# Patient Record
Sex: FEMALE | Race: WHITE | NOT HISPANIC OR LATINO | Employment: OTHER | URBAN - NONMETROPOLITAN AREA
[De-identification: names, ages, dates, MRNs, and addresses within clinical notes are randomized per-mention and may not be internally consistent; named-entity substitution may affect disease eponyms.]

---

## 2017-01-25 DIAGNOSIS — K63.5 COLON POLYPS: Primary | ICD-10-CM

## 2017-02-06 ENCOUNTER — ANESTHESIA EVENT (OUTPATIENT)
Dept: GASTROENTEROLOGY | Facility: HOSPITAL | Age: 72
End: 2017-02-06

## 2017-02-07 ENCOUNTER — ANESTHESIA (OUTPATIENT)
Dept: GASTROENTEROLOGY | Facility: HOSPITAL | Age: 72
End: 2017-02-07

## 2017-02-07 ENCOUNTER — HOSPITAL ENCOUNTER (OUTPATIENT)
Facility: HOSPITAL | Age: 72
Setting detail: HOSPITAL OUTPATIENT SURGERY
Discharge: HOME | End: 2017-02-07
Attending: INTERNAL MEDICINE | Admitting: INTERNAL MEDICINE

## 2017-02-07 VITALS
HEART RATE: 57 BPM | SYSTOLIC BLOOD PRESSURE: 135 MMHG | WEIGHT: 138 LBS | TEMPERATURE: 98.1 F | DIASTOLIC BLOOD PRESSURE: 66 MMHG | BODY MASS INDEX: 24.45 KG/M2 | OXYGEN SATURATION: 99 % | RESPIRATION RATE: 15 BRPM | HEIGHT: 63 IN

## 2017-02-07 DIAGNOSIS — K63.5 COLON POLYPS: ICD-10-CM

## 2017-02-07 PROCEDURE — 0DBK8ZX EXCISION OF ASCENDING COLON, VIA NATURAL OR ARTIFICIAL OPENING ENDOSCOPIC, DIAGNOSTIC: ICD-10-PCS | Performed by: INTERNAL MEDICINE

## 2017-02-07 RX ORDER — SODIUM CHLORIDE 9 MG/ML
100 INJECTION, SOLUTION INTRAVENOUS CONTINUOUS
Status: DISCONTINUED | OUTPATIENT
Start: 2017-02-07 | End: 2017-02-07 | Stop reason: HOSPADM

## 2017-02-07 RX ORDER — LIDOCAINE HYDROCHLORIDE 20 MG/ML
INJECTION, SOLUTION INFILTRATION; PERINEURAL AS NEEDED
Status: DISCONTINUED | OUTPATIENT
Start: 2017-02-07 | End: 2017-02-07 | Stop reason: SURG

## 2017-02-07 RX ORDER — PROPOFOL 10 MG/ML
VIAL (ML) INTRAVENOUS AS NEEDED
Status: DISCONTINUED | OUTPATIENT
Start: 2017-02-07 | End: 2017-02-07 | Stop reason: SURG

## 2017-02-07 RX ORDER — SODIUM CHLORIDE 0.9 % (FLUSH) 0.9 %
1-10 SYRINGE (ML) INJECTION AS NEEDED
Status: DISCONTINUED | OUTPATIENT
Start: 2017-02-07 | End: 2017-02-07 | Stop reason: HOSPADM

## 2017-02-07 RX ADMIN — LIDOCAINE HYDROCHLORIDE 50 MG: 20 INJECTION, SOLUTION INFILTRATION; PERINEURAL at 09:25

## 2017-02-07 RX ADMIN — PROPOFOL 50 MG: 10 INJECTION, EMULSION INTRAVENOUS at 09:32

## 2017-02-07 RX ADMIN — PROPOFOL 50 MG: 10 INJECTION, EMULSION INTRAVENOUS at 09:35

## 2017-02-07 RX ADMIN — PROPOFOL 50 MG: 10 INJECTION, EMULSION INTRAVENOUS at 09:40

## 2017-02-07 RX ADMIN — PROPOFOL 100 MG: 10 INJECTION, EMULSION INTRAVENOUS at 09:29

## 2017-02-07 RX ADMIN — SODIUM CHLORIDE 100 ML/HR: 9 INJECTION, SOLUTION INTRAVENOUS at 08:45

## 2017-02-07 RX ADMIN — PROPOFOL 50 MG: 10 INJECTION, EMULSION INTRAVENOUS at 09:25

## 2017-02-07 NOTE — DISCHARGE INSTRUCTIONS
Hold your Plavix for 5 days due to the removal of three polyps.  And then you may resume taking Plavix.

## 2017-02-07 NOTE — ANESTHESIA POSTPROCEDURE EVALUATION
Patient: Adela Goetz    Procedure Summary     Date Anesthesia Start Anesthesia Stop Room / Location    02/07/17 0924 0946  [facility] / [facility]       Procedure Diagnosis Surgeon Provider    COLONOSCOPY WITH ANESTHESIA (N/A ) Colon polyps  (Colon polyps [K63.5]) DO DEIRDRE Han CRNA          Anesthesia Type: No value filed.  Last vitals  BP      Temp      Pulse     Resp      SpO2        Post Anesthesia Care and Evaluation    Patient location during evaluation: PACU  Patient participation: complete - patient participated  Level of consciousness: awake and alert  Pain score: 0  Pain management: adequate  Airway patency: patent  Anesthetic complications: No anesthetic complications    Cardiovascular status: acceptable and stable  Respiratory status: acceptable and unassisted  Hydration status: acceptable

## 2017-02-07 NOTE — PLAN OF CARE
Problem: GI Endoscopy (Adult)  Goal: Signs and Symptoms of Listed Potential Problems Will be Absent or Manageable (GI Endoscopy)  Outcome: Outcome(s) achieved Date Met:  02/07/17 02/07/17 1015   GI Endoscopy   Problems Assessed (GI Endoscopy) all   Problems Present (GI Endoscopy) none

## 2017-02-07 NOTE — PLAN OF CARE
Problem: Patient Care Overview (Adult)  Goal: Discharge Needs Assessment  Outcome: Outcome(s) achieved Date Met:  02/07/17 02/07/17 1016   Discharge Needs Assessment   Concerns To Be Addressed no discharge needs identified   Equipment Needed After Discharge none   Current Health   Anticipated Changes Related to Illness none   Living Environment   Transportation Available car   Self-Care   Equipment Currently Used at Home none

## 2017-02-07 NOTE — PLAN OF CARE
Problem: Patient Care Overview (Adult)  Goal: Plan of Care Review  Outcome: Outcome(s) achieved Date Met:  02/07/17 02/07/17 1015   Coping/Psychosocial Response Interventions   Plan Of Care Reviewed With patient   Patient Care Overview   Progress improving   Outcome Evaluation   Outcome Summary/Follow up Plan discharge criteria met

## 2017-02-07 NOTE — PLAN OF CARE
Problem: Patient Care Overview (Adult)  Goal: Discharge Needs Assessment  Outcome: Outcome(s) achieved Date Met:  02/07/17 02/07/17 1016   Discharge Needs Assessment   Concerns To Be Addressed no discharge needs identified   Equipment Needed After Discharge none   Current Health   Anticipated Changes Related to Illness none   Living Environment   Transportation Available car

## 2017-02-07 NOTE — ANESTHESIA PREPROCEDURE EVALUATION
Anesthesia Evaluation     Patient summary reviewed   no history of anesthetic complications:     Airway   Mallampati: II  TM distance: >3 FB  Neck ROM: full  no difficulty expected  Dental          Pulmonary - normal exam   (+) shortness of breath,   (-) not a smoker  Cardiovascular - normal exam    Patient on routine beta blocker and Beta blocker given within 24 hours of surgery    (+) hypertension poorly controlled, CAD, dysrhythmias,   (-) past MI, angina, CHF      Neuro/Psych  (+) TIA, CVA residual symptoms, numbness,    GI/Hepatic/Renal/Endo    (+)  GERD well controlled,   (-) hiatal hernia, hepatitis, liver disease, renal disease, diabetes    Musculoskeletal     (+) arthralgias, back pain, joint swelling, neck stiffness,   Abdominal   (+) obese,    Substance History   (-) alcohol use, drug use     OB/GYN negative ob/gyn ROS         Other   (+) arthritis       Other Comment: Off plavix since 2/2                              Anesthesia Plan    ASA 3     intravenous induction   Anesthetic plan and risks discussed with patient.

## 2017-02-07 NOTE — PLAN OF CARE
Problem: GI Endoscopy (Adult)  Goal: Signs and Symptoms of Listed Potential Problems Will be Absent or Manageable (GI Endoscopy)  Outcome: Ongoing (interventions implemented as appropriate)    02/07/17 0965   GI Endoscopy   Problems Assessed (GI Endoscopy) all   Problems Present (GI Endoscopy) none

## 2017-02-07 NOTE — PLAN OF CARE
Problem: Patient Care Overview (Adult)  Goal: Adult Individualization and Mutuality  Outcome: Outcome(s) achieved Date Met:  02/07/17

## 2017-02-08 ENCOUNTER — TELEPHONE (OUTPATIENT)
Dept: GASTROENTEROLOGY | Facility: CLINIC | Age: 72
End: 2017-02-08

## 2017-02-08 ENCOUNTER — HOSPITAL ENCOUNTER (INPATIENT)
Facility: HOSPITAL | Age: 72
LOS: 2 days | Discharge: HOME | End: 2017-02-11
Attending: FAMILY MEDICINE | Admitting: INTERNAL MEDICINE

## 2017-02-08 ENCOUNTER — APPOINTMENT (OUTPATIENT)
Dept: CT IMAGING | Facility: HOSPITAL | Age: 72
End: 2017-02-08

## 2017-02-08 DIAGNOSIS — J18.9 PNEUMONIA OF LEFT UPPER LOBE DUE TO INFECTIOUS ORGANISM: Primary | ICD-10-CM

## 2017-02-08 DIAGNOSIS — R41.82 ALTERED MENTAL STATUS, UNSPECIFIED ALTERED MENTAL STATUS TYPE: ICD-10-CM

## 2017-02-08 LAB
ALBUMIN SERPL-MCNC: 4 G/DL (ref 3.5–5)
ALBUMIN/GLOB SERPL: 1.1 G/DL (ref 1.1–2.5)
ALP SERPL-CCNC: 62 U/L (ref 24–120)
ALT SERPL W P-5'-P-CCNC: 36 U/L (ref 0–54)
AMYLASE SERPL-CCNC: 104 U/L (ref 30–110)
ANION GAP SERPL CALCULATED.3IONS-SCNC: 11 MMOL/L (ref 4–13)
AST SERPL-CCNC: 42 U/L (ref 7–45)
BASOPHILS # BLD AUTO: 0.02 10*3/MM3 (ref 0–0.2)
BASOPHILS NFR BLD AUTO: 0.2 % (ref 0–2)
BILIRUB SERPL-MCNC: 1.4 MG/DL (ref 0.1–1)
BUN BLD-MCNC: 16 MG/DL (ref 5–21)
BUN/CREAT SERPL: 15.2 (ref 7–25)
CALCIUM SPEC-SCNC: 9.1 MG/DL (ref 8.4–10.4)
CHLORIDE SERPL-SCNC: 98 MMOL/L (ref 98–110)
CK MB SERPL-CCNC: <0.22 NG/ML (ref 0–5)
CK SERPL-CCNC: 27 U/L (ref 0–203)
CO2 SERPL-SCNC: 24 MMOL/L (ref 24–31)
CREAT BLD-MCNC: 1.05 MG/DL (ref 0.5–1.4)
CYTO UR: NORMAL
D-LACTATE SERPL-SCNC: 1.4 MMOL/L (ref 0.5–2)
DEPRECATED RDW RBC AUTO: 46.9 FL (ref 40–54)
EOSINOPHIL # BLD AUTO: 0 10*3/MM3 (ref 0–0.7)
EOSINOPHIL NFR BLD AUTO: 0 % (ref 0–4)
ERYTHROCYTE [DISTWIDTH] IN BLOOD BY AUTOMATED COUNT: 13.5 % (ref 12–15)
ETHANOL UR QL: <0.01 %
GFR SERPL CREATININE-BSD FRML MDRD: 52 ML/MIN/1.73
GLOBULIN UR ELPH-MCNC: 3.6 GM/DL
GLUCOSE BLD-MCNC: 153 MG/DL (ref 70–100)
HCT VFR BLD AUTO: 38.7 % (ref 37–47)
HGB BLD-MCNC: 12.9 G/DL (ref 12–16)
IMM GRANULOCYTES # BLD: 0.03 10*3/MM3 (ref 0–0.03)
IMM GRANULOCYTES NFR BLD: 0.2 % (ref 0–5)
LAB AP CASE REPORT: NORMAL
LAB AP CLINICAL INFORMATION: NORMAL
LIPASE SERPL-CCNC: 156 U/L (ref 23–203)
LYMPHOCYTES # BLD AUTO: 0.64 10*3/MM3 (ref 0.72–4.86)
LYMPHOCYTES NFR BLD AUTO: 5 % (ref 15–45)
Lab: NORMAL
MCH RBC QN AUTO: 32.2 PG (ref 28–32)
MCHC RBC AUTO-ENTMCNC: 33.3 G/DL (ref 33–36)
MCV RBC AUTO: 96.5 FL (ref 82–98)
MONOCYTES # BLD AUTO: 0.78 10*3/MM3 (ref 0.19–1.3)
MONOCYTES NFR BLD AUTO: 6 % (ref 4–12)
NEUTROPHILS # BLD AUTO: 11.45 10*3/MM3 (ref 1.87–8.4)
NEUTROPHILS NFR BLD AUTO: 88.6 % (ref 39–78)
PATH REPORT.FINAL DX SPEC: NORMAL
PATH REPORT.GROSS SPEC: NORMAL
PLATELET # BLD AUTO: 215 10*3/MM3 (ref 130–400)
PMV BLD AUTO: 11 FL (ref 6–12)
POTASSIUM BLD-SCNC: 3.4 MMOL/L (ref 3.5–5.3)
PROT SERPL-MCNC: 7.6 G/DL (ref 6.3–8.7)
RBC # BLD AUTO: 4.01 10*6/MM3 (ref 4.2–5.4)
SODIUM BLD-SCNC: 133 MMOL/L (ref 135–145)
TROPONIN I SERPL-MCNC: 0.02 NG/ML (ref 0–0.03)
WBC NRBC COR # BLD: 12.92 10*3/MM3 (ref 4.8–10.8)

## 2017-02-08 PROCEDURE — 80053 COMPREHEN METABOLIC PANEL: CPT | Performed by: FAMILY MEDICINE

## 2017-02-08 PROCEDURE — 85025 COMPLETE CBC W/AUTO DIFF WBC: CPT | Performed by: FAMILY MEDICINE

## 2017-02-08 PROCEDURE — 83605 ASSAY OF LACTIC ACID: CPT | Performed by: FAMILY MEDICINE

## 2017-02-08 PROCEDURE — 70450 CT HEAD/BRAIN W/O DYE: CPT

## 2017-02-08 PROCEDURE — 82553 CREATINE MB FRACTION: CPT | Performed by: FAMILY MEDICINE

## 2017-02-08 PROCEDURE — 84484 ASSAY OF TROPONIN QUANT: CPT | Performed by: FAMILY MEDICINE

## 2017-02-08 PROCEDURE — 80307 DRUG TEST PRSMV CHEM ANLYZR: CPT | Performed by: FAMILY MEDICINE

## 2017-02-08 PROCEDURE — 99285 EMERGENCY DEPT VISIT HI MDM: CPT

## 2017-02-08 PROCEDURE — 83690 ASSAY OF LIPASE: CPT | Performed by: FAMILY MEDICINE

## 2017-02-08 PROCEDURE — 87040 BLOOD CULTURE FOR BACTERIA: CPT | Performed by: FAMILY MEDICINE

## 2017-02-08 PROCEDURE — P9612 CATHETERIZE FOR URINE SPEC: HCPCS

## 2017-02-08 PROCEDURE — 82150 ASSAY OF AMYLASE: CPT | Performed by: FAMILY MEDICINE

## 2017-02-08 PROCEDURE — 82550 ASSAY OF CK (CPK): CPT | Performed by: FAMILY MEDICINE

## 2017-02-08 RX ORDER — SODIUM CHLORIDE 0.9 % (FLUSH) 0.9 %
10 SYRINGE (ML) INJECTION AS NEEDED
Status: DISCONTINUED | OUTPATIENT
Start: 2017-02-08 | End: 2017-02-11 | Stop reason: HOSPADM

## 2017-02-08 RX ADMIN — SODIUM CHLORIDE 1000 ML: 9 INJECTION, SOLUTION INTRAVENOUS at 23:45

## 2017-02-09 ENCOUNTER — APPOINTMENT (OUTPATIENT)
Dept: GENERAL RADIOLOGY | Facility: HOSPITAL | Age: 72
End: 2017-02-09

## 2017-02-09 PROBLEM — J18.9 PNEUMONIA OF LEFT UPPER LOBE DUE TO INFECTIOUS ORGANISM: Status: ACTIVE | Noted: 2017-02-09

## 2017-02-09 LAB
AMPHET+METHAMPHET UR QL: NEGATIVE
BACTERIA UR QL AUTO: ABNORMAL /HPF
BARBITURATES UR QL SCN: NEGATIVE
BENZODIAZ UR QL SCN: NEGATIVE
BILIRUB UR QL STRIP: NEGATIVE
CANNABINOIDS SERPL QL: NEGATIVE
CLARITY UR: CLEAR
COCAINE UR QL: NEGATIVE
COD CRY URNS QL: ABNORMAL /HPF
COLOR UR: YELLOW
FINE GRAN CASTS URNS QL MICRO: ABNORMAL /LPF
GLUCOSE UR STRIP-MCNC: NEGATIVE MG/DL
HGB UR QL STRIP.AUTO: ABNORMAL
HOLD SPECIMEN: NORMAL
HOLD SPECIMEN: NORMAL
HYALINE CASTS UR QL AUTO: ABNORMAL /LPF
KETONES UR QL STRIP: ABNORMAL
LEUKOCYTE ESTERASE UR QL STRIP.AUTO: NEGATIVE
METHADONE UR QL SCN: NEGATIVE
MUCOUS THREADS URNS QL MICRO: ABNORMAL /HPF
NITRITE UR QL STRIP: NEGATIVE
OPIATES UR QL: NEGATIVE
PCP UR QL SCN: NEGATIVE
PH UR STRIP.AUTO: 6 [PH] (ref 5–8)
PROT UR QL STRIP: ABNORMAL
RBC # UR: ABNORMAL /HPF
REF LAB TEST METHOD: ABNORMAL
RENAL EPI CELLS #/AREA URNS HPF: ABNORMAL /HPF
SP GR UR STRIP: >1.03 (ref 1–1.03)
SQUAMOUS #/AREA URNS HPF: ABNORMAL /HPF
UROBILINOGEN UR QL STRIP: ABNORMAL
WBC CASTS #/AREA URNS LPF: ABNORMAL /LPF
WBC UR QL AUTO: ABNORMAL /HPF
WHOLE BLOOD HOLD SPECIMEN: NORMAL
WHOLE BLOOD HOLD SPECIMEN: NORMAL

## 2017-02-09 PROCEDURE — 80307 DRUG TEST PRSMV CHEM ANLYZR: CPT | Performed by: FAMILY MEDICINE

## 2017-02-09 PROCEDURE — 94799 UNLISTED PULMONARY SVC/PX: CPT

## 2017-02-09 PROCEDURE — 25010000002 CEFTRIAXONE: Performed by: FAMILY MEDICINE

## 2017-02-09 PROCEDURE — 87899 AGENT NOS ASSAY W/OPTIC: CPT

## 2017-02-09 PROCEDURE — 86403 PARTICLE AGGLUT ANTBDY SCRN: CPT | Performed by: INTERNAL MEDICINE

## 2017-02-09 PROCEDURE — 86710 INFLUENZA VIRUS ANTIBODY: CPT | Performed by: INTERNAL MEDICINE

## 2017-02-09 PROCEDURE — 25010000002 ENOXAPARIN PER 10 MG: Performed by: INTERNAL MEDICINE

## 2017-02-09 PROCEDURE — 36415 COLL VENOUS BLD VENIPUNCTURE: CPT | Performed by: INTERNAL MEDICINE

## 2017-02-09 PROCEDURE — 63710000001 PREDNISONE PER 5 MG: Performed by: INTERNAL MEDICINE

## 2017-02-09 PROCEDURE — 71010 HC CHEST PA OR AP: CPT

## 2017-02-09 PROCEDURE — 81001 URINALYSIS AUTO W/SCOPE: CPT | Performed by: FAMILY MEDICINE

## 2017-02-09 PROCEDURE — 25010000002 LEVOFLOXACIN PER 250 MG: Performed by: INTERNAL MEDICINE

## 2017-02-09 RX ORDER — ACETAMINOPHEN 325 MG/1
650 TABLET ORAL EVERY 6 HOURS PRN
Status: DISCONTINUED | OUTPATIENT
Start: 2017-02-09 | End: 2017-02-11 | Stop reason: HOSPADM

## 2017-02-09 RX ORDER — PANTOPRAZOLE SODIUM 40 MG/1
40 TABLET, DELAYED RELEASE ORAL
Status: DISCONTINUED | OUTPATIENT
Start: 2017-02-09 | End: 2017-02-11 | Stop reason: HOSPADM

## 2017-02-09 RX ORDER — PREDNISONE 10 MG/1
10 TABLET ORAL
Status: DISCONTINUED | OUTPATIENT
Start: 2017-02-09 | End: 2017-02-11 | Stop reason: HOSPADM

## 2017-02-09 RX ORDER — LEVOTHYROXINE SODIUM 0.05 MG/1
50 TABLET ORAL
Status: DISCONTINUED | OUTPATIENT
Start: 2017-02-09 | End: 2017-02-11 | Stop reason: HOSPADM

## 2017-02-09 RX ORDER — POTASSIUM CHLORIDE 750 MG/1
40 CAPSULE, EXTENDED RELEASE ORAL ONCE
Status: COMPLETED | OUTPATIENT
Start: 2017-02-09 | End: 2017-02-09

## 2017-02-09 RX ORDER — ONDANSETRON 2 MG/ML
4 INJECTION INTRAMUSCULAR; INTRAVENOUS EVERY 6 HOURS PRN
Status: DISCONTINUED | OUTPATIENT
Start: 2017-02-09 | End: 2017-02-11 | Stop reason: HOSPADM

## 2017-02-09 RX ORDER — LEVOFLOXACIN 5 MG/ML
750 INJECTION, SOLUTION INTRAVENOUS EVERY 24 HOURS
Status: DISCONTINUED | OUTPATIENT
Start: 2017-02-09 | End: 2017-02-09

## 2017-02-09 RX ORDER — SODIUM CHLORIDE 0.9 % (FLUSH) 0.9 %
1-10 SYRINGE (ML) INJECTION AS NEEDED
Status: DISCONTINUED | OUTPATIENT
Start: 2017-02-09 | End: 2017-02-11 | Stop reason: HOSPADM

## 2017-02-09 RX ORDER — IPRATROPIUM BROMIDE AND ALBUTEROL SULFATE 2.5; .5 MG/3ML; MG/3ML
3 SOLUTION RESPIRATORY (INHALATION) EVERY 4 HOURS PRN
Status: DISCONTINUED | OUTPATIENT
Start: 2017-02-09 | End: 2017-02-11 | Stop reason: HOSPADM

## 2017-02-09 RX ORDER — OXYCODONE HYDROCHLORIDE AND ACETAMINOPHEN 5; 325 MG/1; MG/1
1 TABLET ORAL DAILY PRN
COMMUNITY
End: 2018-12-19

## 2017-02-09 RX ORDER — SODIUM CHLORIDE 9 MG/ML
50 INJECTION, SOLUTION INTRAVENOUS CONTINUOUS
Status: DISCONTINUED | OUTPATIENT
Start: 2017-02-09 | End: 2017-02-11 | Stop reason: HOSPADM

## 2017-02-09 RX ADMIN — POTASSIUM CHLORIDE 40 MEQ: 750 CAPSULE, EXTENDED RELEASE ORAL at 17:11

## 2017-02-09 RX ADMIN — LEVOTHYROXINE SODIUM 50 MCG: 50 TABLET ORAL at 06:22

## 2017-02-09 RX ADMIN — SODIUM CHLORIDE 100 ML/HR: 9 INJECTION, SOLUTION INTRAVENOUS at 17:44

## 2017-02-09 RX ADMIN — PANTOPRAZOLE SODIUM 40 MG: 40 TABLET, DELAYED RELEASE ORAL at 06:22

## 2017-02-09 RX ADMIN — METRONIDAZOLE 500 MG: 500 INJECTION, SOLUTION INTRAVENOUS at 04:55

## 2017-02-09 RX ADMIN — TUBERCULIN PURIFIED PROTEIN DERIVATIVE 5 UNITS: 5 INJECTION, SOLUTION INTRADERMAL at 04:55

## 2017-02-09 RX ADMIN — ENOXAPARIN SODIUM 40 MG: 40 INJECTION SUBCUTANEOUS at 11:11

## 2017-02-09 RX ADMIN — PREDNISONE 10 MG: 10 TABLET ORAL at 15:06

## 2017-02-09 RX ADMIN — DOXYCYCLINE 100 MG: 100 INJECTION, POWDER, LYOPHILIZED, FOR SOLUTION INTRAVENOUS at 17:44

## 2017-02-09 RX ADMIN — ACETAMINOPHEN 650 MG: 325 TABLET ORAL at 20:57

## 2017-02-09 RX ADMIN — LEVOFLOXACIN 750 MG: 5 INJECTION, SOLUTION INTRAVENOUS at 04:55

## 2017-02-09 RX ADMIN — METRONIDAZOLE 500 MG: 500 INJECTION, SOLUTION INTRAVENOUS at 15:06

## 2017-02-09 RX ADMIN — CEFTRIAXONE 1 G: 1 INJECTION, POWDER, FOR SOLUTION INTRAMUSCULAR; INTRAVENOUS at 03:26

## 2017-02-09 NOTE — H&P
DATE:  02/09/2017  TIME:  4:52 a.m.   ADMITTING PHYSICIAN:  Royce Lopez MD for the Hospitalist Service     HISTORY OF PRESENT ILLNESS:  Mrs. Goetz is a 71-year-old  female who presents to the emergency department at [facility] due to a multiplicity complaints including excessive somnolence, fevers, chills, loss of appetite and shortness of breath. Her symptoms started approximately 2 days ago, after undergoing colonoscopy. The colonoscopy was performed by Dr. Andrea Pizano. There were no obvious intra-procedure complications and the patient was discharged home after colonoscopy in stable condition. However, since then, she has developed the above noted symptoms. In the emergency department, a chest x-ray was obtained which demonstrates right upper lobe airspace disease. This finding may represent an aspiration type pneumonia as a consequence of her colonoscopy. Mrs. Goetz will be admitted to the hospital for further evaluation and treatment. Presently, she is resting comfortably and is in no distress. She seems significantly somnolent. However, she was able to answer most of my questions without much difficulty.     REVIEW OF SYSTEMS: Otherwise unremarkable from a cardiovascular, pulmonary, gastrointestinal, genitourinary, neurologic, psychiatric, metabolic and constitutional standpoint except as noted. She relates excessive fatigue, weakness and somnolence over the past 2 days.  She also relates subjective fevers and chills. She has had no sweats. She has had poor appetite. Her weight is stable. She has had no chest pain or chest palpitations. She relates a shortness of breath, but is not able to elaborate. She has had no lower extremity edema, orthopnea, cough, wheezing, or hemoptysis. Note that throughout the interview and exam I noticed that she was having a mild nonproductive cough. She has had no abdominal pain. She relates feeling nauseous but has had no vomiting. She has had no  diarrhea or constipation. She has had no dysphagia or odynophagia. She has had no hematemesis, hematochezia or melena. She has had no flank pain, pelvic pain, hematuria, or dysuria. She has had no skin rashes or arthralgias or myalgias. She has had no headache, confusion otherwise, memory deficits or loss of consciousness. She has had no changes in her vision or hearing. She has had no acute focal motor or sensory deficits.     PAST MEDICAL HISTORY:  1.   Hypertension.   2.   Dyslipidemia.   3.   Hypothyroidism.   4.   Coronary artery disease.   5.   Cerebrovascular disease with history of stroke on 2 occasions.   6.   Rheumatoid arthritis.   7.   Osteoarthritis.   8.   Osteoporosis.     PAST SURGICAL HISTORY:  1.   Status post appendectomy.   2.   Status post bilateral tubal ligation.   3.   Status post 3-vessel coronary artery bypass grafting.   4.   Status post lumbosacral surgery.   5.   Status post right breast biopsy.     ALLERGIES: No known drug allergies.     HOME MEDICATIONS:  1.   Norvasc 2.5 mg p.o. daily.   2.   Vitamin C 500 mg p.o. daily.   3.   B complex 1 p.o. daily.   4.   Coreg 12.5 mg p.o. b.i.d.   5.   Folic acid 1 mg p.o. daily.   6.   Synthroid 0.05 mg p.o. daily.   7.   Lisinopril 10 mg p.o. daily.   8.   Methotrexate 10 mg p.o. weekly.   9.   Nitroglycerin sublingual tablets 0.4 mg p.o. p.r.n. for chest pain.   10. Oxycodone 5 mg p.o. q.i.d. p.r.n. for pain.   11. Pravachol 40 mg p.o. daily.   12. Prednisone 10 mg p.o. daily.   13. Restoril 15 mg p.o. at bedtime p.r.n. for insomnia.   14. Zinc sulfate 1 p.o. daily.     SOCIAL HISTORY: Significant for being a resident of [place], [place]. She lives with her . She has 6 sons in good health. She works part-time as a . She holds an associate's degree. She has no history of tobacco, alcohol or drug use. She describes her [employer] affiliation is Yaz[employer]. She has no recent history of travel outside this region.     She  designates her , Devon Goetz, to serve as a surrogate for healthcare matters should such become necessary.     CODE STATUS: SHE IS A FULL CODE.     FAMILY HISTORY: Significant for having 3 brothers and 3 sisters. One brother is  due to heart attack. Her surviving siblings are in apparent good health. Her father is  due to heart attack. Mother is  due to COPD.     PHYSICAL EXAMINATION:  VITAL SIGNS: Temperature is 97.4, pulse is 68, respirations are 14 and unlabored, blood pressure is 117/58 and O2 saturation is 93% breathing ambient air. Weight is 139 pounds.   GENERAL: This is a 71-year-old  female appearing her documented age. She is resting comfortably in bed. She is in no apparent distress. She is articulate in her speech. She is interactive and cooperative. She is slow to answer questions. She appears mildly sedated.   HEENT:  Head and neck exam is essentially unremarkable except as noted. I see no signs of acute trauma. Eyes, nose, and throat appear grossly unremarkable. Sclerae are clear. There is no discharge from the nostrils. Mucous membranes are moist. Posterior pharynx is unremarkable. Dentition is in good shape. Neck is supple. She has no cervical or clavicular adenopathy. She has no carotid bruits. There are no masses of the head or neck. Neck veins are not pathologically distended.   CARDIAC: Reveals S1 and S2 with a regular rhythm. She has no definite murmurs, rubs, gallops.   LUNG: Reveals bilateral breath sounds are clear to auscultation throughout. She has no rales, wheezes, or rhonchi.   ABDOMEN: Reveals bowel sounds to be present. Her abdomen is nontender, nondistended, soft.   EXTREMITIES: No lower extremity edema, erythema or calf tenderness.   NEUROLOGIC: Reveals the patient to be resting quietly but easily awakened. Cranial nerves II-XII are intact. She exhibits no definite focal, motor or sensory deficits. She seems able to move all extremities without  difficulty and at will. Her gait was not tested.   PSYCHIATRIC: Deferred due to the patient's status. As noted, she appears mildly sedated.     DIAGNOSTIC DATA: Metabolic panel demonstrates sodium of 133, potassium 3.4, chloride 98, CO2 of 24, BUN 16, creatinine 1.05, glucose 153, total calcium 9.1.     CBC demonstrates a white blood cell count of 12.9, hemoglobin 12.9, hematocrit 38.7, platelet count of 215,000.     Urine drug screen is uniformly unremarkable.     Urinalysis demonstrates a specific gravity of greater than 1.030 with a pH of 6.0. Her urine contains a trace amount of ketones, a small amount of blood and small amount of protein.     Urine microscopic examination reveals 3-5 red blood cells per high-power field and 0-2 white blood cells per high-power field.     Lactic acid is 1.4.     Amylase and lipase are unremarkable.     CPK, CK-MB and troponin levels are all unremarkable.     Alcohol level is less than 0.01.     CT study of the head without contrast demonstrates no acute abnormalities, per report of the emergency department physician. There is evidence of lacunar infarcts within the bilateral basal ganglion. There is evidence of small vessel ischemic changes. Generalized atrophy is noted. There is a calcified meningioma measuring 1.8 x 1.4 cm present. Please refer to the formal interpretation for additional particulars.     Chest x-ray demonstrates apparent right upper lobe airspace disease.     IMPRESSION:  1.   Altered mental status.   2.   Right upper lobe pneumonia, aspiration?   3.   Hypertension.   4.   Dyslipidemia.   5.   Hypothyroidism.   6.   Rheumatoid arthritis   7.   Coronary artery disease.   8.   Cerebrovascular disease.      PLAN: At this time, Mrs. Goetz will be admitted to [facility] for further evaluation and treatment. Her admitting diagnoses are as noted. Her condition at this time is judged to be stable. She will be placed on telemetry. I have asked the nursing  staff to obtain vital signs per protocol. She will be confined to bedrest with bathroom privileges with assistance. As noted, she has no known drug allergies. I have asked the nursing staff to monitor input and output. Daily weights will be obtained. Neurologic checks will be obtained q.4h. Neurochecks will be obtained every 4 hours. She will be maintained on a regular diet. IV fluids will consist of normal saline at 75 mL/h.  Oxygen will be used as needed to maintain her O2 saturation greater than 92%. She is a FULL CODE. Fall precautions are to be instituted.     ADMITTING MEDICATIONS:    1.   Levaquin 750 mg IV daily.   2.   Flagyl 500 mg IV q.8h.   3.   Prednisone 10 mg p.o. daily.   4.   Tylenol 650 mg p.o. q.6h. p.r.n. for fever and/or discomfort.   5.   DuoNeb 1 unit q.4h. p.r.n. for shortness of breath.   6.   Zofran 4 mg IV q.6h. p.r.n. for nausea and vomiting.   7.   Protonix 40 mg p.o. daily.     I will obtain followup laboratory studies in the morning.     I will continue to follow Mrs. Goetz closely through the night pending return of the hospitalist team in the morning. The nursing staff may call should they have any questions or concerns. Please refer to the medical record for additional information, orders and/or comments.        cc:         YOBANI Acuna/[id number]  D:  02/09/2017 06:08:20(Eastern Time)  T:  02/09/2017 07:51:11(Eastern Time)  Voice ID:  [id number]/Document ID:  [id number]

## 2017-02-09 NOTE — ED PROVIDER NOTES
Subjective   Patient is a 71 y.o. female presenting with altered mental status.   Altered Mental Status   Presenting symptoms: behavior changes and confusion    Presenting symptoms comment:  Sleeping alot  Severity:  Severe  Most recent episode:  2 days ago  Episode history:  Unable to specify  Timing:  Constant  Progression:  Worsening  Chronicity:  New  Context: not alcohol use, not dementia, not drug use, not head injury, not homeless, taking medications as prescribed, not nursing home resident, not recent change in medication, not recent illness and not recent infection    Associated symptoms: nausea, vomiting and weakness    Associated symptoms: no abdominal pain, normal movement, no agitation, no bladder incontinence, no decreased appetite, no depression, no difficulty breathing, no eye deviation, no fever, no hallucinations, no headaches, no light-headedness, no palpitations, no rash, no seizures, no slurred speech, no suicidal behavior and no visual change        Review of Systems   Constitutional: Negative for activity change, appetite change, chills, decreased appetite and fever.   HENT: Negative for congestion, dental problem and drooling.    Eyes: Negative for discharge and itching.   Respiratory: Negative for apnea, cough, chest tightness and shortness of breath.    Cardiovascular: Negative for chest pain and palpitations.   Gastrointestinal: Positive for nausea and vomiting. Negative for abdominal pain and diarrhea.   Endocrine: Negative for polydipsia and polyuria.   Genitourinary: Negative for bladder incontinence, difficulty urinating and dysuria.   Skin: Negative for color change, pallor and rash.   Neurological: Positive for weakness. Negative for dizziness, seizures, facial asymmetry, light-headedness and headaches.   Hematological: Negative for adenopathy. Does not bruise/bleed easily.   Psychiatric/Behavioral: Positive for confusion. Negative for agitation, behavioral problems, hallucinations  and suicidal ideas.   All other systems reviewed and are negative.      Past Medical History   Diagnosis Date   • Arthritis    • CAD (coronary artery disease)    • Cancer      basal  cell ca   • Chest pain    • Difficulty walking    • Hyperlipidemia    • Hypertension    • Osteopenia    • SOB (shortness of breath)    • Stroke      2015 april durin surgery   • TIA (transient ischemic attack)        No Known Allergies    Past Surgical History   Procedure Laterality Date   • Spine surgery     • Tubal abdominal ligation     • Appendectomy     • Hernia repair     • Cardiac catheterization     • Breast biopsy     • Breast reconstruction     • Retinal laser procedure     • Cardiac surgery     • Colonoscopy N/A 1/18/2017     Procedure: COLONOSCOPY WITH ANESTHESIA;  Surgeon: Andrae Pizano DO;  Location: [facility] ENDOSCOPY;  Service:    • Colonoscopy N/A 2/7/2017     Procedure: COLONOSCOPY WITH ANESTHESIA;  Surgeon: Andrea Pizano DO;  Location: [facility] ENDOSCOPY;  Service:        Family History   Problem Relation Age of Onset   • Thyroid cancer Other    • Coronary artery disease Other    • Hypothyroidism Other    • Emphysema Mother    • Heart disease Father    • Cancer Sister    • Cancer Brother    • No Known Problems Sister    • No Known Problems Sister    • No Known Problems Brother    • Liver cancer Maternal Grandmother        Social History     Social History   • Marital status:      Spouse name: N/A   • Number of children: N/A   • Years of education: N/A     Social History Main Topics   • Smoking status: Never Smoker   • Smokeless tobacco: Never Used   • Alcohol use No   • Drug use: No   • Sexual activity: Defer     Other Topics Concern   • None     Social History Narrative       Lab Results (last 24 hours)     Procedure Component Value Units Date/Time    CBC & Differential [[id number]] Collected:  02/08/17 2201    Specimen:  Blood Updated:  02/08/17 2250    Narrative:       The following orders were created for  panel order CBC & Differential.  Procedure                               Abnormality         Status                     ---------                               -----------         ------                     CBC Auto Differential[[id number]]         Abnormal            Final result                 Please view results for these tests on the individual orders.    Comprehensive Metabolic Panel [[id number]]  (Abnormal) Collected:  02/08/17 2201    Specimen:  Blood Updated:  02/08/17 2303     Glucose 153 (H) mg/dL      BUN 16 mg/dL      Creatinine 1.05 mg/dL      Sodium 133 (L) mmol/L      Potassium 3.4 (L) mmol/L      Chloride 98 mmol/L      CO2 24.0 mmol/L      Calcium 9.1 mg/dL      Total Protein 7.6 g/dL      Albumin 4.00 g/dL      ALT (SGPT) 36 U/L      AST (SGOT) 42 U/L      Alkaline Phosphatase 62 U/L      Total Bilirubin 1.4 (H) mg/dL      eGFR Non African Amer 52 (L) mL/min/1.73      Globulin 3.6 gm/dL      A/G Ratio 1.1 g/dL      BUN/Creatinine Ratio 15.2      Anion Gap 11.0 mmol/L     Narrative:       The MDRD GFR formula is only valid for adults with stable renal function between ages 18 and 70.    Amylase [[id number]]  (Normal) Collected:  02/08/17 2201    Specimen:  Blood Updated:  02/08/17 2303     Amylase 104 U/L     Lipase [[id number]]  (Normal) Collected:  02/08/17 2201    Specimen:  Blood Updated:  02/08/17 2303     Lipase 156 U/L     CK [[id number]]  (Normal) Collected:  02/08/17 2201    Specimen:  Blood Updated:  02/08/17 2316     Creatine Kinase 27 U/L     Troponin [[id number]]  (Normal) Collected:  02/08/17 2201    Specimen:  Blood Updated:  02/08/17 2316     Troponin I 0.019 ng/mL     CK-MB [[id number]]  (Normal) Collected:  02/08/17 2201    Specimen:  Blood Updated:  02/08/17 2316     CKMB <0.22 ng/mL     Narrative:       CKMB Index not indicated    Ethanol [[id number]]  (Normal) Collected:  02/08/17 2201    Specimen:  Blood Updated:  02/08/17 2303     Ethanol % <0.010 %     Narrative:       Not for legal  purposes. Chain of Custody not followed.     CBC Auto Differential [[id number]]  (Abnormal) Collected:  02/08/17 2201    Specimen:  Blood Updated:  02/08/17 2250     WBC 12.92 (H) 10*3/mm3      RBC 4.01 (L) 10*6/mm3      Hemoglobin 12.9 g/dL      Hematocrit 38.7 %      MCV 96.5 fL      MCH 32.2 (H) pg      MCHC 33.3 g/dL      RDW 13.5 %      RDW-SD 46.9 fl      MPV 11.0 fL      Platelets 215 10*3/mm3      Neutrophil % 88.6 (H) %      Lymphocyte % 5.0 (L) %      Monocyte % 6.0 %      Eosinophil % 0.0 %      Basophil % 0.2 %      Immature Grans % 0.2 %      Neutrophils, Absolute 11.45 (H) 10*3/mm3      Lymphocytes, Absolute 0.64 (L) 10*3/mm3      Monocytes, Absolute 0.78 10*3/mm3      Eosinophils, Absolute 0.00 10*3/mm3      Basophils, Absolute 0.02 10*3/mm3      Immature Grans, Absolute 0.03 10*3/mm3     Lactic Acid, Plasma [[id number]]  (Normal) Collected:  02/08/17 2332    Specimen:  Blood Updated:  02/08/17 2350     Lactate 1.4 mmol/L     Blood Culture [[id number]] Collected:  02/08/17 2332    Specimen:  Blood from Blood, Venous Line Updated:  02/08/17 2339    Blood Culture [[id number]] Collected:  02/08/17 2332    Specimen:  Blood from Arm, Left Updated:  02/08/17 2339    Urinalysis With / Culture If Indicated [[id number]]  (Abnormal) Collected:  02/09/17 0157    Specimen:  Urine from Urine, Catheter Updated:  02/09/17 0236     Color, UA Yellow      Appearance, UA Clear      pH, UA 6.0      Specific Gravity, UA >1.030 (H)      Glucose, UA Negative      Ketones, UA Trace (A)      Bilirubin, UA Negative      Blood, UA Small (1+) (A)      Protein, UA 30 mg/dL (1+) (A)      Leuk Esterase, UA Negative      Nitrite, UA Negative      Urobilinogen, UA 0.2 E.U./dL     Urine Drug Screen [[id number]]  (Normal) Collected:  02/09/17 0157    Specimen:  Urine from Urine, Catheter Updated:  02/09/17 0241     Amphetamine Screen, Urine Negative      Barbiturates Screen, Urine Negative      Benzodiazepine Screen, Urine Negative      Cocaine  Screen, Urine Negative      Methadone Screen, Urine Negative      Opiate Screen Negative      Phencyclidine (PCP), Urine Negative      THC, Screen, Urine Negative     Narrative:       Negative Thresholds For Drugs Screened in Urine:    Amphetamines          500 ng/ml  Barbiturates          200 ng/ml  Benzodiazepines       200 ng/ml  Cocaine               150 ng/ml  Methadone             150 ng/ml  Opiates               300 ng/ml  Phencyclidine         25 ng/ml  THC                      50 ng/ml    The normal value for all drugs tested is negative. This report includes final unconfirmed screening results.  A positive result by this assay can be, at your request, sent to the Reference Lab for confirmation by gas chromatography. Unconfirmed results must not be used for non-medical purposes, such as employment or legal testing. Clinical consideration should be applied to any drug of abuse test result, particularly when unconfirmed results are used.    Urinalysis, Microscopic Only [[id number]]  (Abnormal) Collected:  02/09/17 0157    Specimen:  Urine from Urine, Catheter Updated:  02/09/17 0236     RBC, UA 3-5 (A) /HPF      WBC, UA 0-2 (A) /HPF      Bacteria, UA Trace (A) /HPF      Squamous Epithelial Cells, UA None Seen /HPF      Renal Epithelial Cells, UA 0-2 /HPF      Hyaline Casts, UA 7-12 /LPF      WBC Casts 0-2 /LPF      Fine Granular Casts, UA 0-2 /LPF      Calcium Oxalate Crystals, UA Small/1+ /HPF      Mucus, UA Moderate/2+ (A) /HPF      Methodology Automated Microscopy           Objective   Physical Exam   Constitutional: She is oriented to person, place, and time. She appears well-developed and well-nourished.   HENT:   Head: Normocephalic and atraumatic.   Eyes: Conjunctivae and EOM are normal. Pupils are equal, round, and reactive to light.   Neck: Normal range of motion. Neck supple.   Cardiovascular: Normal rate, regular rhythm, normal heart sounds and intact distal pulses.    Pulmonary/Chest: Effort normal  "and breath sounds normal.   Abdominal: Soft. Bowel sounds are normal.   Neurological: She is alert and oriented to person, place, and time.   Skin: Skin is warm and dry.   Psychiatric: She has a normal mood and affect. Her behavior is normal.   Nursing note and vitals reviewed.      Procedures         CT Head Without Contrast    (Results Pending)   XR Chest 1 View    (Results Pending)       Visit Vitals   • /59   • Pulse 64   • Temp 97.4 °F (36.3 °C) (Temporal Artery )   • Resp 14   • Ht 63\" (160 cm)   • Wt 139 lb (63 kg)   • LMP  (LMP Unknown)   • SpO2 94%   • BMI 24.62 kg/m2       ED Course    ED Course       Medications   sodium chloride 0.9 % flush 10 mL (not administered)   tuberculin injection 5 Units (not administered)   sodium chloride 0.9 % bolus 1,000 mL (0 mL Intravenous Stopped 2/9/17 0145)   cefTRIAXone (ROCEPHIN) 1 g/100 mL 0.9% NS (MBP) (1 g Intravenous New Bag 2/9/17 0326)            MDM  Number of Diagnoses or Management Options  Altered mental status, unspecified altered mental status type: new and requires workup  Pneumonia of left upper lobe due to infectious organism: new and requires workup     Amount and/or Complexity of Data Reviewed  Clinical lab tests: ordered and reviewed  Tests in the radiology section of CPT®: ordered and reviewed  Tests in the medicine section of CPT®: ordered and reviewed  Decide to obtain previous medical records or to obtain history from someone other than the patient: yes  Review and summarize past medical records: yes  Independent visualization of images, tracings, or specimens: yes    Risk of Complications, Morbidity, and/or Mortality  Presenting problems: high  Diagnostic procedures: moderate  Management options: moderate    Patient Progress  Patient progress: improved      Final diagnoses:   Pneumonia of left upper lobe due to infectious organism   Altered mental status, unspecified altered mental status type          Heidi Melendez DO  02/09/17 " 0406       Heidi Melendez DO  02/09/17 0422

## 2017-02-09 NOTE — ED NOTES
PT HAD SCHEDULED COLONOSCOPY ON Tuesday. SINCE THE PROCEDURE PT IS QUIET AND WITHDRAWN NOT ACTING LIKE NORMAL SELF PER FAMILY. PT PRESENTS WITH NAUSEA AND VOMITING. FAMILY STATES SHE HAS NOT EATEN OR HAD ANYTHING TO DRINK SINCE Monday AFTERNOON. PT HAS A HX OF DIFFICULTY AROUSING AFTER SURGERY.      Italia Espino RN  02/08/17 0108

## 2017-02-09 NOTE — PROGRESS NOTES
"Patient admitted earlier this morning by Dr. Lopez, and his history and physical is been reviewed.  Patient recently had a colonoscopy, and states that she was doing well after this procedure but was feeling a little bit weak and fatigued.  She attributed these feelings to the medications she received for the procedure, the colon prep the night before, etc.  She states that she was able to go visit family after the procedure, was able to climb multiple flights of stairs, and was even able to do some antique shopping.  She got progressively more tired later that evening, and states that the next day she felt \"wiped out\".  She has had some nausea and a couple of episodes of \"dry heaving\".  She does not recall any episodes of aspiration.  She has had a little bit of congestion, but denies any shortness of breath.  She reports some subjective chills, but denies any fevers or rigors.  She has been having some muscle/joint pains, but is attributing those symptoms to her normal symptoms of rheumatoid arthritis, of which she takes prednisone and methotrexate on an outpatient basis.    Chest x-ray reveals a new right upper lobe infiltrate.  Patient has a mild elevation of her white blood cell count at 12.92.  Her history does not seem consistent with aspiration, and she recalls no events where she could have aspirated even post procedure.  I will go ahead and discontinue her Flagyl for now.  Continue other antibiotic therapy with doxycycline in addition to Rocephin.  I will start her on a diet this evening.  Continue to hydrate with intravenous fluids as clinically she appears somewhat dry, and does have some mild hyponatremia and hypokalemia.  We will repeat her laboratory studies in the morning tomorrow.  Please see Dr. Lopez's dictation for additional details which was completed earlier this morning.  Workup continues.    Can Gr MD  02/09/17  4:40 PM        "

## 2017-02-09 NOTE — PLAN OF CARE
Problem: Patient Care Overview (Adult)  Goal: Plan of Care Review  Outcome: Ongoing (interventions implemented as appropriate)    02/09/17 1145   Coping/Psychosocial Response Interventions   Plan Of Care Reviewed With patient;spouse   Patient Care Overview   Progress no change   Outcome Evaluation   Outcome Summary/Follow up Plan VSS, no complaints of SOA or pain, pt reports she is hungry and eager to eat, currently NPO, awaiting MD assessment, initiating plan of care, will continue to monitor.        Goal: Adult Individualization and Mutuality  Outcome: Ongoing (interventions implemented as appropriate)  Goal: Discharge Needs Assessment  Outcome: Ongoing (interventions implemented as appropriate)    Problem: Pneumonia (Adult)  Goal: Signs and Symptoms of Listed Potential Problems Will be Absent or Manageable (Pneumonia)  Outcome: Ongoing (interventions implemented as appropriate)

## 2017-02-10 LAB
ALBUMIN SERPL-MCNC: 2.9 G/DL (ref 3.5–5)
ALBUMIN/GLOB SERPL: 1 G/DL (ref 1.1–2.5)
ALP SERPL-CCNC: 48 U/L (ref 24–120)
ALT SERPL W P-5'-P-CCNC: 35 U/L (ref 0–54)
ANION GAP SERPL CALCULATED.3IONS-SCNC: 7 MMOL/L (ref 4–13)
AST SERPL-CCNC: 27 U/L (ref 7–45)
BILIRUB SERPL-MCNC: 0.5 MG/DL (ref 0.1–1)
BUN BLD-MCNC: 12 MG/DL (ref 5–21)
BUN/CREAT SERPL: 16.2 (ref 7–25)
CALCIUM SPEC-SCNC: 9 MG/DL (ref 8.4–10.4)
CHLORIDE SERPL-SCNC: 109 MMOL/L (ref 98–110)
CO2 SERPL-SCNC: 23 MMOL/L (ref 24–31)
CREAT BLD-MCNC: 0.74 MG/DL (ref 0.5–1.4)
DEPRECATED RDW RBC AUTO: 46.4 FL (ref 40–54)
ERYTHROCYTE [DISTWIDTH] IN BLOOD BY AUTOMATED COUNT: 13.5 % (ref 12–15)
GFR SERPL CREATININE-BSD FRML MDRD: 77 ML/MIN/1.73
GLOBULIN UR ELPH-MCNC: 2.9 GM/DL
GLUCOSE BLD-MCNC: 99 MG/DL (ref 70–100)
HCT VFR BLD AUTO: 33.9 % (ref 37–47)
HGB BLD-MCNC: 11.3 G/DL (ref 12–16)
MAGNESIUM SERPL-MCNC: 2.1 MG/DL (ref 1.4–2.2)
MCH RBC QN AUTO: 31.6 PG (ref 28–32)
MCHC RBC AUTO-ENTMCNC: 33.3 G/DL (ref 33–36)
MCV RBC AUTO: 94.7 FL (ref 82–98)
PHOSPHATE SERPL-MCNC: 2.9 MG/DL (ref 2.5–4.5)
PLATELET # BLD AUTO: 173 10*3/MM3 (ref 130–400)
PMV BLD AUTO: 10.4 FL (ref 6–12)
POTASSIUM BLD-SCNC: 3.7 MMOL/L (ref 3.5–5.3)
PROT SERPL-MCNC: 5.8 G/DL (ref 6.3–8.7)
RBC # BLD AUTO: 3.58 10*6/MM3 (ref 4.2–5.4)
SODIUM BLD-SCNC: 139 MMOL/L (ref 135–145)
TSH SERPL DL<=0.05 MIU/L-ACNC: 0.54 MIU/ML (ref 0.47–4.68)
WBC NRBC COR # BLD: 6.09 10*3/MM3 (ref 4.8–10.8)

## 2017-02-10 PROCEDURE — 84443 ASSAY THYROID STIM HORMONE: CPT | Performed by: INTERNAL MEDICINE

## 2017-02-10 PROCEDURE — 63710000001 PREDNISONE PER 5 MG: Performed by: INTERNAL MEDICINE

## 2017-02-10 PROCEDURE — 25010000002 CEFTRIAXONE: Performed by: INTERNAL MEDICINE

## 2017-02-10 PROCEDURE — 80053 COMPREHEN METABOLIC PANEL: CPT | Performed by: INTERNAL MEDICINE

## 2017-02-10 PROCEDURE — 83735 ASSAY OF MAGNESIUM: CPT | Performed by: INTERNAL MEDICINE

## 2017-02-10 PROCEDURE — 85027 COMPLETE CBC AUTOMATED: CPT | Performed by: INTERNAL MEDICINE

## 2017-02-10 PROCEDURE — 84100 ASSAY OF PHOSPHORUS: CPT | Performed by: INTERNAL MEDICINE

## 2017-02-10 PROCEDURE — 25010000002 ENOXAPARIN PER 10 MG: Performed by: INTERNAL MEDICINE

## 2017-02-10 RX ORDER — OXYCODONE HYDROCHLORIDE AND ACETAMINOPHEN 5; 325 MG/1; MG/1
1 TABLET ORAL EVERY 6 HOURS PRN
Status: DISCONTINUED | OUTPATIENT
Start: 2017-02-10 | End: 2017-02-11 | Stop reason: HOSPADM

## 2017-02-10 RX ADMIN — SODIUM CHLORIDE 100 ML/HR: 9 INJECTION, SOLUTION INTRAVENOUS at 10:40

## 2017-02-10 RX ADMIN — PANTOPRAZOLE SODIUM 40 MG: 40 TABLET, DELAYED RELEASE ORAL at 05:45

## 2017-02-10 RX ADMIN — LEVOTHYROXINE SODIUM 50 MCG: 50 TABLET ORAL at 05:45

## 2017-02-10 RX ADMIN — DOXYCYCLINE 100 MG: 100 INJECTION, POWDER, LYOPHILIZED, FOR SOLUTION INTRAVENOUS at 05:45

## 2017-02-10 RX ADMIN — ENOXAPARIN SODIUM 40 MG: 40 INJECTION SUBCUTANEOUS at 09:05

## 2017-02-10 RX ADMIN — PREDNISONE 10 MG: 10 TABLET ORAL at 09:05

## 2017-02-10 RX ADMIN — CEFTRIAXONE 1 G: 1 INJECTION, POWDER, FOR SOLUTION INTRAMUSCULAR; INTRAVENOUS at 02:22

## 2017-02-10 RX ADMIN — DOXYCYCLINE 100 MG: 100 INJECTION, POWDER, LYOPHILIZED, FOR SOLUTION INTRAVENOUS at 16:51

## 2017-02-10 RX ADMIN — OXYCODONE HYDROCHLORIDE AND ACETAMINOPHEN 1 TABLET: 5; 325 TABLET ORAL at 20:52

## 2017-02-10 NOTE — PLAN OF CARE
Problem: Patient Care Overview (Adult)  Goal: Plan of Care Review  Outcome: Ongoing (interventions implemented as appropriate)    02/10/17 1600   Coping/Psychosocial Response Interventions   Plan Of Care Reviewed With patient   Patient Care Overview   Progress improving   Outcome Evaluation   Outcome Summary/Follow up Plan pt doing well today and states she is feeling better. she is in 0/10 and is up with one assist       Goal: Adult Individualization and Mutuality  Outcome: Ongoing (interventions implemented as appropriate)  Goal: Discharge Needs Assessment  Outcome: Ongoing (interventions implemented as appropriate)

## 2017-02-10 NOTE — PLAN OF CARE
Problem: Patient Care Overview (Adult)  Goal: Plan of Care Review  Outcome: Ongoing (interventions implemented as appropriate)    02/10/17 0500   Coping/Psychosocial Response Interventions   Plan Of Care Reviewed With patient;spouse   Patient Care Overview   Progress no change   Outcome Evaluation   Outcome Summary/Follow up Plan mild temp. encourage ISand tylenol. no c/o pain. denies SOA. denies N/V         Problem: Pneumonia (Adult)  Goal: Signs and Symptoms of Listed Potential Problems Will be Absent or Manageable (Pneumonia)    02/10/17 0500   Pneumonia   Problems Assessed (Pneumonia) all   Problems Present (Pneumonia) none

## 2017-02-10 NOTE — PROGRESS NOTES
"    [facility]  INPATIENT PROGRESS NOTE    Length of Stay: 1  Date of Admission: 2/8/2017  Primary Care Physician: Kareem Kinney MD    Subjective   Chief Complaint: \"My legs hurt\"  HPI   Patient states that she feels better today than yesterday.  She does admit to some pain in her legs.  She feels like the pain involves her joints, and wonders if it is related to her rheumatoid arthritis.  She is tolerating her diet.  She does feel weakness, but states this is better today as compared to the past 2 days.  Denies shortness of breath.    Review of Systems     All pertinent negatives and positives are as above. All other systems have been reviewed and are negative unless otherwise stated.     Objective    Temp:  [97.4 °F (36.3 °C)-100.8 °F (38.2 °C)] 99.7 °F (37.6 °C)  Heart Rate:  [57-94] 66  Resp:  [16-18] 16  BP: (122-155)/(68-84) 145/75  Physical Exam   Constitutional: She is oriented to person, place, and time. No distress.   HENT:   Head: Normocephalic.   Mouth/Throat: No oropharyngeal exudate.   Eyes: Pupils are equal, round, and reactive to light. No scleral icterus.   Neck: Normal range of motion. No tracheal deviation present.   Pulmonary/Chest: Effort normal. No respiratory distress.   Neurological: She is alert and oriented to person, place, and time.   Skin: Skin is warm and dry. She is not diaphoretic.   Psychiatric: She has a normal mood and affect. Her behavior is normal.   Vitals reviewed.    Results Review:  I have reviewed the labs, radiology results, and diagnostic studies.    Laboratory Data:     Results from last 7 days  Lab Units 02/10/17  0442 02/08/17  2201   WBC 10*3/mm3 6.09 12.92*   HEMOGLOBIN g/dL 11.3* 12.9   HEMATOCRIT % 33.9* 38.7   PLATELETS 10*3/mm3 173 215          Results from last 7 days  Lab Units 02/10/17  0442 02/08/17  2201   SODIUM mmol/L 139 133*   POTASSIUM mmol/L 3.7 3.4*   CHLORIDE mmol/L 109 98   TOTAL CO2 mmol/L 23.0* " 24.0   BUN mg/dL 12 16   CREATININE mg/dL 0.74 1.05   CALCIUM mg/dL 9.0 9.1   BILIRUBIN mg/dL 0.5 1.4*   ALK PHOS U/L 48 62   ALT (SGPT) U/L 35 36   AST (SGOT) U/L 27 42   GLUCOSE mg/dL 99 153*       Culture Data:   BLOOD CULTURE   Date Value Ref Range Status   02/08/2017 No growth at 24 hours  Preliminary   02/08/2017 No growth at 24 hours  Preliminary       Radiology Data:   Imaging Results (last 24 hours)     ** No results found for the last 24 hours. **          I have reviewed the patient current medications.     Assessment/Plan     Hospital Problem List     Pneumonia of left upper lobe due to infectious organism        Assessment:  1.  RUL Pneumonia  2.  Leukocytosis - improving  3.  HTN  4.  Rheumatoid Arthritis - on outpatient Methotrexate and Prednisone  5.  Hypothyroidism    Plan:  1.  IV Doxy/Rocephin  2.  Holding Methotrexate  3.  Continue outpt prednisone regimen  4.  Restart Percocet for joint pain (patient thinks her RA is causing joint pain in her legs)  5.  Decrease rate of IVFs  6.  Anticipate home this weekend    Can Gr MD   02/10/17   4:02 PM

## 2017-02-10 NOTE — PROGRESS NOTES
Discharge Planning Assessment  [facility]     Patient Name: Adela Goetz  MRN: [id number]  Today's Date: 2/10/2017    Admit Date: 2/8/2017          Discharge Needs Assessment       02/10/17 1114    Living Environment    Lives With (P)  spouse    Living Arrangements (P)  house    Quality Of Family Relationships (P)  supportive    Able to Return to Prior Living Arrangements (P)  yes    Discharge Needs Assessment    Concerns To Be Addressed (P)  no discharge needs identified;denies needs/concerns at this time    Readmission Within The Last 30 Days (P)  no previous admission in last 30 days    Anticipated Changes Related to Illness (P)  none    Equipment Currently Used at Home (P)  none    Equipment Needed After Discharge (P)  none    Transportation Available (P)  family or friend will provide            Discharge Plan       02/10/17 1115    Case Management/Social Work Plan    Plan (P)  PT plans to DC home with spouse. PT refuses HH services. No DC needs anticipated at this time. SW will follow.     Patient/Family In Agreement With Plan (P)  yes        Discharge Placement     No information found                Demographic Summary     None            Functional Status     None            Psychosocial     None            Abuse/Neglect     None            Legal     None            Substance Abuse     None            Patient Forms     None          Dina Mistry

## 2017-02-11 VITALS
HEART RATE: 70 BPM | RESPIRATION RATE: 14 BRPM | HEIGHT: 63 IN | WEIGHT: 139 LBS | TEMPERATURE: 99.1 F | BODY MASS INDEX: 24.63 KG/M2 | DIASTOLIC BLOOD PRESSURE: 83 MMHG | SYSTOLIC BLOOD PRESSURE: 158 MMHG | OXYGEN SATURATION: 96 %

## 2017-02-11 PROCEDURE — 25010000002 CEFTRIAXONE: Performed by: INTERNAL MEDICINE

## 2017-02-11 PROCEDURE — 25010000002 ENOXAPARIN PER 10 MG: Performed by: INTERNAL MEDICINE

## 2017-02-11 PROCEDURE — 63710000001 PREDNISONE PER 5 MG: Performed by: INTERNAL MEDICINE

## 2017-02-11 RX ORDER — CEFDINIR 300 MG/1
300 CAPSULE ORAL 2 TIMES DAILY
Qty: 10 CAPSULE | Refills: 0 | Status: SHIPPED | OUTPATIENT
Start: 2017-02-11 | End: 2017-06-21

## 2017-02-11 RX ORDER — TOBRAMYCIN AND DEXAMETHASONE 3; 1 MG/ML; MG/ML
1 SUSPENSION/ DROPS OPHTHALMIC
Status: DISCONTINUED | OUTPATIENT
Start: 2017-02-11 | End: 2017-02-11 | Stop reason: HOSPADM

## 2017-02-11 RX ORDER — TOBRAMYCIN AND DEXAMETHASONE 3; 1 MG/ML; MG/ML
1 SUSPENSION/ DROPS OPHTHALMIC
Refills: 0
Start: 2017-02-11 | End: 2017-06-21

## 2017-02-11 RX ORDER — DOXYCYCLINE 50 MG/1
100 CAPSULE ORAL 2 TIMES DAILY
Qty: 10 CAPSULE | Refills: 0 | Status: SHIPPED | OUTPATIENT
Start: 2017-02-11 | End: 2017-06-21

## 2017-02-11 RX ADMIN — PANTOPRAZOLE SODIUM 40 MG: 40 TABLET, DELAYED RELEASE ORAL at 05:41

## 2017-02-11 RX ADMIN — PREDNISONE 10 MG: 10 TABLET ORAL at 09:09

## 2017-02-11 RX ADMIN — CEFTRIAXONE 1 G: 1 INJECTION, POWDER, FOR SOLUTION INTRAMUSCULAR; INTRAVENOUS at 03:24

## 2017-02-11 RX ADMIN — LEVOTHYROXINE SODIUM 50 MCG: 50 TABLET ORAL at 05:41

## 2017-02-11 RX ADMIN — DOXYCYCLINE 100 MG: 100 INJECTION, POWDER, LYOPHILIZED, FOR SOLUTION INTRAVENOUS at 05:33

## 2017-02-11 RX ADMIN — ENOXAPARIN SODIUM 40 MG: 40 INJECTION SUBCUTANEOUS at 09:09

## 2017-02-11 RX ADMIN — SODIUM CHLORIDE 50 ML/HR: 9 INJECTION, SOLUTION INTRAVENOUS at 03:00

## 2017-02-11 NOTE — PLAN OF CARE
Problem: Patient Care Overview (Adult)  Goal: Plan of Care Review  Outcome: Ongoing (interventions implemented as appropriate)    02/11/17 0405   Coping/Psychosocial Response Interventions   Plan Of Care Reviewed With patient   Patient Care Overview   Progress progress toward functional goals as expected   Outcome Evaluation   Outcome Summary/Follow up Plan PT ASTATED SHE WAS GOING HOME TODAY ,CONT ON ANTIBODICS . B/P 179/ 81       Goal: Adult Individualization and Mutuality  Outcome: Ongoing (interventions implemented as appropriate)  Goal: Discharge Needs Assessment  Outcome: Ongoing (interventions implemented as appropriate)    Problem: Pneumonia (Adult)  Goal: Signs and Symptoms of Listed Potential Problems Will be Absent or Manageable (Pneumonia)  Outcome: Ongoing (interventions implemented as appropriate)

## 2017-02-11 NOTE — DISCHARGE SUMMARY
[facility] Medicine Services  DISCHARGE SUMMARY       Date of Admission: 2/8/2017  Date of Discharge:  2/11/2017  Primary Care Physician: Kareem Kinney MD    Discharge Diagnoses:  Hospital Problem List     Pneumonia of right upper lobe due to infectious organism   Leukocytosis - improving  HTN  Rheumatoid Arthritis - on outpatient Methotrexate and Prednisone  Hypothyroidism       Procedures Performed: None    Pertinent Test Results:   Lab Results (last 7 days)     CBC Auto Differential [[id number]]  (Abnormal) Collected:  02/08/17 2201    Specimen:  Blood Updated:  02/08/17 2250     WBC 12.92 (H) 10*3/mm3      RBC 4.01 (L) 10*6/mm3      Hemoglobin 12.9 g/dL      Hematocrit 38.7 %      MCV 96.5 fL      MCH 32.2 (H) pg      MCHC 33.3 g/dL      RDW 13.5 %      RDW-SD 46.9 fl      MPV 11.0 fL      Platelets 215 10*3/mm3      Neutrophil % 88.6 (H) %      Lymphocyte % 5.0 (L) %      Monocyte % 6.0 %      Eosinophil % 0.0 %      Basophil % 0.2 %      Immature Grans % 0.2 %      Neutrophils, Absolute 11.45 (H) 10*3/mm3      Lymphocytes, Absolute 0.64 (L) 10*3/mm3      Monocytes, Absolute 0.78 10*3/mm3      Eosinophils, Absolute 0.00 10*3/mm3      Basophils, Absolute 0.02 10*3/mm3      Immature Grans, Absolute 0.03 10*3/mm3     Comprehensive Metabolic Panel [[id number]]  (Abnormal) Collected:  02/08/17 2201    Specimen:  Blood Updated:  02/08/17 2303     Glucose 153 (H) mg/dL      BUN 16 mg/dL      Creatinine 1.05 mg/dL      Sodium 133 (L) mmol/L      Potassium 3.4 (L) mmol/L      Chloride 98 mmol/L      CO2 24.0 mmol/L      Calcium 9.1 mg/dL      Total Protein 7.6 g/dL      Albumin 4.00 g/dL      ALT (SGPT) 36 U/L      AST (SGOT) 42 U/L      Alkaline Phosphatase 62 U/L      Total Bilirubin 1.4 (H) mg/dL      eGFR Non African Amer 52 (L) mL/min/1.73      Globulin 3.6 gm/dL      A/G Ratio 1.1 g/dL      BUN/Creatinine Ratio 15.2      Anion Gap 11.0 mmol/L     Narrative:       The MDRD GFR  formula is only valid for adults with stable renal function between ages 18 and 70.    Amylase [[id number]]  (Normal) Collected:  02/08/17 2201    Specimen:  Blood Updated:  02/08/17 2303     Amylase 104 U/L     Lipase [[id number]]  (Normal) Collected:  02/08/17 2201    Specimen:  Blood Updated:  02/08/17 2303     Lipase 156 U/L     Ethanol [[id number]]  (Normal) Collected:  02/08/17 2201    Specimen:  Blood Updated:  02/08/17 2303     Ethanol % <0.010 %     Narrative:       Not for legal purposes. Chain of Custody not followed.     CK [[id number]]  (Normal) Collected:  02/08/17 2201    Specimen:  Blood Updated:  02/08/17 2316     Creatine Kinase 27 U/L     Troponin [[id number]]  (Normal) Collected:  02/08/17 2201    Specimen:  Blood Updated:  02/08/17 2316     Troponin I 0.019 ng/mL     CK-MB [[id number]]  (Normal) Collected:  02/08/17 2201    Specimen:  Blood Updated:  02/08/17 2316     CKMB <0.22 ng/mL     Narrative:       CKMB Index not indicated    Lactic Acid, Plasma [[id number]]  (Normal) Collected:  02/08/17 2332    Specimen:  Blood Updated:  02/08/17 2350     Lactate 1.4 mmol/L     Urinalysis With / Culture If Indicated [[id number]]  (Abnormal) Collected:  02/09/17 0157    Specimen:  Urine from Urine, Catheter Updated:  02/09/17 0236     Color, UA Yellow      Appearance, UA Clear      pH, UA 6.0      Specific Gravity, UA >1.030 (H)      Glucose, UA Negative      Ketones, UA Trace (A)      Bilirubin, UA Negative      Blood, UA Small (1+) (A)      Protein, UA 30 mg/dL (1+) (A)      Leuk Esterase, UA Negative      Nitrite, UA Negative      Urobilinogen, UA 0.2 E.U./dL     Urinalysis, Microscopic Only [[id number]]  (Abnormal) Collected:  02/09/17 0157    Specimen:  Urine from Urine, Catheter Updated:  02/09/17 0236     RBC, UA 3-5 (A) /HPF      WBC, UA 0-2 (A) /HPF      Bacteria, UA Trace (A) /HPF      Squamous Epithelial Cells, UA None Seen /HPF      Renal Epithelial Cells, UA 0-2 /HPF      Hyaline Casts, UA 7-12 /LPF      WBC  Casts 0-2 /LPF      Fine Granular Casts, UA 0-2 /LPF      Calcium Oxalate Crystals, UA Small/1+ /HPF      Mucus, UA Moderate/2+ (A) /HPF      Methodology Automated Microscopy     Urine Drug Screen [[id number]]  (Normal) Collected:  02/09/17 0157    Specimen:  Urine from Urine, Catheter Updated:  02/09/17 0241     Amphetamine Screen, Urine Negative      Barbiturates Screen, Urine Negative      Benzodiazepine Screen, Urine Negative      Cocaine Screen, Urine Negative      Methadone Screen, Urine Negative      Opiate Screen Negative      Phencyclidine (PCP), Urine Negative      THC, Screen, Urine Negative     Streptococcus Pneumoniae Ag [[id number]] Collected:  02/09/17 1812    Specimen:  Blood Updated:  02/09/17 1821    Influenza A / B Virus Antibodies, G / M [[id number]] Collected:  02/09/17 1812    Specimen:  Blood Updated:  02/09/17 1821    CBC (No Diff) [[id number]]  (Abnormal) Collected:  02/10/17 0442    Specimen:  Blood Updated:  02/10/17 0509     WBC 6.09 10*3/mm3      RBC 3.58 (L) 10*6/mm3      Hemoglobin 11.3 (L) g/dL      Hematocrit 33.9 (L) %      MCV 94.7 fL      MCH 31.6 pg      MCHC 33.3 g/dL      RDW 13.5 %      RDW-SD 46.4 fl      MPV 10.4 fL      Platelets 173 10*3/mm3     Magnesium [[id number]]  (Normal) Collected:  02/10/17 0442    Specimen:  Blood Updated:  02/10/17 0534     Magnesium 2.1 mg/dL     Phosphorus [[id number]]  (Normal) Collected:  02/10/17 0442    Specimen:  Blood Updated:  02/10/17 0534     Phosphorus 2.9 mg/dL     Comprehensive Metabolic Panel [[id number]]  (Abnormal) Collected:  02/10/17 0442    Specimen:  Blood Updated:  02/10/17 0541     Glucose 99 mg/dL      BUN 12 mg/dL      Creatinine 0.74 mg/dL      Sodium 139 mmol/L      Potassium 3.7 mmol/L      Chloride 109 mmol/L      CO2 23.0 (L) mmol/L      Calcium 9.0 mg/dL      Total Protein 5.8 (L) g/dL      Albumin 2.90 (L) g/dL      ALT (SGPT) 35 U/L      AST (SGOT) 27 U/L      Alkaline Phosphatase 48 U/L      Total Bilirubin 0.5 mg/dL      eGFR  Non  Amer 77 mL/min/1.73      Globulin 2.9 gm/dL      A/G Ratio 1.0 (L) g/dL      BUN/Creatinine Ratio 16.2      Anion Gap 7.0 mmol/L     Narrative:       The MDRD GFR formula is only valid for adults with stable renal function between ages 18 and 70.    TSH [[id number]]  (Normal) Collected:  02/10/17 0442    Specimen:  Blood Updated:  02/10/17 0603     TSH 0.535 mIU/mL     Blood Culture [[id number]]  (Normal) Collected:  02/08/17 2332    Specimen:  Blood from Blood, Venous Line Updated:  02/11/17 0001     Blood Culture No growth at 2 days     Blood Culture [[id number]]  (Normal) Collected:  02/08/17 2332    Specimen:  Blood from Arm, Left Updated:  02/11/17 0001     Blood Culture No growth at 2 days         Imaging Results (last 7 days)     Procedure Component Value Units Date/Time    CT Head Without Contrast [[id number]] Collected:  02/09/17 0804     Updated:  02/09/17 0809    Narrative:       EXAMINATION: CT HEAD WO CONTRAST-      2/8/2017 11:34 PM CST     HISTORY: Recent colonoscopy. Altered mental status and weakness.     In order to have a CT radiation dose as low as reasonably achievable  Automated Exposure Control was utilized for adjustment of the mA and/or  KV according to patient size.     DLP in mGycm= 1139.     Routine noncontrast head CT compared with 04/30/2015.     A preliminary NRS/vRAD report was faxed to the appropriate department  immediately after this study was performed.     Atrophy is not excessive.  Ventricles are normal.     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     No acute hemorrhage.     Unchanged dense anterior falx calcification or densely calcified  meningioma measuring 2.0 x 1.6 cm.     Summary:  1. No acute abnormality and no significant change.      This report was finalized on 02/09/2017 08:07 by Dr. Ayaan Cordero MD.    XR Chest 1 View [[id number]] Collected:  02/09/17 0708     Updated:  02/09/17 0904    Narrative:       FRONTAL UPRIGHT RADIOGRAPH OF THE CHEST  "2/9/2017 3:43 AM CST     HISTORY: Altered mental status     COMPARISON: 09/06/2016.     FINDINGS:   Ill-defined opacity in the RIGHT upper lung is most likely pneumonia.  Median sternotomy wires and atrial appendage clip are noted.      The osseous structures and surrounding soft tissues demonstrate no acute  abnormality.       Impression:       1. New opacity in the RIGHT upper lung is most likely pneumonia.  Follow-up to radiographic resolution is recommended to ensure no  underlying neoplasm.        This report was finalized on 02/09/2017 09:02 by Dr. Kareem Lin MD.          Consults: None    Chief Complaint on Day of Discharge:     Hospital Course  Patient is a 71 y.o. female presented with complaints of fevers, chills, loss of appetite and shortness of breathing.  Apparently symptoms had started 2 days prior to admission and post colonoscopy.  There was no obvious intraoral procedure complications and the patient was discharged home after colonoscopy in stable addition.  Evaluation revealed right upper lobe pneumonia, most likely aspiration as a consequence of her colonoscopy.  Patient was admitted for further treatment.  She has been receiving doxycycline and Rocephin IV.  Her stay has been uncomplicated.  She does have rheumatoid arthritis and methotrexate has been on hold, and will continue until duration of antibiotics is completed.  She has no complaints today other than weakness.  She is quite anxious to be discharged.  Family is at bedside and assures she will have help at home.  She declines home health.     Condition on Discharge:  Fatigue    Physical Exam on Discharge:  Visit Vitals   • /83 (BP Location: Left arm, Patient Position: Lying)   • Pulse 70   • Temp 99.1 °F (37.3 °C) (Oral)   • Resp 14   • Ht 62.99\" (160 cm)   • Wt 139 lb (63 kg)   • LMP  (LMP Unknown)   • SpO2 96%   • BMI 24.62 kg/m2     Physical Exam   Constitutional: She is oriented to person, place, and time. She appears " well-developed and well-nourished. No distress.   HENT:   Head: Normocephalic and atraumatic.   Eyes: Conjunctivae and EOM are normal. Pupils are equal, round, and reactive to light. No scleral icterus.   Neck: Normal range of motion. Neck supple. No JVD present. No tracheal deviation present.   Cardiovascular: Normal rate, regular rhythm, normal heart sounds and intact distal pulses.  Exam reveals no gallop.    No murmur heard.  Pulmonary/Chest: Effort normal. No respiratory distress. She has wheezes (mild scattered wheezing throughout). She has no rales.   Diminished right upper lobe   Abdominal: Soft. Bowel sounds are normal. She exhibits no distension. There is no tenderness. There is no guarding.   Musculoskeletal: Normal range of motion. She exhibits no edema.   Neurological: She is alert and oriented to person, place, and time.   No obvious deficits noted.   Skin: Skin is warm and dry. No rash noted. She is not diaphoretic. No erythema. No pallor.   Psychiatric: She has a normal mood and affect. Her behavior is normal.   Vitals reviewed.      Discharge Disposition:  Home or Self Care    Discharge Medications:   Adela Goetz   Home Medication Instructions MILES:[id number]    Printed on:02/11/17 9907   Medication Information                      amLODIPine (NORVASC) 2.5 MG tablet  Take 1 tablet by mouth daily.             Ascorbic Acid (VITAMIN C PO)  Take 1,000 mg by mouth Daily.             B Complex-C-E-Zn (BEC/ZINC PO)  Take  by mouth.             carvedilol (COREG) 12.5 MG tablet  Take 6.25 mg by mouth 2 (Two) Times a Day With Meals.             cefdinir (OMNICEF) 300 MG capsule  Take 1 capsule by mouth 2 (Two) Times a Day.             Cholecalciferol (VITAMIN D3) 5000 UNITS capsule capsule  Take 2,000 Units by mouth Daily.             doxycycline (MONODOX) 50 MG capsule  Take 2 capsules by mouth 2 (Two) Times a Day.             folic acid (FOLVITE) 1 MG tablet  Take 1 mg by mouth Daily.              levothyroxine (SYNTHROID, LEVOTHROID) 50 MCG tablet  Take 50 mcg by mouth Daily.             lisinopril (PRINIVIL,ZESTRIL) 20 MG tablet  Take 20 mg by mouth Daily.             methotrexate 2.5 MG tablet  Take 10 mg by mouth 1 (One) Time Per Week.             nitroglycerin (NITROSTAT) 0.4 MG SL tablet  Place 0.4 mg under the tongue every 5 (five) minutes as needed for chest pain. Take no more than 3 doses in 15 minutes.             oxyCODONE (OXY-IR) 5 MG capsule  Take 5 mg by mouth every 4 (four) hours as needed for moderate pain (4-6).             oxyCODONE-acetaminophen (PERCOCET) 5-325 MG per tablet  Take 1 tablet by mouth Daily As Needed for moderate pain (4-6).             pravastatin (PRAVACHOL) 40 MG tablet  Take 40 mg by mouth Daily.             predniSONE (DELTASONE) 10 MG tablet  Take 10 mg by mouth daily.             temazepam (RESTORIL) 15 MG capsule  Take 15 mg by mouth at night as needed for sleep.             Zinc Sulfate (ZINC 15 PO)  Take 50 mg by mouth Daily.                 Diet Instructions     Diet: Regular; Thin Liquids, No Restrictions       Discharge Diet:  Regular   Fluid Consistency:  Thin Liquids, No Restrictions          Discharge Care Plan / Instructions: Hold methotrexate until completion of antibiotics      Activity Instructions     Activity as Tolerated          Follow-up Appointments: Next week with primary care provider, Dr. Kareem Kinney    Test Results Pending at Discharge: None     Plan discussed with Dr. Jamie Bo.   Dr. Gil:  Patient seen and examined. Complained of right eye infection. Stated she was ready to go home. Mild irritation in right eye. Lungs clear. Patient up in chair. Plan for DC discussed with NP, agree with OP follow up. Will add Tobradex to DC medications.     Time spent in face-to-face evaluation, chart review, planning and education 40 minutes.    Angela Gr, APRN  02/11/17  9:26 AM

## 2017-02-11 NOTE — PLAN OF CARE
Problem: Patient Care Overview (Adult)  Goal: Plan of Care Review  Outcome: Ongoing (interventions implemented as appropriate)    02/11/17 1018   Coping/Psychosocial Response Interventions   Plan Of Care Reviewed With patient   Patient Care Overview   Progress improving   Outcome Evaluation   Outcome Summary/Follow up Plan pt being discharged home and has stated she feels much better today       Goal: Adult Individualization and Mutuality  Outcome: Ongoing (interventions implemented as appropriate)  Goal: Discharge Needs Assessment  Outcome: Ongoing (interventions implemented as appropriate)    Problem: Pneumonia (Adult)  Goal: Signs and Symptoms of Listed Potential Problems Will be Absent or Manageable (Pneumonia)  Outcome: Ongoing (interventions implemented as appropriate)

## 2017-02-13 LAB — S PNEUM AG SPEC QL LA: NEGATIVE

## 2017-02-14 LAB
BACTERIA SPEC AEROBE CULT: NORMAL
BACTERIA SPEC AEROBE CULT: NORMAL

## 2017-02-15 ENCOUNTER — HOSPITAL ENCOUNTER (OUTPATIENT)
Dept: GENERAL RADIOLOGY | Facility: HOSPITAL | Age: 72
Discharge: HOME | End: 2017-02-15
Admitting: INTERNAL MEDICINE

## 2017-02-15 ENCOUNTER — TRANSCRIBE ORDERS (OUTPATIENT)
Dept: GENERAL RADIOLOGY | Facility: HOSPITAL | Age: 72
End: 2017-02-15

## 2017-02-15 DIAGNOSIS — J18.9 UNRESOLVED PNEUMONIA: Primary | ICD-10-CM

## 2017-02-15 PROCEDURE — 71020 HC CHEST PA AND LATERAL: CPT

## 2017-02-16 LAB
FLUAV IGG SER-ACNC: 1.92 IV
FLUAV IGM SER QL: 0.5 IV
FLUBV IGG TITR SER: 1.79 IV
FLUBV IGM SER QL: 0.26 IV

## 2017-03-22 ENCOUNTER — HOSPITAL ENCOUNTER (OUTPATIENT)
Dept: GENERAL RADIOLOGY | Facility: HOSPITAL | Age: 72
Discharge: HOME | End: 2017-03-22
Admitting: INTERNAL MEDICINE

## 2017-03-22 ENCOUNTER — TRANSCRIBE ORDERS (OUTPATIENT)
Dept: GENERAL RADIOLOGY | Facility: HOSPITAL | Age: 72
End: 2017-03-22

## 2017-03-22 DIAGNOSIS — Z78.0 POSTMENOPAUSAL STATUS (AGE-RELATED) (NATURAL): Primary | ICD-10-CM

## 2017-03-22 PROCEDURE — 77080 DXA BONE DENSITY AXIAL: CPT

## 2017-05-03 ENCOUNTER — HOSPITAL ENCOUNTER (OUTPATIENT)
Dept: GENERAL RADIOLOGY | Age: 72
Discharge: HOME | End: 2017-05-03
Payer: MEDICARE

## 2017-05-03 DIAGNOSIS — M25.542 ARTHRALGIA OF BOTH HANDS: ICD-10-CM

## 2017-05-03 DIAGNOSIS — M25.541 ARTHRALGIA OF BOTH HANDS: ICD-10-CM

## 2017-05-03 PROCEDURE — 73130 X-RAY EXAM OF HAND: CPT

## 2017-06-15 ENCOUNTER — HOSPITAL ENCOUNTER (OUTPATIENT)
Dept: GENERAL RADIOLOGY | Facility: HOSPITAL | Age: 72
Discharge: HOME | End: 2017-06-15
Admitting: INTERNAL MEDICINE

## 2017-06-15 ENCOUNTER — TRANSCRIBE ORDERS (OUTPATIENT)
Dept: GENERAL RADIOLOGY | Facility: HOSPITAL | Age: 72
End: 2017-06-15

## 2017-06-15 DIAGNOSIS — M54.12 BRACHIAL NEURITIS OR RADICULITIS NOS: Primary | ICD-10-CM

## 2017-06-15 PROCEDURE — 72050 X-RAY EXAM NECK SPINE 4/5VWS: CPT

## 2017-06-21 ENCOUNTER — OFFICE VISIT (OUTPATIENT)
Dept: NEUROLOGY | Facility: CLINIC | Age: 72
End: 2017-06-21

## 2017-06-21 VITALS
HEART RATE: 74 BPM | WEIGHT: 139 LBS | BODY MASS INDEX: 24.63 KG/M2 | DIASTOLIC BLOOD PRESSURE: 88 MMHG | SYSTOLIC BLOOD PRESSURE: 138 MMHG | HEIGHT: 63 IN

## 2017-06-21 DIAGNOSIS — M54.12 CERVICAL RADICULOPATHY: ICD-10-CM

## 2017-06-21 DIAGNOSIS — E78.5 HYPERLIPIDEMIA, UNSPECIFIED HYPERLIPIDEMIA TYPE: ICD-10-CM

## 2017-06-21 DIAGNOSIS — I63.312 CEREBROVASCULAR ACCIDENT (CVA) DUE TO THROMBOSIS OF LEFT MIDDLE CEREBRAL ARTERY (HCC): Primary | ICD-10-CM

## 2017-06-21 DIAGNOSIS — R43.2 ALTERED TASTE: ICD-10-CM

## 2017-06-21 DIAGNOSIS — D32.9 MENINGIOMA (HCC): ICD-10-CM

## 2017-06-21 DIAGNOSIS — I10 ESSENTIAL HYPERTENSION: ICD-10-CM

## 2017-06-21 PROCEDURE — 99213 OFFICE O/P EST LOW 20 MIN: CPT | Performed by: CLINICAL NURSE SPECIALIST

## 2017-06-21 RX ORDER — CLOPIDOGREL BISULFATE 75 MG/1
75 TABLET ORAL DAILY
COMMUNITY
End: 2017-10-11 | Stop reason: SDUPTHER

## 2017-06-21 NOTE — PROGRESS NOTES
Subjective     Chief Complaint   Patient presents with   • Stroke     No new stroke symptoms       Adela Goetz is a 72 y.o. female right handed retiree.  She is here today for follow up for stroke and altered taste. She was last seen 11/2017. She has done well since then and denies new stroke symptoms. She has had no change/improvement in taste. She did see Dr. Evans to evaluation meningioma. Since december 2017 she has had progressive cervical pain radiating down left arm and is to see Dr. ANGEL Lang next week.  She is wearing a soft neck collar.     As you recall, SHe has a history of stroke in 12/14 and 4/15 perioperative during CABG.  She has had loss of taste since then.  She also has mild dis coordination of LUE.  She had seen Dr. Wynn in the past. I have reviewed test results.  She does have a history of a meningioma. en previously reviewed by Dr. Lerma.    Stroke   This is a chronic problem. The current episode started more than 1 year ago. The problem has been unchanged. Associated symptoms include arthralgias (arthritis) and weakness (mild LUE). Pertinent negatives include no chest pain, fatigue, fever, headaches, nausea, sore throat or vomiting. Associated symptoms comments: Altered taste. LUE discoordination. Treatments tried: currently taking ASA 81 mg/statin.   Brain Tumor   This is a chronic problem. The problem has been unchanged (unchanged on MRI). Associated symptoms include arthralgias (arthritis) and weakness (mild LUE). Pertinent negatives include no chest pain, fatigue, fever, headaches, nausea, sore throat or vomiting. Associated symptoms comments: None. Altered taste.        Current Outpatient Prescriptions   Medication Sig Dispense Refill   • amLODIPine (NORVASC) 2.5 MG tablet Take 1 tablet by mouth daily. 30 tablet 11   • Ascorbic Acid (VITAMIN C PO) Take 1,000 mg by mouth Daily.     • B Complex-C-E-Zn (BEC/ZINC PO) Take  by mouth.     • carvedilol (COREG) 12.5 MG tablet Take 6.25 mg by mouth  2 (Two) Times a Day With Meals.     • Cholecalciferol (VITAMIN D3) 5000 UNITS capsule capsule Take 2,000 Units by mouth Daily.     • clopidogrel (PLAVIX) 75 MG tablet Take 75 mg by mouth Daily.     • folic acid (FOLVITE) 1 MG tablet Take 1 mg by mouth Daily.     • levothyroxine (SYNTHROID, LEVOTHROID) 50 MCG tablet Take 50 mcg by mouth Daily.     • lisinopril (PRINIVIL,ZESTRIL) 20 MG tablet Take 20 mg by mouth Daily.  4   • methotrexate 2.5 MG tablet Take 4 tablets by mouth 1 (One) Time Per Week. Resume after completion of antibiotics  0   • nitroglycerin (NITROSTAT) 0.4 MG SL tablet Place 0.4 mg under the tongue every 5 (five) minutes as needed for chest pain. Take no more than 3 doses in 15 minutes.     • oxyCODONE (OXY-IR) 5 MG capsule Take 5 mg by mouth every 4 (four) hours as needed for moderate pain (4-6).     • oxyCODONE-acetaminophen (PERCOCET) 5-325 MG per tablet Take 1 tablet by mouth Daily As Needed for moderate pain (4-6).     • pravastatin (PRAVACHOL) 40 MG tablet Take 40 mg by mouth Daily.  0   • predniSONE (DELTASONE) 10 MG tablet Take 10 mg by mouth daily.     • temazepam (RESTORIL) 15 MG capsule Take 15 mg by mouth at night as needed for sleep.     • Zinc Sulfate (ZINC 15 PO) Take 50 mg by mouth Daily.       No current facility-administered medications for this visit.        Past Medical History:   Diagnosis Date   • Arthritis    • CAD (coronary artery disease)    • Cancer     basal  cell ca   • Chest pain    • Difficulty walking    • Hyperlipidemia    • Hypertension    • Osteopenia    • SOB (shortness of breath)    • Stroke     2015 april durin surgery   • TIA (transient ischemic attack)        Past Surgical History:   Procedure Laterality Date   • APPENDECTOMY     • BREAST BIOPSY     • BREAST RECONSTRUCTION     • CARDIAC CATHETERIZATION     • CARDIAC SURGERY     • COLONOSCOPY N/A 1/18/2017    Procedure: COLONOSCOPY WITH ANESTHESIA;  Surgeon: Andrea Pizano DO;  [facility];   "Service:    • COLONOSCOPY N/A 2/7/2017    Procedure: COLONOSCOPY WITH ANESTHESIA;  Surgeon: Andrea Pizano DO;  Location: [facility] ENDOSCOPY;  Service:    • HERNIA REPAIR     • RETINAL LASER PROCEDURE     • SPINE SURGERY     • TUBAL ABDOMINAL LIGATION         family history includes Cancer in her brother and sister; Coronary artery disease in her other; Emphysema in her mother; Heart disease in her father; Hypothyroidism in her other; Liver cancer in her maternal grandmother; No Known Problems in her brother, sister, and sister; Thyroid cancer in her other.    Social History   Substance Use Topics   • Smoking status: Never Smoker   • Smokeless tobacco: Never Used   • Alcohol use No       Review of Systems   Constitutional: Negative for fatigue and fever.   HENT: Negative.  Negative for sore throat.    Eyes: Negative.  Negative for visual disturbance.   Respiratory: Negative.  Negative for chest tightness and shortness of breath.    Cardiovascular: Negative.  Negative for chest pain.   Gastrointestinal: Negative.  Negative for constipation, diarrhea, nausea and vomiting.   Endocrine: Negative.    Genitourinary: Negative.    Musculoskeletal: Positive for arthralgias (arthritis).   Skin: Negative.    Allergic/Immunologic: Negative.    Neurological: Positive for weakness (mild LUE). Negative for tremors, speech difficulty and headaches.   Hematological: Negative.    Psychiatric/Behavioral: Negative.    All other systems reviewed and are negative.      Objective     /88  Pulse 74  Ht 63\" (160 cm)  Wt 139 lb (63 kg)  LMP  (LMP Unknown)  BMI 24.62 kg/m2, Body mass index is 24.62 kg/(m^2).    Physical Exam   Constitutional: She is oriented to person, place, and time. She appears well-developed and well-nourished.   HENT:   Head: Normocephalic and atraumatic.   Right Ear: External ear normal.   Left Ear: External ear normal.   Nose: Nose normal.   Mouth/Throat: Oropharynx is clear and moist.   Eyes: Conjunctivae, " EOM and lids are normal. Pupils are equal, round, and reactive to light.   Neck: Normal range of motion. Neck supple. Carotid bruit is not present.   Cardiovascular: Normal rate, regular rhythm, S1 normal, S2 normal and normal heart sounds.    Pulmonary/Chest: Effort normal and breath sounds normal.   Abdominal: Soft. Bowel sounds are normal.   Musculoskeletal: Normal range of motion.   Neurological: She is alert and oriented to person, place, and time. She has normal strength and normal reflexes. No cranial nerve deficit or sensory deficit. She displays a negative Romberg sign. Gait abnormal. Abnormal coordination: no ataxia/ tremor.    Reflex Scores:       Tricep reflexes are 2+ on the right side and 2+ on the left side.       Bicep reflexes are 2+ on the right side and 2+ on the left side.       Brachioradialis reflexes are 2+ on the right side and 2+ on the left side.       Patellar reflexes are 2+ on the right side and 2+ on the left side.       Achilles reflexes are 2+ on the right side and 2+ on the left side.  Decrease finger tapping on left compared to right  LUE strength no drift. Left  weaker than right.   Skin: Skin is warm and dry.   Psychiatric: She has a normal mood and affect. Her speech is normal and behavior is normal. Cognition and memory are normal.   Nursing note and vitals reviewed.      Results for orders placed or performed during the hospital encounter of 02/08/17   Blood Culture   Result Value Ref Range    Blood Culture No growth at 5 days    Blood Culture   Result Value Ref Range    Blood Culture No growth at 5 days    Comprehensive Metabolic Panel   Result Value Ref Range    Glucose 153 (H) 70 - 100 mg/dL    BUN 16 5 - 21 mg/dL    Creatinine 1.05 0.50 - 1.40 mg/dL    Sodium 133 (L) 135 - 145 mmol/L    Potassium 3.4 (L) 3.5 - 5.3 mmol/L    Chloride 98 98 - 110 mmol/L    CO2 24.0 24.0 - 31.0 mmol/L    Calcium 9.1 8.4 - 10.4 mg/dL    Total Protein 7.6 6.3 - 8.7 g/dL    Albumin 4.00 3.50  - 5.00 g/dL    ALT (SGPT) 36 0 - 54 U/L    AST (SGOT) 42 7 - 45 U/L    Alkaline Phosphatase 62 24 - 120 U/L    Total Bilirubin 1.4 (H) 0.1 - 1.0 mg/dL    eGFR Non African Amer 52 (L) >60 mL/min/1.73    Globulin 3.6 gm/dL    A/G Ratio 1.1 1.1 - 2.5 g/dL    BUN/Creatinine Ratio 15.2 7.0 - 25.0    Anion Gap 11.0 4.0 - 13.0 mmol/L   Urinalysis With / Culture If Indicated   Result Value Ref Range    Color, UA Yellow Yellow, Straw    Appearance, UA Clear Clear    pH, UA 6.0 5.0 - 8.0    Specific Gravity, UA >1.030 (H) 1.005 - 1.030    Glucose, UA Negative Negative    Ketones, UA Trace (A) Negative    Bilirubin, UA Negative Negative    Blood, UA Small (1+) (A) Negative    Protein, UA 30 mg/dL (1+) (A) Negative    Leuk Esterase, UA Negative Negative    Nitrite, UA Negative Negative    Urobilinogen, UA 0.2 E.U./dL 0.2 - 1.0 E.U./dL   Amylase   Result Value Ref Range    Amylase 104 30 - 110 U/L   Lipase   Result Value Ref Range    Lipase 156 23 - 203 U/L   CK   Result Value Ref Range    Creatine Kinase 27 0 - 203 U/L   Troponin   Result Value Ref Range    Troponin I 0.019 0.000 - 0.034 ng/mL   CK-MB   Result Value Ref Range    CKMB <0.22 0.00 - 5.00 ng/mL   Ethanol   Result Value Ref Range    Ethanol % <0.010 <0.010 %   Urine Drug Screen   Result Value Ref Range    Amphetamine Screen, Urine Negative Negative    Barbiturates Screen, Urine Negative Negative    Benzodiazepine Screen, Urine Negative Negative    Cocaine Screen, Urine Negative Negative    Methadone Screen, Urine Negative Negative    Opiate Screen Negative Negative    Phencyclidine (PCP), Urine Negative Negative    THC, Screen, Urine Negative Negative   Lactic Acid, Plasma   Result Value Ref Range    Lactate 1.4 0.5 - 2.0 mmol/L   CBC Auto Differential   Result Value Ref Range    WBC 12.92 (H) 4.80 - 10.80 10*3/mm3    RBC 4.01 (L) 4.20 - 5.40 10*6/mm3    Hemoglobin 12.9 12.0 - 16.0 g/dL    Hematocrit 38.7 37.0 - 47.0 %    MCV 96.5 82.0 - 98.0 fL    MCH 32.2 (H) 28.0  - 32.0 pg    MCHC 33.3 33.0 - 36.0 g/dL    RDW 13.5 12.0 - 15.0 %    RDW-SD 46.9 40.0 - 54.0 fl    MPV 11.0 6.0 - 12.0 fL    Platelets 215 130 - 400 10*3/mm3    Neutrophil % 88.6 (H) 39.0 - 78.0 %    Lymphocyte % 5.0 (L) 15.0 - 45.0 %    Monocyte % 6.0 4.0 - 12.0 %    Eosinophil % 0.0 0.0 - 4.0 %    Basophil % 0.2 0.0 - 2.0 %    Immature Grans % 0.2 0.0 - 5.0 %    Neutrophils, Absolute 11.45 (H) 1.87 - 8.40 10*3/mm3    Lymphocytes, Absolute 0.64 (L) 0.72 - 4.86 10*3/mm3    Monocytes, Absolute 0.78 0.19 - 1.30 10*3/mm3    Eosinophils, Absolute 0.00 0.00 - 0.70 10*3/mm3    Basophils, Absolute 0.02 0.00 - 0.20 10*3/mm3    Immature Grans, Absolute 0.03 0.00 - 0.03 10*3/mm3   Urinalysis, Microscopic Only   Result Value Ref Range    RBC, UA 3-5 (A) None Seen /HPF    WBC, UA 0-2 (A) None Seen /HPF    Bacteria, UA Trace (A) None Seen /HPF    Squamous Epithelial Cells, UA None Seen None Seen, 0-2 /HPF    Renal Epithelial Cells, UA 0-2 0 - 2 /HPF    Hyaline Casts, UA 7-12 None Seen /LPF    WBC Casts 0-2 None Seen /LPF    Fine Granular Casts, UA 0-2 None Seen /LPF    Calcium Oxalate Crystals, UA Small/1+ None Seen /HPF    Mucus, UA Moderate/2+ (A) None Seen, Trace /HPF    Methodology Automated Microscopy    Streptococcus Pneumoniae Ag   Result Value Ref Range    STREP PNEUMONIAE ANTIGEN Negative Negative   Influenza A / B Virus Antibodies, G / M   Result Value Ref Range    Influenza A Virus Ab, IgG 1.92 (H) <=0.89 IV    Influenza A Virus Ab, IgM 0.50 <=0.89 IV    Influenza B Virus Ab, IgG 1.79 (H) <=0.89 IV    Influenza B Virus Ab, IgM 0.26 <=0.89 IV   CBC (No Diff)   Result Value Ref Range    WBC 6.09 4.80 - 10.80 10*3/mm3    RBC 3.58 (L) 4.20 - 5.40 10*6/mm3    Hemoglobin 11.3 (L) 12.0 - 16.0 g/dL    Hematocrit 33.9 (L) 37.0 - 47.0 %    MCV 94.7 82.0 - 98.0 fL    MCH 31.6 28.0 - 32.0 pg    MCHC 33.3 33.0 - 36.0 g/dL    RDW 13.5 12.0 - 15.0 %    RDW-SD 46.4 40.0 - 54.0 fl    MPV 10.4 6.0 - 12.0 fL    Platelets 173 130 - 400  10*3/mm3   Comprehensive Metabolic Panel   Result Value Ref Range    Glucose 99 70 - 100 mg/dL    BUN 12 5 - 21 mg/dL    Creatinine 0.74 0.50 - 1.40 mg/dL    Sodium 139 135 - 145 mmol/L    Potassium 3.7 3.5 - 5.3 mmol/L    Chloride 109 98 - 110 mmol/L    CO2 23.0 (L) 24.0 - 31.0 mmol/L    Calcium 9.0 8.4 - 10.4 mg/dL    Total Protein 5.8 (L) 6.3 - 8.7 g/dL    Albumin 2.90 (L) 3.50 - 5.00 g/dL    ALT (SGPT) 35 0 - 54 U/L    AST (SGOT) 27 7 - 45 U/L    Alkaline Phosphatase 48 24 - 120 U/L    Total Bilirubin 0.5 0.1 - 1.0 mg/dL    eGFR Non African Amer 77 >60 mL/min/1.73    Globulin 2.9 gm/dL    A/G Ratio 1.0 (L) 1.1 - 2.5 g/dL    BUN/Creatinine Ratio 16.2 7.0 - 25.0    Anion Gap 7.0 4.0 - 13.0 mmol/L   Magnesium   Result Value Ref Range    Magnesium 2.1 1.4 - 2.2 mg/dL   Phosphorus   Result Value Ref Range    Phosphorus 2.9 2.5 - 4.5 mg/dL   TSH   Result Value Ref Range    TSH 0.535 0.470 - 4.680 mIU/mL   Light Blue Top   Result Value Ref Range    Extra Tube hold for add-on    Green Top (Gel)   Result Value Ref Range    Extra Tube Hold for add-ons.    Lavender Top   Result Value Ref Range    Extra Tube hold for add-on    Red Top   Result Value Ref Range    Extra Tube Hold for add-ons.         ASSESSMENT/PLAN    Diagnoses and all orders for this visit:    Cerebrovascular accident (CVA) due to thrombosis of left middle cerebral artery    Essential hypertension    Hyperlipidemia, unspecified hyperlipidemia type    Meningioma    Altered taste    Cervical radiculopathy    Other orders  -     clopidogrel (PLAVIX) 75 MG tablet; Take 75 mg by mouth Daily.      MEDICAL DECISION MAKIN. Continue with Plavix 75 mg daily for secondary stroke prevention  2. Continue with statin per PCP for LDL goal less than 70.  3. BP managed by PCP for systolic less than 130.  4. Meningioma monitored by Dr. MONIQUE Evans and patient denies LE weakness  5. Patient to see Dr. ANGEL Mcclellan next week for cervical radiculopathy  6. BMI healthy  7. Patient  does not use tobacco.  8. Patient is counseled on stroke signs and symptoms using FAST and Time Saved is Brain Saved.       allergies and all known medications/prescriptions have been reviewed using resources available on this encounter.    Return in about 6 months (around 12/21/2017).        JACLYN Santiago

## 2017-07-05 ENCOUNTER — TRANSCRIBE ORDERS (OUTPATIENT)
Dept: GENERAL RADIOLOGY | Facility: HOSPITAL | Age: 72
End: 2017-07-05

## 2017-07-05 ENCOUNTER — TRANSCRIBE ORDERS (OUTPATIENT)
Dept: ADMINISTRATIVE | Facility: HOSPITAL | Age: 72
End: 2017-07-05

## 2017-07-05 DIAGNOSIS — Z12.31 ENCOUNTER FOR SCREENING MAMMOGRAM FOR MALIGNANT NEOPLASM OF BREAST: Primary | ICD-10-CM

## 2017-07-13 RX ORDER — CLOPIDOGREL BISULFATE 75 MG/1
TABLET ORAL
Qty: 30 TABLET | Refills: 5 | Status: SHIPPED | OUTPATIENT
Start: 2017-07-13 | End: 2018-01-04 | Stop reason: SDUPTHER

## 2017-08-21 ENCOUNTER — HOSPITAL ENCOUNTER (OUTPATIENT)
Dept: MAMMOGRAPHY | Facility: HOSPITAL | Age: 72
Discharge: HOME | End: 2017-08-21

## 2017-08-21 DIAGNOSIS — Z12.31 ENCOUNTER FOR SCREENING MAMMOGRAM FOR MALIGNANT NEOPLASM OF BREAST: ICD-10-CM

## 2017-09-26 ENCOUNTER — OFFICE VISIT (OUTPATIENT)
Dept: OTOLARYNGOLOGY | Facility: CLINIC | Age: 72
End: 2017-09-26

## 2017-09-26 VITALS
BODY MASS INDEX: 22.32 KG/M2 | HEIGHT: 63 IN | TEMPERATURE: 97.7 F | DIASTOLIC BLOOD PRESSURE: 77 MMHG | HEART RATE: 58 BPM | SYSTOLIC BLOOD PRESSURE: 112 MMHG | WEIGHT: 126 LBS

## 2017-09-26 DIAGNOSIS — I63.312 CEREBROVASCULAR ACCIDENT (CVA) DUE TO THROMBOSIS OF LEFT MIDDLE CEREBRAL ARTERY (HCC): ICD-10-CM

## 2017-09-26 DIAGNOSIS — C44.91 BASAL CELL CARCINOMA: Primary | ICD-10-CM

## 2017-09-26 DIAGNOSIS — I25.810 CORONARY ARTERY DISEASE INVOLVING CORONARY BYPASS GRAFT OF NATIVE HEART WITHOUT ANGINA PECTORIS: ICD-10-CM

## 2017-09-26 PROCEDURE — 99203 OFFICE O/P NEW LOW 30 MIN: CPT | Performed by: OTOLARYNGOLOGY

## 2017-09-26 RX ORDER — CIPROFLOXACIN HYDROCHLORIDE 3.5 MG/ML
1 SOLUTION/ DROPS TOPICAL
Refills: 0 | COMMUNITY
Start: 2017-09-04 | End: 2017-10-11

## 2017-09-26 RX ORDER — PREDNISOLONE ACETATE 10 MG/ML
1 SUSPENSION/ DROPS OPHTHALMIC 4 TIMES DAILY
Refills: 0 | COMMUNITY
Start: 2017-09-04 | End: 2018-12-19

## 2017-09-26 NOTE — PROGRESS NOTES
Name:  Adela Goetz  YOB: 1945  Location: [facility]  Location Address: [address] [place] [address], [facility], [address] [place] [address]  Location Phone: [phone]    Chief Complaint  Chief Complaint   Patient presents with   • Skin Lesion     BCC right superior lateral neck       History of Present IllnessThe patient  is a 72 y.o. female who is referred by Mary Cardona MD for preoperative evaluation. She complains of a lesion of the right neck present for several months. It has been  biopsied.  Pathology demonstrated a basal cell carcinoma.                            Past Medical History:   Diagnosis Date   • Arthritis    • CAD (coronary artery disease)    • Cancer     basal  cell ca   • Chest pain    • Difficulty walking    • Hyperlipidemia    • Hypertension    • Osteopenia    • SOB (shortness of breath)    • Stroke     2015 durin surgery   • TIA (transient ischemic attack)        Past Surgical History:   Procedure Laterality Date   • APPENDECTOMY     • BREAST BIOPSY     • BREAST RECONSTRUCTION     • CARDIAC CATHETERIZATION     • CARDIAC SURGERY     • CATARACT EXTRACTION     • COLONOSCOPY N/A 2017    Procedure: COLONOSCOPY WITH ANESTHESIA;  Surgeon: Andrea Pizano DO;  Location: [facility] ENDOSCOPY;  Service:    • COLONOSCOPY N/A 2017    Procedure: COLONOSCOPY WITH ANESTHESIA;  Surgeon: Andrea Pizano DO;  Location: [facility] ENDOSCOPY;  Service:    • HERNIA REPAIR     • RETINAL LASER PROCEDURE     • SPINE SURGERY     • TUBAL ABDOMINAL LIGATION           Current Outpatient Prescriptions:   •  amLODIPine (NORVASC) 2.5 MG tablet, Take 1 tablet by mouth daily., Disp: 30 tablet, Rfl: 11  •  Ascorbic Acid (VITAMIN C PO), Take 1,000 mg by mouth Daily., Disp: , Rfl:   •  B Complex-C-E-Zn (BEC/ZINC PO), Take  by mouth., Disp: , Rfl:   •  carvedilol (COREG) 12.5 MG tablet, Take 6.25 mg by mouth 2 (Two) Times a Day With Meals., Disp: , Rfl:   •  Cholecalciferol (VITAMIN D3) 5000  UNITS capsule capsule, Take 2,000 Units by mouth Daily., Disp: , Rfl:   •  ciprofloxacin (CILOXAN) 0.3 % ophthalmic solution, , Disp: , Rfl: 0  •  clopidogrel (PLAVIX) 75 MG tablet, Take 75 mg by mouth Daily., Disp: , Rfl:   •  clopidogrel (PLAVIX) 75 MG tablet, TAKE 1 TABLET BY MOUTH DAILY, Disp: 30 tablet, Rfl: 5  •  folic acid (FOLVITE) 1 MG tablet, Take 1 mg by mouth Daily., Disp: , Rfl:   •  levothyroxine (SYNTHROID, LEVOTHROID) 50 MCG tablet, Take 50 mcg by mouth Daily., Disp: , Rfl:   •  lisinopril (PRINIVIL,ZESTRIL) 20 MG tablet, Take 20 mg by mouth Daily., Disp: , Rfl: 4  •  methotrexate 2.5 MG tablet, Take 4 tablets by mouth 1 (One) Time Per Week. Resume after completion of antibiotics, Disp: , Rfl: 0  •  nitroglycerin (NITROSTAT) 0.4 MG SL tablet, Place 0.4 mg under the tongue every 5 (five) minutes as needed for chest pain. Take no more than 3 doses in 15 minutes., Disp: , Rfl:   •  oxyCODONE (OXY-IR) 5 MG capsule, Take 5 mg by mouth every 4 (four) hours as needed for moderate pain (4-6)., Disp: , Rfl:   •  oxyCODONE-acetaminophen (PERCOCET) 5-325 MG per tablet, Take 1 tablet by mouth Daily As Needed for moderate pain (4-6)., Disp: , Rfl:   •  pravastatin (PRAVACHOL) 40 MG tablet, Take 40 mg by mouth Daily., Disp: , Rfl: 0  •  prednisoLONE acetate (PRED FORTE) 1 % ophthalmic suspension, , Disp: , Rfl: 0  •  predniSONE (DELTASONE) 10 MG tablet, Take 10 mg by mouth daily., Disp: , Rfl:   •  temazepam (RESTORIL) 15 MG capsule, Take 15 mg by mouth at night as needed for sleep., Disp: , Rfl:   •  Zinc Sulfate (ZINC 15 PO), Take 50 mg by mouth Daily., Disp: , Rfl:     Review of patient's allergies indicates no known allergies.    Family History   Problem Relation Age of Onset   • Thyroid cancer Other    • Coronary artery disease Other    • Hypothyroidism Other    • Emphysema Mother    • Heart disease Father    • Cancer Sister    • Cancer Brother    • No Known Problems Sister    • No Known Problems Sister    •  No Known Problems Brother    • Liver cancer Maternal Grandmother        Social History     Social History   • Marital status:      Spouse name: N/A   • Number of children: N/A   • Years of education: N/A     Occupational History   • Not on file.     Social History Main Topics   • Smoking status: Never Smoker   • Smokeless tobacco: Never Used   • Alcohol use No   • Drug use: No   • Sexual activity: Defer     Other Topics Concern   • Not on file     Social History Narrative       Review of Systems   Constitutional: Negative.    HENT: Negative.    Eyes: Negative.    Respiratory: Negative.    Cardiovascular: Negative.    Gastrointestinal: Negative.    Endocrine: Negative.    Genitourinary: Negative.    Musculoskeletal: Negative.    Skin:        Right neck lesion   Allergic/Immunologic: Negative.    Neurological: Negative.    Hematological: Negative.    Psychiatric/Behavioral: Negative.        Vitals:    09/26/17 1311   BP: 112/77   Pulse: 58   Temp: 97.7 °F (36.5 °C)       Objective     Physical Exam    CONSTITUTIONAL: well nourished, well-developed, alert, oriented, in no acute distress     COMMUNICATION AND VOICE: able to communicate normally, normal voice quality    HEAD: normocephalic, no lesions, atraumatic, no tenderness, no masses     FACE: appearance normal, no lesions, no tenderness, no deformities, facial motion symmetric    EYES: ocular motility normal, eyelids normal, orbits normal, no proptosis, conjunctiva normal , pupils equal, round     EARS:  Hearing: hearing to conversational voice intact bilaterally   External Ears: normal bilaterally, no lesions    NOSE:  External Nose: external nasal structure normal, no tenderness on palpation, no nasal discharge, no lesions, no evidence of trauma, nostrils patent     ORAL:  Lips: upper and lower lips without lesion     NECK:  Inspection and Palpation: neck appearance normal, no masses or tenderness, 11 mm RPP right upper neck level II    CHEST/RESPIRATORY:  normal respiratory effort     CARDIOVASCULAR: no cyanosis or edema     NEUROLOGICAL/PSYCHIATRIC: oriented to time, place and person, mood normal, affect appropriate, CN II-XII intact grossly          Assessment/Plan   Adela was seen today for skin lesion.    Diagnoses and all orders for this visit:    Basal cell carcinoma  Comments:  Right upper neck level II  Orders:  -     Case Request; Standing  -     Comprehensive Metabolic Panel; Future  -     CBC and Differential; Future  -     Protime-INR; Future  -     APTT; Future  -     ECG 12 Lead; Future  -     XR Chest 2 View; Future  -     Case Request    Cerebrovascular accident (CVA) due to thrombosis of left middle cerebral artery  -     Case Request; Standing  -     Comprehensive Metabolic Panel; Future  -     CBC and Differential; Future  -     Protime-INR; Future  -     APTT; Future  -     ECG 12 Lead; Future  -     XR Chest 2 View; Future  -     Case Request    Coronary artery disease involving coronary bypass graft of native heart without angina pectoris  -     Case Request; Standing  -     Comprehensive Metabolic Panel; Future  -     CBC and Differential; Future  -     Protime-INR; Future  -     APTT; Future  -     ECG 12 Lead; Future  -     XR Chest 2 View; Future  -     Case Request    Other orders  -     Follow Anesthesia Guidelines / Standing Orders; Future  -     Obtain Informed Consent  -     Follow Anesthesia Guidelines / Standing Orders; Standing  -     Verify NPO Status; Standing  -     Obtain Informed Consent; Standing  -     NOLA Hose - To Be Placed on Patient in Pre-Op; Standing  -     SCD (Sequential Compression Device) - To Be Placed on Patient in Pre-Op; Standing  -     NPO Diet; Standing      EXCISION LESION right upper neck level II with frozen section and possible flap or graft (Right)  Orders Placed This Encounter   Procedures   • XR Chest 2 View     Standing Status:   Future     Standing Expiration Date:   9/26/2018     Order Specific  "Question:   Reason for Exam:     Answer:   EXCISION LESION right upper neck level II with frozen section and possible flap or graft   • Comprehensive Metabolic Panel     Standing Status:   Future     Standing Expiration Date:   9/26/2018   • Protime-INR     Standing Status:   Future     Standing Expiration Date:   9/26/2018   • APTT     Standing Status:   Future     Standing Expiration Date:   9/26/2018   • Follow Anesthesia Guidelines / Standing Orders     Standing Status:   Future   • Obtain Informed Consent     EXCISION LESION with possible flap or graft-     Order Specific Question:   Informed Consent Given For     Answer:   EXCISION LESION right upper neck level II with frozen section and possible flap or graft   • ECG 12 Lead     Standing Status:   Future     Standing Expiration Date:   9/26/2018     Order Specific Question:   Reason for Exam:     Answer:   EXCISION LESION   • CBC and Differential     Standing Status:   Future     Standing Expiration Date:   9/26/2018     Order Specific Question:   Manual Differential     Answer:   No     Return for postop.       Patient Instructions   The risks, benefits and options of the procedure were explained to the patient. The possiblity of a persistent cosmetic defect as well as a \"flap\" or a graft to close the defect was discussed. The patient was instructed to stop all aspirin, NSAIDs and VIT E etc.  If appropriate, clearance with primary MD or specialist will be obtained preoperatively.  The patient was scheduled for a LOCAL/MAC Anesthesia with a frozen section for margin control.    For instructions on the proper use, care and disposal of controled substances, ask you doctor or refer to the [place] Board of Medicine website: http://kbml.[place].gov/hb1/Pages/Considerations-For-Patient-Education.aspx            "

## 2017-09-28 ENCOUNTER — TELEPHONE (OUTPATIENT)
Dept: OTOLARYNGOLOGY | Facility: CLINIC | Age: 72
End: 2017-09-28

## 2017-09-28 PROBLEM — I25.810 CORONARY ARTERY DISEASE INVOLVING CORONARY BYPASS GRAFT OF NATIVE HEART WITHOUT ANGINA PECTORIS: Status: ACTIVE | Noted: 2017-09-28

## 2017-09-28 NOTE — TELEPHONE ENCOUNTER
----- Message from JACLYN Denton sent at 9/28/2017  3:07 PM CDT -----  Ok for the patient to be off plavix of 7 days. She is to take ASA 81 mg daily while off plavix.   Thanks  Zeina      ----- Message -----     From: Sherie Alonso LPN     Sent: 9/28/2017  12:54 PM       To: JACLYN Denton    I received this message.  I did see where you follow this patient. Do you want to follow up on this?  ----- Message -----     From: Nneka Cui RN     Sent: 9/28/2017  11:49 AM       To: Sherie Alonso LPN    Patient is having an excision of lesion. She is on plavix. I need a clearance to stop Plavix 7 days prior to surgery. I first contacted Dr. Kinney and he said to contact Dr. Lerma      9/28/17 Patient notified.

## 2017-09-28 NOTE — TELEPHONE ENCOUNTER
----- Message from Nneka Cui RN sent at 9/26/2017  1:53 PM CDT -----  Call dr kinney re plavix    9/28/17 I spoke with Dr. Kinney's nurse and she states we need to get clearance from Dr. Lerma to stop blood thinner. I will contact him

## 2017-10-11 ENCOUNTER — HOSPITAL ENCOUNTER (OUTPATIENT)
Dept: GENERAL RADIOLOGY | Facility: HOSPITAL | Age: 72
Discharge: HOME | End: 2017-10-11
Admitting: OTOLARYNGOLOGY

## 2017-10-11 ENCOUNTER — APPOINTMENT (OUTPATIENT)
Dept: PREADMISSION TESTING | Facility: HOSPITAL | Age: 72
End: 2017-10-11

## 2017-10-11 VITALS
DIASTOLIC BLOOD PRESSURE: 68 MMHG | HEIGHT: 64 IN | OXYGEN SATURATION: 99 % | RESPIRATION RATE: 18 BRPM | SYSTOLIC BLOOD PRESSURE: 147 MMHG | BODY MASS INDEX: 22.53 KG/M2 | WEIGHT: 132 LBS | HEART RATE: 53 BPM

## 2017-10-11 DIAGNOSIS — I63.312 CEREBROVASCULAR ACCIDENT (CVA) DUE TO THROMBOSIS OF LEFT MIDDLE CEREBRAL ARTERY (HCC): ICD-10-CM

## 2017-10-11 DIAGNOSIS — C44.91 BASAL CELL CARCINOMA: ICD-10-CM

## 2017-10-11 DIAGNOSIS — I25.810 CORONARY ARTERY DISEASE INVOLVING CORONARY BYPASS GRAFT OF NATIVE HEART WITHOUT ANGINA PECTORIS: ICD-10-CM

## 2017-10-11 DIAGNOSIS — C44.41 BASAL CELL CARCINOMA OF SKIN OF NECK: ICD-10-CM

## 2017-10-11 LAB
ALBUMIN SERPL-MCNC: 3.8 G/DL (ref 3.5–5)
ALBUMIN/GLOB SERPL: 1.4 G/DL (ref 1.1–2.5)
ALP SERPL-CCNC: 52 U/L (ref 24–120)
ALT SERPL W P-5'-P-CCNC: 29 U/L (ref 0–54)
ANION GAP SERPL CALCULATED.3IONS-SCNC: 8 MMOL/L (ref 4–13)
APTT PPP: 28.4 SECONDS (ref 24.1–34.8)
AST SERPL-CCNC: 21 U/L (ref 7–45)
BASOPHILS # BLD AUTO: 0.01 10*3/MM3 (ref 0–0.2)
BASOPHILS NFR BLD AUTO: 0.2 % (ref 0–2)
BILIRUB SERPL-MCNC: 0.3 MG/DL (ref 0.1–1)
BUN BLD-MCNC: 23 MG/DL (ref 5–21)
BUN/CREAT SERPL: 27.4 (ref 7–25)
CALCIUM SPEC-SCNC: 9.1 MG/DL (ref 8.4–10.4)
CHLORIDE SERPL-SCNC: 109 MMOL/L (ref 98–110)
CO2 SERPL-SCNC: 27 MMOL/L (ref 24–31)
CREAT BLD-MCNC: 0.84 MG/DL (ref 0.5–1.4)
DEPRECATED RDW RBC AUTO: 52.1 FL (ref 40–54)
EOSINOPHIL # BLD AUTO: 0.05 10*3/MM3 (ref 0–0.7)
EOSINOPHIL NFR BLD AUTO: 1 % (ref 0–4)
ERYTHROCYTE [DISTWIDTH] IN BLOOD BY AUTOMATED COUNT: 14.3 % (ref 12–15)
GFR SERPL CREATININE-BSD FRML MDRD: 66 ML/MIN/1.73
GLOBULIN UR ELPH-MCNC: 2.7 GM/DL
GLUCOSE BLD-MCNC: 84 MG/DL (ref 70–100)
HCT VFR BLD AUTO: 38.6 % (ref 37–47)
HGB BLD-MCNC: 12.7 G/DL (ref 12–16)
IMM GRANULOCYTES # BLD: 0.02 10*3/MM3 (ref 0–0.03)
IMM GRANULOCYTES NFR BLD: 0.4 % (ref 0–5)
INR PPP: 0.93 (ref 0.91–1.09)
LYMPHOCYTES # BLD AUTO: 0.61 10*3/MM3 (ref 0.72–4.86)
LYMPHOCYTES NFR BLD AUTO: 11.9 % (ref 15–45)
MCH RBC QN AUTO: 32.9 PG (ref 28–32)
MCHC RBC AUTO-ENTMCNC: 32.9 G/DL (ref 33–36)
MCV RBC AUTO: 100 FL (ref 82–98)
MONOCYTES # BLD AUTO: 0.35 10*3/MM3 (ref 0.19–1.3)
MONOCYTES NFR BLD AUTO: 6.8 % (ref 4–12)
NEUTROPHILS # BLD AUTO: 4.08 10*3/MM3 (ref 1.87–8.4)
NEUTROPHILS NFR BLD AUTO: 79.7 % (ref 39–78)
PLATELET # BLD AUTO: 206 10*3/MM3 (ref 130–400)
PMV BLD AUTO: 10.9 FL (ref 6–12)
POTASSIUM BLD-SCNC: 3.9 MMOL/L (ref 3.5–5.3)
PROT SERPL-MCNC: 6.5 G/DL (ref 6.3–8.7)
PROTHROMBIN TIME: 12.7 SECONDS (ref 11.9–14.6)
RBC # BLD AUTO: 3.86 10*6/MM3 (ref 4.2–5.4)
SODIUM BLD-SCNC: 144 MMOL/L (ref 135–145)
WBC NRBC COR # BLD: 5.12 10*3/MM3 (ref 4.8–10.8)

## 2017-10-11 PROCEDURE — 36415 COLL VENOUS BLD VENIPUNCTURE: CPT

## 2017-10-11 PROCEDURE — 85610 PROTHROMBIN TIME: CPT | Performed by: OTOLARYNGOLOGY

## 2017-10-11 PROCEDURE — 93005 ELECTROCARDIOGRAM TRACING: CPT

## 2017-10-11 PROCEDURE — 85730 THROMBOPLASTIN TIME PARTIAL: CPT | Performed by: OTOLARYNGOLOGY

## 2017-10-11 PROCEDURE — 80053 COMPREHEN METABOLIC PANEL: CPT | Performed by: OTOLARYNGOLOGY

## 2017-10-11 PROCEDURE — 85025 COMPLETE CBC W/AUTO DIFF WBC: CPT | Performed by: OTOLARYNGOLOGY

## 2017-10-11 PROCEDURE — 71020 HC CHEST PA AND LATERAL: CPT

## 2017-10-11 PROCEDURE — 93010 ELECTROCARDIOGRAM REPORT: CPT | Performed by: INTERNAL MEDICINE

## 2017-10-11 NOTE — DISCHARGE INSTRUCTIONS
DAY OF SURGERY INSTRUCTIONS        YOUR SURGEON: dr villaotro    PROCEDURE: ***    DATE OF SURGERY: ***    ARRIVAL TIME: AS DIRECTED BY OFFICE    DAY OF SURGERY TAKE ONLY THESE MEDICATIONS: ***amlodipine and carvediolol        BEFORE YOU COME TO THE HOSPITAL  (Pre-op instructions)  • Do not eat, drink, smoke or chew gum after midnight the night before surgery.  This also includes no mints.  • Morning of surgery take only the medicines you have been instructed with a sip of water unless otherwise instructed  by your physician.  • Do not shave, wear makeup or dark nail polish.  • Remove all jewelry including rings.  • Leave anything you consider valuable at home.  • Leave your suitcase in the car until after your surgery.  • Bring the following with you if applicable:  o Picture ID and insurance, Medicare or Medicaid cards  o Co-pay/deductible required by insurance (cash, check, credit card)  o Copy of advance directive, living will or power-of- documents if not brought to PAT  o CPAP or BIPAP mask and tubing  o Relaxation aids (MP3 player, book, magazine)  • On the day of surgery check in at registration located at the main entrance of the hospital.       Outpatient Surgery Guidelines, Adult  Outpatient procedures are those for which the person having the procedure is allowed to go home the same day as the procedure. Various procedures are done on an outpatient basis. You should follow some general guidelines if you will be having an outpatient procedure.  LET YOUR HEALTH CARE PROVIDER KNOW ABOUT:  · Any allergies you have.  · All medicines you are taking, including vitamins, herbs, eye drops, creams, and over-the-counter medicines.  · Previous problems you or members of your family have had with the use of anesthetics.  · Any blood disorders you have.  · Previous surgeries you have had.  · Medical conditions you have.  RISKS AND COMPLICATIONS  Your health care provider will discuss possible risks and  complications with you before surgery. Common risks and complications include:    · Problems due to the use of anesthetics.  · Blood loss and replacement (does not apply to minor surgical procedures).  · Temporary increase in pain due to surgery.  · Uncorrected pain or problems that the surgery was meant to correct.  · Infection.  · New damage.  BEFORE THE PROCEDURE  · Ask your health care provider about changing or stopping your regular medicines. You may need to stop taking certain medicines in the days or weeks before the procedure.  · Stop smoking at least 2 weeks before surgery. This lowers your risk for complications during and after surgery. Ask your health care provider for help with this if needed.  · Eat your usual meals and a light supper the day before surgery. Continue fluid intake. Do not drink alcohol.  · Do not eat or drink after midnight the night before your surgery.   · Arrange for someone to take you home and to stay with you for 24 hours after the procedure. Medicine given for your procedure may affect your ability to drive or to care for yourself.  · Call your health care provider's office if you develop an illness or problem that may prevent you from safely having your procedure.  AFTER THE PROCEDURE  After surgery, you will be taken to a recovery area, where your progress will be monitored. If there are no complications, you will be allowed to go home when you are awake, stable, and taking fluids well. You may have numbness around the surgical site. Healing will take some time. You will have tenderness at the surgical site and may have some swelling and bruising. You may also have some nausea.  HOME CARE INSTRUCTIONS  · Do not drive for 24 hours, or as directed by your health care provider. Do not drive while taking prescription pain medicines.  · Do not drink alcohol for 24 hours.  · Do not make important decisions or sign legal documents for 24 hours.  · You may resume a normal diet and  activities as directed.  · Do not lift anything heavier than 10 pounds (4.5 kg) or play contact sports until your health care provider says it is okay.  · Change your bandages (dressings) as directed.  · Only take over-the-counter or prescription medicines as directed by your health care provider.  · Follow up with your health care provider as directed.  SEEK MEDICAL CARE IF:  · You have increased bleeding (more than a small spot) from the surgical site.  · You have redness, swelling, or increasing pain in the wound.  · You see pus coming from the wound.  · You have a fever.  · You notice a bad smell coming from the wound or dressing.  · You feel lightheaded or faint.  · You develop a rash.  · You have trouble breathing.  · You develop allergies.  MAKE SURE YOU:  · Understand these instructions.  · Will watch your condition.  · Will get help right away if you are not doing well or get worse.     This information is not intended to replace advice given to you by your health care provider. Make sure you discuss any questions you have with your health care provider.     Document Released: 09/12/2002 Document Revised: 05/03/2016 Document Reviewed: 05/22/2014  [employer] Interactive Patient Education ©2016 [employer]       Fall Prevention in Hospitals, Adult  As a hospital patient, your condition and the treatments you receive can increase your risk for falls. Some additional risk factors for falls in a hospital include:  · Being in an unfamiliar environment.  · Being on bed rest.  · Your surgery.  · Taking certain medicines.  · Your tubing requirements, such as intravenous (IV) therapy or catheters.  It is important that you learn how to decrease fall risks while at the hospital. Below are important tips that can help prevent falls.  SAFETY TIPS FOR PREVENTING FALLS  Talk about your risk of falling.  · Ask your health care provider why you are at risk for falling. Is it your medicine, illness, tubing placement, or  something else?  · Make a plan with your health care provider to keep you safe from falls.  · Ask your health care provider or pharmacist about side effects of your medicines. Some medicines can make you dizzy or affect your coordination.  Ask for help.  · Ask for help before getting out of bed. You may need to press your call button.  · Ask for assistance in getting safely to the toilet.  · Ask for a walker or cane to be put at your bedside. Ask that most of the side rails on your bed be placed up before your health care provider leaves the room.  · Ask family or friends to sit with you.  · Ask for things that are out of your reach, such as your glasses, hearing aids, telephone, bedside table, or call button.  Follow these tips to avoid falling:  · Stay lying or seated, rather than standing, while waiting for help.  · Wear rubber-soled slippers or shoes whenever you walk in the hospital.  · Avoid quick, sudden movements.  ¨ Change positions slowly.  ¨ Sit on the side of your bed before standing.  ¨ Stand up slowly and wait before you start to walk.  · Let your health care provider know if there is a spill on the floor.  · Pay careful attention to the medical equipment, electrical cords, and tubes around you.  · When you need help, use your call button by your bed or in the bathroom. Wait for one of your health care providers to help you.  · If you feel dizzy or unsure of your footing, return to bed and wait for assistance.  · Avoid being distracted by the TV, telephone, or another person in your room.  · Do not lean or support yourself on rolling objects, such as IV poles or bedside tables.     This information is not intended to replace advice given to you by your health care provider. Make sure you discuss any questions you have with your health care provider.     Document Released: 12/15/2001 Document Revised: 01/08/2016 Document Reviewed: 08/25/2013  [employer] Interactive Patient Education ©2016 [employer]       Surgical Site Infections FAQs  What is a Surgical Site Infection (SSI)?  A surgical site infection is an infection that occurs after surgery in the part of the body where the surgery took place. Most patients who have surgery do not develop an infection. However, infections develop in about 1 to 3 out of every 100 patients who have surgery.  Some of the common symptoms of a surgical site infection are:  · Redness and pain around the area where you had surgery  · Drainage of cloudy fluid from your surgical wound  · Fever  Can SSIs be treated?  Yes. Most surgical site infections can be treated with antibiotics. The antibiotic given to you depends on the bacteria (germs) causing the infection. Sometimes patients with SSIs also need another surgery to treat the infection.  What are some of the things that hospitals are doing to prevent SSIs?  To prevent SSIs, doctors, nurses, and other healthcare providers:  · Clean their hands and arms up to their elbows with an antiseptic agent just before the surgery.  · Clean their hands with soap and water or an alcohol-based hand rub before and after caring for each patient.  · May remove some of your hair immediately before your surgery using electric clippers if the hair is in the same area where the procedure will occur. They should not shave you with a razor.  · Wear special hair covers, masks, gowns, and gloves during surgery to keep the surgery area clean.  · Give you antibiotics before your surgery starts. In most cases, you should get antibiotics within 60 minutes before the surgery starts and the antibiotics should be stopped within 24 hours after surgery.  · Clean the skin at the site of your surgery with a special soap that kills germs.  What can I do to help prevent SSIs?  Before your surgery:  · Tell your doctor about other medical problems you may have. Health problems such as allergies, diabetes, and obesity could affect your surgery and your  treatment.  · Quit smoking. Patients who smoke get more infections. Talk to your doctor about how you can quit before your surgery.  · Do not shave near where you will have surgery. Shaving with a razor can irritate your skin and make it easier to develop an infection.  At the time of your surgery:  · Speak up if someone tries to shave you with a razor before surgery. Ask why you need to be shaved and talk with your surgeon if you have any concerns.  · Ask if you will get antibiotics before surgery.  After your surgery:  · Make sure that your healthcare providers clean their hands before examining you, either with soap and water or an alcohol-based hand rub.  · If you do not see your providers clean their hands, please ask them to do so.  · Family and friends who visit you should not touch the surgical wound or dressings.  · Family and friends should clean their hands with soap and water or an alcohol-based hand rub before and after visiting you. If you do not see them clean their hands, ask them to clean their hands.  What do I need to do when I go home from the hospital?  · Before you go home, your doctor or nurse should explain everything you need to know about taking care of your wound. Make sure you understand how to care for your wound before you leave the hospital.  · Always clean your hands before and after caring for your wound.  · Before you go home, make sure you know who to contact if you have questions or problems after you get home.  · If you have any symptoms of an infection, such as redness and pain at the surgery site, drainage, or fever, call your doctor immediately.  If you have additional questions, please ask your doctor or nurse.  Developed and co-sponsored by The Society for Healthcare Epidemiology of [place] (SHEA); Infectious Diseases Society of [place] (IDSA); American Hospital Association; Association for Professionals in Infection Control and Epidemiology (APIC); Centers for Disease  Control and Prevention (CDC); and The Joint Commission.     This information is not intended to replace advice given to you by your health care provider. Make sure you discuss any questions you have with your health care provider.     Document Released: 12/23/2014 Document Revised: 01/08/2016 Document Reviewed: 03/02/2016  [employer] Interactive Patient Education ©2016 [employer]       [facility]  CHG 4% Patient Instruction Sheet    Preparing the Skin Before Surgery  Preparing or “prepping” skin before surgery can reduce the risk of infection at the surgical site. To make the process easier,[facility] has chosen 4% Chlorhexidine Gluconate (CHG) antiseptic solution.   The steps below outline the prepping process and should be carefully followed.                                                                                                                                                      Prep the skin at the following time(s):                                                      We recommend you shower the night before surgery, and again the morning of surgery with the 4% CHG antiseptic solution using half of the bottle and a cloth each time.  Dress in clean clothes/sleepwear after showering.  See instructions below for application.          Do not apply any lotions or moisturizers.       Do not shave the area to be prepped for at least 2 days prior to surgery.    Clipping the hair may be done immediately prior to your surgery at the hospital    if needed.    Directions:  Thoroughly rinse your body with water.  Apply 4% CHG to a cloth and wash skin gently, paying special attention to the operative site.  Rinse again thoroughly.  Once you have begun using this product do not apply anything else to your skin. If itching or redness persists, rinse affected areas and discontinue use.    When using this product:  • Keep out of eyes, ears, and mouth.  • If solution should contact these areas, rinse out promptly  and thoroughly with water.  • For external use only.  • Do not use in genital area, on your face or head.      PATIENT/FAMILY/RESPONSIBLE PARTY VERBALIZES UNDERSTANDING OF ABOVE EDUCATION.  COPY OF PAIN SCALE GIVEN AND REVIEWED WITH VERBALIZED UNDERSTANDING.

## 2017-10-17 NOTE — H&P (VIEW-ONLY)
Name:  Adela Goetz  YOB: 1945  Location: [facility]  Location Address: [address] [place] [address], [facility], [address] [place] [address]  Location Phone: [phone]    Chief Complaint  Chief Complaint   Patient presents with   • Skin Lesion     BCC right superior lateral neck       History of Present IllnessThe patient  is a 72 y.o. female who is referred by Mary Cardona MD for preoperative evaluation. She complains of a lesion of the right neck present for several months. It has been  biopsied.  Pathology demonstrated a basal cell carcinoma.                            Past Medical History:   Diagnosis Date   • Arthritis    • CAD (coronary artery disease)    • Cancer     basal  cell ca   • Chest pain    • Difficulty walking    • Hyperlipidemia    • Hypertension    • Osteopenia    • SOB (shortness of breath)    • Stroke     2015 durin surgery   • TIA (transient ischemic attack)        Past Surgical History:   Procedure Laterality Date   • APPENDECTOMY     • BREAST BIOPSY     • BREAST RECONSTRUCTION     • CARDIAC CATHETERIZATION     • CARDIAC SURGERY     • CATARACT EXTRACTION     • COLONOSCOPY N/A 2017    Procedure: COLONOSCOPY WITH ANESTHESIA;  Surgeon: Andrea Pizano DO;  Location: [facility] ENDOSCOPY;  Service:    • COLONOSCOPY N/A 2017    Procedure: COLONOSCOPY WITH ANESTHESIA;  Surgeon: Andrea Pizano DO;  Location: [facility] ENDOSCOPY;  Service:    • HERNIA REPAIR     • RETINAL LASER PROCEDURE     • SPINE SURGERY     • TUBAL ABDOMINAL LIGATION           Current Outpatient Prescriptions:   •  amLODIPine (NORVASC) 2.5 MG tablet, Take 1 tablet by mouth daily., Disp: 30 tablet, Rfl: 11  •  Ascorbic Acid (VITAMIN C PO), Take 1,000 mg by mouth Daily., Disp: , Rfl:   •  B Complex-C-E-Zn (BEC/ZINC PO), Take  by mouth., Disp: , Rfl:   •  carvedilol (COREG) 12.5 MG tablet, Take 6.25 mg by mouth 2 (Two) Times a Day With Meals., Disp: , Rfl:   •  Cholecalciferol (VITAMIN D3) 5000  UNITS capsule capsule, Take 2,000 Units by mouth Daily., Disp: , Rfl:   •  ciprofloxacin (CILOXAN) 0.3 % ophthalmic solution, , Disp: , Rfl: 0  •  clopidogrel (PLAVIX) 75 MG tablet, Take 75 mg by mouth Daily., Disp: , Rfl:   •  clopidogrel (PLAVIX) 75 MG tablet, TAKE 1 TABLET BY MOUTH DAILY, Disp: 30 tablet, Rfl: 5  •  folic acid (FOLVITE) 1 MG tablet, Take 1 mg by mouth Daily., Disp: , Rfl:   •  levothyroxine (SYNTHROID, LEVOTHROID) 50 MCG tablet, Take 50 mcg by mouth Daily., Disp: , Rfl:   •  lisinopril (PRINIVIL,ZESTRIL) 20 MG tablet, Take 20 mg by mouth Daily., Disp: , Rfl: 4  •  methotrexate 2.5 MG tablet, Take 4 tablets by mouth 1 (One) Time Per Week. Resume after completion of antibiotics, Disp: , Rfl: 0  •  nitroglycerin (NITROSTAT) 0.4 MG SL tablet, Place 0.4 mg under the tongue every 5 (five) minutes as needed for chest pain. Take no more than 3 doses in 15 minutes., Disp: , Rfl:   •  oxyCODONE (OXY-IR) 5 MG capsule, Take 5 mg by mouth every 4 (four) hours as needed for moderate pain (4-6)., Disp: , Rfl:   •  oxyCODONE-acetaminophen (PERCOCET) 5-325 MG per tablet, Take 1 tablet by mouth Daily As Needed for moderate pain (4-6)., Disp: , Rfl:   •  pravastatin (PRAVACHOL) 40 MG tablet, Take 40 mg by mouth Daily., Disp: , Rfl: 0  •  prednisoLONE acetate (PRED FORTE) 1 % ophthalmic suspension, , Disp: , Rfl: 0  •  predniSONE (DELTASONE) 10 MG tablet, Take 10 mg by mouth daily., Disp: , Rfl:   •  temazepam (RESTORIL) 15 MG capsule, Take 15 mg by mouth at night as needed for sleep., Disp: , Rfl:   •  Zinc Sulfate (ZINC 15 PO), Take 50 mg by mouth Daily., Disp: , Rfl:     Review of patient's allergies indicates no known allergies.    Family History   Problem Relation Age of Onset   • Thyroid cancer Other    • Coronary artery disease Other    • Hypothyroidism Other    • Emphysema Mother    • Heart disease Father    • Cancer Sister    • Cancer Brother    • No Known Problems Sister    • No Known Problems Sister    •  No Known Problems Brother    • Liver cancer Maternal Grandmother        Social History     Social History   • Marital status:      Spouse name: N/A   • Number of children: N/A   • Years of education: N/A     Occupational History   • Not on file.     Social History Main Topics   • Smoking status: Never Smoker   • Smokeless tobacco: Never Used   • Alcohol use No   • Drug use: No   • Sexual activity: Defer     Other Topics Concern   • Not on file     Social History Narrative       Review of Systems   Constitutional: Negative.    HENT: Negative.    Eyes: Negative.    Respiratory: Negative.    Cardiovascular: Negative.    Gastrointestinal: Negative.    Endocrine: Negative.    Genitourinary: Negative.    Musculoskeletal: Negative.    Skin:        Right neck lesion   Allergic/Immunologic: Negative.    Neurological: Negative.    Hematological: Negative.    Psychiatric/Behavioral: Negative.        Vitals:    09/26/17 1311   BP: 112/77   Pulse: 58   Temp: 97.7 °F (36.5 °C)       Objective     Physical Exam    CONSTITUTIONAL: well nourished, well-developed, alert, oriented, in no acute distress     COMMUNICATION AND VOICE: able to communicate normally, normal voice quality    HEAD: normocephalic, no lesions, atraumatic, no tenderness, no masses     FACE: appearance normal, no lesions, no tenderness, no deformities, facial motion symmetric    EYES: ocular motility normal, eyelids normal, orbits normal, no proptosis, conjunctiva normal , pupils equal, round     EARS:  Hearing: hearing to conversational voice intact bilaterally   External Ears: normal bilaterally, no lesions    NOSE:  External Nose: external nasal structure normal, no tenderness on palpation, no nasal discharge, no lesions, no evidence of trauma, nostrils patent     ORAL:  Lips: upper and lower lips without lesion     NECK:  Inspection and Palpation: neck appearance normal, no masses or tenderness, 11 mm RPP right upper neck level II    CHEST/RESPIRATORY:  normal respiratory effort     CARDIOVASCULAR: no cyanosis or edema     NEUROLOGICAL/PSYCHIATRIC: oriented to time, place and person, mood normal, affect appropriate, CN II-XII intact grossly          Assessment/Plan   Adela was seen today for skin lesion.    Diagnoses and all orders for this visit:    Basal cell carcinoma  Comments:  Right upper neck level II  Orders:  -     Case Request; Standing  -     Comprehensive Metabolic Panel; Future  -     CBC and Differential; Future  -     Protime-INR; Future  -     APTT; Future  -     ECG 12 Lead; Future  -     XR Chest 2 View; Future  -     Case Request    Cerebrovascular accident (CVA) due to thrombosis of left middle cerebral artery  -     Case Request; Standing  -     Comprehensive Metabolic Panel; Future  -     CBC and Differential; Future  -     Protime-INR; Future  -     APTT; Future  -     ECG 12 Lead; Future  -     XR Chest 2 View; Future  -     Case Request    Coronary artery disease involving coronary bypass graft of native heart without angina pectoris  -     Case Request; Standing  -     Comprehensive Metabolic Panel; Future  -     CBC and Differential; Future  -     Protime-INR; Future  -     APTT; Future  -     ECG 12 Lead; Future  -     XR Chest 2 View; Future  -     Case Request    Other orders  -     Follow Anesthesia Guidelines / Standing Orders; Future  -     Obtain Informed Consent  -     Follow Anesthesia Guidelines / Standing Orders; Standing  -     Verify NPO Status; Standing  -     Obtain Informed Consent; Standing  -     NOLA Hose - To Be Placed on Patient in Pre-Op; Standing  -     SCD (Sequential Compression Device) - To Be Placed on Patient in Pre-Op; Standing  -     NPO Diet; Standing      EXCISION LESION right upper neck level II with frozen section and possible flap or graft (Right)  Orders Placed This Encounter   Procedures   • XR Chest 2 View     Standing Status:   Future     Standing Expiration Date:   9/26/2018     Order Specific  "Question:   Reason for Exam:     Answer:   EXCISION LESION right upper neck level II with frozen section and possible flap or graft   • Comprehensive Metabolic Panel     Standing Status:   Future     Standing Expiration Date:   9/26/2018   • Protime-INR     Standing Status:   Future     Standing Expiration Date:   9/26/2018   • APTT     Standing Status:   Future     Standing Expiration Date:   9/26/2018   • Follow Anesthesia Guidelines / Standing Orders     Standing Status:   Future   • Obtain Informed Consent     EXCISION LESION with possible flap or graft-     Order Specific Question:   Informed Consent Given For     Answer:   EXCISION LESION right upper neck level II with frozen section and possible flap or graft   • ECG 12 Lead     Standing Status:   Future     Standing Expiration Date:   9/26/2018     Order Specific Question:   Reason for Exam:     Answer:   EXCISION LESION   • CBC and Differential     Standing Status:   Future     Standing Expiration Date:   9/26/2018     Order Specific Question:   Manual Differential     Answer:   No     Return for postop.       Patient Instructions   The risks, benefits and options of the procedure were explained to the patient. The possiblity of a persistent cosmetic defect as well as a \"flap\" or a graft to close the defect was discussed. The patient was instructed to stop all aspirin, NSAIDs and VIT E etc.  If appropriate, clearance with primary MD or specialist will be obtained preoperatively.  The patient was scheduled for a LOCAL/MAC Anesthesia with a frozen section for margin control.    For instructions on the proper use, care and disposal of controled substances, ask you doctor or refer to the [place] Board of Medicine website: http://kbml.[place].gov/hb1/Pages/Considerations-For-Patient-Education.aspx            "

## 2017-10-18 ENCOUNTER — ANESTHESIA EVENT (OUTPATIENT)
Dept: PERIOP | Facility: HOSPITAL | Age: 72
End: 2017-10-18

## 2017-10-18 ENCOUNTER — HOSPITAL ENCOUNTER (OUTPATIENT)
Facility: HOSPITAL | Age: 72
Setting detail: HOSPITAL OUTPATIENT SURGERY
Discharge: HOME | End: 2017-10-18
Attending: OTOLARYNGOLOGY | Admitting: OTOLARYNGOLOGY

## 2017-10-18 ENCOUNTER — ANESTHESIA (OUTPATIENT)
Dept: PERIOP | Facility: HOSPITAL | Age: 72
End: 2017-10-18

## 2017-10-18 VITALS
SYSTOLIC BLOOD PRESSURE: 148 MMHG | HEART RATE: 49 BPM | DIASTOLIC BLOOD PRESSURE: 76 MMHG | TEMPERATURE: 97.8 F | RESPIRATION RATE: 16 BRPM | OXYGEN SATURATION: 99 %

## 2017-10-18 DIAGNOSIS — I63.312 CEREBROVASCULAR ACCIDENT (CVA) DUE TO THROMBOSIS OF LEFT MIDDLE CEREBRAL ARTERY (HCC): ICD-10-CM

## 2017-10-18 DIAGNOSIS — C44.91 BASAL CELL CARCINOMA: ICD-10-CM

## 2017-10-18 DIAGNOSIS — I25.810 CORONARY ARTERY DISEASE INVOLVING CORONARY BYPASS GRAFT OF NATIVE HEART WITHOUT ANGINA PECTORIS: ICD-10-CM

## 2017-10-18 PROCEDURE — 25010000002 MIDAZOLAM PER 1 MG: Performed by: ANESTHESIOLOGY

## 2017-10-18 PROCEDURE — 88305 TISSUE EXAM BY PATHOLOGIST: CPT | Performed by: OTOLARYNGOLOGY

## 2017-10-18 PROCEDURE — 25010000002 PROPOFOL 10 MG/ML EMULSION: Performed by: NURSE ANESTHETIST, CERTIFIED REGISTERED

## 2017-10-18 PROCEDURE — 88331 PATH CONSLTJ SURG 1 BLK 1SPC: CPT | Performed by: OTOLARYNGOLOGY

## 2017-10-18 PROCEDURE — 25010000002 FENTANYL CITRATE (PF) 100 MCG/2ML SOLUTION: Performed by: NURSE ANESTHETIST, CERTIFIED REGISTERED

## 2017-10-18 PROCEDURE — 13132 CMPLX RPR F/C/C/M/N/AX/G/H/F: CPT | Performed by: OTOLARYNGOLOGY

## 2017-10-18 PROCEDURE — 11622 EXC S/N/H/F/G MAL+MRG 1.1-2: CPT | Performed by: OTOLARYNGOLOGY

## 2017-10-18 RX ORDER — MAGNESIUM HYDROXIDE 1200 MG/15ML
LIQUID ORAL AS NEEDED
Status: DISCONTINUED | OUTPATIENT
Start: 2017-10-18 | End: 2017-10-18 | Stop reason: HOSPADM

## 2017-10-18 RX ORDER — SODIUM CHLORIDE 0.9 % (FLUSH) 0.9 %
3 SYRINGE (ML) INJECTION AS NEEDED
Status: DISCONTINUED | OUTPATIENT
Start: 2017-10-18 | End: 2017-10-18 | Stop reason: HOSPADM

## 2017-10-18 RX ORDER — SODIUM CHLORIDE, SODIUM LACTATE, POTASSIUM CHLORIDE, CALCIUM CHLORIDE 600; 310; 30; 20 MG/100ML; MG/100ML; MG/100ML; MG/100ML
9 INJECTION, SOLUTION INTRAVENOUS CONTINUOUS
Status: DISCONTINUED | OUTPATIENT
Start: 2017-10-18 | End: 2017-10-18 | Stop reason: HOSPADM

## 2017-10-18 RX ORDER — IPRATROPIUM BROMIDE AND ALBUTEROL SULFATE 2.5; .5 MG/3ML; MG/3ML
3 SOLUTION RESPIRATORY (INHALATION) ONCE AS NEEDED
Status: CANCELLED | OUTPATIENT
Start: 2017-10-18

## 2017-10-18 RX ORDER — HYDRALAZINE HYDROCHLORIDE 20 MG/ML
5 INJECTION INTRAMUSCULAR; INTRAVENOUS
Status: CANCELLED | OUTPATIENT
Start: 2017-10-18

## 2017-10-18 RX ORDER — HYDROCODONE BITARTRATE AND ACETAMINOPHEN 5; 325 MG/1; MG/1
1 TABLET ORAL ONCE AS NEEDED
Status: DISCONTINUED | OUTPATIENT
Start: 2017-10-18 | End: 2017-10-18 | Stop reason: HOSPADM

## 2017-10-18 RX ORDER — MORPHINE SULFATE 2 MG/ML
2 INJECTION, SOLUTION INTRAMUSCULAR; INTRAVENOUS
Status: CANCELLED | OUTPATIENT
Start: 2017-10-18 | End: 2017-10-18

## 2017-10-18 RX ORDER — LABETALOL HYDROCHLORIDE 5 MG/ML
5 INJECTION, SOLUTION INTRAVENOUS
Status: CANCELLED | OUTPATIENT
Start: 2017-10-18

## 2017-10-18 RX ORDER — DIPHENHYDRAMINE HYDROCHLORIDE 50 MG/ML
12.5 INJECTION INTRAMUSCULAR; INTRAVENOUS
Status: CANCELLED | OUTPATIENT
Start: 2017-10-18

## 2017-10-18 RX ORDER — LIDOCAINE HYDROCHLORIDE AND EPINEPHRINE 10; 10 MG/ML; UG/ML
INJECTION, SOLUTION INFILTRATION; PERINEURAL AS NEEDED
Status: DISCONTINUED | OUTPATIENT
Start: 2017-10-18 | End: 2017-10-18 | Stop reason: HOSPADM

## 2017-10-18 RX ORDER — FENTANYL CITRATE 50 UG/ML
INJECTION, SOLUTION INTRAMUSCULAR; INTRAVENOUS AS NEEDED
Status: DISCONTINUED | OUTPATIENT
Start: 2017-10-18 | End: 2017-10-18 | Stop reason: SURG

## 2017-10-18 RX ORDER — PROPOFOL 10 MG/ML
VIAL (ML) INTRAVENOUS AS NEEDED
Status: DISCONTINUED | OUTPATIENT
Start: 2017-10-18 | End: 2017-10-18 | Stop reason: SURG

## 2017-10-18 RX ORDER — DEXTROSE MONOHYDRATE 25 G/50ML
12.5 INJECTION, SOLUTION INTRAVENOUS AS NEEDED
Status: DISCONTINUED | OUTPATIENT
Start: 2017-10-18 | End: 2017-10-18 | Stop reason: HOSPADM

## 2017-10-18 RX ORDER — SODIUM CHLORIDE, SODIUM LACTATE, POTASSIUM CHLORIDE, CALCIUM CHLORIDE 600; 310; 30; 20 MG/100ML; MG/100ML; MG/100ML; MG/100ML
1000 INJECTION, SOLUTION INTRAVENOUS CONTINUOUS
Status: DISCONTINUED | OUTPATIENT
Start: 2017-10-18 | End: 2017-10-18 | Stop reason: HOSPADM

## 2017-10-18 RX ORDER — MEPERIDINE HYDROCHLORIDE 25 MG/ML
12.5 INJECTION INTRAMUSCULAR; INTRAVENOUS; SUBCUTANEOUS
Status: CANCELLED | OUTPATIENT
Start: 2017-10-18 | End: 2017-10-19

## 2017-10-18 RX ORDER — HYDROCODONE BITARTRATE AND ACETAMINOPHEN 5; 325 MG/1; MG/1
1 TABLET ORAL EVERY 4 HOURS PRN
Qty: 4 TABLET | Refills: 0 | Status: SHIPPED | OUTPATIENT
Start: 2017-10-18 | End: 2018-12-19

## 2017-10-18 RX ORDER — FENTANYL CITRATE 50 UG/ML
25 INJECTION, SOLUTION INTRAMUSCULAR; INTRAVENOUS
Status: DISCONTINUED | OUTPATIENT
Start: 2017-10-18 | End: 2017-10-18 | Stop reason: HOSPADM

## 2017-10-18 RX ORDER — SODIUM CHLORIDE 0.9 % (FLUSH) 0.9 %
1-10 SYRINGE (ML) INJECTION AS NEEDED
Status: DISCONTINUED | OUTPATIENT
Start: 2017-10-18 | End: 2017-10-18 | Stop reason: HOSPADM

## 2017-10-18 RX ORDER — MIDAZOLAM HYDROCHLORIDE 1 MG/ML
2 INJECTION INTRAMUSCULAR; INTRAVENOUS
Status: DISCONTINUED | OUTPATIENT
Start: 2017-10-18 | End: 2017-10-18 | Stop reason: HOSPADM

## 2017-10-18 RX ORDER — NALOXONE HCL 0.4 MG/ML
0.4 VIAL (ML) INJECTION AS NEEDED
Status: CANCELLED | OUTPATIENT
Start: 2017-10-18

## 2017-10-18 RX ORDER — ONDANSETRON 2 MG/ML
4 INJECTION INTRAMUSCULAR; INTRAVENOUS ONCE AS NEEDED
Status: CANCELLED | OUTPATIENT
Start: 2017-10-18

## 2017-10-18 RX ORDER — MIDAZOLAM HYDROCHLORIDE 1 MG/ML
1 INJECTION INTRAMUSCULAR; INTRAVENOUS
Status: DISCONTINUED | OUTPATIENT
Start: 2017-10-18 | End: 2017-10-18 | Stop reason: HOSPADM

## 2017-10-18 RX ADMIN — SODIUM CHLORIDE, POTASSIUM CHLORIDE, SODIUM LACTATE AND CALCIUM CHLORIDE 1000 ML: 600; 310; 30; 20 INJECTION, SOLUTION INTRAVENOUS at 07:23

## 2017-10-18 RX ADMIN — FENTANYL CITRATE 100 MCG: 50 INJECTION, SOLUTION INTRAMUSCULAR; INTRAVENOUS at 09:25

## 2017-10-18 RX ADMIN — PROPOFOL 30 MG: 10 INJECTION, EMULSION INTRAVENOUS at 09:23

## 2017-10-18 RX ADMIN — MIDAZOLAM HYDROCHLORIDE 2 MG: 1 INJECTION, SOLUTION INTRAMUSCULAR; INTRAVENOUS at 08:51

## 2017-10-18 RX ADMIN — LIDOCAINE HYDROCHLORIDE 0.5 ML: 10 INJECTION, SOLUTION EPIDURAL; INFILTRATION; INTRACAUDAL; PERINEURAL at 07:22

## 2017-10-18 NOTE — PLAN OF CARE
Problem: Patient Care Overview (Adult)  Goal: Plan of Care Review  Outcome: Outcome(s) achieved Date Met:  10/18/17

## 2017-10-18 NOTE — PLAN OF CARE
Problem: Perioperative Period (Adult)  Goal: Signs and Symptoms of Listed Potential Problems Will be Absent or Manageable (Perioperative Period)  Outcome: Ongoing (interventions implemented as appropriate)    10/18/17 1480   Perioperative Period   Problems Assessed (Perioperative Period) pain;hypothermia;hypoxia/hypoxemia;perioperative injury;situational response   Problems Present (Perioperative Period) situational response

## 2017-10-18 NOTE — ANESTHESIA POSTPROCEDURE EVALUATION
Patient: Adela Goetz    Procedure Summary     Date Anesthesia Start Anesthesia Stop Room / Location    10/18/17 0921 1024  [facility] OR 03 / [facility]       Procedure Diagnosis Surgeon Provider    Excision of basal cell carcinoma the right neck with complex closure (Right Neck) Basal cell carcinoma; Coronary artery disease involving coronary bypass graft of native heart without angina pectoris; Cerebrovascular accident (CVA) due to thrombosis of left middle cerebral artery  (Basal cell carcinoma [C44.91]; Coronary artery disease involving coronary bypass graft of native heart without angina pectoris [I25.810]; Cerebrovascular accident (CVA) due to thrombosis of left middle cerebral artery [I63.312]) MD DEIRDRE Tsai CRNA          Anesthesia Type: MAC  Last vitals  BP   165/86 (10/18/17 1047)   Temp   97.8 °F (36.6 °C) (10/18/17 0648)   Pulse   57 (10/18/17 1047)   Resp   16 (10/18/17 1047)     SpO2   100 % (10/18/17 1047)     Post Anesthesia Care and Evaluation    Patient location during evaluation: PACU  Patient participation: complete - patient participated  Level of consciousness: awake and alert  Pain score: 0  Pain management: adequate  Airway patency: patent  Anesthetic complications: No anesthetic complications    Cardiovascular status: acceptable and stable  Respiratory status: acceptable and unassisted  Hydration status: acceptable

## 2017-10-18 NOTE — DISCHARGE INSTRUCTIONS
Moderate Conscious Sedation, Adult, Care After  Refer to this sheet in the next few weeks. These instructions provide you with information on caring for yourself after your procedure. Your health care provider may also give you more specific instructions. Your treatment has been planned according to current medical practices, but problems sometimes occur. Call your health care provider if you have any problems or questions after your procedure.  WHAT TO EXPECT AFTER THE PROCEDURE    After your procedure:  · You may feel sleepy, clumsy, and have poor balance for several hours.  · Vomiting may occur if you eat too soon after the procedure.  HOME CARE INSTRUCTIONS  · Do not participate in any activities where you could become injured for at least 24 hours. Do not:  ¨ Drive.  ¨ Swim.  ¨ Ride a bicycle.  ¨ Operate heavy machinery.  ¨ Cook.  ¨ Use power tools.  ¨ Climb ladders.  ¨ Work from a high place.  · Do not make important decisions or sign legal documents until you are improved.  · If you vomit, drink water, juice, or soup when you can drink without vomiting. Make sure you have little or no nausea before eating solid foods.  · Only take over-the-counter or prescription medicines for pain, discomfort, or fever as directed by your health care provider.  · Make sure you and your family fully understand everything about the medicines given to you, including what side effects may occur.  · You should not drink alcohol, take sleeping pills, or take medicines that cause drowsiness for at least 24 hours.  · If you smoke, do not smoke without supervision.  · If you are feeling better, you may resume normal activities 24 hours after you were sedated.  · Keep all appointments with your health care provider.  SEEK MEDICAL CARE IF:  · Your skin is pale or bluish in color.  · You continue to feel nauseous or vomit.  · Your pain is getting worse and is not helped by medicine.  · You have bleeding or swelling.  · You are still  sleepy or feeling clumsy after 24 hours.  SEEK IMMEDIATE MEDICAL CARE IF:  · You develop a rash.  · You have difficulty breathing.  · You develop any type of allergic problem.  · You have a fever.  MAKE SURE YOU:  · Understand these instructions.  · Will watch your condition.  · Will get help right away if you are not doing well or get worse.     This information is not intended to replace advice given to you by your health care provider. Make sure you discuss any questions you have with your health care provider.     Document Released: 10/08/2014 Document Revised: 01/08/2016 Document Reviewed: 10/08/2014  [employer] Interactive Patient Education ©2016 [employer]         CALL YOUR PHYSICIAN IF YOU EXPERIENCE  INCREASED PAIN NOT HELPED BY YOUR PAIN MEDICATION.        Fall Prevention in the Home      Falls can cause injuries. They can happen to people of all ages. There are many things you can do to make your home safe and to help prevent falls.    WHAT CAN I DO ON THE OUTSIDE OF MY HOME?  · Regularly fix the edges of walkways and driveways and fix any cracks.  · Remove anything that might make you trip as you walk through a door, such as a raised step or threshold.  · Trim any bushes or trees on the path to your home.  · Use bright outdoor lighting.  · Clear any walking paths of anything that might make someone trip, such as rocks or tools.  · Regularly check to see if handrails are loose or broken. Make sure that both sides of any steps have handrails.  · Any raised decks and porches should have guardrails on the edges.  · Have any leaves, snow, or ice cleared regularly.  · Use sand or salt on walking paths during winter.  · Clean up any spills in your garage right away. This includes oil or grease spills.  WHAT CAN I DO IN THE BATHROOM?    · Use night lights.  · Install grab bars by the toilet and in the tub and shower. Do not use towel bars as grab bars.  · Use non-skid mats or decals in the tub or shower.  · If  you need to sit down in the shower, use a plastic, non-slip stool.  · Keep the floor dry. Clean up any water that spills on the floor as soon as it happens.  · Remove soap buildup in the tub or shower regularly.  · Attach bath mats securely with double-sided non-slip rug tape.  · Do not have throw rugs and other things on the floor that can make you trip.  WHAT CAN I DO IN THE BEDROOM?  · Use night lights.  · Make sure that you have a light by your bed that is easy to reach.  · Do not use any sheets or blankets that are too big for your bed. They should not hang down onto the floor.  · Have a firm chair that has side arms. You can use this for support while you get dressed.  · Do not have throw rugs and other things on the floor that can make you trip.  WHAT CAN I DO IN THE KITCHEN?  · Clean up any spills right away.  · Avoid walking on wet floors.  · Keep items that you use a lot in easy-to-reach places.  · If you need to reach something above you, use a strong step stool that has a grab bar.  · Keep electrical cords out of the way.  · Do not use floor polish or wax that makes floors slippery. If you must use wax, use non-skid floor wax.  · Do not have throw rugs and other things on the floor that can make you trip.  WHAT CAN I DO WITH MY STAIRS?  · Do not leave any items on the stairs.  · Make sure that there are handrails on both sides of the stairs and use them. Fix handrails that are broken or loose. Make sure that handrails are as long as the stairways.  · Check any carpeting to make sure that it is firmly attached to the stairs. Fix any carpet that is loose or worn.  · Avoid having throw rugs at the top or bottom of the stairs. If you do have throw rugs, attach them to the floor with carpet tape.  · Make sure that you have a light switch at the top of the stairs and the bottom of the stairs. If you do not have them, ask someone to add them for you.  WHAT ELSE CAN I DO TO HELP PREVENT FALLS?  · Wear shoes  that:  ¨ Do not have high heels.  ¨ Have rubber bottoms.  ¨ Are comfortable and fit you well.  ¨ Are closed at the toe. Do not wear sandals.  · If you use a stepladder:  ¨ Make sure that it is fully opened. Do not climb a closed stepladder.  ¨ Make sure that both sides of the stepladder are locked into place.  ¨ Ask someone to hold it for you, if possible.  · Clearly denisa and make sure that you can see:  ¨ Any grab bars or handrails.  ¨ First and last steps.  ¨ Where the edge of each step is.  · Use tools that help you move around (mobility aids) if they are needed. These include:  ¨ Canes.  ¨ Walkers.  ¨ Scooters.  ¨ Crutches.  · Turn on the lights when you go into a dark area. Replace any light bulbs as soon as they burn out.  · Set up your furniture so you have a clear path. Avoid moving your furniture around.  · If any of your floors are uneven, fix them.  · If there are any pets around you, be aware of where they are.  · Review your medicines with your doctor. Some medicines can make you feel dizzy. This can increase your chance of falling.  Ask your doctor what other things that you can do to help prevent falls.     This information is not intended to replace advice given to you by your health care provider. Make sure you discuss any questions you have with your health care provider.     Document Released: 10/14/2010 Document Revised: 05/03/2016 Document Reviewed: 01/22/2016  [employer] Interactive Patient Education ©2016 [employer]     PATIENT/FAMILY/RESPONSIBLE PARTY VERBALIZES UNDERSTANDING OF ABOVE EDUCATION.  COPY OF PAIN SCALE GIVEN AND REVIEWED WITH VERBALIZED UNDERSTANDING.

## 2017-10-18 NOTE — PERIOPERATIVE NURSING NOTE
PT STATES WITH PREVIOUS COLONOSCOPY SHE BELIEVES SHE ASPIRATED. STATES SHE HAD PNEUMONIA THE NEXT DAY.

## 2017-10-18 NOTE — OP NOTE
OPERATIVE NOTE  10/18/2017    NAME: Adela Goetz    YOB: 1945  MRN: [id number]    PRE-OPERATIVE DIAGNOSIS:    Basal cell carcinoma [C44.91]  Coronary artery disease involving coronary bypass graft of native heart without angina pectoris [I25.810]  Cerebrovascular accident (CVA) due to thrombosis of left middle cerebral artery [I63.312]    POST-OPERATIVE DIAGNOSIS:   Post-Op Diagnosis Codes:     * Basal cell carcinoma [C44.91]     * Coronary artery disease involving coronary bypass graft of native heart without angina pectoris [I25.810]     * Cerebrovascular accident (CVA) due to thrombosis of left middle cerebral artery [I63.312]    PROCEDURE PERFORMED:   Excision of basal cell carcinoma the right neck with complex closure    SURGEON:   Edward Cardona MD    ASSISTANT(S):   None    ANESTHESIA:   MAC and Local Anesthesia with 1% Xylocaine with Epinephrine 1:100,000    INDICATIONS: The patient is a 72 y.o. female with Basal cell carcinoma [C44.91]  Coronary artery disease involving coronary bypass graft of native heart without angina pectoris [I25.810]  Cerebrovascular accident (CVA) due to thrombosis of left middle cerebral artery [I63.312]    PROCEDURE:  The patient was brought to the operating room, given MAC and Local Anesthesia with 1% Xylocaine with Epinephrine 1:100,000, and prepped and draped in the usual manner.    Approximately 8mL 1% Xylocaine with epinephrine was injected in the planned excision site in the right upper neck.  Excision was accomplished with a #15 blade in an elliptical fashion without difficulty.  The excision was approximately  3.5 cm x 1.2 cm.  The lesion was approximately 1.3 cm x 0.7 cm.  Upon excision the specimen was marked and submitted for frozen section examination which demonstrated findings consistent with a basal cell carcinoma with margins free of involvement with tumor.    Wide undermining was performed with curved iris scissors and double prong skin hooks.   Minimal bleeding was encountered which was controlled with electrocautery and low settings.  The incision was reapproximated utilizing interrupted 4-0 Vicryl subcutaneously and interrupted 5-0 nylon to reapproximate the epidermis.  Bactroban ointment was applied and the procedure terminated.     The patient tolerated the procedure well without complications and was transported to the postanesthesia care unit in stable condition.    SPECIMENS:  A: Right upper neck basal cell carcinoma    COMPLICATIONS: NONE    ESTIMATED BLOOD LOSS:  Minimal    Edward Cardona MD  10/18/2017

## 2017-10-18 NOTE — ANESTHESIA PREPROCEDURE EVALUATION
Anesthesia Evaluation     Patient summary reviewed   history of anesthetic complications (aspirated during colonoscopy):  NPO Solid Status: > 8 hours       Airway   Mallampati: II  TM distance: >3 FB  Neck ROM: full  Dental      Pulmonary    (-) asthma, sleep apnea, not a smoker  Cardiovascular   Exercise tolerance: good (4-7 METS)    ECG reviewed  Patient on routine beta blocker and Beta blocker given within 24 hours of surgery    (+) hypertension, CAD, CABG (2015), angina, hyperlipidemia  (-) cardiac stents      Neuro/Psych  (+) CVA,    (-) seizures  GI/Hepatic/Renal/Endo    (+)  hypothyroidism,   (-) GERD, liver disease, no renal disease, diabetes    Musculoskeletal     Abdominal    Substance History      OB/GYN          Other   (+) arthritis (RA on chronic steroids)                                     Anesthesia Plan    ASA 3     MAC     intravenous induction   Anesthetic plan and risks discussed with patient.

## 2017-10-18 NOTE — PLAN OF CARE
Problem: Patient Care Overview (Adult)  Goal: Plan of Care Review  Outcome: Ongoing (interventions implemented as appropriate)    10/18/17 0966   Coping/Psychosocial Response Interventions   Plan Of Care Reviewed With patient   Patient Care Overview   Progress improving   Outcome Evaluation   Outcome Summary/Follow up Plan less anxious

## 2017-10-18 NOTE — PLAN OF CARE
Problem: Perioperative Period (Adult)  Goal: Signs and Symptoms of Listed Potential Problems Will be Absent or Manageable (Perioperative Period)  Outcome: Outcome(s) achieved Date Met:  10/18/17    10/18/17 1122   Perioperative Period   Problems Assessed (Perioperative Period) all   Problems Present (Perioperative Period) none         Problem: Patient Care Overview (Adult)  Goal: Plan of Care Review  Outcome: Outcome(s) achieved Date Met:  10/18/17    10/18/17 1122   Coping/Psychosocial Response Interventions   Plan Of Care Reviewed With patient;spouse   Patient Care Overview   Progress improving   Outcome Evaluation   Outcome Summary/Follow up Plan PT READY FOR DC HOME

## 2017-10-19 LAB
CYTO UR: NORMAL
LAB AP CASE REPORT: NORMAL
LAB AP CLINICAL INFORMATION: NORMAL
Lab: NORMAL
Lab: NORMAL
PATH REPORT.FINAL DX SPEC: NORMAL
PATH REPORT.GROSS SPEC: NORMAL

## 2017-10-30 ENCOUNTER — OFFICE VISIT (OUTPATIENT)
Dept: OTOLARYNGOLOGY | Facility: CLINIC | Age: 72
End: 2017-10-30

## 2017-10-30 ENCOUNTER — HOSPITAL ENCOUNTER (OUTPATIENT)
Dept: MAMMOGRAPHY | Facility: HOSPITAL | Age: 72
Discharge: HOME | End: 2017-10-30
Admitting: INTERNAL MEDICINE

## 2017-10-30 DIAGNOSIS — C44.41 BASAL CELL CARCINOMA OF SKIN OF NECK: Primary | ICD-10-CM

## 2017-10-30 PROCEDURE — 99024 POSTOP FOLLOW-UP VISIT: CPT | Performed by: OTOLARYNGOLOGY

## 2017-10-30 PROCEDURE — 77063 BREAST TOMOSYNTHESIS BI: CPT

## 2017-10-30 PROCEDURE — G0202 SCR MAMMO BI INCL CAD: HCPCS

## 2017-10-30 NOTE — PROGRESS NOTES
Procedure   Suture Removal  Date/Time: 10/30/2017 10:12 AM  Performed by: ZENOBIA DIXON  Authorized by: VERA RENNER   Consent: Verbal consent obtained.  Consent given by: patient  Patient identity confirmed: verbally with patient  Body area: head/neck  Location details: neck  Comments: Patient presents for suture removal. The incision is well-approximated with no redness or edema noted. The patient was advised of path report.

## 2017-10-31 ENCOUNTER — TRANSCRIBE ORDERS (OUTPATIENT)
Dept: ADMINISTRATIVE | Facility: HOSPITAL | Age: 72
End: 2017-10-31

## 2017-10-31 DIAGNOSIS — R92.8 ABNORMAL MAMMOGRAM: Primary | ICD-10-CM

## 2017-11-03 ENCOUNTER — APPOINTMENT (OUTPATIENT)
Dept: ULTRASOUND IMAGING | Facility: HOSPITAL | Age: 72
End: 2017-11-03

## 2017-11-03 ENCOUNTER — APPOINTMENT (OUTPATIENT)
Dept: MAMMOGRAPHY | Facility: HOSPITAL | Age: 72
End: 2017-11-03

## 2017-11-08 ENCOUNTER — HOSPITAL ENCOUNTER (OUTPATIENT)
Dept: MAMMOGRAPHY | Facility: HOSPITAL | Age: 72
Discharge: HOME | End: 2017-11-08
Admitting: INTERNAL MEDICINE

## 2017-11-08 ENCOUNTER — HOSPITAL ENCOUNTER (OUTPATIENT)
Dept: ULTRASOUND IMAGING | Facility: HOSPITAL | Age: 72
Discharge: HOME | End: 2017-11-08

## 2017-11-08 DIAGNOSIS — R92.8 ABNORMAL MAMMOGRAM: ICD-10-CM

## 2017-11-08 PROCEDURE — G0206 DX MAMMO INCL CAD UNI: HCPCS

## 2017-11-08 PROCEDURE — G0279 TOMOSYNTHESIS, MAMMO: HCPCS

## 2017-11-10 ENCOUNTER — DOCUMENTATION (OUTPATIENT)
Dept: CARDIOLOGY | Facility: CLINIC | Age: 72
End: 2017-11-10

## 2017-11-10 NOTE — PROGRESS NOTES
GO A REFILL REQUEST FROM [facility] IN [place], [place] ,   FOR AMLODIPINE BESYLATE.   I READ DR MOSHER'S LAST OFFICE NOTE AND STATES SHE WILL FOLLING UP WITH HER PCP DR SMART,  SO I ASKED THE PHARMACY TO SEND THE REFILL REQUEST TO DR ASTORGA OFFICE.

## 2017-12-11 ENCOUNTER — HOSPITAL ENCOUNTER (OUTPATIENT)
Dept: MRI IMAGING | Facility: HOSPITAL | Age: 72
Discharge: HOME | End: 2017-12-11
Attending: NEUROLOGICAL SURGERY | Admitting: NEUROLOGICAL SURGERY

## 2017-12-11 ENCOUNTER — OFFICE VISIT (OUTPATIENT)
Dept: NEUROSURGERY | Facility: CLINIC | Age: 72
End: 2017-12-11

## 2017-12-11 VITALS
BODY MASS INDEX: 22.53 KG/M2 | HEIGHT: 64 IN | DIASTOLIC BLOOD PRESSURE: 70 MMHG | SYSTOLIC BLOOD PRESSURE: 140 MMHG | WEIGHT: 132 LBS

## 2017-12-11 DIAGNOSIS — M54.12 CERVICAL RADICULOPATHY: ICD-10-CM

## 2017-12-11 DIAGNOSIS — Z78.9 NON-SMOKER: ICD-10-CM

## 2017-12-11 DIAGNOSIS — D32.9 MENINGIOMA (HCC): ICD-10-CM

## 2017-12-11 DIAGNOSIS — D32.9 MENINGIOMA (HCC): Primary | ICD-10-CM

## 2017-12-11 PROCEDURE — 0 GADOBENATE DIMEGLUMINE 529 MG/ML SOLUTION: Performed by: NEUROLOGICAL SURGERY

## 2017-12-11 PROCEDURE — 99213 OFFICE O/P EST LOW 20 MIN: CPT | Performed by: NEUROLOGICAL SURGERY

## 2017-12-11 PROCEDURE — A9577 INJ MULTIHANCE: HCPCS | Performed by: NEUROLOGICAL SURGERY

## 2017-12-11 PROCEDURE — 70553 MRI BRAIN STEM W/O & W/DYE: CPT

## 2017-12-11 RX ORDER — PREDNISONE 1 MG/1
TABLET ORAL
Refills: 0 | COMMUNITY
Start: 2017-10-22 | End: 2018-12-19

## 2017-12-11 RX ORDER — FERROUS SULFATE 325(65) MG
TABLET ORAL
Refills: 1 | COMMUNITY
Start: 2017-11-09 | End: 2019-02-19

## 2017-12-11 RX ORDER — CARVEDILOL 6.25 MG/1
TABLET ORAL
Refills: 4 | COMMUNITY
Start: 2017-12-10 | End: 2018-12-19 | Stop reason: DRUGHIGH

## 2017-12-11 RX ADMIN — GADOBENATE DIMEGLUMINE 10 ML: 529 INJECTION, SOLUTION INTRAVENOUS at 09:15

## 2017-12-11 NOTE — PROGRESS NOTES
Chief complaint   Chief Complaint   Patient presents with   • Follow-up        Subjective     HPI:     This patient is a 72-year-old female who presents for a one-year follow-up regarding an incidental meningioma.  She is doing well and she states that she has not had any trouble over the past 1 year from this meningioma.  She does have some left shoulder stiffness, and she also had pain down her left arm but this resolved spontaneously.  She has a known cervical disc herniation and she in the past has not been interested in physical therapy, pain management or surgical intervention.    Review of Systems     A 12 point review of systems is obtained and is negative except for as described    Past Medical History:   Diagnosis Date   • Arthritis    • CAD (coronary artery disease)    • Cancer     basal  cell ca   • Chest pain    • Difficulty walking    • Disease of thyroid gland    • Hyperlipidemia    • Hypertension    • Osteopenia    • Pneumonia     feb 2017   • SOB (shortness of breath)    • Stroke     2015 april durin surgery   • TIA (transient ischemic attack)    • Weakness     left hand and left arm     Past Surgical History:   Procedure Laterality Date   • APPENDECTOMY     • BREAST BIOPSY     • BREAST RECONSTRUCTION      breast reduction , yrs ago   • CARDIAC CATHETERIZATION     • CARDIAC SURGERY      cabg x3  2015 ,done at [facility]   • CATARACT EXTRACTION     • COLONOSCOPY N/A 1/18/2017    Procedure: COLONOSCOPY WITH ANESTHESIA;  Surgeon: Andrea Pizano DO;  Location: [facility] ENDOSCOPY;  Service:    • COLONOSCOPY N/A 2/7/2017    Procedure: COLONOSCOPY WITH ANESTHESIA;  Surgeon: Andrea Pizano DO;  Location: [facility] ENDOSCOPY;  Service:    • HEAD/NECK LESION/CYST EXCISION Right 10/18/2017    Procedure: Excision of basal cell carcinoma the right neck with complex closure;  Surgeon: Edward Cardona MD;  Location: [facility] OR;  Service:    • HERNIA REPAIR     • REDUCTION MAMMAPLASTY  1995   • RETINAL  "LASER PROCEDURE     • SPINE SURGERY     • TUBAL ABDOMINAL LIGATION       Family History   Problem Relation Age of Onset   • Thyroid cancer Other    • Coronary artery disease Other    • Hypothyroidism Other    • Emphysema Mother    • Heart disease Father    • Cancer Sister    • Cancer Brother    • No Known Problems Sister    • No Known Problems Sister    • No Known Problems Brother    • Liver cancer Maternal Grandmother      Social History   Substance Use Topics   • Smoking status: Never Smoker   • Smokeless tobacco: Never Used   • Alcohol use No       (Not in a hospital admission)  Allergies:  Review of patient's allergies indicates no known allergies.    Objective      Vital Signs  /70  Ht 162.6 cm (64.02\")  Wt 59.9 kg (132 lb)  LMP  (LMP Unknown)  BMI 22.65 kg/m2    Physical Exam    No acute distress  Awake alert oriented ×3  HEENT atraumatic, normocephalic  Neurologic exam  Cranial nerves II through XII grossly intact  No pronator drift  Patient moves all extremities 5 out of 5 strength  Sensation is intact light touch in upper and lower extremities    Results Review: Mri Brain With & Without Contrast    Result Date: 12/11/2017  Narrative: EXAMINATION:  MRI BRAIN WITHOUT AND WITH CONTRAST-  12/11/2017 9:17 AM EST  HISTORY: Follow-up for meningioma. D32.9-Benign neoplasm of meninges, unspecified.  TECHNIQUE: Multiplanar imaging was performed in a high field magnet before and after gadolinium contrast administration.  COMPARISON: 11/11/2016.  FINDINGS: A mass in the falx superiorly measures 2.2 x 1.7 x 2.1 cm and previously measured 2.1 x 1.8 x 2.1 cm. This is not significantly changed. This enhances around the periphery. Centrally, there is low signal on all imaging sequences with blooming artifact on the gradient echo sequence. This is consistent with calcification. There are no new mass lesions. There is mild atrophy with associated ventricular prominence. There is chronic ischemia in the bean on the " right. There is chronic ischemic change in the right centrum semiovale region that extends into the basal ganglia region. There is chronic small vessel ischemic white matter disease in the periventricular white matter.      Impression: 1. A calcified meningioma along the falx is not significantly changed in appearance compared to the prior study. There are no new masses. 2. Chronic ischemic changes, as described. 3. Mild atrophy with associated ventricular prominence.    This report was finalized on 12/11/2017 08:56 by Dr. Ja Guzman MD.              Assessment/Plan:     72-year-old female with stable meningioma over the past 1 year.  I directly reviewed imaging findings with the patient.  Patient would like to proceed with continued observation over radiation or surgical resection.  We will follow-up with her in 1 year with a repeat MRI brain with and without contrast or sooner with questions or concerns.  We will also refer her to physical therapy for her left shoulder stiffness.    I discussed the patients findings and my recommendations with patient    Jae Evans MD  12/11/17  11:44 AM

## 2017-12-21 ENCOUNTER — OFFICE VISIT (OUTPATIENT)
Dept: NEUROLOGY | Facility: CLINIC | Age: 72
End: 2017-12-21

## 2017-12-21 VITALS
HEART RATE: 52 BPM | RESPIRATION RATE: 18 BRPM | OXYGEN SATURATION: 100 % | DIASTOLIC BLOOD PRESSURE: 105 MMHG | BODY MASS INDEX: 23.39 KG/M2 | WEIGHT: 132 LBS | SYSTOLIC BLOOD PRESSURE: 160 MMHG | HEIGHT: 63 IN

## 2017-12-21 DIAGNOSIS — D32.9 MENINGIOMA (HCC): ICD-10-CM

## 2017-12-21 DIAGNOSIS — I10 ESSENTIAL HYPERTENSION: Primary | ICD-10-CM

## 2017-12-21 DIAGNOSIS — M54.12 CERVICAL RADICULOPATHY: ICD-10-CM

## 2017-12-21 DIAGNOSIS — I63.312 CEREBROVASCULAR ACCIDENT (CVA) DUE TO THROMBOSIS OF LEFT MIDDLE CEREBRAL ARTERY (HCC): ICD-10-CM

## 2017-12-21 DIAGNOSIS — E78.5 HYPERLIPIDEMIA, UNSPECIFIED HYPERLIPIDEMIA TYPE: ICD-10-CM

## 2017-12-21 DIAGNOSIS — R43.2 ALTERED TASTE: ICD-10-CM

## 2017-12-21 PROCEDURE — 99213 OFFICE O/P EST LOW 20 MIN: CPT | Performed by: CLINICAL NURSE SPECIALIST

## 2017-12-21 NOTE — PROGRESS NOTES
Subjective     Chief Complaint   Patient presents with   • Stroke     patient states no isigns of another stroke since last visit          Adela Goetz is a 72 y.o. female right handed retiree.  She is here today for follow up for stroke and altered taste. She was last seen 6/2017. She has done well since then and denies new stroke symptoms. She has had no change/improvement in taste. She did see Dr. Eavns to evaluation meningiom and per patient has recent MRI brain and meningioma is stable. She did see Dr. Lang for cervical radiculopathy and did do PT and reports some improvement but continues to have LUE weakness.   She did have basal cell Ca removed by Dr. RUSS Cardona a few months ago.  As you recall, SHe has a history of stroke in 12/14 and 4/15 perioperative during CABG.  She has had loss of taste since then.  She also has mild dis coordination of LUE.  She had seen Dr. Wynn in the past. I have reviewed test results.  She does have a history of a meningioma. en previously reviewed by Dr. Lerma.       Stroke   This is a chronic (12/2014 and 4/2015 during [facility]) problem. The current episode started more than 1 year ago (right and left BG.). The problem has been unchanged. Associated symptoms include arthralgias (arthritis) and weakness (mild LUE). Pertinent negatives include no chest pain, fatigue, fever, headaches, nausea, sore throat or vomiting. Associated symptoms comments: Altered taste. LUE discoordination. Treatments tried: currently taking ASA 81 mg/statin.   Brain Tumor   This is a chronic problem. The problem has been unchanged (unchanged on MRI). Associated symptoms include arthralgias (arthritis) and weakness (mild LUE). Pertinent negatives include no chest pain, fatigue, fever, headaches, nausea, sore throat or vomiting. Associated symptoms comments: None. Altered taste.        Current Outpatient Prescriptions   Medication Sig Dispense Refill   • amLODIPine (NORVASC) 2.5 MG tablet Take 1 tablet by mouth  daily. 30 tablet 11   • Ascorbic Acid (VITAMIN C PO) Take 1,000 mg by mouth Daily.     • B Complex-C-E-Zn (BEC/ZINC PO) Take  by mouth.     • carvedilol (COREG) 12.5 MG tablet Take 6.25 mg by mouth 2 (Two) Times a Day With Meals.     • carvedilol (COREG) 6.25 MG tablet   4   • Cholecalciferol (VITAMIN D3) 5000 UNITS capsule capsule Take 2,000 Units by mouth Daily.     • clopidogrel (PLAVIX) 75 MG tablet TAKE 1 TABLET BY MOUTH DAILY 30 tablet 5   • ferrous sulfate 325 (65 FE) MG tablet TK 1 T PO QD  1   • folic acid (FOLVITE) 1 MG tablet Take 1 mg by mouth Daily.     • HYDROcodone-acetaminophen (NORCO) 5-325 MG per tablet Take 1 tablet by mouth Every 4 (Four) Hours As Needed for Moderate Pain  or Severe Pain  (Pain) for up to 4 doses. 4 tablet 0   • levothyroxine (SYNTHROID, LEVOTHROID) 50 MCG tablet Take 50 mcg by mouth Daily.     • lisinopril (PRINIVIL,ZESTRIL) 20 MG tablet Take 20 mg by mouth Daily.  4   • methotrexate 2.5 MG tablet Take 4 tablets by mouth 1 (One) Time Per Week. Resume after completion of antibiotics  0   • Nepafenac (ILEVRO) 0.3 % suspension Apply 1 drop to eye Daily. Right eye     • nitroglycerin (NITROSTAT) 0.4 MG SL tablet Place 0.4 mg under the tongue every 5 (five) minutes as needed for chest pain. Take no more than 3 doses in 15 minutes.     • oxyCODONE-acetaminophen (PERCOCET) 5-325 MG per tablet Take 1 tablet by mouth Daily As Needed for moderate pain (4-6).     • pravastatin (PRAVACHOL) 40 MG tablet Take 40 mg by mouth Daily.  0   • prednisoLONE acetate (PRED FORTE) 1 % ophthalmic suspension Administer 1 drop to the right eye 4 (Four) Times a Day.  0   • predniSONE (DELTASONE) 10 MG tablet Take 10 mg by mouth daily.     • predniSONE (DELTASONE) 5 MG tablet TK 1 T PO D  0   • temazepam (RESTORIL) 15 MG capsule Take 15 mg by mouth at night as needed for sleep.     • Zinc Sulfate (ZINC 15 PO) Take 50 mg by mouth Daily.       No current facility-administered medications for this visit.         Past Medical History:   Diagnosis Date   • Arthritis    • CAD (coronary artery disease)    • Cancer     basal  cell ca   • Chest pain    • Difficulty walking    • Disease of thyroid gland    • Hyperlipidemia    • Hypertension    • Osteopenia    • Pneumonia     feb 2017   • SOB (shortness of breath)    • Stroke     2015 april durin surgery   • TIA (transient ischemic attack)    • Weakness     left hand and left arm       Past Surgical History:   Procedure Laterality Date   • APPENDECTOMY     • BREAST BIOPSY     • BREAST RECONSTRUCTION      breast reduction , yrs ago   • CARDIAC CATHETERIZATION     • CARDIAC SURGERY      cabg x3  2015 ,done at [facility]   • CATARACT EXTRACTION     • COLONOSCOPY N/A 1/18/2017    Procedure: COLONOSCOPY WITH ANESTHESIA;  Surgeon: Andrea Pizano DO;  Location: [facility] ENDOSCOPY;  Service:    • COLONOSCOPY N/A 2/7/2017    Procedure: COLONOSCOPY WITH ANESTHESIA;  Surgeon: Andrea Pizano DO;  Location: [facility] ENDOSCOPY;  Service:    • HEAD/NECK LESION/CYST EXCISION Right 10/18/2017    Procedure: Excision of basal cell carcinoma the right neck with complex closure;  Surgeon: Edward Cardona MD;  Location: [facility] OR;  Service:    • HERNIA REPAIR     • REDUCTION MAMMAPLASTY  1995   • RETINAL LASER PROCEDURE     • SPINE SURGERY     • TUBAL ABDOMINAL LIGATION         family history includes Cancer in her brother and sister; Coronary artery disease in her other; Emphysema in her mother; Heart disease in her father; Hypothyroidism in her other; Liver cancer in her maternal grandmother; No Known Problems in her brother, sister, and sister; Thyroid cancer in her other.    Social History   Substance Use Topics   • Smoking status: Never Smoker   • Smokeless tobacco: Never Used   • Alcohol use No       Review of Systems   Constitutional: Negative for fatigue and fever.   HENT: Negative.  Negative for sore throat.    Eyes: Negative.  Negative for visual disturbance.   Respiratory:  "Negative.  Negative for chest tightness and shortness of breath.    Cardiovascular: Negative.  Negative for chest pain.   Gastrointestinal: Negative.  Negative for constipation, diarrhea, nausea and vomiting.   Endocrine: Negative.    Genitourinary: Negative.    Musculoskeletal: Positive for arthralgias (arthritis).   Skin: Negative.    Allergic/Immunologic: Negative.    Neurological: Positive for weakness (mild LUE). Negative for tremors, speech difficulty and headaches.   Hematological: Negative.    Psychiatric/Behavioral: Negative.    All other systems reviewed and are negative.      Objective     BP (!) 160/105 (BP Location: Left arm, Patient Position: Sitting, Cuff Size: Adult)  Pulse 52  Resp 18  Ht 160 cm (63\")  Wt 59.9 kg (132 lb)  LMP  (LMP Unknown)  SpO2 100%  BMI 23.38 kg/m2, Body mass index is 23.38 kg/(m^2).    Physical Exam   Constitutional: She is oriented to person, place, and time. She appears well-developed and well-nourished.   HENT:   Head: Normocephalic and atraumatic.   Right Ear: External ear normal.   Left Ear: External ear normal.   Nose: Nose normal.   Mouth/Throat: Oropharynx is clear and moist.   Eyes: Conjunctivae, EOM and lids are normal. Pupils are equal, round, and reactive to light.   Neck: Normal range of motion. Neck supple. Carotid bruit is not present.   Cardiovascular: Normal rate, regular rhythm, S1 normal, S2 normal and normal heart sounds.    Pulmonary/Chest: Effort normal and breath sounds normal.   Abdominal: Soft. Bowel sounds are normal.   Musculoskeletal: Normal range of motion.   Neurological: She is alert and oriented to person, place, and time. She has normal strength and normal reflexes. No cranial nerve deficit or sensory deficit. She displays a negative Romberg sign. Gait abnormal. Abnormal coordination: no ataxia/ tremor.    Reflex Scores:       Tricep reflexes are 2+ on the right side and 2+ on the left side.       Bicep reflexes are 2+ on the right side " and 2+ on the left side.       Brachioradialis reflexes are 2+ on the right side and 2+ on the left side.       Patellar reflexes are 2+ on the right side and 2+ on the left side.       Achilles reflexes are 2+ on the right side and 2+ on the left side.  Decrease finger tapping on left compared to right  LUE strength no drift. Left  weaker than right.   Skin: Skin is warm and dry.   Psychiatric: She has a normal mood and affect. Her speech is normal and behavior is normal. Cognition and memory are normal.   Nursing note and vitals reviewed.           ASSESSMENT/PLAN    Diagnoses and all orders for this visit:    Essential hypertension    Hyperlipidemia, unspecified hyperlipidemia type    Cerebrovascular accident (CVA) due to thrombosis of left middle cerebral artery    Altered taste    Meningioma    Cervical radiculopathy    MEDICAL DECISION MAKIN. Continue with Plavix 75 mg daily for secondary stroke prevention  2. Continue with statin per PCP for LDL goal less than 70.  3. BP managed by PCP for systolic less than 130.  4. Meningioma monitored by Dr. MONIQUE Evans and patient denies LE weakness  5. Patient to see Dr. ANGEL Mcclellan next week for cervical radiculopathy  6. BMI healthy. Patient's BMI is within normal parameters. No follow-up required.  7. Patient does not use tobacco.  8. Patient is counseled on stroke signs and symptoms using FAST and Time Saved is Brain Saved.   9. BP is elevated today. Patient states did not take her BP and will not be returning home immediately because has another appointment later today and too far to travel to go and come back. She will call her son to see if can bring her medication to her. She is to monitor her BP and f/u with PCP.     allergies and all known medications/prescriptions have been reviewed using resources available on this encounter.    Return in about 1 year (around 2018).        Sandra Omalley, JACLYN

## 2018-01-03 ENCOUNTER — APPOINTMENT (OUTPATIENT)
Dept: PHYSICAL THERAPY | Facility: HOSPITAL | Age: 73
End: 2018-01-03
Attending: NEUROLOGICAL SURGERY

## 2018-01-03 ENCOUNTER — HOSPITAL ENCOUNTER (OUTPATIENT)
Dept: PHYSICAL THERAPY | Facility: HOSPITAL | Age: 73
Setting detail: THERAPIES SERIES
Discharge: HOME | End: 2018-01-03
Attending: NEUROLOGICAL SURGERY

## 2018-01-03 DIAGNOSIS — M54.12 CERVICAL RADICULOPATHY: ICD-10-CM

## 2018-01-03 DIAGNOSIS — I63.312 CEREBROVASCULAR ACCIDENT (CVA) DUE TO THROMBOSIS OF LEFT MIDDLE CEREBRAL ARTERY (HCC): ICD-10-CM

## 2018-01-03 DIAGNOSIS — R29.898 WEAKNESS OF LEFT UPPER EXTREMITY: Primary | ICD-10-CM

## 2018-01-03 PROCEDURE — G8984 CARRY CURRENT STATUS: HCPCS

## 2018-01-03 PROCEDURE — G8985 CARRY GOAL STATUS: HCPCS

## 2018-01-03 PROCEDURE — 97162 PT EVAL MOD COMPLEX 30 MIN: CPT

## 2018-01-03 NOTE — THERAPY EVALUATION
Outpatient Physical Therapy Ortho Initial Evaluation  [facility]     Patient Name: Adela Goetz  : 1945  MRN: [id number]  Today's Date: 1/3/2018      Visit Date: 2018    Patient Active Problem List   Diagnosis   • CAD (coronary artery disease)   • Hypertension   • Hyperlipidemia   • Cerebrovascular accident (CVA) due to thrombosis of left middle cerebral artery   • Rheumatoid arthritis   • Osteopenia   • Meningioma   • Altered taste   • Cervical radiculopathy   • Pneumonia of left upper lobe due to infectious organism   • Basal cell carcinoma   • Coronary artery disease involving coronary bypass graft of native heart without angina pectoris   • Non-smoker        Past Medical History:   Diagnosis Date   • Arthritis    • CAD (coronary artery disease)    • Cancer     basal  cell ca   • Chest pain    • Difficulty walking    • Disease of thyroid gland    • Hyperlipidemia    • Hypertension    • Osteopenia    • Pneumonia     2017   • SOB (shortness of breath)    • Stroke     2015 durin surgery   • TIA (transient ischemic attack)    • Weakness     left hand and left arm        Past Surgical History:   Procedure Laterality Date   • APPENDECTOMY     • BREAST BIOPSY     • BREAST RECONSTRUCTION      breast reduction , yrs ago   • CARDIAC CATHETERIZATION     • CARDIAC SURGERY      cabg x3   ,done at [facility]   • CATARACT EXTRACTION     • COLONOSCOPY N/A 2017    Procedure: COLONOSCOPY WITH ANESTHESIA;  Surgeon: Andrea Pizano DO;  Location: [facility] ENDOSCOPY;  Service:    • COLONOSCOPY N/A 2017    Procedure: COLONOSCOPY WITH ANESTHESIA;  Surgeon: Andrea Pizano DO;  Location: [facility] ENDOSCOPY;  Service:    • HEAD/NECK LESION/CYST EXCISION Right 10/18/2017    Procedure: Excision of basal cell carcinoma the right neck with complex closure;  Surgeon: Edward Cardona MD;  Location: [facility] OR;  Service:    • HERNIA REPAIR     • REDUCTION MAMMAPLASTY     • RETINAL LASER  PROCEDURE     • SPINE SURGERY     • TUBAL ABDOMINAL LIGATION         Visit Dx:     ICD-10-CM ICD-9-CM   1. Weakness of left upper extremity R29.898 729.89   2. Cerebrovascular accident (CVA) due to thrombosis of left middle cerebral artery I63.312 434.01   3. Cervical radiculopathy M54.12 723.4             Patient History       01/03/18 0900          History    Chief Complaint Muscle weakness;Difficulty with daily activities  -RS      Date Current Problem(s) Began 01/03/17  -RS      Brief Description of Current Complaint Patient reports one year ago she had a significant flare in neck pain with left UE radicular symptoms.  This lasted 6 weeks with no therapy during/after.  Patient did suffer a CVA in 2015 after open hear surgery which affects her steadiness on her feet and coordination.  The left arm became weak after the neck flared and has been getting better slowly, but is still not where she wants it to be.  She is not falling, denies saddle anesthesia, reports increased constipation due to inability to eat/drink much due to post CVA loss of ability to taste, and notices more frequent bouts of mild urinary incontinence (cough/sneeze/feels like she needs to get to the restroom quickly).  Reaching overhead and lifting objects are the most difficult at this time.  -RS      Previous treatment for THIS PROBLEM Medication  -RS      Onset Date- PT 1/3/2018  -RS      Patient/Caregiver Goals Improve strength  -RS      Current Tobacco Use no  -RS      Smoking Status never smoker  -RS      Hand Dominance right-handed  -RS      Occupation/sports/leisure activities Sitter at [employer]  -RS      Patient seeing anyone else for problem(s)? Yes  -RS      What clinical tests have you had for this problem? MRI;X-ray  -RS      Results of Clinical Tests see imaging notes  -RS      Are you or can you be pregnant No  -RS      Pain     Pain Location Neck  -RS      Pain at Present 0  -RS      Fall Risk Assessment    Any  falls in the past year: No  -RS      Services    Prior Rehab/Home Health Experiences Yes  -RS      When was the prior experience with Rehab/Home Health 2009  -RS      Where was the prior experience with Rehab/Home Health [facility]  -RS      Are you currently receiving Home Health services No  -RS      Do you plan to receive Home Health services in the near future No  -RS      Daily Activities    Primary Language English  -RS      Are you able to read Yes  -RS      Are you able to write Yes  -RS      How does patient learn best? Pictures/Video;Demonstration  -RS      Does patient have problems with the following? Depression  -RS      Pt Participated in POC and Goals Yes  -RS      Safety    Are you being hurt, hit, or frightened by anyone at home or in your life? No  -RS      Are you being neglected by a caregiver No  -RS        User Key  (r) = Recorded By, (t) = Taken By, (c) = Cosigned By    Initials Name Provider Type    RS Enrique Del Cid, PT DPT Physical Therapist                PT Ortho       01/03/18 1000    Posture/Observations    Alignment Options Forward head;Thoracic kyphosis;Rounded shoulders  -RS    Forward Head Mild;Increased  -RS    Thoracic Kyphosis Mild;Moderate;Increased  -RS    Rounded Shoulders Bilateral:;Mild;Increased  -RS    Observations Muscle atrophy  -RS    Posture/Observations Comments left posterior cuff atrophy, left deltoid atrophy, left mid/low trap atrophy  -RS    Quarter Clearing    Quarter Clearing Upper Quarter Clearing  -RS    DTR- Upper Quarter Clearing    Biceps (C5/6) Bilateral:;3- Slightly hyperactive response  -RS    Brachioradialis (C6) Bilateral:;3- Slightly hyperactive response  -RS    Triceps (C7) Bilateral:;3- Slightly hyperactive response  -RS    Sensory Screen for Light Touch- Upper Quarter Clearing    C4 (posterior shoulder) Bilateral:;Intact  -RS    C5 (lateral upper arm) Bilateral:;Intact  -RS    C6 (tip of thumb) Bilateral:;Intact  -RS    C7 (tip  of 3rd finger) Bilateral:;Intact  -RS    C8 (tip of 5th finger) Bilateral:;Intact  -RS    T1 (medial lower arm) Bilateral:;Intact  -RS    Myotomal Screen- Upper Quarter Clearing    Shoulder flexion (C5) Right:;5 (Normal);Left:;4- (Good -)  -RS    Elbow flexion/wrist extension (C6) Right:;5 (Normal);Left:;4- (Good -)  -RS    Elbow extension/wrist flexion (C7) Right:;5 (Normal);Left:;4- (Good -)  -RS    Finger flexion/ (C8) Right:;5 (Normal);Left:;4- (Good -);4 (Good)  -RS    Finger abduction (T1) Right:;4+ (Good +);Left:;3+ (Fair +);4- (Good -)  -RS    Cervical/Shoulder ROM Screen    Cervical flexion Normal  -RS    Cervical extension Normal  -RS    Cervical rotation Impaired   55-57 deg bilaterally  -RS    Special Tests/Palpation    Special Tests/Palpation Shoulder  -RS    Shoulder Girdle Palpation    Deltoid Left:;Atrophied  -RS    Infraspinatus Left:;Atrophied  -RS    Teres Minor Left:;Atrophied  -RS    Upper Trap Left:;Tender;Guarded/taut  -RS    Levator Scapula Left:;Tender;Guarded/taut  -RS    Shoulder Girdle Palpation? Yes  -RS    Shoulder Girdle Accessory Motions    Shoulder Girdle Accessory Motions Tested? Yes  -RS    Posterior glide of humerus Left:;Hypomobile  -RS    Anterior glide of humerus Left:;Hypomobile  -RS    Retraction mobility of scapula Left:;Hypomobile  -RS    Upward rotation mobility of scapula Left:;Hypomobile  -RS    Shoulder Impingement/Rotator Cuff Special Tests    Full Can Test (RC Lesion) Left:;Negative  -RS    Drop Arm Test (Full Thickness RC Lesion) Left:;Positive   for weakness, cannot grade for cuff integrity  -RS    Belly Press (Subscapularis Lesion) Left:;Negative  -RS    Scapular Special Tests    Scapular Assistance Test (Scapular Dyskinesia) Left:;Positive  -RS    Scapular Retraction Test (Scapular Dyskinesia) Left:;Positive  -RS    ROM (Range of Motion)    General ROM upper extremity range of motion deficits identified  -RS    Left Shoulder    Flexion AROM Deficit 95  -RS     Flexion PROM Deficit 125  -RS    ABduction AROM Deficit 94  -RS    ABduction PROM Deficit 118  -RS    Left Scapula    Impairments Contrib to Scap ROM Deficit joint capsule tightness;muscle weakness;muscle shortening  -RS    General UE Assessment    ROM shoulder, left: UE ROM deficit;scapula, left: UE ROM deficit  -RS    MMT (Manual Muscle Testing)    General MMT Assessment upper extremity strength deficits identified  -RS    Left Shoulder    Flexion Gross Movement (3+/5) fair plus  -RS    ABduction Gross Movement (3+/5) fair plus  -RS    Int Rotation Gross Movement (4-/5) good minus  -RS    Ext Rotation Gross Movement (3+/5) fair plus  -RS    Left Scapula    Scapular ABd&Lat Rotation Serratus Ant (2+/5) poor plus;(3-/5) fair minus  -RS    Upper Extremity    Upper Ext Manual Muscle Testing right UE strength is WNL;left shoulder strength deficit;left scapular strength deficit  -RS    Pathomechanics    Upper Extremity Pathomechanics Excessive scapular elevation;Limited scapular upward rotation;Winging of scapula with return to neutral;Limited thoracic extension;Excessive abduction/internal rotation with reaching  -RS      User Key  (r) = Recorded By, (t) = Taken By, (c) = Cosigned By    Initials Name Provider Type    KENNETH Del Cid PT DPT Physical Therapist           Hand Therapy (last 24 hours)      Hand Eval       01/03/18 1000          Hand  Strength     Strength Affected Side Left  -RS       Strength Right    # Reps 3  -RS      Right Rung 2  -RS      Right  Test 1 40  -RS      Right  Test 2 40  -RS      Right  Test 3 42  -RS       Strength Average Right 40.67  -RS       Strength Left    # Reps 3  -RS      Left Rung 2  -RS      Left  Test 1 18  -RS      Left  Test 2 20  -RS      Left  Test 3 18  -RS       Strength Average Left 18.67  -RS        User Key  (r) = Recorded By, (t) = Taken By, (c) = Cosigned By    Initials Name Provider Type    KENNETH aSravia  Nehemias, PT DPT Physical Therapist                    Therapy Education  Education Details: standing wall CKC serratus slide; (L) UE CKC shoulder flexion at 90 deg  Given: HEP, Symptoms/condition management  Program: New  How Provided: Written, Demonstration, Verbal  Provided to: Patient  Level of Understanding: Verbalized, Demonstrated, Teach back education performed           PT OP Goals       01/03/18 1000       PT Short Term Goals    STG Date to Achieve 01/17/18  -RS     STG 1 Patient will demonstrate independence with the home exercise program  -RS     STG 1 Progress New  -RS     STG 2 Patient will display PROM into left shoulder flexion symmetrical to the right shoulder  -RS     STG 2 Progress New  -RS     Long Term Goals    LTG Date to Achieve 01/31/18  -RS     LTG 1 Patient will demonstrate left scapular upward rotation to at least 45 degrees with active shoulder flexion  -RS     LTG 1 Progress New  -RS     LTG 2 Patient will demonstrate left shoulder flexion/abduction AROM within 10 degrees of the right shoulder by discharge  -RS     LTG 2 Progress New  -RS     LTG 3 Patient will demonstrate ability to hold up to 3# sustained 90 deg shoulder flexion without compensations at the elbow, shoulder blade, or without shrugging by discharge.  -RS     LTG 3 Progress New  -RS     LTG 4 Patient will improve left hand  strength within 10# of the right UE by discharge  -RS     LTG 4 Progress New  -RS     LTG 4 Progress Comments Dynamometer position #2 for initial evaluation  -RS     LTG 5 Patient will demonstrate ability to reach overhead, perform housework, perform ADL's with the left UE without compensation of shoulder shrugging or trunk lean by discharge.  -RS     LTG 5 Progress New  -RS     Time Calculation    PT Goal Re-Cert Due Date 02/02/18  -RS       User Key  (r) = Recorded By, (t) = Taken By, (c) = Cosigned By    Initials Name Provider Type    KENNETH Del Cid, PT DPT Physical Therapist                 PT Assessment/Plan       01/03/18 1000       PT Assessment    Functional Limitations Performance in self-care ADL;Performance in leisure activities;Limitation in home management  -RS     Impairments Endurance;Joint mobility;Muscle strength;Pain;Posture;Range of motion  -RS     Assessment Comments Patient presents today with left UE weakness after a flare in neck pain one year ago.  The patient has suffered a CVA in the past and could not fully account the weakness as a residual deficit.  There were no UMN signs today including a negative Brantley bilaterally.  The patient's limited ROM in the shoulder is mainly due to lack of scapular active movement into upward rotation.  The shoulder has gotten stiff due to the lack of mobility overhead.  The left posterior rotator cuff is also noted to be very weak, which does not provide a good force couple to promote AROM greater than 90 degrees of flexion.  There is also significant  strength deficits on the left side that we will monitor with time.  The thoracic stiffness works against the upward rotation with reaching and will be addressed over the plan of care.  Noted upper trap and levator compensation is noted due to the aforementioned weaknesses.  The patient will be monitored for any changes in symptoms that would suggest persistent nerve irritation.  Given the patient has not pain or pain with ROM, especially of the neck, I believe that she is a good candidate for therapy.  Thank you for allowing us to work with this patient.  -RS     Please refer to paper survey for additional self-reported information Yes  -RS     Rehab Potential Good  -RS     Patient/caregiver participated in establishment of treatment plan and goals Yes  -RS     Patient would benefit from skilled therapy intervention Yes  -RS     PT Plan    PT Frequency 1x/week;2x/week  -RS     Predicted Duration of Therapy Intervention (days/wks) 4-6 weeks  -RS     Planned CPT's? PT EVAL MOD COMPLELITY:  29162;PT THER PROC EA 15 MIN: 21386;PT THER ACT EA 15 MIN: 96833;PT MANUAL THERAPY EA 15 MIN: 33030;PT NEUROMUSC RE-EDUCATION EA 15 MIN: 10817;PT HOT OR COLD PACK TREAT MCARE;PT ELECTRICAL STIM UNATTEND: ;PT ELECTRICAL STIM ATTD EA 15 MIN: 35750  -RS     Physical Therapy Interventions (Optional Details) home exercise program;joint mobilization;manual therapy techniques;modalities;neuromuscular re-education;motor coordination training;patient/family education;postural re-education;ROM (Range of Motion);strengthening;stretching;swiss ball techniques;taping  -RS     PT Plan Comments We will work on improving left shoulder joint mobility, scapulohumeral muscle flexibility, scapulothoracic flexibility (upward rotation) early on.  We will integrate gravity eliminated and CKC strengthening/recruitment progressing to resisted exercises as able.  Modalities may be used sparingly for muscle recruitment.    -RS       User Key  (r) = Recorded By, (t) = Taken By, (c) = Cosigned By    Initials Name Provider Type    RS Enrique Del Cid, PT DPT Physical Therapist                                        Time Calculation:   Start Time: 0936  Stop Time: 1036  Time Calculation (min): 60 min     Therapy Charges for Today     Code Description Service Date Service Provider Modifiers Qty    73844217134 HC PT CARRY MOV HAND OBJ CURRENT 1/3/2018 Enrique Del Cid, PT DPT GP, CJ 1    16329200574 HC PT CARRY MOV HAND OBJ PROJECTED 1/3/2018 Enrique Del Cid, PT DPT GP, CI 1    61092870626 HC PT EVAL MOD COMPLEXITY 4 1/3/2018 Enrique Del Cid, PT DPT GP 1          PT G-Codes  PT Professional Judgement Used?: Yes (per patient report of normal)  Functional Limitation: Carrying, moving and handling objects  Carrying, Moving and Handling Objects Current Status (): At least 20 percent but less than 40 percent impaired, limited or restricted  Carrying, Moving and Handling Objects Goal Status (): At least 1 percent but  less than 20 percent impaired, limited or restricted         YANDEL Del Cid, PT DPT  1/3/2018

## 2018-01-05 RX ORDER — CLOPIDOGREL BISULFATE 75 MG/1
TABLET ORAL
Qty: 30 TABLET | Refills: 11 | Status: SHIPPED | OUTPATIENT
Start: 2018-01-05 | End: 2019-02-19 | Stop reason: SDUPTHER

## 2018-01-30 ENCOUNTER — OFFICE VISIT (OUTPATIENT)
Dept: OTOLARYNGOLOGY | Facility: CLINIC | Age: 73
End: 2018-01-30

## 2018-01-30 VITALS
DIASTOLIC BLOOD PRESSURE: 81 MMHG | TEMPERATURE: 97.8 F | HEART RATE: 67 BPM | WEIGHT: 130.25 LBS | SYSTOLIC BLOOD PRESSURE: 135 MMHG | BODY MASS INDEX: 23.08 KG/M2 | HEIGHT: 63 IN

## 2018-01-30 DIAGNOSIS — C44.41 BASAL CELL CARCINOMA OF SKIN OF NECK: Primary | ICD-10-CM

## 2018-01-30 PROCEDURE — 99213 OFFICE O/P EST LOW 20 MIN: CPT | Performed by: NURSE PRACTITIONER

## 2018-01-30 NOTE — PATIENT INSTRUCTIONS
Call for problems or worsening symptoms    Sun protection.  Massage scar PRN.  Continue with derm followups Ananya Cardona

## 2018-01-30 NOTE — PROGRESS NOTES
YOB: 1945  Location: [facility]  Location Address: [address] [place] [address], [facility], [address] [place] [address]  Location Phone: [phone]    Chief Complaint   Patient presents with   • Basal Cell Carcinoma       History of Present Illness  Adela Goetz is a 72 y.o. female.  Adela Goetz is here for follow up of ENT complaints. The patient has had problems with hx of BCC.  The symptoms are not localized to a particular location. The patient has had a resolution of the symptoms. The symptoms have been present for the last several months The symptoms are aggravated by  no identifiable factors.         Past Medical History:   Diagnosis Date   • Arthritis    • Basal cell carcinoma 10/28/2017   • CAD (coronary artery disease)    • Cancer     basal  cell ca   • Chest pain    • CVA (cerebral vascular accident)    • Difficulty walking    • Disease of thyroid gland    • Hyperlipidemia    • Hypertension    • Osteopenia    • Pneumonia     2017   • SOB (shortness of breath)    • Stroke     2015 durin surgery   • TIA (transient ischemic attack)    • Weakness     left hand and left arm       Past Surgical History:   Procedure Laterality Date   • APPENDECTOMY     • BREAST BIOPSY     • BREAST RECONSTRUCTION      breast reduction , yrs ago   • CARDIAC CATHETERIZATION     • CARDIAC SURGERY      cabg x3   ,done at [facility]   • CATARACT EXTRACTION     • COLONOSCOPY N/A 2017    Procedure: COLONOSCOPY WITH ANESTHESIA;  Surgeon: Andrea Pizano DO;  Location: [facility] ENDOSCOPY;  Service:    • COLONOSCOPY N/A 2017    Procedure: COLONOSCOPY WITH ANESTHESIA;  Surgeon: Andrea Pizano DO;  Location: [facility] ENDOSCOPY;  Service:    • HEAD/NECK LESION/CYST EXCISION Right 10/18/2017    Procedure: Excision of basal cell carcinoma the right neck with complex closure;  Surgeon: Edward Cardona MD;  Location: [facility] OR;  Service:    • HERNIA REPAIR     • REDUCTION MAMMAPLASTY     • RETINAL LASER  PROCEDURE     • SPINE SURGERY     • TUBAL ABDOMINAL LIGATION           Current Outpatient Prescriptions:   •  amLODIPine (NORVASC) 2.5 MG tablet, Take 1 tablet by mouth daily., Disp: 30 tablet, Rfl: 11  •  Ascorbic Acid (VITAMIN C PO), Take 1,000 mg by mouth Daily., Disp: , Rfl:   •  B Complex-C-E-Zn (BEC/ZINC PO), Take  by mouth., Disp: , Rfl:   •  carvedilol (COREG) 12.5 MG tablet, Take 6.25 mg by mouth 2 (Two) Times a Day With Meals., Disp: , Rfl:   •  Cholecalciferol (VITAMIN D3) 5000 UNITS capsule capsule, Take 2,000 Units by mouth Daily., Disp: , Rfl:   •  clopidogrel (PLAVIX) 75 MG tablet, TAKE 1 TABLET BY MOUTH DAILY, Disp: 30 tablet, Rfl: 11  •  ferrous sulfate 325 (65 FE) MG tablet, TK 1 T PO QD, Disp: , Rfl: 1  •  folic acid (FOLVITE) 1 MG tablet, Take 1 mg by mouth Daily., Disp: , Rfl:   •  lisinopril (PRINIVIL,ZESTRIL) 20 MG tablet, Take 20 mg by mouth Daily., Disp: , Rfl: 4  •  nitroglycerin (NITROSTAT) 0.4 MG SL tablet, Place 0.4 mg under the tongue every 5 (five) minutes as needed for chest pain. Take no more than 3 doses in 15 minutes., Disp: , Rfl:   •  predniSONE (DELTASONE) 5 MG tablet, TK 1 T PO D, Disp: , Rfl: 0  •  Zinc Sulfate (ZINC 15 PO), Take 50 mg by mouth Daily., Disp: , Rfl:   •  carvedilol (COREG) 6.25 MG tablet, , Disp: , Rfl: 4  •  HYDROcodone-acetaminophen (NORCO) 5-325 MG per tablet, Take 1 tablet by mouth Every 4 (Four) Hours As Needed for Moderate Pain  or Severe Pain  (Pain) for up to 4 doses., Disp: 4 tablet, Rfl: 0  •  levothyroxine (SYNTHROID, LEVOTHROID) 50 MCG tablet, Take 50 mcg by mouth Daily., Disp: , Rfl:   •  methotrexate 2.5 MG tablet, Take 4 tablets by mouth 1 (One) Time Per Week. Resume after completion of antibiotics, Disp: , Rfl: 0  •  Nepafenac (ILEVRO) 0.3 % suspension, Apply 1 drop to eye Daily. Right eye, Disp: , Rfl:   •  oxyCODONE-acetaminophen (PERCOCET) 5-325 MG per tablet, Take 1 tablet by mouth Daily As Needed for moderate pain (4-6)., Disp: , Rfl:   •   pravastatin (PRAVACHOL) 40 MG tablet, Take 40 mg by mouth Daily., Disp: , Rfl: 0  •  prednisoLONE acetate (PRED FORTE) 1 % ophthalmic suspension, Administer 1 drop to the right eye 4 (Four) Times a Day., Disp: , Rfl: 0  •  predniSONE (DELTASONE) 10 MG tablet, Take 10 mg by mouth daily., Disp: , Rfl:   •  temazepam (RESTORIL) 15 MG capsule, Take 15 mg by mouth at night as needed for sleep., Disp: , Rfl:     Review of patient's allergies indicates no known allergies.    Family History   Problem Relation Age of Onset   • Thyroid cancer Other    • Coronary artery disease Other    • Hypothyroidism Other    • Emphysema Mother    • Heart disease Father    • Cancer Sister    • Cancer Brother    • No Known Problems Sister    • No Known Problems Sister    • No Known Problems Brother    • Liver cancer Maternal Grandmother        Social History     Social History   • Marital status:      Spouse name: N/A   • Number of children: N/A   • Years of education: N/A     Occupational History   • Not on file.     Social History Main Topics   • Smoking status: Never Smoker   • Smokeless tobacco: Never Used   • Alcohol use No   • Drug use: No   • Sexual activity: Defer     Other Topics Concern   • Not on file     Social History Narrative       Review of Systems   Constitutional: Negative.    HENT:        SEE HPI   Eyes: Negative.    Respiratory: Negative.    Cardiovascular: Negative.    Gastrointestinal: Negative.    Endocrine: Negative.    Genitourinary: Negative.    Musculoskeletal: Negative.    Skin: Negative.    Allergic/Immunologic: Negative.    Neurological: Negative.    Hematological: Negative.    Psychiatric/Behavioral: Negative.        Vitals:    01/30/18 0911   BP: 135/81   Pulse: 67   Temp: 97.8 °F (36.6 °C)       Objective     Physical Exam  CONSTITUTIONAL: well nourished, alert, oriented, in no acute distress     COMMUNICATION AND VOICE: able to communicate normally, normal voice quality    HEAD: normocephalic, no  lesions, atraumatic, no tenderness, no masses     FACE: appearance normal, no lesions, no tenderness, no deformities, facial motion symmetric    SALIVARY GLANDS: parotid glands with no tenderness, no swelling, no masses, submandibular glands with normal size, nontender    EYES: ocular motility normal, eyelids normal, orbits normal, no proptosis, conjunctiva normal , pupils equal, round     EARS:  Hearing: response to conversational voice normal bilaterally   External Ears: auricles without lesions  Otoscopic: tympanic membrane appearance normal, no lesions, no perforation, normal mobility, no fluid    NOSE:  External Nose: structure normal, no tenderness on palpation, no nasal discharge, no lesions, no evidence of trauma, nostrils patent   Intranasal Exam: nasal mucosa normal, vestibule within normal limits, inferior turbinate normal, nasal septum midline       ORAL:  Lips: upper and lower lips without lesion   Teeth: dentition within normal limits for age   Gums: gingivae healthy   Oral Mucosa: oral mucosa normal, no mucosal lesions   Floor of Mouth: Warthin’s duct patent, mucosa normal  Tongue: lingual mucosa normal without lesions, normal tongue mobility   Palate: soft and hard palates with normal mucosa and structure  Oropharynx: oropharyngeal mucosa normal    NECK: right neck incision well healed    THYROID: no overt thyromegaly, no tenderness, nodules or mass present on palpation, position midline     LYMPH NODES: no lymphadenopathy    CHEST/RESPIRATORY: respiratory effort normal,     CARDIOVASCULAR:  extremities without cyanosis or edema      NEUROLOGIC/PSYCHIATRIC: oriented to time, place and person, mood normal, affect appropriate, CN II-XII intact grossly    Assessment/Plan   Adela was seen today for basal cell carcinoma.    Diagnoses and all orders for this visit:    Basal cell carcinoma of skin of neck      * Surgery not found *  No orders of the defined types were placed in this encounter.    Return if  symptoms worsen or fail to improve.       Patient Instructions   Call for problems or worsening symptoms    Sun protection.  Massage scar PRN.  Continue with derm followups Ananya Cardona

## 2018-02-02 ENCOUNTER — DOCUMENTATION (OUTPATIENT)
Dept: PHYSICAL THERAPY | Facility: HOSPITAL | Age: 73
End: 2018-02-02

## 2018-02-02 NOTE — THERAPY DISCHARGE NOTE
Outpatient Physical Therapy Rehab Program Treatment/Discharge       Patient Name: Adela Goetz  : 1945  MRN: [id number]  Today's Date: 2018      Visit Date: 2018    Visit Dx:  No diagnosis found.    Patient Active Problem List   Diagnosis   • CAD (coronary artery disease)   • Hypertension   • Hyperlipidemia   • Cerebrovascular accident (CVA) due to thrombosis of left middle cerebral artery   • Rheumatoid arthritis   • Osteopenia   • Meningioma   • Altered taste   • Cervical radiculopathy   • Pneumonia of left upper lobe due to infectious organism   • Basal cell carcinoma   • Coronary artery disease involving coronary bypass graft of native heart without angina pectoris   • Non-smoker        Past Medical History:   Diagnosis Date   • Arthritis    • Basal cell carcinoma 10/28/2017   • CAD (coronary artery disease)    • Cancer     basal  cell ca   • Chest pain    • CVA (cerebral vascular accident)    • Difficulty walking    • Disease of thyroid gland    • Hyperlipidemia    • Hypertension    • Osteopenia    • Pneumonia     2017   • SOB (shortness of breath)    • Stroke     2015 durin surgery   • TIA (transient ischemic attack)    • Weakness     left hand and left arm        Past Surgical History:   Procedure Laterality Date   • APPENDECTOMY     • BREAST BIOPSY     • BREAST RECONSTRUCTION      breast reduction , yrs ago   • CARDIAC CATHETERIZATION     • CARDIAC SURGERY      cabg x3   ,done at [facility]   • CATARACT EXTRACTION     • COLONOSCOPY N/A 2017    Procedure: COLONOSCOPY WITH ANESTHESIA;  Surgeon: Andrea Pizano DO;  Location: [facility] ENDOSCOPY;  Service:    • COLONOSCOPY N/A 2017    Procedure: COLONOSCOPY WITH ANESTHESIA;  Surgeon: Andrea Pizano DO;  Location: [facility] ENDOSCOPY;  Service:    • HEAD/NECK LESION/CYST EXCISION Right 10/18/2017    Procedure: Excision of basal cell carcinoma the right neck with complex closure;  Surgeon: Edward Cardona MD;   Location: [facility] OR;  Service:    • HERNIA REPAIR     • REDUCTION MAMMAPLASTY  1995   • RETINAL LASER PROCEDURE     • SPINE SURGERY     • TUBAL ABDOMINAL LIGATION                                                            PT OP Goals       02/02/18 1000       PT Short Term Goals    STG Date to Achieve 01/17/18  -TR     STG 1 Patient will demonstrate independence with the home exercise program  -TR     STG 1 Progress Not Met  -TR     STG 2 Patient will display PROM into left shoulder flexion symmetrical to the right shoulder  -TR     STG 2 Progress Not Met  -TR     Long Term Goals    LTG Date to Achieve 01/31/18  -TR     LTG 1 Patient will demonstrate left scapular upward rotation to at least 45 degrees with active shoulder flexion  -TR     LTG 1 Progress Not Met  -TR     LTG 2 Patient will demonstrate left shoulder flexion/abduction AROM within 10 degrees of the right shoulder by discharge  -TR     LTG 2 Progress Not Met  -TR     LTG 3 Patient will demonstrate ability to hold up to 3# sustained 90 deg shoulder flexion without compensations at the elbow, shoulder blade, or without shrugging by discharge.  -TR     LTG 3 Progress Not Met  -TR     LTG 4 Patient will improve left hand  strength within 10# of the right UE by discharge  -TR     LTG 4 Progress Not Met  -TR     LTG 5 Patient will demonstrate ability to reach overhead, perform housework, perform ADL's with the left UE without compensation of shoulder shrugging or trunk lean by discharge.  -TR     LTG 5 Progress Not Met  -TR     Time Calculation    PT Goal Re-Cert Due Date 02/02/18  -TR       User Key  (r) = Recorded By, (t) = Taken By, (c) = Cosigned By    Initials Name Provider Type    MILKA Tejeda, PTA Physical Therapy Assistant                    Time Calculation:                   OP PT Discharge Summary  Reason for Discharge: Non-compliant  Outcomes Achieved: Unable to make functional progress toward goals at this time  Discharge  Destination: Home without follow-up  Discharge Instructions: Pt has not attended any sessions since inital eval. Pt called today and pt wishes to be discharged.       Sierra Tejeda, PTA  2/2/2018

## 2018-02-05 ENCOUNTER — APPOINTMENT (OUTPATIENT)
Dept: PHYSICAL THERAPY | Facility: HOSPITAL | Age: 73
End: 2018-02-05
Attending: NEUROLOGICAL SURGERY

## 2018-05-30 ENCOUNTER — HOSPITAL ENCOUNTER (OUTPATIENT)
Dept: GENERAL RADIOLOGY | Age: 73
Discharge: HOME | End: 2018-05-30
Payer: MEDICARE

## 2018-05-30 DIAGNOSIS — M25.561 PAIN IN BOTH KNEES, UNSPECIFIED CHRONICITY: ICD-10-CM

## 2018-05-30 DIAGNOSIS — M25.562 PAIN IN BOTH KNEES, UNSPECIFIED CHRONICITY: ICD-10-CM

## 2018-05-30 DIAGNOSIS — M25.551 PAIN OF BOTH HIP JOINTS: ICD-10-CM

## 2018-05-30 DIAGNOSIS — M25.552 PAIN OF BOTH HIP JOINTS: ICD-10-CM

## 2018-05-30 PROCEDURE — 73560 X-RAY EXAM OF KNEE 1 OR 2: CPT

## 2018-05-30 PROCEDURE — 73521 X-RAY EXAM HIPS BI 2 VIEWS: CPT

## 2018-09-18 LAB
ALBUMIN SERPL-MCNC: 4 G/DL (ref 3.5–5.2)
ALP BLD-CCNC: 65 U/L (ref 35–104)
ALT SERPL-CCNC: 14 U/L (ref 5–33)
ANION GAP SERPL CALCULATED.3IONS-SCNC: 12 MMOL/L (ref 7–19)
AST SERPL-CCNC: 18 U/L (ref 5–32)
BASOPHILS ABSOLUTE: 0 K/UL (ref 0–0.2)
BASOPHILS RELATIVE PERCENT: 0.6 % (ref 0–1)
BILIRUB SERPL-MCNC: 0.4 MG/DL (ref 0.2–1.2)
BUN BLDV-MCNC: 15 MG/DL (ref 8–23)
C-REACTIVE PROTEIN: 0.05 MG/DL (ref 0–0.5)
CALCIUM SERPL-MCNC: 9.6 MG/DL (ref 8.8–10.2)
CHLORIDE BLD-SCNC: 106 MMOL/L (ref 98–111)
CO2: 26 MMOL/L (ref 22–29)
CREAT SERPL-MCNC: 1 MG/DL (ref 0.5–0.9)
EOSINOPHILS ABSOLUTE: 0 K/UL (ref 0–0.6)
EOSINOPHILS RELATIVE PERCENT: 0 % (ref 0–5)
GFR NON-AFRICAN AMERICAN: 54
GLUCOSE BLD-MCNC: 90 MG/DL (ref 74–109)
HCT VFR BLD CALC: 38.9 % (ref 37–47)
HEMOGLOBIN: 12.8 G/DL (ref 12–16)
LYMPHOCYTES ABSOLUTE: 1 K/UL (ref 1.1–4.5)
LYMPHOCYTES RELATIVE PERCENT: 28.6 % (ref 20–40)
MCH RBC QN AUTO: 30.5 PG (ref 27–31)
MCHC RBC AUTO-ENTMCNC: 32.9 G/DL (ref 33–37)
MCV RBC AUTO: 92.6 FL (ref 81–99)
MONOCYTES ABSOLUTE: 0.4 K/UL (ref 0–0.9)
MONOCYTES RELATIVE PERCENT: 11.5 % (ref 0–10)
NEUTROPHILS ABSOLUTE: 2 K/UL (ref 1.5–7.5)
NEUTROPHILS RELATIVE PERCENT: 59 % (ref 50–65)
PDW BLD-RTO: 12.7 % (ref 11.5–14.5)
PLATELET # BLD: 195 K/UL (ref 130–400)
PMV BLD AUTO: 10.3 FL (ref 9.4–12.3)
POTASSIUM SERPL-SCNC: 3.5 MMOL/L (ref 3.5–5)
RBC # BLD: 4.2 M/UL (ref 4.2–5.4)
SEDIMENTATION RATE, ERYTHROCYTE: 10 MM/HR (ref 0–25)
SODIUM BLD-SCNC: 144 MMOL/L (ref 136–145)
TOTAL PROTEIN: 6.6 G/DL (ref 6.6–8.7)
WBC # BLD: 3.4 K/UL (ref 4.8–10.8)

## 2018-11-05 DIAGNOSIS — D32.0 BENIGN NEOPLASM OF CEREBRAL MENINGES (HCC): Primary | ICD-10-CM

## 2018-11-12 ENCOUNTER — HOSPITAL ENCOUNTER (OUTPATIENT)
Dept: MRI IMAGING | Facility: HOSPITAL | Age: 73
Discharge: HOME | End: 2018-11-12
Admitting: NURSE PRACTITIONER

## 2018-11-12 DIAGNOSIS — D32.0 BENIGN NEOPLASM OF CEREBRAL MENINGES (HCC): ICD-10-CM

## 2018-11-12 LAB — CREAT BLDA-MCNC: 0.9 MG/DL (ref 0.6–1.3)

## 2018-11-12 PROCEDURE — 82565 ASSAY OF CREATININE: CPT

## 2018-11-12 PROCEDURE — A9577 INJ MULTIHANCE: HCPCS | Performed by: NURSE PRACTITIONER

## 2018-11-12 PROCEDURE — 0 GADOBENATE DIMEGLUMINE 529 MG/ML SOLUTION: Performed by: NURSE PRACTITIONER

## 2018-11-12 PROCEDURE — 70553 MRI BRAIN STEM W/O & W/DYE: CPT

## 2018-11-12 RX ADMIN — GADOBENATE DIMEGLUMINE 10 ML: 529 INJECTION, SOLUTION INTRAVENOUS at 09:11

## 2018-12-19 ENCOUNTER — OFFICE VISIT (OUTPATIENT)
Dept: NEUROSURGERY | Facility: CLINIC | Age: 73
End: 2018-12-19

## 2018-12-19 VITALS — BODY MASS INDEX: 23.74 KG/M2 | WEIGHT: 134 LBS | HEIGHT: 63 IN

## 2018-12-19 DIAGNOSIS — Z78.9 NON-SMOKER: ICD-10-CM

## 2018-12-19 DIAGNOSIS — D32.9 MENINGIOMA (HCC): Primary | ICD-10-CM

## 2018-12-19 PROCEDURE — 99214 OFFICE O/P EST MOD 30 MIN: CPT | Performed by: NEUROLOGICAL SURGERY

## 2018-12-19 NOTE — PROGRESS NOTES
Chief complaint:   Chief Complaint   Patient presents with   • Follow-up     Here for yearly follow up for meningioma. Patient is doing well. She is under the care of Neurology.        Subjective     HPI:   From previous note:   72-year-old female with stable meningioma over the past 1 year.  I directly reviewed imaging findings with the patient.  Patient would like to proceed with continued observation over radiation or surgical resection.  We will follow-up with her in 1 year with a repeat MRI brain with and without contrast or sooner with questions or concerns.  We will also refer her to physical therapy for her left shoulder stiffness.    Interval History: Adela returns today for follow-up for meningioma.  She is doing well.  She has only mild worsening confusion.  She is attributing this to old age.  Her gait is wide-based and slow but she walks without a cane or walker.  She lives alone at home with her .  She has no weakness numbness or tingling in her hands or feet.  She has noticed a loss of smell and taste.  But no recent history of head trauma other than the fall off a ladder 2015.    Review of Systems   Constitutional: Negative.    HENT: Negative.    Eyes: Negative.    Respiratory: Negative.    Cardiovascular: Negative.    Gastrointestinal: Negative.    Endocrine: Negative.    Genitourinary: Negative.    Musculoskeletal: Negative.    Skin: Negative.    Allergic/Immunologic: Negative.    Neurological: Negative.    Hematological: Negative.        PFSH:  Past Medical History:   Diagnosis Date   • Basal cell carcinoma 10/28/2017   • CAD (coronary artery disease)    • CVA (cerebral vascular accident) (CMS/HCC) 2015   • Hyperlipidemia    • Hypertension    • Osteopenia    • Rheumatoid arthritis (CMS/[facility])    • TIA (transient ischemic attack)        Past Surgical History:   Procedure Laterality Date   • APPENDECTOMY     • BREAST BIOPSY     • BREAST RECONSTRUCTION      breast reduction , yrs ago   • CARDIAC  "CATHETERIZATION     • CARDIAC SURGERY      cabg x3  2015 ,done at [facility]   • CATARACT EXTRACTION     • COLONOSCOPY N/A 1/18/2017    Procedure: COLONOSCOPY WITH ANESTHESIA;  Surgeon: Andrea Pizano DO;  Location: [facility] ENDOSCOPY;  Service:    • COLONOSCOPY N/A 2/7/2017    Procedure: COLONOSCOPY WITH ANESTHESIA;  Surgeon: Andrea Pizano DO;  Location: [facility] ENDOSCOPY;  Service:    • HEAD/NECK LESION/CYST EXCISION Right 10/18/2017    Procedure: Excision of basal cell carcinoma the right neck with complex closure;  Surgeon: Edward Cardona MD;  Location: [facility] OR;  Service:    • HERNIA REPAIR     • REDUCTION MAMMAPLASTY  1995   • RETINAL LASER PROCEDURE     • SPINE SURGERY     • TUBAL ABDOMINAL LIGATION         Objective      Current Outpatient Medications   Medication Sig Dispense Refill   • amLODIPine (NORVASC) 2.5 MG tablet Take 1 tablet by mouth daily. 30 tablet 11   • Ascorbic Acid (VITAMIN C PO) Take 1,000 mg by mouth Daily.     • B Complex-C-E-Zn (BEC/ZINC PO) Take  by mouth.     • carvedilol (COREG) 12.5 MG tablet Take 6.25 mg by mouth 2 (Two) Times a Day With Meals.     • Cholecalciferol (VITAMIN D3) 5000 UNITS capsule capsule Take 2,000 Units by mouth Daily.     • clopidogrel (PLAVIX) 75 MG tablet TAKE 1 TABLET BY MOUTH DAILY 30 tablet 11   • ferrous sulfate 325 (65 FE) MG tablet TK 1 T PO QD  1   • folic acid (FOLVITE) 1 MG tablet Take 1 mg by mouth Daily.     • levothyroxine (SYNTHROID, LEVOTHROID) 50 MCG tablet Take 50 mcg by mouth Daily.     • lisinopril (PRINIVIL,ZESTRIL) 20 MG tablet Take 20 mg by mouth Daily.  4   • nitroglycerin (NITROSTAT) 0.4 MG SL tablet Place 0.4 mg under the tongue every 5 (five) minutes as needed for chest pain. Take no more than 3 doses in 15 minutes.     • Zinc Sulfate (ZINC 15 PO) Take 50 mg by mouth Daily.       No current facility-administered medications for this visit.        Vital Signs  Ht 160 cm (63\")   Wt 60.8 kg (134 lb)   LMP  (LMP Unknown)   " BMI 23.74 kg/m²   Physical Exam   Constitutional: She is oriented to person, place, and time.   Eyes: EOM are normal. Pupils are equal, round, and reactive to light.   Neurological: She is oriented to person, place, and time. She has normal strength. Gait normal.   Reflex Scores:       Patellar reflexes are 3+ on the right side and 3+ on the left side.  Psychiatric: Her speech is normal.     Neurologic Exam     Mental Status   Oriented to person, place, and time.   Speech: speech is normal     Cranial Nerves     CN II   Visual fields full to confrontation.     CN III, IV, VI   Pupils are equal, round, and reactive to light.  Extraocular motions are normal.     CN V   Right facial sensation deficit: none  Left facial sensation deficit: none    CN VII   Facial expression full, symmetric.     CN VIII   Hearing: intact    CN IX, X   Palate: symmetric    CN XI   Right sternocleidomastoid strength: normal  Left sternocleidomastoid strength: normal    CN XII   Tongue deviation: none    Motor Exam     Strength   Strength 5/5 throughout.     Sensory Exam   Right arm light touch: normal  Left arm light touch: normal    Gait, Coordination, and Reflexes     Gait  Gait: normal    Reflexes   Reflexes 2+ except as noted.   Right patellar: 3+  Left patellar: 3+  Right plantar: normal  Left plantar: normal    (12 bullet pts)    Results Review:   MRI with and without contrast of the brain reviewed.  There is some discrepancy in the \[facility]\"" neuroradiology report.  The body the takes is no significant change but the impression says significant change.  I believe this is a dictation air.  When I compare the 11/12/2018 to 02/02/2013 scans are assignificant change.  There does appear to be increased calcification within the center of the lesion which is a reassuring sign.          Assessment/Plan: Adela Goetz is a 73 y.o. female with a significant comorbidity TIA, rheumatoid arthritis, osteopenia, and coronary artery disease status post  heart surgery. She presents with a known problem of incidentally found meningioma after head trauma. Physical exam findings of a logical intact but with some altered gait.  Her imaging shows stable falcine meningioma.  We discussed conservative management versus stereotactic radiosurgery versus resection.  I agree with Dr. Evans.  We will continue conservative care with serial monitoring.  Since the lesion has been stable for 3 years we willout her interval scans to 3 years.  She does contact our office should she develop worsening neurological symptoms.    1. Meningioma (CMS/HCC)    2. Non-smoker    3. BMI 23.0-23.9, adult        Recommendations:  Adeal was seen today for follow-up.    Diagnoses and all orders for this visit:    Meningioma (CMS/HCC)  -     MRI Brain With & Without Contrast; Future    Non-smoker    BMI 23.0-23.9, adult        Return in about 3 years (around 12/19/2021) for follow up.    Level of Risk: Low due to:  one stable chronic illnesss  MDM: Moderate Complexity  (Mod = 01583, High = 98299)    Thank you, for allowing me to continue to participate in the care of this patient.    Sincerely,  Randal Mota MD

## 2019-02-19 ENCOUNTER — OFFICE VISIT (OUTPATIENT)
Dept: NEUROLOGY | Facility: CLINIC | Age: 74
End: 2019-02-19

## 2019-02-19 VITALS
SYSTOLIC BLOOD PRESSURE: 132 MMHG | BODY MASS INDEX: 23.74 KG/M2 | HEART RATE: 74 BPM | WEIGHT: 134 LBS | HEIGHT: 63 IN | DIASTOLIC BLOOD PRESSURE: 80 MMHG

## 2019-02-19 DIAGNOSIS — R41.3 IMPAIRED MEMORY: ICD-10-CM

## 2019-02-19 DIAGNOSIS — I63.312 CEREBROVASCULAR ACCIDENT (CVA) DUE TO THROMBOSIS OF LEFT MIDDLE CEREBRAL ARTERY (HCC): Primary | ICD-10-CM

## 2019-02-19 DIAGNOSIS — I65.23 BILATERAL CAROTID ARTERY STENOSIS: ICD-10-CM

## 2019-02-19 DIAGNOSIS — D32.9 MENINGIOMA (HCC): ICD-10-CM

## 2019-02-19 DIAGNOSIS — R43.2 ALTERED TASTE: ICD-10-CM

## 2019-02-19 PROCEDURE — 99214 OFFICE O/P EST MOD 30 MIN: CPT | Performed by: CLINICAL NURSE SPECIALIST

## 2019-02-19 RX ORDER — GABAPENTIN 100 MG/1
100 CAPSULE ORAL EVERY 8 HOURS PRN
COMMUNITY
End: 2019-11-12 | Stop reason: ALTCHOICE

## 2019-02-19 RX ORDER — TRAZODONE HYDROCHLORIDE 50 MG/1
50 TABLET ORAL NIGHTLY
COMMUNITY
End: 2020-09-03

## 2019-02-19 RX ORDER — PRAVASTATIN SODIUM 40 MG
40 TABLET ORAL NIGHTLY
Qty: 30 TABLET | Refills: 2 | Status: SHIPPED | OUTPATIENT
Start: 2019-02-19 | End: 2019-02-19

## 2019-02-19 RX ORDER — CLOPIDOGREL BISULFATE 75 MG/1
75 TABLET ORAL DAILY
Qty: 90 TABLET | Refills: 3 | Status: SHIPPED | OUTPATIENT
Start: 2019-02-19 | End: 2020-09-03

## 2019-02-19 RX ORDER — ROSUVASTATIN CALCIUM 20 MG/1
20 TABLET, COATED ORAL DAILY
COMMUNITY
End: 2019-10-25 | Stop reason: HOSPADM

## 2019-02-19 NOTE — PROGRESS NOTES
Subjective     Chief Complaint   Patient presents with   • Stroke         Adela Goetz is a 73 y.o. female right handed retiree.  She is here today for follow up for stroke and altered taste. She was last seen 12/2017. She is by herself. SHe is ambulating unassisted. She has done well since then and denies new stroke symptoms. She has had no change/improvement in taste and tells me that certain foods cause impaired taste. She did recently see Dr. Mota for evaluation of meningioma which is stable from follow up MRI brain 11/2018.   She does have remaining numbness in 4th,5th fingers left hand since the strokes.   She also has remaining LUE weakness since the stroke and cervical radiculopathy. Overall, this is about the same.     Patient has complaints of impaired memory. She seems uncertain why she is here for follow up and seems to be uncertain of her medications. She does appear to have more word finding during today exam but she denies this as a decline. MMSE today  28/30. She drives and does not get lost. States she manages finances well.      As you recall, SHe has a history of stroke in 12/14 and 4/15 perioperative during CABG.  She has had loss of taste since then.  She also has mild dis coordination of LUE.  She had seen Dr. Wynn in the past. .  She does have a history of a meningioma.        Stroke   This is a chronic (12/2014 and 4/2015 during CAGB) problem. The current episode started more than 1 year ago (right and left BG.). The problem has been unchanged. Associated symptoms include arthralgias (arthritis), neck pain and weakness (mild LUE). Pertinent negatives include no chest pain, fatigue, fever, headaches, myalgias, nausea, sore throat or vomiting. Associated symptoms comments: Altered taste. LUE discoordination. Treatments tried: currently taking ASA 81 mg/statin.   Brain Tumor   This is a chronic problem. The problem has been unchanged (unchanged on MRI). Associated symptoms include  "arthralgias (arthritis), neck pain and weakness (mild LUE). Pertinent negatives include no chest pain, fatigue, fever, headaches, myalgias, nausea, sore throat or vomiting. Associated symptoms comments: None. Altered taste.   Memory Loss   This is a new problem. Episode frequency: worse in the last year 2018. Associated symptoms include arthralgias (arthritis), neck pain and weakness (mild LUE). Pertinent negatives include no chest pain, fatigue, fever, headaches, myalgias, nausea, sore throat or vomiting. Associated symptoms comments: Does manage finances but occasionally will get late notices, \"too much month\" between income, drives does not get lost, seems uncertain with her medications, what she takes and why. Exacerbated by: stroke, meningioma, grandmother had dementia in her 70s.        Current Outpatient Medications   Medication Sig Dispense Refill   • amLODIPine (NORVASC) 2.5 MG tablet Take 1 tablet by mouth daily. 30 tablet 11   • Ascorbic Acid (VITAMIN C PO) Take 1,000 mg by mouth Daily.     • Cholecalciferol (VITAMIN D3) 5000 UNITS capsule capsule Take 2,000 Units by mouth Daily.     • clopidogrel (PLAVIX) 75 MG tablet Take 1 tablet by mouth Daily. 90 tablet 3   • folic acid (FOLVITE) 1 MG tablet Take 1 mg by mouth Daily.     • gabapentin (NEURONTIN) 100 MG capsule Take 100 mg by mouth Every 8 (Eight) Hours As Needed.     • levothyroxine (SYNTHROID, LEVOTHROID) 50 MCG tablet Take 50 mcg by mouth Daily.     • lisinopril (PRINIVIL,ZESTRIL) 20 MG tablet Take 20 mg by mouth Daily.  4   • methotrexate 2.5 MG tablet Take 2.5 mg by mouth 1 (One) Time Per Week.     • nitroglycerin (NITROSTAT) 0.4 MG SL tablet Place 0.4 mg under the tongue every 5 (five) minutes as needed for chest pain. Take no more than 3 doses in 15 minutes.     • traZODone (DESYREL) 50 MG tablet Take 50 mg by mouth Every Night.     • sertraline (ZOLOFT) 50 MG tablet Take 50 mg by mouth Daily.       No current facility-administered medications " for this visit.        Past Medical History:   Diagnosis Date   • Basal cell carcinoma 10/28/2017   • CAD (coronary artery disease)    • CVA (cerebral vascular accident) (CMS/HCC) 2015   • Hyperlipidemia    • Hypertension    • Osteopenia    • Rheumatoid arthritis (CMS/HCC)    • TIA (transient ischemic attack)        Past Surgical History:   Procedure Laterality Date   • APPENDECTOMY     • BREAST BIOPSY     • BREAST RECONSTRUCTION      breast reduction , yrs ago   • CARDIAC CATHETERIZATION     • CARDIAC SURGERY      cabg x3  2015 ,[facility] at [facility]   • CATARACT EXTRACTION     • COLONOSCOPY N/A 1/18/2017    Procedure: COLONOSCOPY WITH ANESTHESIA;  Surgeon: Andrea Pizano DO;  Location: [facility] ENDOSCOPY;  Service:    • COLONOSCOPY N/A 2/7/2017    Procedure: COLONOSCOPY WITH ANESTHESIA;  Surgeon: Andrea Pizano DO;  Location: [facility] ENDOSCOPY;  Service:    • HEAD/NECK LESION/CYST EXCISION Right 10/18/2017    Procedure: Excision of basal cell carcinoma the right neck with complex closure;  Surgeon: Edward Cardona MD;  Location: [facility] OR;  Service:    • HERNIA REPAIR     • REDUCTION MAMMAPLASTY  1995   • RETINAL LASER PROCEDURE     • SPINE SURGERY     • TUBAL ABDOMINAL LIGATION         family history includes Cancer in her brother and sister; Emphysema in her mother; Heart disease in her father; Liver cancer in her maternal grandmother.    Social History     Tobacco Use   • Smoking status: Never Smoker   • Smokeless tobacco: Never Used   Substance Use Topics   • Alcohol use: No   • Drug use: No       Review of Systems   Constitutional: Negative for fatigue and fever.   HENT: Negative.  Negative for rhinorrhea and sore throat.    Eyes: Negative.  Negative for visual disturbance.   Respiratory: Negative.  Negative for chest tightness and shortness of breath.    Cardiovascular: Negative.  Negative for chest pain.   Gastrointestinal: Negative.  Negative for blood in stool, constipation, diarrhea, nausea and  "vomiting.   Endocrine: Negative.  Negative for cold intolerance and heat intolerance.   Genitourinary: Negative.  Negative for dysuria and frequency.   Musculoskeletal: Positive for arthralgias (arthritis) and neck pain. Negative for gait problem and myalgias.   Skin: Negative.    Allergic/Immunologic: Negative.    Neurological: Positive for speech difficulty (mild word finding) and weakness (mild LUE). Negative for tremors and headaches.   Hematological: Negative.    Psychiatric/Behavioral: Positive for confusion (impaired memory). Negative for agitation. The patient is not nervous/anxious and is not hyperactive.    All other systems reviewed and are negative.      Objective     /80   Pulse 74   Ht 160 cm (63\")   Wt 60.8 kg (134 lb)   LMP  (LMP Unknown)   BMI 23.74 kg/m² , Body mass index is 23.74 kg/m².    Physical Exam   Constitutional: She is oriented to person, place, and time. Vital signs are normal. She appears well-developed and well-nourished. She is cooperative.   HENT:   Head: Normocephalic and atraumatic.   Right Ear: Hearing and external ear normal.   Left Ear: Hearing and external ear normal.   Nose: Nose normal.   Mouth/Throat: Oropharynx is clear and moist.   Eyes: Conjunctivae, EOM and lids are normal. Pupils are equal, round, and reactive to light.   Neck: Normal range of motion. Neck supple. Carotid bruit is not present.   Cardiovascular: Normal rate, regular rhythm and normal heart sounds.   No murmur heard.  Pulmonary/Chest: Effort normal and breath sounds normal. She has no decreased breath sounds. She has no rhonchi.   Abdominal: Soft. Bowel sounds are normal.   Musculoskeletal: Normal range of motion.   Neurological: She is alert and oriented to person, place, and time. She has normal strength and normal reflexes. She displays no tremor. No cranial nerve deficit or sensory deficit. She exhibits normal muscle tone. She displays a negative Romberg sign. Coordination (no ataxia/ " tremor. ) and gait normal.   Reflex Scores:       Tricep reflexes are 2+ on the right side and 2+ on the left side.       Bicep reflexes are 2+ on the right side and 2+ on the left side.       Brachioradialis reflexes are 2+ on the right side and 2+ on the left side.       Patellar reflexes are 2+ on the right side and 2+ on the left side.       Achilles reflexes are 2+ on the right side and 2+ on the left side.        Awake, alert. No aphasia, no dysarthria  Completes simple and complex commands  MMSE 28/30    CN II:  Visual fields full.  Pupils equally reactive to light  CN III, IV, VI:  Extraocular Muscles full with no signs of nystagmus  CN V:  Facial sensory is symmetric with no asymetries.  CN VII:  Facial motor symmetric  CN VIII:  Gross hearing intact bilaterally  CN IX:  Palate elevates symmetrically  CN X:  Palate elevates symmetrically  CN XI:  Shoulder shrug symmetric  CN XII:  Tongue is midline on protrusion    Decrease finger tapping on left compared to right  LUE strength no drift. Left  weaker than right.       Skin: Skin is warm and dry.   Psychiatric: She has a normal mood and affect. Her speech is normal and behavior is normal. Cognition and memory are normal.   Nursing note and vitals reviewed.      Results for orders placed or performed during the hospital encounter of 11/12/18   POC Creatinine   Result Value Ref Range    Creatinine 0.90 0.60 - 1.30 mg/dL      MRI BRAIN 11/2018: IMPRESSION:  1.  No acute abnormalities.  2.  Meningioma, parafalcine, change in signal characteristics in size.  3.  Additional chronic findings as described above.    ASSESSMENT/PLAN    Diagnoses and all orders for this visit:    Cerebrovascular accident (CVA) due to thrombosis of left middle cerebral artery (CMS/HCC)  -     US Carotid Bilateral; Future  -     Ambulatory Referral to Speech Therapy    Altered taste    Meningioma (CMS/HCC)  -     EEG (Hospital Performed); Future    Impaired memory  -     CBC &  Differential; Future  -     Comprehensive Metabolic Panel; Future  -     Magnesium; Future  -     Ammonia; Future  -     EEG (Hospital Performed); Future  -     Vitamin B12; Future  -     Folate; Future  -     Vitamin B6 (Pyridoxine); Future  -     Methylmalonic Acid, Serum; Future  -     Cancel: Ambulatory Referral to Speech Therapy  -     TSH; Future  -     T4; Future  -     Ambulatory Referral to Home Health  -     Urinalysis With Culture If Indicated - Urine, Clean Catch; Future  -     Ambulatory Referral to Speech Therapy    Bilateral carotid artery stenosis  -     US Carotid Bilateral; Future    Other orders  -     gabapentin (NEURONTIN) 100 MG capsule; Take 100 mg by mouth Every 8 (Eight) Hours As Needed.  -     traZODone (DESYREL) 50 MG tablet; Take 50 mg by mouth Every Night.  -     methotrexate 2.5 MG tablet; Take 2.5 mg by mouth 1 (One) Time Per Week.  -     sertraline (ZOLOFT) 50 MG tablet; Take 50 mg by mouth Daily.  -     clopidogrel (PLAVIX) 75 MG tablet; Take 1 tablet by mouth Daily.    MEDICAL DECISION MAKING:  Patient having complaints of impaired memory. May be late effect of prior strokes. MRI 11/2108 stable.   1. Continue with Plavix 75 mg daily for secondary stroke prevention  2. Continue with statin per PCP for LDL goal less than 70.  3. BP managed by PCP for systolic less than 130.  4. Meningioma monitored by Dr. Mota  5. Refer to  for impaired memory for memory evaluationh  6. Check labs looking for correctable cause of impaired memory.  7. Check EEG  8. Patient is counseled on stroke signs and symptoms using FAST and Time Saved is Brain Saved.   9. Check carotid duplex.  10. Refer to Home health for medication education  11. Patient's Body mass index is 23.74 kg/m². BMI is within normal parameters. No follow-up required..  12. Fall Risk Assessment  Fallen in past 6 months: 0--> No  Mental Status: 1--> confused  Mobility: 0--> No mobility issues  Medications: 1--> Sedatives  Total  Fall Risk Score: 4  13. Patient not taking a statin and unsure why. She was taking Pravachol 40 mg 1 year ago and uncertain when she stopped. Will resume Pravachol 40 mg daily. Check lipid panel.       HPI and ROS reviewed and updated.      allergies and all known medications/prescriptions have been reviewed using resources available on this encounter.    Return in about 6 weeks (around 4/2/2019).        Sandra Omalley, APRN

## 2019-02-22 ENCOUNTER — TELEPHONE (OUTPATIENT)
Dept: NEUROLOGY | Facility: CLINIC | Age: 74
End: 2019-02-22

## 2019-02-22 ENCOUNTER — LAB (OUTPATIENT)
Dept: LAB | Facility: HOSPITAL | Age: 74
End: 2019-02-22

## 2019-02-22 DIAGNOSIS — R41.3 IMPAIRED MEMORY: ICD-10-CM

## 2019-02-22 DIAGNOSIS — I63.312 CEREBROVASCULAR ACCIDENT (CVA) DUE TO THROMBOSIS OF LEFT MIDDLE CEREBRAL ARTERY (HCC): ICD-10-CM

## 2019-02-22 LAB
ALBUMIN SERPL-MCNC: 4.5 G/DL (ref 3.5–5)
ALBUMIN/GLOB SERPL: 1.7 G/DL (ref 1.1–2.5)
ALP SERPL-CCNC: 81 U/L (ref 24–120)
ALT SERPL W P-5'-P-CCNC: 24 U/L (ref 0–54)
AMMONIA BLD-SCNC: <9 UMOL/L (ref 9–33)
ANION GAP SERPL CALCULATED.3IONS-SCNC: 9 MMOL/L (ref 4–13)
ARTICHOKE IGE QN: 80 MG/DL (ref 0–99)
AST SERPL-CCNC: 26 U/L (ref 7–45)
BACTERIA UR QL AUTO: ABNORMAL /HPF
BASOPHILS # BLD AUTO: 0.02 10*3/MM3 (ref 0–0.2)
BASOPHILS NFR BLD AUTO: 0.4 % (ref 0–2)
BILIRUB SERPL-MCNC: 0.6 MG/DL (ref 0.1–1)
BILIRUB UR QL STRIP: NEGATIVE
BUN BLD-MCNC: 13 MG/DL (ref 5–21)
BUN/CREAT SERPL: 15.9 (ref 7–25)
CALCIUM SPEC-SCNC: 9.5 MG/DL (ref 8.4–10.4)
CHLORIDE SERPL-SCNC: 103 MMOL/L (ref 98–110)
CHOLEST SERPL-MCNC: 151 MG/DL (ref 130–200)
CLARITY UR: CLEAR
CO2 SERPL-SCNC: 30 MMOL/L (ref 24–31)
COLOR UR: YELLOW
CREAT BLD-MCNC: 0.82 MG/DL (ref 0.5–1.4)
DEPRECATED RDW RBC AUTO: 46.8 FL (ref 40–54)
EOSINOPHIL # BLD AUTO: 0.15 10*3/MM3 (ref 0–0.7)
EOSINOPHIL NFR BLD AUTO: 3 % (ref 0–4)
ERYTHROCYTE [DISTWIDTH] IN BLOOD BY AUTOMATED COUNT: 13.5 % (ref 12–15)
FOLATE SERPL-MCNC: >20 NG/ML (ref 4.78–24.2)
GFR SERPL CREATININE-BSD FRML MDRD: 68 ML/MIN/1.73
GLOBULIN UR ELPH-MCNC: 2.7 GM/DL
GLUCOSE BLD-MCNC: 90 MG/DL (ref 70–100)
GLUCOSE UR STRIP-MCNC: NEGATIVE MG/DL
HCT VFR BLD AUTO: 40.2 % (ref 37–47)
HDLC SERPL-MCNC: 50 MG/DL
HGB BLD-MCNC: 13.2 G/DL (ref 12–16)
HGB UR QL STRIP.AUTO: NEGATIVE
HYALINE CASTS UR QL AUTO: ABNORMAL /LPF
IMM GRANULOCYTES # BLD AUTO: 0.01 10*3/MM3 (ref 0–0.05)
IMM GRANULOCYTES NFR BLD AUTO: 0.2 % (ref 0–5)
KETONES UR QL STRIP: NEGATIVE
LDLC/HDLC SERPL: 1.46 {RATIO}
LEUKOCYTE ESTERASE UR QL STRIP.AUTO: ABNORMAL
LYMPHOCYTES # BLD AUTO: 1.2 10*3/MM3 (ref 0.72–4.86)
LYMPHOCYTES NFR BLD AUTO: 24.4 % (ref 15–45)
MAGNESIUM SERPL-MCNC: 2.3 MG/DL (ref 1.4–2.2)
MCH RBC QN AUTO: 31.6 PG (ref 28–32)
MCHC RBC AUTO-ENTMCNC: 32.8 G/DL (ref 33–36)
MCV RBC AUTO: 96.2 FL (ref 82–98)
MONOCYTES # BLD AUTO: 0.4 10*3/MM3 (ref 0.19–1.3)
MONOCYTES NFR BLD AUTO: 8.1 % (ref 4–12)
NEUTROPHILS # BLD AUTO: 3.14 10*3/MM3 (ref 1.87–8.4)
NEUTROPHILS NFR BLD AUTO: 63.9 % (ref 39–78)
NITRITE UR QL STRIP: NEGATIVE
NRBC BLD AUTO-RTO: 0 /100 WBC (ref 0–0)
PH UR STRIP.AUTO: 7 [PH] (ref 5–8)
PLATELET # BLD AUTO: 221 10*3/MM3 (ref 130–400)
PMV BLD AUTO: 10.4 FL (ref 6–12)
POTASSIUM BLD-SCNC: 3.5 MMOL/L (ref 3.5–5.3)
PROT SERPL-MCNC: 7.2 G/DL (ref 6.3–8.7)
PROT UR QL STRIP: NEGATIVE
RBC # BLD AUTO: 4.18 10*6/MM3 (ref 4.2–5.4)
RBC # UR: ABNORMAL /HPF
REF LAB TEST METHOD: ABNORMAL
SODIUM BLD-SCNC: 142 MMOL/L (ref 135–145)
SP GR UR STRIP: 1.01 (ref 1–1.03)
SQUAMOUS #/AREA URNS HPF: ABNORMAL /HPF
TRIGL SERPL-MCNC: 140 MG/DL (ref 0–149)
TSH SERPL DL<=0.05 MIU/L-ACNC: 1.25 MIU/ML (ref 0.47–4.68)
UROBILINOGEN UR QL STRIP: ABNORMAL
VIT B12 BLD-MCNC: 623 PG/ML (ref 239–931)
WBC NRBC COR # BLD: 4.92 10*3/MM3 (ref 4.8–10.8)
WBC UR QL AUTO: ABNORMAL /HPF

## 2019-02-22 PROCEDURE — 84443 ASSAY THYROID STIM HORMONE: CPT | Performed by: CLINICAL NURSE SPECIALIST

## 2019-02-22 PROCEDURE — 81001 URINALYSIS AUTO W/SCOPE: CPT | Performed by: CLINICAL NURSE SPECIALIST

## 2019-02-22 PROCEDURE — 85025 COMPLETE CBC W/AUTO DIFF WBC: CPT | Performed by: CLINICAL NURSE SPECIALIST

## 2019-02-22 PROCEDURE — 84436 ASSAY OF TOTAL THYROXINE: CPT | Performed by: CLINICAL NURSE SPECIALIST

## 2019-02-22 PROCEDURE — 83735 ASSAY OF MAGNESIUM: CPT | Performed by: CLINICAL NURSE SPECIALIST

## 2019-02-22 PROCEDURE — 80061 LIPID PANEL: CPT | Performed by: CLINICAL NURSE SPECIALIST

## 2019-02-22 PROCEDURE — 80053 COMPREHEN METABOLIC PANEL: CPT | Performed by: CLINICAL NURSE SPECIALIST

## 2019-02-22 PROCEDURE — 82607 VITAMIN B-12: CPT | Performed by: CLINICAL NURSE SPECIALIST

## 2019-02-22 PROCEDURE — 84207 ASSAY OF VITAMIN B-6: CPT | Performed by: CLINICAL NURSE SPECIALIST

## 2019-02-22 PROCEDURE — 82746 ASSAY OF FOLIC ACID SERUM: CPT | Performed by: CLINICAL NURSE SPECIALIST

## 2019-02-22 PROCEDURE — 82140 ASSAY OF AMMONIA: CPT | Performed by: CLINICAL NURSE SPECIALIST

## 2019-02-22 PROCEDURE — 36415 COLL VENOUS BLD VENIPUNCTURE: CPT

## 2019-02-22 PROCEDURE — 83921 ORGANIC ACID SINGLE QUANT: CPT | Performed by: CLINICAL NURSE SPECIALIST

## 2019-02-22 NOTE — TELEPHONE ENCOUNTER
[facility] said Adela told them she doesn't need home care and doesn't want home care, and she got her meds straightened out.

## 2019-02-23 LAB — T4 SERPL-MCNC: 10.6 UG/DL (ref 4.5–12)

## 2019-02-26 LAB
Lab: NORMAL
METHYLMALONATE SERPL-SCNC: 118 NMOL/L (ref 0–378)

## 2019-02-26 RX ORDER — AMOXICILLIN 500 MG/1
500 CAPSULE ORAL 2 TIMES DAILY
Qty: 8 CAPSULE | Refills: 0 | Status: SHIPPED | OUTPATIENT
Start: 2019-02-26 | End: 2019-03-02

## 2019-02-27 ENCOUNTER — HOSPITAL ENCOUNTER (OUTPATIENT)
Dept: SPEECH THERAPY | Facility: HOSPITAL | Age: 74
Setting detail: THERAPIES SERIES
Discharge: HOME | End: 2019-02-27

## 2019-02-27 DIAGNOSIS — R41.3 MEMORY CHANGES: Primary | ICD-10-CM

## 2019-02-27 PROCEDURE — 96125 COGNITIVE TEST BY HC PRO: CPT | Performed by: SPEECH-LANGUAGE PATHOLOGIST

## 2019-02-28 LAB — VIT B6 SERPL-MCNC: 19.3 UG/L (ref 2–32.8)

## 2019-03-14 ENCOUNTER — APPOINTMENT (OUTPATIENT)
Dept: ULTRASOUND IMAGING | Facility: HOSPITAL | Age: 74
End: 2019-03-14

## 2019-03-26 ENCOUNTER — HOSPITAL ENCOUNTER (OUTPATIENT)
Dept: ULTRASOUND IMAGING | Facility: HOSPITAL | Age: 74
Discharge: HOME | End: 2019-03-26

## 2019-03-26 ENCOUNTER — HOSPITAL ENCOUNTER (OUTPATIENT)
Dept: NEUROLOGY | Facility: HOSPITAL | Age: 74
Discharge: HOME | End: 2019-03-26
Admitting: CLINICAL NURSE SPECIALIST

## 2019-03-26 DIAGNOSIS — R41.3 IMPAIRED MEMORY: ICD-10-CM

## 2019-03-26 DIAGNOSIS — I63.312 CEREBROVASCULAR ACCIDENT (CVA) DUE TO THROMBOSIS OF LEFT MIDDLE CEREBRAL ARTERY (HCC): ICD-10-CM

## 2019-03-26 DIAGNOSIS — I65.23 BILATERAL CAROTID ARTERY STENOSIS: ICD-10-CM

## 2019-03-26 DIAGNOSIS — D32.9 MENINGIOMA (HCC): ICD-10-CM

## 2019-03-26 PROCEDURE — 95816 EEG AWAKE AND DROWSY: CPT

## 2019-03-26 PROCEDURE — 93880 EXTRACRANIAL BILAT STUDY: CPT

## 2019-03-26 PROCEDURE — 95816 EEG AWAKE AND DROWSY: CPT | Performed by: PSYCHIATRY & NEUROLOGY

## 2019-03-26 PROCEDURE — 93880 EXTRACRANIAL BILAT STUDY: CPT | Performed by: SURGERY

## 2019-03-27 ENCOUNTER — TELEPHONE (OUTPATIENT)
Dept: PODIATRY | Facility: CLINIC | Age: 74
End: 2019-03-27

## 2019-03-27 NOTE — TELEPHONE ENCOUNTER
Patient is now scheduled for May 17, 2019 at 3:45 pm. Patient gave verbal understanding of appointment date and time. Reminder letter will be mailed as well.

## 2019-04-17 ENCOUNTER — OFFICE VISIT (OUTPATIENT)
Dept: NEUROLOGY | Facility: CLINIC | Age: 74
End: 2019-04-17

## 2019-04-17 VITALS
HEIGHT: 63 IN | BODY MASS INDEX: 23.92 KG/M2 | HEART RATE: 72 BPM | WEIGHT: 135 LBS | SYSTOLIC BLOOD PRESSURE: 126 MMHG | DIASTOLIC BLOOD PRESSURE: 74 MMHG

## 2019-04-17 DIAGNOSIS — E78.5 HYPERLIPIDEMIA, UNSPECIFIED HYPERLIPIDEMIA TYPE: ICD-10-CM

## 2019-04-17 DIAGNOSIS — D32.9 MENINGIOMA (HCC): ICD-10-CM

## 2019-04-17 DIAGNOSIS — I10 ESSENTIAL HYPERTENSION: ICD-10-CM

## 2019-04-17 DIAGNOSIS — R41.3 IMPAIRED MEMORY: ICD-10-CM

## 2019-04-17 DIAGNOSIS — I63.312 CEREBROVASCULAR ACCIDENT (CVA) DUE TO THROMBOSIS OF LEFT MIDDLE CEREBRAL ARTERY (HCC): Primary | ICD-10-CM

## 2019-04-17 PROCEDURE — 99214 OFFICE O/P EST MOD 30 MIN: CPT | Performed by: CLINICAL NURSE SPECIALIST

## 2019-04-17 NOTE — PROGRESS NOTES
Subjective     Chief Complaint   Patient presents with   • Stroke        Adela Goetz is a 74y.o. female right handed retiree.  She is here today for follow up for stroke and altered taste, and impaired memory. She was last seen  2/2019. She is by herself. SHe is ambulating unassisted. She has done well since then and denies new stroke symptoms. She has had no change/improvement in taste and tells me that certain foods cause impaired taste. She has hx of meningioma followed by Dr. Mota. At last visit had complaints of impaired memory and seemed confused regarding her medications and why she was present for office visit. She did have evaluation with EEG, CUS, labs and ST memory evaluation. She had previous MRI brain 11/2018 that was stable. I have reviewed test results with patient. EEG negative, CUS stable compared to previous exam. ST memory eval was WFL. Labs unremarkable except extremely mild UTI. At any rate, patient reports today that she feels like she has improved and at the least no worse. I had ordered HH for medication management and patient refused when HH called to arrange.     She does have remaining numbness in 4th,5th fingers left hand since the strokes.   She also has remaining LUE weakness since the stroke and cervical radiculopathy. Overall, this is about the same.            As you recall, SHe has a history of stroke in 12/14 and 4/15 perioperative during CABG.  She has had loss of taste since then.  She also has mild dis coordination of LUE.  She had seen Dr. Wynn in the past. .  She does have a history of a meningioma.         Stroke    This is a chronic (12/2014 and 4/2015 during [facility]B) problem. The current episode started more than 1 year ago (right and left BG.). The problem has been unchanged. Associated symptoms include arthralgias (arthritis), neck pain and weakness (mild LUE). Pertinent negatives include no chest pain, fatigue, fever, headaches, myalgias, nausea, sore throat or  "vomiting. Associated symptoms comments: Altered taste. LUE discoordination. Treatments tried: currently taking ASA 81 mg/statin.   Brain Tumor    This is a chronic problem. The problem has been unchanged (unchanged on MRI). Associated symptoms include arthralgias (arthritis), neck pain and weakness (mild LUE). Pertinent negatives include no chest pain, fatigue, fever, headaches, myalgias, nausea, sore throat or vomiting. Associated symptoms comments: None. Altered taste.    Memory Loss    This is a new problem. Episode frequency: worse in the last year 2018.  Associated symptoms include arthralgias (arthritis), neck pain and weakness (mild LUE). Pertinent negatives include no chest pain, fatigue, fever, headaches, myalgias, nausea, sore throat or vomiting. Associated symptoms comments: Does manage finances but occasionally will get late notices, \"too much month\" between income, drives does not get lost, seems uncertain with her medications, what she takes and why. Exacerbated by: stroke, meningioma, grandmother had dementia in her 70s.        Current Outpatient Medications   Medication Sig Dispense Refill   • amLODIPine (NORVASC) 2.5 MG tablet Take 1 tablet by mouth daily. 30 tablet 11   • Ascorbic Acid (VITAMIN C PO) Take 1,000 mg by mouth Daily.     • Cholecalciferol (VITAMIN D3) 5000 UNITS capsule capsule Take 2,000 Units by mouth Daily.     • clopidogrel (PLAVIX) 75 MG tablet Take 1 tablet by mouth Daily. 90 tablet 3   • folic acid (FOLVITE) 1 MG tablet Take 1 mg by mouth Daily.     • gabapentin (NEURONTIN) 100 MG capsule Take 100 mg by mouth Every 8 (Eight) Hours As Needed.     • levothyroxine (SYNTHROID, LEVOTHROID) 50 MCG tablet Take 50 mcg by mouth Daily.     • lisinopril (PRINIVIL,ZESTRIL) 20 MG tablet Take 20 mg by mouth Daily.  4   • methotrexate 2.5 MG tablet Take 2.5 mg by mouth 1 (One) Time Per Week.     • nitroglycerin (NITROSTAT) 0.4 MG SL tablet Place 0.4 mg under the tongue every 5 (five) minutes " as needed for chest pain. Take no more than 3 doses in 15 minutes.     • rosuvastatin (CRESTOR) 20 MG tablet Take 20 mg by mouth Daily.     • sertraline (ZOLOFT) 50 MG tablet Take 50 mg by mouth Daily.     • traZODone (DESYREL) 50 MG tablet Take 50 mg by mouth Every Night.       No current facility-administered medications for this visit.        Past Medical History:   Diagnosis Date   • Basal cell carcinoma 10/28/2017   • CAD (coronary artery disease)    • CVA (cerebral vascular accident) (CMS/HCC) 2015   • Hyperlipidemia    • Hypertension    • Osteopenia    • Rheumatoid arthritis (CMS/HCC)    • TIA (transient ischemic attack)        Past Surgical History:   Procedure Laterality Date   • APPENDECTOMY     • BREAST BIOPSY     • BREAST RECONSTRUCTION      breast reduction , yrs ago   • CARDIAC CATHETERIZATION     • CARDIAC SURGERY      cabg x3  2015 ,done at [facility]   • CATARACT EXTRACTION     • COLONOSCOPY N/A 1/18/2017    Procedure: COLONOSCOPY WITH ANESTHESIA;  Surgeon: Andrea Pizano DO;  Location: [facility] ENDOSCOPY;  Service:    • COLONOSCOPY N/A 2/7/2017    Procedure: COLONOSCOPY WITH ANESTHESIA;  Surgeon: Andrea Pizano DO;  Location: [facility] ENDOSCOPY;  Service:    • HEAD/NECK LESION/CYST EXCISION Right 10/18/2017    Procedure: Excision of basal cell carcinoma the right neck with complex closure;  Surgeon: Edward Cardona MD;  Location: [facility] OR;  Service:    • HERNIA REPAIR     • REDUCTION MAMMAPLASTY  1995   • RETINAL LASER PROCEDURE     • SPINE SURGERY     • TUBAL ABDOMINAL LIGATION         family history includes Cancer in her brother and sister; Emphysema in her mother; Heart disease in her father; Liver cancer in her maternal grandmother.    Social History     Tobacco Use   • Smoking status: Never Smoker   • Smokeless tobacco: Never Used   Substance Use Topics   • Alcohol use: No   • Drug use: No       Review of Systems   Constitutional: Negative for fatigue and fever.   HENT: Negative.   "Negative for rhinorrhea and sore throat.    Eyes: Negative.  Negative for visual disturbance.   Respiratory: Negative.  Negative for chest tightness and shortness of breath.    Cardiovascular: Negative.  Negative for chest pain.   Gastrointestinal: Negative.  Negative for blood in stool, constipation, diarrhea, nausea and vomiting.   Endocrine: Negative.  Negative for cold intolerance and heat intolerance.   Genitourinary: Negative.  Negative for dysuria and frequency.   Musculoskeletal: Positive for arthralgias (arthritis) and neck pain. Negative for gait problem and myalgias.   Skin: Negative.    Allergic/Immunologic: Negative.    Neurological: Positive for speech difficulty (mild word finding) and weakness (mild LUE). Negative for tremors and headaches.   Hematological: Negative.    Psychiatric/Behavioral: Positive for confusion (impaired memory). Negative for agitation. The patient is not nervous/anxious and is not hyperactive.    All other systems reviewed and are negative.      Objective     /74   Pulse 72   Ht 160 cm (63\")   Wt 61.2 kg (135 lb)   LMP  (LMP Unknown)   Breastfeeding? No   BMI 23.91 kg/m²  , Body mass index is 23.91 kg/m².    Physical Exam   Constitutional: She is oriented to person, place, and time. Vital signs are normal. She appears well-developed and well-nourished. She is cooperative.   HENT:   Head: Normocephalic and atraumatic.   Right Ear: Hearing and external ear normal.   Left Ear: Hearing and external ear normal.   Nose: Nose normal.   Mouth/Throat: Oropharynx is clear and moist.   Eyes: Conjunctivae, EOM and lids are normal. Pupils are equal, round, and reactive to light.   Neck: Normal range of motion. Neck supple. Carotid bruit is not present.   Cardiovascular: Normal rate, regular rhythm and normal heart sounds.   No murmur heard.  Pulmonary/Chest: Effort normal and breath sounds normal. She has no decreased breath sounds. She has no rhonchi.   Abdominal: Soft. Bowel " sounds are normal.   Musculoskeletal: Normal range of motion.   Neurological: She is alert and oriented to person, place, and time. She has normal strength and normal reflexes. She displays no tremor. No cranial nerve deficit or sensory deficit. She exhibits normal muscle tone. She displays a negative Romberg sign. Coordination (no ataxia/ tremor. ) and gait normal.   Reflex Scores:       Tricep reflexes are 2+ on the right side and 2+ on the left side.       Bicep reflexes are 2+ on the right side and 2+ on the left side.       Brachioradialis reflexes are 2+ on the right side and 2+ on the left side.       Patellar reflexes are 2+ on the right side and 2+ on the left side.       Achilles reflexes are 2+ on the right side and 2+ on the left side.  awake, alert. No aphasia, no dysarthria  Completes simple and complex commands  MMSE 28/30    CN II:  Visual fields full.  Pupils equally reactive to light  CN III, IV, VI:  Extraocular Muscles full with no signs of nystagmus  CN V:  Facial sensory is symmetric with no asymetries.  CN VII:  Facial motor symmetric  CN VIII:  Gross hearing intact bilaterally  CN IX:  Palate elevates symmetrically  CN X:  Palate elevates symmetrically  CN XI:  Shoulder shrug symmetric  CN XII:  Tongue is midline on protrusion    Decrease finger tapping on left compared to right  LUE strength no drift. Left  weaker than right.       Skin: Skin is warm and dry.   Psychiatric: She has a normal mood and affect. Her speech is normal and behavior is normal. Cognition and memory are normal.   Nursing note and vitals reviewed.      Results for orders placed or performed in visit on 02/22/19   T4   Result Value Ref Range    T4, Total 10.6 4.5 - 12.0 ug/dL   Comprehensive Metabolic Panel   Result Value Ref Range    Glucose 90 70 - 100 mg/dL    BUN 13 5 - 21 mg/dL    Creatinine 0.82 0.50 - 1.40 mg/dL    Sodium 142 135 - 145 mmol/L    Potassium 3.5 3.5 - 5.3 mmol/L    Chloride 103 98 - 110 mmol/L     CO2 30.0 24.0 - 31.0 mmol/L    Calcium 9.5 8.4 - 10.4 mg/dL    Total Protein 7.2 6.3 - 8.7 g/dL    Albumin 4.50 3.50 - 5.00 g/dL    ALT (SGPT) 24 0 - 54 U/L    AST (SGOT) 26 7 - 45 U/L    Alkaline Phosphatase 81 24 - 120 U/L    Total Bilirubin 0.6 0.1 - 1.0 mg/dL    eGFR Non African Amer 68 >60 mL/min/1.73    Globulin 2.7 gm/dL    A/G Ratio 1.7 1.1 - 2.5 g/dL    BUN/Creatinine Ratio 15.9 7.0 - 25.0    Anion Gap 9.0 4.0 - 13.0 mmol/L   Magnesium   Result Value Ref Range    Magnesium 2.3 (H) 1.4 - 2.2 mg/dL   Ammonia   Result Value Ref Range    Ammonia <9 (L) 9 - 33 umol/L   Vitamin B12   Result Value Ref Range    Vitamin B-12 623 239 - 931 pg/mL   Folate   Result Value Ref Range    Folate >20.00 4.78 - 24.20 ng/mL   Vitamin B6 (Pyridoxine)   Result Value Ref Range    Vitamin B6 19.3 2.0 - 32.8 ug/L   Methylmalonic Acid, Serum   Result Value Ref Range    Methylmalonic Acid 118 0 - 378 nmol/L    Disclaimer: Comment    TSH   Result Value Ref Range    TSH 1.250 0.470 - 4.680 mIU/mL   Urinalysis With Culture If Indicated - Urine, Clean Catch   Result Value Ref Range    Color, UA Yellow Yellow, Straw    Appearance, UA Clear Clear    pH, UA 7.0 5.0 - 8.0    Specific Gravity, UA 1.009 1.005 - 1.030    Glucose, UA Negative Negative    Ketones, UA Negative Negative    Bilirubin, UA Negative Negative    Blood, UA Negative Negative    Protein, UA Negative Negative    Leuk Esterase, UA Trace (A) Negative    Nitrite, UA Negative Negative    Urobilinogen, UA 0.2 E.U./dL 0.2 - 1.0 E.U./dL   Lipid Panel   Result Value Ref Range    Total Cholesterol 151 130 - 200 mg/dL    Triglycerides 140 0 - 149 mg/dL    HDL Cholesterol 50 >=50 mg/dL    LDL Cholesterol  80 0 - 99 mg/dL    LDL/HDL Ratio 1.46    CBC Auto Differential   Result Value Ref Range    WBC 4.92 4.80 - 10.80 10*3/mm3    RBC 4.18 (L) 4.20 - 5.40 10*6/mm3    Hemoglobin 13.2 12.0 - 16.0 g/dL    Hematocrit 40.2 37.0 - 47.0 %    MCV 96.2 82.0 - 98.0 fL    MCH 31.6 28.0 - 32.0 pg     MCHC 32.8 (L) 33.0 - 36.0 g/dL    RDW 13.5 12.0 - 15.0 %    RDW-SD 46.8 40.0 - 54.0 fl    MPV 10.4 6.0 - 12.0 fL    Platelets 221 130 - 400 10*3/mm3    Neutrophil % 63.9 39.0 - 78.0 %    Lymphocyte % 24.4 15.0 - 45.0 %    Monocyte % 8.1 4.0 - 12.0 %    Eosinophil % 3.0 0.0 - 4.0 %    Basophil % 0.4 0.0 - 2.0 %    Immature Grans % 0.2 0.0 - 5.0 %    Neutrophils, Absolute 3.14 1.87 - 8.40 10*3/mm3    Lymphocytes, Absolute 1.20 0.72 - 4.86 10*3/mm3    Monocytes, Absolute 0.40 0.19 - 1.30 10*3/mm3    Eosinophils, Absolute 0.15 0.00 - 0.70 10*3/mm3    Basophils, Absolute 0.02 0.00 - 0.20 10*3/mm3    Immature Grans, Absolute 0.01 0.00 - 0.05 10*3/mm3    nRBC 0.0 0.0 - 0.0 /100 WBC   Urinalysis, Microscopic Only - Urine, Clean Catch   Result Value Ref Range    RBC, UA 0-2 (A) None Seen /HPF    WBC, UA 0-2 (A) None Seen /HPF    Bacteria, UA None Seen None Seen /HPF    Squamous Epithelial Cells, UA 0-2 None Seen, 0-2 /HPF    Hyaline Casts, UA 3-6 None Seen /LPF    Methodology Automated Microscopy       CAROTID DUPLEX:  IMPRESSION:  Impression:  1. There is less than 50% stenosis of the right internal carotid artery.  2. There is less than 50% stenosis of the left internal carotid artery.  3. Antegrade flow is demonstrated in bilateral vertebral arteries.    [facility] EVAL:  Visit Date: 02/27/2019      RBANS: The Repeatable Battery for the Assessment of Neuropsychological Status (RBANS) assesses patient function in the areas of Immediate and Delayed memory, visuospatial/constructional skills, language and attention. It is used to detect and track neurocognitive deficits.    Index score Percentile Qualitative Description   Immediate Memory 94   Average   Visuospatial 87   Low Average   Language 96   Low Average   Attention 88   Low Average   Delayed Memory 101   Average   Total Scale 90   Average   Comments:  RBANS given due to concerns for Memory.  Patient scored WFL in all areas of memory.  Gave patient handout on tips to  help at home.  Instructed her to contact MD if symptoms worsen or other concerns arise.  SLP services not warranted at this time.   Lucille Mirza, MS CCC-SLP 2/27/2019 1:31 PM    EEG: RESULTS: Background activity the EEG is 8-9 Hz and posterior head region bilaterally.  Hyperventilation not done.  Patient has bradycardia noted at times.  Photic stimulation no change.  Patient may have some focal slowing left frontal temporal versus right     IMPRESSION: Questionable EEG with some question of left frontal temporal slowing versus right and I cannot rule out focal cortical injury or ischemia as contributory.  Artifact could be contributory.  This must be correlated with patient's presentation.         ASSESSMENT/PLAN    Diagnoses and all orders for this visit:    Cerebrovascular accident (CVA) due to thrombosis of left middle cerebral artery (CMS/HCC)    Impaired memory    Hyperlipidemia, unspecified hyperlipidemia type    Essential hypertension    Meningioma (CMS/HCC)      MEDICAL DECISION MAKING:  Patient with hx of CVA and impaired memory stable. Impaired memory likely late effect of prior stroke.  1. Continue with Plavix 75 mg daily for secondary stroke prevention  2. Continue with statin per PCP for LDL goal less than 70.  3. BP managed by PCP for systolic less than 130.  4. Meningioma monitored by Dr. Mota  5.  patient to continue with tips provided by ST for impaired memory  6.  labs did show mild UTI at last visit and resolved  7.  I had recommended HH for medication education and patient refused.  8. Patient is counseled on stroke signs and symptoms using FAST and Time Saved is Brain Saved.   9.  Patient's Body mass index is 23.91 kg/m². BMI is within normal parameters. No follow-up required..    HPI and ROS reviewed and updated.      allergies and all known medications/prescriptions have been reviewed using resources available on this encounter.    Return in about 6 months (around  10/17/2019).        Sandra Omalley, APRN

## 2019-04-17 NOTE — PATIENT INSTRUCTIONS
Stroke Prevention  Some medical conditions and behaviors are associated with a higher chance of having a stroke. You can help prevent a stroke by making nutrition, lifestyle, and other changes, including managing any medical conditions you may have.  What nutrition changes can be made?  · Eat healthy foods. You can do this by:  ? Choosing foods high in fiber, such as fresh fruits and vegetables and whole grains.  ? Eating at least 5 or more servings of fruits and vegetables a day. Try to fill half of your plate at each meal with fruits and vegetables.  ? Choosing lean protein foods, such as lean cuts of meat, poultry without skin, fish, tofu, beans, and nuts.  ? Eating low-fat dairy products.  ? Avoiding foods that are high in salt (sodium). This can help lower blood pressure.  ? Avoiding foods that have saturated fat, trans fat, and cholesterol. This can help prevent high cholesterol.  ? Avoiding processed and premade foods.  · Follow your health care provider's specific guidelines for losing weight, controlling high blood pressure (hypertension), lowering high cholesterol, and managing diabetes. These may include:  ? Reducing your daily calorie intake.  ? Limiting your daily sodium intake to 1,500 milligrams (mg).  ? Using only healthy fats for cooking, such as olive oil, canola oil, or sunflower oil.  ? Counting your daily carbohydrate intake.  What lifestyle changes can be made?  · Maintain a healthy weight. Talk to your health care provider about your ideal weight.  · Get at least 30 minutes of moderate physical activity at least 5 days a week. Moderate activity includes brisk walking, biking, and swimming.  · Do not use any products that contain nicotine or tobacco, such as cigarettes and e-cigarettes. If you need help quitting, ask your health care provider. It may also be helpful to avoid exposure to secondhand smoke.  · Limit alcohol intake to no more than 1 drink a day for nonpregnant women and 2 drinks a  day for men. One drink equals 12 oz of beer, 5 oz of wine, or 1½ oz of hard liquor.  · Stop any illegal drug use.  · Avoid taking birth control pills. Talk to your health care provider about the risks of taking birth control pills if:  ? You are over 35 years old.  ? You smoke.  ? You get migraines.  ? You have ever had a blood clot.  What other changes can be made?  · Manage your cholesterol levels.  ? Eating a healthy diet is important for preventing high cholesterol. If cholesterol cannot be managed through diet alone, you may also need to take medicines.  ? Take any prescribed medicines to control your cholesterol as told by your health care provider.  · Manage your diabetes.  ? Eating a healthy diet and exercising regularly are important parts of managing your blood sugar. If your blood sugar cannot be managed through diet and exercise, you may need to take medicines.  ? Take any prescribed medicines to control your diabetes as told by your health care provider.  · Control your hypertension.  ? To reduce your risk of stroke, try to keep your blood pressure below 130/80.  ? Eating a healthy diet and exercising regularly are an important part of controlling your blood pressure. If your blood pressure cannot be managed through diet and exercise, you may need to take medicines.  ? Take any prescribed medicines to control hypertension as told by your health care provider.  ? Ask your health care provider if you should monitor your blood pressure at home.  ? Have your blood pressure checked every year, even if your blood pressure is normal. Blood pressure increases with age and some medical conditions.  · Get evaluated for sleep disorders (sleep apnea). Talk to your health care provider about getting a sleep evaluation if you snore a lot or have excessive sleepiness.  · Take over-the-counter and prescription medicines only as told by your health care provider. Aspirin or blood thinners (antiplatelets or  anticoagulants) may be recommended to reduce your risk of forming blood clots that can lead to stroke.  · Make sure that any other medical conditions you have, such as atrial fibrillation or atherosclerosis, are managed.  What are the warning signs of a stroke?  The warning signs of a stroke can be easily remembered as BEFAST.  · B is for balance. Signs include:  ? Dizziness.  ? Loss of balance or coordination.  ? Sudden trouble walking.  · E is for eyes. Signs include:  ? A sudden change in vision.  ? Trouble seeing.  · F is for face. Signs include:  ? Sudden weakness or numbness of the face.  ? The face or eyelid drooping to one side.  · A is for arms. Signs include:  ? Sudden weakness or numbness of the arm, usually on one side of the body.  · S is for speech. Signs include:  ? Trouble speaking (aphasia).  ? Trouble understanding.  · T is for time.  ? These symptoms may represent a serious problem that is an emergency. Do not wait to see if the symptoms will go away. Get medical help right away. Call your local emergency services (911 in the [place] Do not drive yourself to the hospital.  · Other signs of stroke may include:  ? A sudden, severe headache with no known cause.  ? Nausea or vomiting.  ? Seizure.    Where to find more information  For more information, visit:  · American Stroke Association: www.strokeassociation.org  · National Stroke Association: www.stroke.org    Summary  · You can prevent a stroke by eating healthy, exercising, not smoking, limiting alcohol intake, and managing any medical conditions you may have.  · Do not use any products that contain nicotine or tobacco, such as cigarettes and e-cigarettes. If you need help quitting, ask your health care provider. It may also be helpful to avoid exposure to secondhand smoke.  · Remember BEFAST for warning signs of stroke. Get help right away if you or a loved one has any of these signs.  This information is not intended to replace advice given to  you by your health care provider. Make sure you discuss any questions you have with your health care provider.  Document Released: 01/25/2006 Document Revised: 01/23/2018 Document Reviewed: 01/23/2018  [employer] Patient Education © 2019 [employer]  BMI for Adults  Body mass index (BMI) is a number that is calculated from a person's weight and height. In most adults, the number is used to find how much of an adult's weight is made up of fat. BMI is not as accurate as a direct measure of body fat.  How is BMI calculated?  BMI is calculated by dividing weight in kilograms by height in meters squared. It can also be calculated by dividing weight in pounds by height in inches squared, then multiplying the resulting number by 703. Charts are available to help you find your BMI quickly and easily without doing this calculation.  How is BMI interpreted?  Health care professionals use BMI charts to identify whether an adult is underweight, at a normal weight, or overweight based on the following guidelines:  · Underweight: BMI less than 18.5.  · Normal weight: BMI between 18.5 and 24.9.  · Overweight: BMI between 25 and 29.9.  · Obese: BMI of 30 and above.    BMI is usually interpreted the same for males and females.  Weight includes both fat and muscle, so someone with a muscular build, such as an athlete, may have a BMI that is higher than 24.9. In cases like these, BMI may not accurately depict body fat. To determine if excess body fat is the cause of a BMI of 25 or higher, further assessments may need to be done by a health care provider.  Why is BMI a useful tool?  BMI is used to identify a possible weight problem that may be related to a medical problem or may increase the risk for medical problems. BMI can also be used to promote changes to reach a healthy weight.  This information is not intended to replace advice given to you by your health care provider. Make sure you discuss any questions you have  with your health care provider.  Document Released: 08/29/2005 Document Revised: 04/27/2017 Document Reviewed: 05/15/2015  [employer] Interactive Patient Education © 2018 [employer]

## 2019-08-21 ENCOUNTER — TRANSCRIBE ORDERS (OUTPATIENT)
Dept: ADMINISTRATIVE | Facility: HOSPITAL | Age: 74
End: 2019-08-21

## 2019-08-21 ENCOUNTER — HOSPITAL ENCOUNTER (OUTPATIENT)
Dept: BONE DENSITY | Facility: HOSPITAL | Age: 74
Discharge: HOME | End: 2019-08-21
Admitting: PHYSICIAN ASSISTANT

## 2019-08-21 ENCOUNTER — HOSPITAL ENCOUNTER (OUTPATIENT)
Dept: GENERAL RADIOLOGY | Facility: HOSPITAL | Age: 74
Discharge: HOME | End: 2019-08-21

## 2019-08-21 DIAGNOSIS — M81.0 AGE-RELATED OSTEOPOROSIS WITHOUT CURRENT PATHOLOGICAL FRACTURE: Primary | ICD-10-CM

## 2019-08-21 DIAGNOSIS — M81.0 AGE-RELATED OSTEOPOROSIS WITHOUT CURRENT PATHOLOGICAL FRACTURE: ICD-10-CM

## 2019-08-21 DIAGNOSIS — M05.79 RHEUMATOID ARTHRITIS WITH RHEUMATOID FACTOR OF MULTIPLE SITES WITHOUT ORGAN OR SYSTEMS INVOLVEMENT (HCC): ICD-10-CM

## 2019-08-21 PROCEDURE — 72040 X-RAY EXAM NECK SPINE 2-3 VW: CPT

## 2019-08-21 PROCEDURE — 77080 DXA BONE DENSITY AXIAL: CPT

## 2019-10-21 ENCOUNTER — OFFICE VISIT (OUTPATIENT)
Dept: NEUROLOGY | Facility: CLINIC | Age: 74
End: 2019-10-21

## 2019-10-21 VITALS
SYSTOLIC BLOOD PRESSURE: 142 MMHG | OXYGEN SATURATION: 99 % | HEIGHT: 63 IN | BODY MASS INDEX: 23.92 KG/M2 | WEIGHT: 135 LBS | HEART RATE: 63 BPM | DIASTOLIC BLOOD PRESSURE: 86 MMHG

## 2019-10-21 DIAGNOSIS — M50.30 DDD (DEGENERATIVE DISC DISEASE), CERVICAL: ICD-10-CM

## 2019-10-21 DIAGNOSIS — I63.81 BASAL GANGLIA INFARCTION (HCC): ICD-10-CM

## 2019-10-21 DIAGNOSIS — R29.898 LUE WEAKNESS: Primary | ICD-10-CM

## 2019-10-21 DIAGNOSIS — I63.312 CEREBROVASCULAR ACCIDENT (CVA) DUE TO THROMBOSIS OF LEFT MIDDLE CEREBRAL ARTERY (HCC): ICD-10-CM

## 2019-10-21 PROCEDURE — 99213 OFFICE O/P EST LOW 20 MIN: CPT | Performed by: PHYSICIAN ASSISTANT

## 2019-10-21 RX ORDER — PREDNISONE 1 MG/1
TABLET ORAL
Refills: 3 | Status: ON HOLD | COMMUNITY
Start: 2019-08-10 | End: 2019-10-23

## 2019-10-21 NOTE — PROGRESS NOTES
Neurology Progress Note      Chief Complaint:    Hx right basal ganglia CVA  Periventricular white matter changes  Microvascular/cerebrovascular disease  Parafalcine meningioma  HTN  Dyslipidemia  LUE weakness  Short term memory impairment    Subjective     Subjective:    This very pleasant 74-year-old female who has a history of 5 remote right basal ganglia infarct that occurred following coronary artery bypass grafting in May 2015.  Since that time, the patient has noticed some mild short-term memory impairment.  Most recent MRI imaging of the brain from November 2018 demonstrated some periventricular white matter changes as well as microvascular cerebrovascular disease.  The patient was followed by a neurosurgery, Dr. Mota, for a parafalcine meningioma that has been stable for a number of years.    The patient states that several months ago, she developed severe left-sided cervical pain.  She did have some pain that extended through the left upper extremity and since that time has noticed some progressive weakness of the left upper extremity that is now stable.  The pain has since resolved, but she feels as though she cannot use the left upper extremity as well as she did previously.  Specifically, she has difficulty with flexion and raising left upper extremity above the head.  She is a right-hand dominant individual.  She has never had any imaging of the cervical spine.  She does have rheumatoid arthritis and is treated with methotrexate.    We did administer a Mini-Mental status examination in the office today which demonstrated a score of 29/30.  She states that her memory is stable, however, she notices some difficulties with immediate recall of tasks or events at times.  She did never has had confusion.  She has not been on medication for memory.    She remains on anti-lipid and hypertension medications.  She continues with Plavix 75 mg daily given her history of recurrent CVA as well as underlying  coronary artery disease.    Past Medical History:   Diagnosis Date   • Basal cell carcinoma 10/28/2017   • CAD (coronary artery disease)    • CVA (cerebral vascular accident) (CMS/[facility]) 2015   • Hyperlipidemia    • Hypertension    • Osteopenia    • Rheumatoid arthritis (CMS/[facility])    • TIA (transient ischemic attack)      Past Surgical History:   Procedure Laterality Date   • APPENDECTOMY     • BREAST BIOPSY     • BREAST RECONSTRUCTION      breast reduction , yrs ago   • CARDIAC CATHETERIZATION     • CARDIAC SURGERY      cabg x3  2015 ,done at [facility]   • CATARACT EXTRACTION     • COLONOSCOPY N/A 1/18/2017    Procedure: COLONOSCOPY WITH ANESTHESIA;  Surgeon: Andrea Pizano DO;  Location:  [facility];  Service:    • COLONOSCOPY N/A 2/7/2017    Procedure: COLONOSCOPY WITH ANESTHESIA;  Surgeon: Andrea Pizano DO;  Location: [facility] ENDOSCOPY;  Service:    • HEAD/NECK LESION/CYST EXCISION Right 10/18/2017    Procedure: Excision of basal cell carcinoma the right neck with complex closure;  Surgeon: Edward Cardona MD;  Location: [facility] OR;  Service:    • HERNIA REPAIR     • REDUCTION MAMMAPLASTY  1995   • RETINAL LASER PROCEDURE     • SPINE SURGERY     • TUBAL ABDOMINAL LIGATION       Family History   Problem Relation Age of Onset   • Emphysema Mother    • Heart disease Father    • Cancer Sister    • Cancer Brother    • Liver cancer Maternal Grandmother      Social History     Tobacco Use   • Smoking status: Never Smoker   • Smokeless tobacco: Never Used   Substance Use Topics   • Alcohol use: No   • Drug use: No       Medications:  Current Outpatient Medications   Medication Sig Dispense Refill   • amLODIPine (NORVASC) 2.5 MG tablet Take 1 tablet by mouth daily. 30 tablet 11   • Ascorbic Acid (VITAMIN C PO) Take 1,000 mg by mouth Daily.     • Cholecalciferol (VITAMIN D3) 5000 UNITS capsule capsule Take 2,000 Units by mouth Daily.     • clopidogrel (PLAVIX) 75 MG tablet Take 1 tablet by mouth Daily. 90  tablet 3   • folic acid (FOLVITE) 1 MG tablet Take 1 mg by mouth Daily.     • gabapentin (NEURONTIN) 100 MG capsule Take 100 mg by mouth Every 8 (Eight) Hours As Needed.     • levothyroxine (SYNTHROID, LEVOTHROID) 50 MCG tablet Take 50 mcg by mouth Daily.     • lisinopril (PRINIVIL,ZESTRIL) 20 MG tablet Take 20 mg by mouth Daily.  4   • methotrexate 2.5 MG tablet Take 2.5 mg by mouth 1 (One) Time Per Week.     • nitroglycerin (NITROSTAT) 0.4 MG SL tablet Place 0.4 mg under the tongue every 5 (five) minutes as needed for chest pain. Take no more than 3 doses in 15 minutes.     • predniSONE (DELTASONE) 5 MG tablet TK 1 TO 2 TS PO D PRN  3   • rosuvastatin (CRESTOR) 20 MG tablet Take 20 mg by mouth Daily.     • sertraline (ZOLOFT) 50 MG tablet Take 50 mg by mouth Daily.     • traZODone (DESYREL) 50 MG tablet Take 50 mg by mouth Every Night.       No current facility-administered medications for this visit.        Allergies:    Patient has no known allergies.    Review of Systems:   -A 14 point review of systems is completed and is negative except for left upper extremity weakness.      Objective      Vital Signs  Heart Rate:  [63] 63  BP: (142)/(86) 142/86    Physical Exam:    General Exam:  Head:  Normal cephalic, atraumatic  HEENT:  Neck supple  Fundoscopic Exam:  No signs of disc edema  CVS:  Regular rate and rhythm.  No murmurs  Carotid Examination:  No bruits  Lungs:  Clear to auscultation  Abdomen:  Non-tender, Non-distended  Extremities:  No signs of peripheral edema  Skin:  No rashes    CERVICAL SPINE EXAMINATION:  RANGE OF MOTION: The patient is able to flex, extend, rotate, and side bend without pain or difficulty.  There is full range of motion.    PALPATION: Nontender to palpation midline and through upper cervical musculature.  STRENGTH: 5/5 right trapezius, deltoid, triceps, biceps, wrist extensors/flexors, finger opposition.  The left upper extremity has 4+/5 deltoid, 4+/5 biceps and 4+/5 triceps.   Otherwise, 5/5 in all remaining motor groups throughout the left upper extremity.  SENSATION: Light touch and pinprick intact C5-T1 bilaterally.  REFLEXES: DTRs are 2+ bilaterally at biceps, triceps, and brachioradialis.  Brantley's and palmomental are negative bilaterally.  SPECIAL TESTS:  Tinel's & Phalen's are negative. Spurling's is negative bilaterally.       Neurologic Exam:     Mental Status:    -Awake, Alert, Oriented to person, place & time  -No word finding difficulties  -No aphasia  -No dysarthria  -Follows some simple commands     CN II:  Visual fields full.  Pupils equally reactive to light  CN III, IV, VI:  Extraocular Muscles full with no signs of nystagmus  CN V:  Facial sensory is symmetric   CN VII:  Facial motor symmetric   CN VIII:  Gross hearing intact bilaterally  CN IX/X:  Palate elevates symmetrically  CN XI:  Shoulder shrug symmetric  CN XII:  Tongue is midline on protrusion     Motor: (strength out of 5:  1= minimal movement, 2 = movement in plane of gravity, 3 = movement against gravity, 4 = movement against some resistance, 5 = full strength)     -Right Upper Ext: Proximal: 5 Distal: 5  -Left Upper Ext: Proximal: 4+   Distal: 5     -Right Lower Ext: Proximal: 5 Distal: 5  -Left Lower Ext: Proximal: 5 Distal: 5     DTR:  -Right              Bicep: 2+         Triceps: 2+      Brachioradialis: 2+              Patella: 2+       Ankle: 2+         Neg Babinski  -Left              Bicep: 2+         Triceps: 2+      Brachioradialis: 2+              Patella: 2+       Ankle: 2+         Neg Babinski     Sensory:  -Intact to light touch, pinprick-     Coordination:  -Finger to nose intact  -Heel to shin intact  -No ataxia     Gait  -No signs of ataxia  -ambulates unassisted         Results Review:    I reviewed the patient's new clinical results and findings.      Lab Results   Component Value Date    HDL 50 02/22/2019    LDL 80 02/22/2019     No components found for: B12  Lab Results   Component  Value Date    TSH 1.250 02/22/2019       Assessment/Plan     Impression:  1.  Remote right basal ganglia CVA  2.  Microvascular/cerebrovascular disease  3.  Periventricular white matter changes  4.  Peripheral signs of meningioma, stable, followed by Dr. Mota  5.  Hypertension, essential  6.  Dyslipidemia, mixed  7.  Mild short-term memory impairment  8.  DDD cervical  9.  Left upper extremity weakness, likely radicular      Plan:  1.  I reviewed the clinical findings with the patient in detail.  From a post stroke standpoint, the patient is doing well.  I do not have access to her most recent lipid panel, but the most up-to-date what I have is from February 2019 demonstrating LDL 80.  Goal would still be to have LDL less than 70.  Will need to remain on statin life-long.  Primary care can continue to follow this.  Additionally, target blood pressure goals of 140 systolic and 90 diastolic.  She was 142/86 today.  Continue antihypertensive management through primary care.    From a post-stroke follow-up standpoint, her CVA was in May 2015 and I do not believe that she needs to be seen on an annual basis by neurology at this point in time.    2.  The patient does have periventricular white matter changes and other evidence of micro vascular cerebrovascular disease.  The patient would benefit from ongoing risk management as stated above.  It would be wise to surveilled her with many mental status examination.  We did perform an today in the office and she scored 29/30.  The patient may benefit from speech therapy as an outpatient future date and other evaluation of cognitive impairment if this progresses.  I would certainly be happy to reevaluate at that point in time.    3.  The patient should consider outpatient physical therapy for treatment of her left upper extremity weakness and DDD cervical.  We did not elect to proceed with any imaging of the cervical spine today secondary to the fact that this will not  likely change the treatment approach at this point in time.  Her pain has resolved and I believe that we can rehabilitate her weakness.  She is mildly weak and has mild atrophy in the left deltoid, biceps and triceps.    4.  The patient will see us on an as-needed basis.    5.  Continue to follow with neurosurgery for monitoring of her meningioma.    The patient voices understanding and agreement with plan of care will call for any concerns or questions in the interim.          Ronnie Haider PA-C  10/21/19  8:29 AM

## 2019-10-23 ENCOUNTER — APPOINTMENT (OUTPATIENT)
Dept: CARDIOLOGY | Facility: HOSPITAL | Age: 74
End: 2019-10-23

## 2019-10-23 ENCOUNTER — HOSPITAL ENCOUNTER (INPATIENT)
Facility: HOSPITAL | Age: 74
LOS: 2 days | Discharge: NURSING, REHAB OR LONG-TERM CARE | End: 2019-10-25
Attending: EMERGENCY MEDICINE | Admitting: FAMILY MEDICINE

## 2019-10-23 ENCOUNTER — APPOINTMENT (OUTPATIENT)
Dept: CT IMAGING | Facility: HOSPITAL | Age: 74
End: 2019-10-23

## 2019-10-23 ENCOUNTER — APPOINTMENT (OUTPATIENT)
Dept: ULTRASOUND IMAGING | Facility: HOSPITAL | Age: 74
End: 2019-10-23

## 2019-10-23 ENCOUNTER — APPOINTMENT (OUTPATIENT)
Dept: MRI IMAGING | Facility: HOSPITAL | Age: 74
End: 2019-10-23

## 2019-10-23 DIAGNOSIS — R47.01 APHASIA: ICD-10-CM

## 2019-10-23 DIAGNOSIS — I63.9 CEREBROVASCULAR ACCIDENT (CVA), UNSPECIFIED MECHANISM (HCC): Primary | ICD-10-CM

## 2019-10-23 DIAGNOSIS — Z74.09 IMPAIRED FUNCTIONAL MOBILITY, BALANCE, GAIT, AND ENDURANCE: ICD-10-CM

## 2019-10-23 DIAGNOSIS — Z78.9 DECREASED ACTIVITIES OF DAILY LIVING (ADL): ICD-10-CM

## 2019-10-23 DIAGNOSIS — R13.10 DYSPHAGIA, UNSPECIFIED TYPE: ICD-10-CM

## 2019-10-23 LAB
ALBUMIN SERPL-MCNC: 3.7 G/DL (ref 3.5–5.2)
ALBUMIN/GLOB SERPL: 1.4 G/DL
ALP SERPL-CCNC: 61 U/L (ref 39–117)
ALT SERPL W P-5'-P-CCNC: 16 U/L (ref 1–33)
ANION GAP SERPL CALCULATED.3IONS-SCNC: 12 MMOL/L (ref 5–15)
APTT PPP: 28.6 SECONDS (ref 24.1–35)
AST SERPL-CCNC: 17 U/L (ref 1–32)
BASOPHILS # BLD AUTO: 0.03 10*3/MM3 (ref 0–0.2)
BASOPHILS NFR BLD AUTO: 0.7 % (ref 0–1.5)
BH CV ECHO MEAS - AO MAX PG (FULL): 1.1 MMHG
BH CV ECHO MEAS - AO MAX PG: 5.7 MMHG
BH CV ECHO MEAS - AO MEAN PG (FULL): 0.5 MMHG
BH CV ECHO MEAS - AO MEAN PG: 3.5 MMHG
BH CV ECHO MEAS - AO ROOT AREA (BSA CORRECTED): 2
BH CV ECHO MEAS - AO ROOT AREA: 8 CM^2
BH CV ECHO MEAS - AO ROOT DIAM: 3.2 CM
BH CV ECHO MEAS - AO V2 MAX: 119 CM/SEC
BH CV ECHO MEAS - AO V2 MEAN: 89.2 CM/SEC
BH CV ECHO MEAS - AO V2 VTI: 28.5 CM
BH CV ECHO MEAS - AVA(I,A): 2.9 CM^2
BH CV ECHO MEAS - AVA(I,D): 2.9 CM^2
BH CV ECHO MEAS - AVA(V,A): 3.1 CM^2
BH CV ECHO MEAS - AVA(V,D): 3.1 CM^2
BH CV ECHO MEAS - BSA(HAYCOCK): 1.6 M^2
BH CV ECHO MEAS - BSA: 1.6 M^2
BH CV ECHO MEAS - BZI_BMI: 22.3 KILOGRAMS/M^2
BH CV ECHO MEAS - BZI_METRIC_HEIGHT: 160 CM
BH CV ECHO MEAS - BZI_METRIC_WEIGHT: 57.2 KG
BH CV ECHO MEAS - EDV(CUBED): 54.4 ML
BH CV ECHO MEAS - EDV(MOD-SP4): 62.6 ML
BH CV ECHO MEAS - EDV(TEICH): 61.6 ML
BH CV ECHO MEAS - EF(CUBED): 64.6 %
BH CV ECHO MEAS - EF(MOD-SP4): 64.7 %
BH CV ECHO MEAS - EF(TEICH): 56.9 %
BH CV ECHO MEAS - ESV(CUBED): 19.2 ML
BH CV ECHO MEAS - ESV(MOD-SP4): 22.1 ML
BH CV ECHO MEAS - ESV(TEICH): 26.5 ML
BH CV ECHO MEAS - FS: 29.3 %
BH CV ECHO MEAS - IVS/LVPW: 0.81
BH CV ECHO MEAS - IVSD: 1.1 CM
BH CV ECHO MEAS - LA DIMENSION: 3.3 CM
BH CV ECHO MEAS - LA/AO: 1
BH CV ECHO MEAS - LV DIASTOLIC VOL/BSA (35-75): 39.4 ML/M^2
BH CV ECHO MEAS - LV MASS(C)D: 165.3 GRAMS
BH CV ECHO MEAS - LV MASS(C)DI: 104 GRAMS/M^2
BH CV ECHO MEAS - LV MAX PG: 4.6 MMHG
BH CV ECHO MEAS - LV MEAN PG: 3 MMHG
BH CV ECHO MEAS - LV SYSTOLIC VOL/BSA (12-30): 13.9 ML/M^2
BH CV ECHO MEAS - LV V1 MAX: 107 CM/SEC
BH CV ECHO MEAS - LV V1 MEAN: 82.6 CM/SEC
BH CV ECHO MEAS - LV V1 VTI: 23.5 CM
BH CV ECHO MEAS - LVIDD: 3.8 CM
BH CV ECHO MEAS - LVIDS: 2.7 CM
BH CV ECHO MEAS - LVLD AP4: 6.3 CM
BH CV ECHO MEAS - LVLS AP4: 5.3 CM
BH CV ECHO MEAS - LVOT AREA (M): 3.5 CM^2
BH CV ECHO MEAS - LVOT AREA: 3.5 CM^2
BH CV ECHO MEAS - LVOT DIAM: 2.1 CM
BH CV ECHO MEAS - LVPWD: 1.4 CM
BH CV ECHO MEAS - MV A MAX VEL: 68.6 CM/SEC
BH CV ECHO MEAS - MV DEC SLOPE: 192 CM/SEC^2
BH CV ECHO MEAS - MV DEC TIME: 0.28 SEC
BH CV ECHO MEAS - MV E MAX VEL: 52.8 CM/SEC
BH CV ECHO MEAS - MV E/A: 0.77
BH CV ECHO MEAS - RAP SYSTOLE: 10 MMHG
BH CV ECHO MEAS - RVSP: 30.1 MMHG
BH CV ECHO MEAS - SI(AO): 144 ML/M^2
BH CV ECHO MEAS - SI(CUBED): 22.1 ML/M^2
BH CV ECHO MEAS - SI(LVOT): 51.2 ML/M^2
BH CV ECHO MEAS - SI(MOD-SP4): 25.5 ML/M^2
BH CV ECHO MEAS - SI(TEICH): 22.1 ML/M^2
BH CV ECHO MEAS - SV(AO): 228.8 ML
BH CV ECHO MEAS - SV(CUBED): 35.2 ML
BH CV ECHO MEAS - SV(LVOT): 81.4 ML
BH CV ECHO MEAS - SV(MOD-SP4): 40.5 ML
BH CV ECHO MEAS - SV(TEICH): 35 ML
BH CV ECHO MEAS - TR MAX VEL: 224 CM/SEC
BILIRUB SERPL-MCNC: 0.7 MG/DL (ref 0.2–1.2)
BUN BLD-MCNC: 16 MG/DL (ref 8–23)
BUN/CREAT SERPL: 20 (ref 7–25)
CALCIUM SPEC-SCNC: 8.9 MG/DL (ref 8.6–10.5)
CHLORIDE SERPL-SCNC: 108 MMOL/L (ref 98–107)
CHOLEST SERPL-MCNC: 185 MG/DL (ref 0–200)
CO2 SERPL-SCNC: 25 MMOL/L (ref 22–29)
CREAT BLD-MCNC: 0.8 MG/DL (ref 0.57–1)
DEPRECATED RDW RBC AUTO: 46.1 FL (ref 37–54)
EOSINOPHIL # BLD AUTO: 0.08 10*3/MM3 (ref 0–0.4)
EOSINOPHIL NFR BLD AUTO: 1.8 % (ref 0.3–6.2)
ERYTHROCYTE [DISTWIDTH] IN BLOOD BY AUTOMATED COUNT: 13.3 % (ref 12.3–15.4)
GFR SERPL CREATININE-BSD FRML MDRD: 70 ML/MIN/1.73
GLOBULIN UR ELPH-MCNC: 2.7 GM/DL
GLUCOSE BLD-MCNC: 84 MG/DL (ref 65–99)
HBA1C MFR BLD: 5.3 % (ref 4.8–5.6)
HCT VFR BLD AUTO: 42.2 % (ref 34–46.6)
HDLC SERPL-MCNC: 44 MG/DL (ref 40–60)
HGB BLD-MCNC: 14.3 G/DL (ref 12–15.9)
IMM GRANULOCYTES # BLD AUTO: 0.02 10*3/MM3 (ref 0–0.05)
IMM GRANULOCYTES NFR BLD AUTO: 0.5 % (ref 0–0.5)
INR PPP: 0.9 (ref 0.91–1.09)
LDLC SERPL CALC-MCNC: 114 MG/DL (ref 0–100)
LDLC/HDLC SERPL: 2.6 {RATIO}
LYMPHOCYTES # BLD AUTO: 1.34 10*3/MM3 (ref 0.7–3.1)
LYMPHOCYTES NFR BLD AUTO: 30.9 % (ref 19.6–45.3)
MAXIMAL PREDICTED HEART RATE: 145 BPM
MCH RBC QN AUTO: 32 PG (ref 26.6–33)
MCHC RBC AUTO-ENTMCNC: 33.9 G/DL (ref 31.5–35.7)
MCV RBC AUTO: 94.4 FL (ref 79–97)
MONOCYTES # BLD AUTO: 0.38 10*3/MM3 (ref 0.1–0.9)
MONOCYTES NFR BLD AUTO: 8.8 % (ref 5–12)
NEUTROPHILS # BLD AUTO: 2.49 10*3/MM3 (ref 1.7–7)
NEUTROPHILS NFR BLD AUTO: 57.3 % (ref 42.7–76)
NRBC BLD AUTO-RTO: 0 /100 WBC (ref 0–0.2)
PLATELET # BLD AUTO: 218 10*3/MM3 (ref 140–450)
PMV BLD AUTO: 10.4 FL (ref 6–12)
POTASSIUM BLD-SCNC: 3.4 MMOL/L (ref 3.5–5.2)
PROT SERPL-MCNC: 6.4 G/DL (ref 6–8.5)
PROTHROMBIN TIME: 12.4 SECONDS (ref 11.9–14.6)
RBC # BLD AUTO: 4.47 10*6/MM3 (ref 3.77–5.28)
SODIUM BLD-SCNC: 145 MMOL/L (ref 136–145)
STRESS TARGET HR: 123 BPM
TRIGL SERPL-MCNC: 134 MG/DL (ref 0–150)
TSH SERPL DL<=0.05 MIU/L-ACNC: 1.24 UIU/ML (ref 0.27–4.2)
VLDLC SERPL-MCNC: 26.8 MG/DL
WBC NRBC COR # BLD: 4.34 10*3/MM3 (ref 3.4–10.8)

## 2019-10-23 PROCEDURE — 83036 HEMOGLOBIN GLYCOSYLATED A1C: CPT | Performed by: FAMILY MEDICINE

## 2019-10-23 PROCEDURE — 85025 COMPLETE CBC W/AUTO DIFF WBC: CPT | Performed by: EMERGENCY MEDICINE

## 2019-10-23 PROCEDURE — 99284 EMERGENCY DEPT VISIT MOD MDM: CPT

## 2019-10-23 PROCEDURE — 85730 THROMBOPLASTIN TIME PARTIAL: CPT | Performed by: EMERGENCY MEDICINE

## 2019-10-23 PROCEDURE — 99223 1ST HOSP IP/OBS HIGH 75: CPT | Performed by: PSYCHIATRY & NEUROLOGY

## 2019-10-23 PROCEDURE — 80053 COMPREHEN METABOLIC PANEL: CPT | Performed by: EMERGENCY MEDICINE

## 2019-10-23 PROCEDURE — 93306 TTE W/DOPPLER COMPLETE: CPT | Performed by: INTERNAL MEDICINE

## 2019-10-23 PROCEDURE — 80061 LIPID PANEL: CPT | Performed by: FAMILY MEDICINE

## 2019-10-23 PROCEDURE — 84443 ASSAY THYROID STIM HORMONE: CPT | Performed by: EMERGENCY MEDICINE

## 2019-10-23 PROCEDURE — 93880 EXTRACRANIAL BILAT STUDY: CPT

## 2019-10-23 PROCEDURE — 93306 TTE W/DOPPLER COMPLETE: CPT

## 2019-10-23 PROCEDURE — 70450 CT HEAD/BRAIN W/O DYE: CPT

## 2019-10-23 PROCEDURE — 70551 MRI BRAIN STEM W/O DYE: CPT

## 2019-10-23 PROCEDURE — 85610 PROTHROMBIN TIME: CPT | Performed by: EMERGENCY MEDICINE

## 2019-10-23 PROCEDURE — 93880 EXTRACRANIAL BILAT STUDY: CPT | Performed by: SURGERY

## 2019-10-23 PROCEDURE — 92610 EVALUATE SWALLOWING FUNCTION: CPT

## 2019-10-23 RX ORDER — PREDNISONE 1 MG/1
5 TABLET ORAL
Status: DISCONTINUED | OUTPATIENT
Start: 2019-10-24 | End: 2019-10-25 | Stop reason: HOSPADM

## 2019-10-23 RX ORDER — TRAZODONE HYDROCHLORIDE 50 MG/1
50 TABLET ORAL NIGHTLY
Status: DISCONTINUED | OUTPATIENT
Start: 2019-10-23 | End: 2019-10-25 | Stop reason: HOSPADM

## 2019-10-23 RX ORDER — LEVOTHYROXINE SODIUM 0.05 MG/1
50 TABLET ORAL DAILY
Status: DISCONTINUED | OUTPATIENT
Start: 2019-10-23 | End: 2019-10-25 | Stop reason: HOSPADM

## 2019-10-23 RX ORDER — CLOPIDOGREL BISULFATE 75 MG/1
75 TABLET ORAL DAILY
Status: DISCONTINUED | OUTPATIENT
Start: 2019-10-23 | End: 2019-10-25 | Stop reason: HOSPADM

## 2019-10-23 RX ORDER — LABETALOL HYDROCHLORIDE 5 MG/ML
20 INJECTION, SOLUTION INTRAVENOUS ONCE
Status: DISCONTINUED | OUTPATIENT
Start: 2019-10-23 | End: 2019-10-25 | Stop reason: HOSPADM

## 2019-10-23 RX ORDER — ACETAMINOPHEN 325 MG/1
650 TABLET ORAL EVERY 6 HOURS PRN
Status: DISCONTINUED | OUTPATIENT
Start: 2019-10-23 | End: 2019-10-25 | Stop reason: HOSPADM

## 2019-10-23 RX ORDER — GABAPENTIN 100 MG/1
100 CAPSULE ORAL EVERY 8 HOURS SCHEDULED
Status: DISCONTINUED | OUTPATIENT
Start: 2019-10-23 | End: 2019-10-25 | Stop reason: HOSPADM

## 2019-10-23 RX ORDER — ASPIRIN 81 MG/1
81 TABLET ORAL DAILY
Status: DISCONTINUED | OUTPATIENT
Start: 2019-10-23 | End: 2019-10-25 | Stop reason: HOSPADM

## 2019-10-23 RX ORDER — ONDANSETRON 2 MG/ML
4 INJECTION INTRAMUSCULAR; INTRAVENOUS EVERY 6 HOURS PRN
Status: DISCONTINUED | OUTPATIENT
Start: 2019-10-23 | End: 2019-10-25 | Stop reason: HOSPADM

## 2019-10-23 RX ORDER — HYDROCODONE BITARTRATE AND ACETAMINOPHEN 5; 325 MG/1; MG/1
1 TABLET ORAL EVERY 6 HOURS PRN
Status: DISCONTINUED | OUTPATIENT
Start: 2019-10-23 | End: 2019-10-25 | Stop reason: HOSPADM

## 2019-10-23 RX ORDER — PREDNISONE 1 MG/1
10 TABLET ORAL DAILY
COMMUNITY
End: 2020-09-03

## 2019-10-23 RX ORDER — ALENDRONATE SODIUM 70 MG/1
70 TABLET ORAL
COMMUNITY

## 2019-10-23 RX ORDER — ATORVASTATIN CALCIUM 40 MG/1
80 TABLET, FILM COATED ORAL NIGHTLY
Status: DISCONTINUED | OUTPATIENT
Start: 2019-10-23 | End: 2019-10-25 | Stop reason: HOSPADM

## 2019-10-23 RX ORDER — SODIUM CHLORIDE 0.9 % (FLUSH) 0.9 %
10 SYRINGE (ML) INJECTION AS NEEDED
Status: DISCONTINUED | OUTPATIENT
Start: 2019-10-23 | End: 2019-10-25 | Stop reason: HOSPADM

## 2019-10-23 RX ORDER — POTASSIUM CHLORIDE 750 MG/1
20 CAPSULE, EXTENDED RELEASE ORAL ONCE
Status: COMPLETED | OUTPATIENT
Start: 2019-10-23 | End: 2019-10-23

## 2019-10-23 RX ADMIN — POTASSIUM CHLORIDE 20 MEQ: 750 CAPSULE, EXTENDED RELEASE ORAL at 21:15

## 2019-10-23 RX ADMIN — CLOPIDOGREL 75 MG: 75 TABLET, FILM COATED ORAL at 21:15

## 2019-10-23 RX ADMIN — ATORVASTATIN CALCIUM 80 MG: 40 TABLET, FILM COATED ORAL at 21:15

## 2019-10-23 RX ADMIN — ASPIRIN 81 MG: 81 TABLET ORAL at 21:15

## 2019-10-23 RX ADMIN — TRAZODONE HYDROCHLORIDE 50 MG: 50 TABLET ORAL at 21:16

## 2019-10-23 RX ADMIN — SERTRALINE 50 MG: 50 TABLET, FILM COATED ORAL at 21:14

## 2019-10-23 RX ADMIN — GABAPENTIN 100 MG: 100 CAPSULE ORAL at 21:16

## 2019-10-23 RX ADMIN — LEVOTHYROXINE SODIUM 50 MCG: 50 TABLET ORAL at 21:14

## 2019-10-23 NOTE — THERAPY EVALUATION
Acute Care - Speech Language Pathology   Swallow Initial Evaluation [facility]     Patient Name: Aedla Goetz  : 1945  MRN: [id number]  Today's Date: 10/23/2019               Admit Date: 10/23/2019  Clinical bedside swallow evaluation completed. Full range of consistencies except mechanical soft solids were presented. Pt had prolonged oral transit and delay swallow initiation with all consistencies. She exhibited immediate grimacing and red face with thin, then had slightly delayed significant coughing. She appeared to have adequate mastication of the regular solid and no significant oral residue was observed.     Recommend:  1. A regular solid diet and nectar thick liquids.   2. Meds whole or crushed with applesauce.   3. Ok for ice chips between meals.   4. SLP will continue to follow and treat.   5. Pt would likely benefit from cognitive-linguistic evaluation as well.   Amanda Cheng, CCC-SLP 10/23/2019 3:16 PM    Visit Dx:     ICD-10-CM ICD-9-CM   1. Cerebrovascular accident (CVA), unspecified mechanism (CMS/HCC) I63.9 434.91   2. Dysphagia, unspecified type R13.10 787.20     Patient Active Problem List   Diagnosis   • CAD (coronary artery disease)   • Hypertension   • Hyperlipidemia   • Cerebrovascular accident (CVA) due to thrombosis of left middle cerebral artery (CMS/HCC)   • Rheumatoid arthritis (CMS/HCC)   • Osteopenia   • Meningioma (CMS/HCC)   • Altered taste   • Cervical radiculopathy   • Pneumonia of left upper lobe due to infectious organism (CMS/HCC)   • Basal cell carcinoma   • Coronary artery disease involving coronary bypass graft of native heart without angina pectoris   • Non-smoker   • BMI 23.0-23.9, adult   • Impaired memory   • Cerebrovascular accident (CVA) (CMS/HCC)     Past Medical History:   Diagnosis Date   • Basal cell carcinoma 10/28/2017   • CAD (coronary artery disease)    • CVA (cerebral vascular accident) (CMS/HCC)    • Hyperlipidemia    • Hypertension    •  Osteopenia    • Rheumatoid arthritis (CMS/HCC)    • TIA (transient ischemic attack)      Past Surgical History:   Procedure Laterality Date   • APPENDECTOMY     • BREAST BIOPSY     • BREAST RECONSTRUCTION      breast reduction , yrs ago   • CARDIAC CATHETERIZATION     • CARDIAC SURGERY      cabg x3  2015 ,done at [facility]   • CATARACT EXTRACTION     • COLONOSCOPY N/A 1/18/2017    Procedure: COLONOSCOPY WITH ANESTHESIA;  Surgeon: Andrea Pizano DO;  Location:  [facility];  Service:    • COLONOSCOPY N/A 2/7/2017    Procedure: COLONOSCOPY WITH ANESTHESIA;  Surgeon: Andrea Pizano DO;  Location:  [facility];  Service:    • HEAD/NECK LESION/CYST EXCISION Right 10/18/2017    Procedure: Excision of basal cell carcinoma the right neck with complex closure;  Surgeon: Edward Cardona MD;  Location: [facility] OR;  Service:    • HERNIA REPAIR     • REDUCTION MAMMAPLASTY  1995   • RETINAL LASER PROCEDURE     • SPINE SURGERY     • TUBAL ABDOMINAL LIGATION          SWALLOW EVALUATION (last 72 hours)      SLP Adult Swallow Evaluation     Row Name 10/23/19 [facility]                   Rehab Evaluation    Document Type  evaluation  -MB        Subjective Information  no complaints  -MB        Patient Observations  alert;cooperative  -MB        Patient/Family Observations  Two male family members present  -MB           General Information    Patient Profile Reviewed  yes  -MB        Pertinent History Of Current Problem  Speech changes, left weakness, left facial droop. Hx of stroke.   -MB        Current Method of Nutrition  NPO  -MB        Precautions/Limitations, Vision  WFL  -MB        Precautions/Limitations, Hearing  WFL  -MB        Prior Level of Function-Communication  WFL  -MB        Prior Level of Function-Swallowing  no diet consistency restrictions  -MB        Plans/Goals Discussed with  patient and family  -MB        Barriers to Rehab  cognitive status  -MB        Patient's Goals for Discharge  patient did  not state  -MB        Family Goals for Discharge  family did not state  -MB           Pain Assessment    Additional Documentation  Pain Scale: FACES Pre/Post-Treatment (Group)  -MB           Pain Scale: FACES Pre/Post-Treatment    Pain: FACES Scale, Pretreatment  0-->no hurt  -MB           Oral Motor and Function    Dentition Assessment  natural, present and adequate  -MB        Secretion Management  WNL/WFL  -MB        Mucosal Quality  moist, healthy  -MB           Oral Musculature and Cranial Nerve Assessment    Oral Motor General Assessment  oral labial or buccal impairment  -MB        Oral Labial or Buccal Impairment, Detail, Cranial Nerve VII (Facial):  CN7: Motor Impairment;left labial droop  -MB           General Eating/Swallowing Observations    Respiratory Support Currently in Use  room air  -MB        Eating/Swallowing Skills  fed by SLP;self-fed  -MB        Positioning During Eating  upright in bed  -MB        Utensils Used  spoon;straw  -MB        Consistencies Trialed  regular textures;pureed;thin liquids;nectar/syrup-thick liquids;honey-thick liquids  -MB           Clinical Swallow Eval    Oral Prep Phase  WFL  -MB        Oral Transit  impaired  -MB        Oral Residue  WFL  -MB        Pharyngeal Phase  suspected pharyngeal impairment  -MB        Esophageal Phase  unremarkable  -MB        Clinical Swallow Evaluation Summary  Full range of consistencies except mechanical soft solids were presented. Pt had prolonged oral transit and delay swallow initiation with all consistencies. She exhibited immediate grimacing and red face with thin, then had slightly delayed significant coughing. She appeared to have adequate mastication of the regular solid and no significant oral residue was observed.   -MB           Oral Transit Concerns    Oral Transit Concerns  increased oral transit time  -MB        Increased Oral Transit Time  all consistencies  -MB           Pharyngeal Phase Concerns    Pharyngeal Phase  Concerns  cough  -MB        Cough  thin  -MB           Clinical Impression    SLP Swallowing Diagnosis  moderate;oral dysfunction  -MB        Functional Impact  risk of aspiration/pneumonia  -MB        Rehab Potential/Prognosis, Swallowing  good, to achieve stated therapy goals  -MB        Swallow Criteria for Skilled Therapeutic Interventions Met  demonstrates skilled criteria  -MB           Recommendations    Therapy Frequency (Swallow)  3 days per week  -MB        Predicted Duration Therapy Intervention (Days)  until discharge  -MB        SLP Diet Recommendation  regular textures;nectar thick liquids;ice chips between meals after oral care, with supervision  -MB        Recommended Precautions and Strategies  upright posture during/after eating;small bites of food and sips of liquid;alternate between small bites of food and sips of liquid  -MB        SLP Rec. for Method of Medication Administration  meds whole;meds crushed;with pudding or applesauce  -MB        Monitor for Signs of Aspiration  yes;cough;gurgly voice;throat clearing;pneumonia;notify SLP if any concerns  -MB        Anticipated Dischage Disposition  unknown  -MB           Swallow Goals (SLP)    Oral Nutrition/Hydration Goal Selection (SLP)  oral nutrition/hydration, SLP goal 1  -MB        Labial Strengthening Goal Selection (SLP)  labial strengthening, SLP goal 1  -MB        Lingual Strengthening Goal Selection (SLP)  lingual strengthening, SLP goal 1  -MB        Pharyngeal Strengthening Exercise Goal Selection (SLP)  pharyngeal strengthening exercise, SLP goal 1  -MB        Additional Documentation  labial strengthening goal selection (SLP);pharyngeal strengthening exercise goal selection (SLP);lingual strengthening goal selection (SLP)  -MB           Oral Nutrition/Hydration Goal 1 (SLP)    Oral Nutrition/Hydration Goal 1, SLP  Pt will tolerate least restrictive diet with no overt s/s of aspiration.   -MB        Time Frame (Oral Nutrition/Hydration  Goal 1, SLP)  by discharge  -MB        Barriers (Oral Nutrition/Hydration Goal 1, SLP)  n/a  -MB        Progress/Outcomes (Oral Nutrition/Hydration Goal 1, SLP)  goal ongoing  -MB           Labial Strengthening Goal 1 (SLP)    Activity (Labial Strengthening Goal 1, SLP)  increase labial tone  -MB        Increase Labial Tone  labial resistance exercises  -MB        [place]/Accuracy (Labial Strengthening Goal 1, SLP)  independently (over 90% accuracy)  -MB        Time Frame (Labial Strengthening Goal 1, SLP)  short term goal (STG);by discharge  -MB        Barriers (Labial Strengthening Goal 1, SLP)  n/a  -MB        Progress/Outcomes (Labial Strengthening Goal 1, SLP)  goal ongoing  -MB           Lingual Strengthening Goal 1 (SLP)    Activity (Lingual Strengthening Goal 1, SLP)  increase lingual tone/sensation/control/coordination/movement  -MB        Increase Lingual Tone/Sensation/Control/Coordination/Movement  lingual movement exercises  -MB        [place]/Accuracy (Lingual Strengthening Goal 1, SLP)  independently (over 90% accuracy)  -MB        Time Frame (Lingual Strengthening Goal 1, SLP)  short term goal (STG);by discharge  -MB        Barriers (Lingual Strengthening Goal 1, SLP)  n/a  -MB        Progress/Outcomes (Lingual Strengthening Goal 1, SLP)  goal ongoing  -MB           Pharyngeal Strengthening Exercise Goal 1 (SLP)    Activity (Pharyngeal Strengthening Goal 1, SLP)  increase timing  -MB        Increase Timing  gustatory stimulation (sour/cold)  -MB        [place]/Accuracy (Pharyngeal Strengthening Goal 1, SLP)  independently (over 90% accuracy)  -MB        Time Frame (Pharyngeal Strengthening Goal 1, SLP)  short term goal (STG);by discharge  -MB        Barriers (Pharyngeal Strengthening Goal 1, SLP)  n/a  -MB        Progress/Outcomes (Pharyngeal Strengthening Goal 1, SLP)  goal ongoing  -MB          User Key  (r) = Recorded By, (t) = Taken By, (c) = Cosigned By    Initials Name Effective  Dates    Amanda Del Castillo, CCC-SLP 08/02/16 -           EDUCATION  The patient has been educated in the following areas:   Dysphagia (Swallowing Impairment).    SLP Recommendation and Plan  SLP Swallowing Diagnosis: moderate, oral dysfunction  SLP Diet Recommendation: regular textures, nectar thick liquids, ice chips between meals after oral care, with supervision  Recommended Precautions and Strategies: upright posture during/after eating, small bites of food and sips of liquid, alternate between small bites of food and sips of liquid  SLP Rec. for Method of Medication Administration: meds whole, meds crushed, with pudding or applesauce     Monitor for Signs of Aspiration: yes, cough, gurgly voice, throat clearing, pneumonia, notify SLP if any concerns     Swallow Criteria for Skilled Therapeutic Interventions Met: demonstrates skilled criteria  Anticipated Dischage Disposition: unknown  Rehab Potential/Prognosis, Swallowing: good, to achieve stated therapy goals  Therapy Frequency (Swallow): 3 days per week  Predicted Duration Therapy Intervention (Days): until discharge       Plan of Care Reviewed With: patient, family  Outcome Summary: Clinical bedside swallow evaluation completed. Full range of consistencies except mechanical soft solids were presented. Pt had prolonged oral transit and delay swallow initiation with all consistencies. She exhibited immediate grimacing and red face with thin, then had slightly delayed significant coughing. She appeared to have adequate mastication of the regular solid and no significant oral residue was observed. Recommend a regular solid diet and nectar thick liquids. Meds whole or crushed with applesauce. Ok for ice chips between meals. SLP will continue to follow and treat. Pt would likely benefit from cognitive-linguistic evaluation as well.     SLP GOALS     Row Name 10/23/19 1038             Oral Nutrition/Hydration Goal 1 (SLP)    Oral Nutrition/Hydration Goal 1, SLP   Pt will tolerate least restrictive diet with no overt s/s of aspiration.   -MB      Time Frame (Oral Nutrition/Hydration Goal 1, SLP)  by discharge  -MB      Barriers (Oral Nutrition/Hydration Goal 1, SLP)  n/a  -MB      Progress/Outcomes (Oral Nutrition/Hydration Goal 1, SLP)  goal ongoing  -MB         Labial Strengthening Goal 1 (SLP)    Activity (Labial Strengthening Goal 1, SLP)  increase labial tone  -MB      Increase Labial Tone  labial resistance exercises  -MB      [place]/Accuracy (Labial Strengthening Goal 1, SLP)  independently (over 90% accuracy)  -MB      Time Frame (Labial Strengthening Goal 1, SLP)  short term goal (STG);by discharge  -MB      Barriers (Labial Strengthening Goal 1, SLP)  n/a  -MB      Progress/Outcomes (Labial Strengthening Goal 1, SLP)  goal ongoing  -MB         Lingual Strengthening Goal 1 (SLP)    Activity (Lingual Strengthening Goal 1, SLP)  increase lingual tone/sensation/control/coordination/movement  -MB      Increase Lingual Tone/Sensation/Control/Coordination/Movement  lingual movement exercises  -MB      [place]/Accuracy (Lingual Strengthening Goal 1, SLP)  independently (over 90% accuracy)  -MB      Time Frame (Lingual Strengthening Goal 1, SLP)  short term goal (STG);by discharge  -MB      Barriers (Lingual Strengthening Goal 1, SLP)  n/a  -MB      Progress/Outcomes (Lingual Strengthening Goal 1, SLP)  goal ongoing  -MB         Pharyngeal Strengthening Exercise Goal 1 (SLP)    Activity (Pharyngeal Strengthening Goal 1, SLP)  increase timing  -MB      Increase Timing  gustatory stimulation (sour/cold)  -MB      [place]/Accuracy (Pharyngeal Strengthening Goal 1, SLP)  independently (over 90% accuracy)  -MB      Time Frame (Pharyngeal Strengthening Goal 1, SLP)  short term goal (STG);by discharge  -MB      Barriers (Pharyngeal Strengthening Goal 1, SLP)  n/a  -MB      Progress/Outcomes (Pharyngeal Strengthening Goal 1, SLP)  goal ongoing  -MB        User Key   (r) = Recorded By, (t) = Taken By, (c) = Cosigned By    Initials Name Provider Type    Amanda Del Castillo CCC-SLP Speech and Language Pathologist             Time Calculation:   Time Calculation- SLP     Row Name 10/23/19 1515             Time Calculation- SLP    SLP Start Time  1038  -MB      SLP Stop Time  1122  -MB      SLP Time Calculation (min)  44 min  -MB      SLP Received On  10/23/19  -MB      SLP Goal Re-Cert Due Date  11/02/19  -MB        User Key  (r) = Recorded By, (t) = Taken By, (c) = Cosigned By    Initials Name Provider Type    Amanda Del Castillo CCC-SLP Speech and Language Pathologist          Therapy Charges for Today     Code Description Service Date Service Provider Modifiers Qty    [id number] HC ST EVAL ORAL PHARYNG SWALLOW 3 10/23/2019 Amanda Cheng CCC-KATERINA GN 1               KARIE Segura  10/23/2019

## 2019-10-23 NOTE — PLAN OF CARE
Problem: Patient Care Overview  Goal: Plan of Care Review  Outcome: Ongoing (interventions implemented as appropriate)   10/23/19 6939   Coping/Psychosocial   Plan of Care Reviewed With patient;family   OTHER   Outcome Summary Clinical bedside swallow evaluation completed. Full range of consistencies except mechanical soft solids were presented. Pt had prolonged oral transit and delay swallow initiation with all consistencies. She exhibited immediate grimacing and red face with thin, then had slightly delayed significant coughing. She appeared to have adequate mastication of the regular solid and no significant oral residue was observed. Recommend a regular solid diet and nectar thick liquids. Meds whole or crushed with applesauce. Ok for ice chips between meals. SLP will continue to follow and treat. Pt would likely benefit from cognitive-linguistic evaluation as well.

## 2019-10-23 NOTE — PLAN OF CARE
Problem: Patient Care Overview  Goal: Individualization and Mutuality  Outcome: Ongoing (interventions implemented as appropriate)    Goal: Discharge Needs Assessment  Outcome: Ongoing (interventions implemented as appropriate)    Goal: Interprofessional Rounds/Family Conf  Outcome: Ongoing (interventions implemented as appropriate)   10/23/19 6944   Interdisciplinary Rounds/Family Conf   Summary Pt A&O x4. VSS. No c/o pain this shift. NIH: 1 for slight R side facial droop. Up x1 standby assist to the bathroom. Speech eval results regular diet with nectar thick liquid. Med whole or crushed in apple sauce. Safety maintained this shift, will continue to monitor.        Problem: Dysphagia (Adult)  Goal: Identify Related Risk Factors and Signs and Symptoms  Outcome: Ongoing (interventions implemented as appropriate)    Goal: Functional/Safe Swallow  Outcome: Ongoing (interventions implemented as appropriate)    Goal: Compensatory Techniques to Improve Safety/Function with Swallowing  Outcome: Ongoing (interventions implemented as appropriate)      Problem: Stroke (Ischemic) (Adult)  Goal: Signs and Symptoms of Listed Potential Problems Will be Absent, Minimized or Managed (Stroke)  Outcome: Ongoing (interventions implemented as appropriate)      Problem: Fall Risk (Adult)  Goal: Identify Related Risk Factors and Signs and Symptoms  Outcome: Ongoing (interventions implemented as appropriate)    Goal: Absence of Fall  Outcome: Ongoing (interventions implemented as appropriate)
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
## 2019-10-24 LAB
ALBUMIN SERPL-MCNC: 3.7 G/DL (ref 3.5–5.2)
ALBUMIN/GLOB SERPL: 1.5 G/DL
ALP SERPL-CCNC: 58 U/L (ref 39–117)
ALT SERPL W P-5'-P-CCNC: 12 U/L (ref 1–33)
ANION GAP SERPL CALCULATED.3IONS-SCNC: 10 MMOL/L (ref 5–15)
AST SERPL-CCNC: 13 U/L (ref 1–32)
BASOPHILS # BLD AUTO: 0.03 10*3/MM3 (ref 0–0.2)
BASOPHILS NFR BLD AUTO: 0.7 % (ref 0–1.5)
BILIRUB SERPL-MCNC: 0.8 MG/DL (ref 0.2–1.2)
BUN BLD-MCNC: 15 MG/DL (ref 8–23)
BUN/CREAT SERPL: 21.1 (ref 7–25)
CALCIUM SPEC-SCNC: 8.9 MG/DL (ref 8.6–10.5)
CHLORIDE SERPL-SCNC: 108 MMOL/L (ref 98–107)
CO2 SERPL-SCNC: 26 MMOL/L (ref 22–29)
CREAT BLD-MCNC: 0.71 MG/DL (ref 0.57–1)
DEPRECATED RDW RBC AUTO: 44.9 FL (ref 37–54)
EOSINOPHIL # BLD AUTO: 0.07 10*3/MM3 (ref 0–0.4)
EOSINOPHIL NFR BLD AUTO: 1.6 % (ref 0.3–6.2)
ERYTHROCYTE [DISTWIDTH] IN BLOOD BY AUTOMATED COUNT: 13.2 % (ref 12.3–15.4)
GFR SERPL CREATININE-BSD FRML MDRD: 80 ML/MIN/1.73
GLOBULIN UR ELPH-MCNC: 2.5 GM/DL
GLUCOSE BLD-MCNC: 100 MG/DL (ref 65–99)
HCT VFR BLD AUTO: 38.4 % (ref 34–46.6)
HGB BLD-MCNC: 13.1 G/DL (ref 12–15.9)
IMM GRANULOCYTES # BLD AUTO: 0.01 10*3/MM3 (ref 0–0.05)
IMM GRANULOCYTES NFR BLD AUTO: 0.2 % (ref 0–0.5)
LYMPHOCYTES # BLD AUTO: 1.19 10*3/MM3 (ref 0.7–3.1)
LYMPHOCYTES NFR BLD AUTO: 27.5 % (ref 19.6–45.3)
MCH RBC QN AUTO: 32 PG (ref 26.6–33)
MCHC RBC AUTO-ENTMCNC: 34.1 G/DL (ref 31.5–35.7)
MCV RBC AUTO: 93.7 FL (ref 79–97)
MONOCYTES # BLD AUTO: 0.39 10*3/MM3 (ref 0.1–0.9)
MONOCYTES NFR BLD AUTO: 9 % (ref 5–12)
NEUTROPHILS # BLD AUTO: 2.64 10*3/MM3 (ref 1.7–7)
NEUTROPHILS NFR BLD AUTO: 61 % (ref 42.7–76)
NRBC BLD AUTO-RTO: 0 /100 WBC (ref 0–0.2)
PLATELET # BLD AUTO: 210 10*3/MM3 (ref 140–450)
PMV BLD AUTO: 10.3 FL (ref 6–12)
POTASSIUM BLD-SCNC: 3.5 MMOL/L (ref 3.5–5.2)
PROT SERPL-MCNC: 6.2 G/DL (ref 6–8.5)
RBC # BLD AUTO: 4.1 10*6/MM3 (ref 3.77–5.28)
SODIUM BLD-SCNC: 144 MMOL/L (ref 136–145)
WBC NRBC COR # BLD: 4.33 10*3/MM3 (ref 3.4–10.8)

## 2019-10-24 PROCEDURE — 85025 COMPLETE CBC W/AUTO DIFF WBC: CPT | Performed by: FAMILY MEDICINE

## 2019-10-24 PROCEDURE — 80053 COMPREHEN METABOLIC PANEL: CPT | Performed by: FAMILY MEDICINE

## 2019-10-24 PROCEDURE — 99232 SBSQ HOSP IP/OBS MODERATE 35: CPT | Performed by: CLINICAL NURSE SPECIALIST

## 2019-10-24 PROCEDURE — 97166 OT EVAL MOD COMPLEX 45 MIN: CPT

## 2019-10-24 PROCEDURE — 97162 PT EVAL MOD COMPLEX 30 MIN: CPT | Performed by: PHYSICAL THERAPIST

## 2019-10-24 PROCEDURE — 96105 ASSESSMENT OF APHASIA: CPT

## 2019-10-24 PROCEDURE — 63710000001 PREDNISONE PER 5 MG: Performed by: FAMILY MEDICINE

## 2019-10-24 RX ADMIN — LEVOTHYROXINE SODIUM 50 MCG: 50 TABLET ORAL at 08:30

## 2019-10-24 RX ADMIN — GABAPENTIN 100 MG: 100 CAPSULE ORAL at 14:53

## 2019-10-24 RX ADMIN — SERTRALINE 50 MG: 50 TABLET, FILM COATED ORAL at 08:30

## 2019-10-24 RX ADMIN — PREDNISONE 5 MG: 5 TABLET ORAL at 08:30

## 2019-10-24 RX ADMIN — ASPIRIN 81 MG: 81 TABLET ORAL at 08:30

## 2019-10-24 RX ADMIN — TRAZODONE HYDROCHLORIDE 50 MG: 50 TABLET ORAL at 20:25

## 2019-10-24 RX ADMIN — GABAPENTIN 100 MG: 100 CAPSULE ORAL at 21:38

## 2019-10-24 RX ADMIN — GABAPENTIN 100 MG: 100 CAPSULE ORAL at 05:08

## 2019-10-24 RX ADMIN — ATORVASTATIN CALCIUM 80 MG: 40 TABLET, FILM COATED ORAL at 20:25

## 2019-10-24 RX ADMIN — CLOPIDOGREL 75 MG: 75 TABLET, FILM COATED ORAL at 08:30

## 2019-10-24 NOTE — PROGRESS NOTES
Continued Stay Note  [facility]     Patient Name: Adela Goetz  MRN: [id number]  Today's Date: 10/24/2019    Admit Date: 10/23/2019    Discharge Plan     Row Name 10/24/19 5236       Plan    Plan Comments  PER SEAN WITH [facility] SHE WILL BE HERE TOMORROW MORNING TO EVAL PT.     Row Name 10/24/19 3517       Plan    Plan Comments  MADE REFERRAL TO [facility] ACUTE [facility] PER PT/FAMILY REQUEST. AWAIT ANSWER FROM ACUTE REHAB.         Discharge Codes    No documentation.             MARGIE Kelley

## 2019-10-24 NOTE — PROGRESS NOTES
Discharge Planning Assessment  [facility]     Patient Name: Adela Goetz  MRN: [id number]  Today's Date: 10/24/2019    Admit Date: 10/23/2019    Discharge Needs Assessment     Row Name 10/24/19 1206       Living Environment    Lives With  spouse    Current Living Arrangements  home/apartment/condo    Primary Care Provided by  self    Provides Primary Care For  no one    Family Caregiver if Needed  spouse;child(tammie), adult    Quality of Family Relationships  helpful    Able to Return to Prior Arrangements  yes       Resource/Environmental Concerns    Resource/Environmental Concerns  none    Transportation Concerns  car, none       Transition Planning    Patient/Family Anticipates Transition to  inpatient rehabilitation facility    Patient/Family Anticipated Services at Transition  rehabilitation services    Transportation Anticipated  family or friend will provide       Discharge Needs Assessment    Readmission Within the Last 30 Days  no previous admission in last 30 days    Concerns to be Addressed  denies needs/concerns at this time    Equipment Currently Used at Home  none    Anticipated Changes Related to Illness  none    Equipment Needed After Discharge  other (see comments) REHAB FACILITY WILL PROVIDE    Outpatient/Agency/Support Group Needs  inpatient rehabilitation facility    Discharge Facility/Level of Care Needs  rehabilitation facility    Discharge Coordination/Progress  REFERRAL PENDING TO [facility]        Discharge Plan     Row Name 10/24/19 1206       Plan    Plan Comments  MADE REFERRAL TO [facility] PER PT/FAMILY REQUEST. AWAIT ANSWER FROM ACUTE REHAB.         Destination      Service Provider Request Status Selected Services Address Phone Number Fax Number    [facility] Pending - No Request Sent N/A [address], [facility] [address] [phone]      Durable Medical Equipment      No service coordination in this encounter.      Dialysis/Infusion      No service  coordination in this encounter.      Home Medical Care      No service coordination in this encounter.      Therapy      No service coordination in this encounter.      Community Resources      No service coordination in this encounter.          Demographic Summary    No documentation.       Functional Status    No documentation.       Psychosocial    No documentation.       Abuse/Neglect    No documentation.       Legal    No documentation.       Substance Abuse    No documentation.       Patient Forms    No documentation.           MARGIE Kelley

## 2019-10-24 NOTE — PLAN OF CARE
Problem: Patient Care Overview  Goal: Plan of Care Review  Outcome: Ongoing (interventions implemented as appropriate)   10/24/19 1150   Coping/Psychosocial   Plan of Care Reviewed With patient;son   OTHER   Outcome Summary OT joelle completed. When asked, pt stated that she was unaware of a previous stroke that had occurred in 2015 but noted that she had L sided weakness at baseline. Pt performed adjusting/donning of sock w/ SBA while supine. Pt completed bed mob w/ SBA. While seated EOB, pt demos posterior lean. Pt states that she is unaware of lean, but is able to self correct I'ly. During sit>stand t/f, pt required Deniz x2. While standing, pt required CGA x2 d/t unsteadiness and LOB as a result of posterior and R sided lean. While walking in hallway, pt demos unsteadiness when walking L/R and forward/backward requiring occasional CGA x2. LOB noted when taking steps backward. Pt demos decreased BUE strength and grasp, balance, and act jewell/endurance. Skilled OT warranted to address these deficits, as well as provide education. Recommend D/C to IP rehab.    Plan of Care Review   Progress no change

## 2019-10-24 NOTE — PLAN OF CARE
Problem: Patient Care Overview  Goal: Plan of Care Review  Outcome: Ongoing (interventions implemented as appropriate)   10/24/19 3567   Coping/Psychosocial   Plan of Care Reviewed With patient;family   OTHER   Outcome Summary Aphasia Diagnostic Profile completed.  Pt was alert and willing to participate throughout evaluation. Pt had decreased articulation and phonation throughout evaluation. Pt's sons report that though she is a generally soft spoken individual, she is not typically this quiet or difficult to understand. She was oriented to personal information and her current situation. Pt demonstrated mild-moderate impairments in attention, confrontational naming, and word finding. She performed within functional limits on tasks requing her to follow directions, comprehend single words and stories, and completing verbal repetitions. 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 Pt demonstrates criteria for skilled intervention. It is recommended that pt receive speech therapy services to improve word finding skills, attention, and phonation. Will continue to follow and treat.

## 2019-10-24 NOTE — PROGRESS NOTES
Neurology Progress Note      Chief Complaint: f/u stroke    Subjective     Subjective:  Patient lying in bed.  and son ( Yoandy) at bedside. Continues with some word finding.  tells me she had been out of 3 of her medications for several days. Two of the medications were amlodipine and Plavix. Per  she handed him two empty medicine bottles and stated she was out of a 3rd medication as well and could not tell him who had prescribed the medications.       TTE shows LV hypertrophy, mild tricuspid valve regurgitation, no intracardiac mass/thrombus. No PFO.    Medications:  Current Facility-Administered Medications   Medication Dose Route Frequency Provider Last Rate Last Dose   • acetaminophen (TYLENOL) tablet 650 mg  650 mg Oral Q6H PRN Priyank Gilbert MD       • aspirin EC tablet 81 mg  81 mg Oral Daily Priyank Gilbert MD   81 mg at 10/24/19 0830   • atorvastatin (LIPITOR) tablet 80 mg  80 mg Oral Nightly Priyank Gilbert MD   80 mg at 10/23/19 2115   • clopidogrel (PLAVIX) tablet 75 mg  75 mg Oral Daily Priyank Gilbert MD   75 mg at 10/24/19 0830   • gabapentin (NEURONTIN) capsule 100 mg  100 mg Oral Q8H Priyank Gilbert MD   100 mg at 10/24/19 0508   • HYDROcodone-acetaminophen (NORCO) 5-325 MG per tablet 1 tablet  1 tablet Oral Q6H PRN Priyank Gilbert MD       • labetalol (NORMODYNE,TRANDATE) injection 20 mg  20 mg Intravenous Once Oz Mendez Jr., MD   Stopped at 10/23/19 1204   • levothyroxine (SYNTHROID, LEVOTHROID) tablet 50 mcg  50 mcg Oral Daily Priyank Gilbert MD   50 mcg at 10/24/19 0830   • ondansetron (ZOFRAN) injection 4 mg  4 mg Intravenous Q6H PRN Priyank Gilbert MD       • predniSONE (DELTASONE) tablet 5 mg  5 mg Oral Daily With Breakfast Priyank Gilbert MD   5 mg at 10/24/19 0830   • sertraline (ZOLOFT) tablet 50 mg  50 mg Oral Daily Priyank Gilbert MD   50 mg at 10/24/19 0830   • sodium chloride 0.9 %  flush 10 mL  10 mL Intravenous PRN Oz Mendez Jr., MD       • traZODone (DESYREL) tablet 50 mg  50 mg Oral Nightly Priyank Gilbert MD   50 mg at 10/23/19 2116       Review of Systems:   -A 14 point review of systems is completed and is negative except for mild aphasi, and mild flattening of right nasal fold.       Objective      Vital Signs  Temp:  [98.2 °F (36.8 °C)-98.5 °F (36.9 °C)] 98.2 °F (36.8 °C)  Heart Rate:  [50-75] 50  Resp:  [16] 16  BP: (116-159)/(67-87) 127/67      SB-S 49-61    Physical Exam:    General Exam:  Head:  Normal cephalic, atraumatic  HEENT:  Neck supple  CVS:  Regular rate and rhythm.  No murmurs  Carotid Examination:  No bruits  Lungs:  Clear to auscultation  Abdomen:  Non-tender, Non-distended  Extremities:  No signs of peripheral edema  Skin:  No rashes    Neurologic Exam:    Mental Status:    -Awake, Alert, Oriented X 3  -mild wordd finding difficulties  -No aphasia  -mild dysarthria  -Follows simple and complex commands    CN II:  Visual fields full.  Pupils equally reactive to light  CN III, IV, VI:  Extraocular Muscles full with no signs of nystagmus  CN V:  Facial sensory is symmetric with no asymetries.  CN VII:  Facial motor asymmetric with subtle flattening of right nasal crease.  CN VIII:  Gross hearing intact bilaterally  CN IX:  Palate elevates symmetrically  CN X:  Palate elevates symmetrically  CN XI:  Shoulder shrug symmetric  CN XII:  Tongue protrudes to midline  Motor: (strength out of 5:  1= minimal movement, 2 = movement in plane of gravity, 3 = movement against gravity, 4 = movement against some resistance, 5 = full strength)    -Right Upper Ext: Proximal: 5 Distal: 5  -Left Upper Ext: Proximal: 5 Distal: 5    -Right Lower Ext: Proximal: 5 Distal: 5  -Left Lower Ext: Proximal: 5 Distal: 5    DTR:  -Right   Bicep: 2+ Tricep: 2+ Brachoradialis: 2+   Patella: 2+ Ankle: 2+ Neg Babinski  -Left   Bicep: 2+ Tricep: 2+ Brachoradialis: 2+   Patella: 2+ Ankle:  2+ Neg Babinski    Sensory:  -Intact to light touch, pinprick, temperature, pain, and proprioception    Coordination:  -Finger to nose intact  -Heel to shin intact  - no ataxia         Results Review:    I reviewed the patient's new clinical results.    Results from last 7 days   Lab Units 10/24/19  0541 10/23/19  1015   WBC 10*3/mm3 4.33 4.34   HEMOGLOBIN g/dL 13.1 14.3   HEMATOCRIT % 38.4 42.2   PLATELETS 10*3/mm3 210 218        Results from last 7 days   Lab Units 10/24/19  0541 10/23/19  1049   SODIUM mmol/L 144 145   POTASSIUM mmol/L 3.5 3.4*   CHLORIDE mmol/L 108* 108*   CO2 mmol/L 26.0 25.0   BUN mg/dL 15 16   CREATININE mg/dL 0.71 0.80   CALCIUM mg/dL 8.9 8.9   BILIRUBIN mg/dL 0.8 0.7   ALK PHOS U/L 58 61   ALT (SGPT) U/L 12 16   AST (SGOT) U/L 13 17   GLUCOSE mg/dL 100* 84        Lab Results   Component Value Date    PHOS 2.9 02/10/2017    MG 2.3 (H) 02/22/2019    PROTIME 12.4 10/23/2019    INR 0.90 (L) 10/23/2019     No components found for: POCGLUC  No components found for: A1C  Lab Results   Component Value Date    HDL 44 10/23/2019     (H) 10/23/2019     No components found for: B12  Lab Results   Component Value Date    TSH 1.240 10/23/2019     Imaging Results (last 72 hours)     Procedure Component Value Units Date/Time    MRI Brain Without Contrast [[id number]] Collected:  10/23/19 1616     Updated:  10/23/19 1624    Narrative:       EXAMINATION: MRI BRAIN WO CONTRAST- 10/23/2019 4:16 PM CDT     HISTORY: Stroke, weakness, history of meningioma     COMPARISON: CT head without contrast 10/23/2019, MRI brain with and  without contrast 11/12/2018     Technical: Multiplanar, multisequence imaging was performed through the  brain without the use of IV contrast.     FINDINGS:  Areas of diffusion restriction are seen in the left corona radiata the  periventricular region. Findings are compatible with acute ischemia.  Approximately 5 adjacent foci of diffusion restriction are noted in this  region. No  additional areas of diffusion restriction are seen.     Artifact from the known calcified meningioma near the vertex is noted.     There is no evidence of acute intracranial hemorrhage. There is  hemosiderin deposition in the region of the left caudate head,  compatible with previous microhemorrhage at the site of infarction.  Numerous foci of hemosiderin deposition are noted bilaterally,  compatible with previous areas of microhemorrhage.     The ventricles are prominent but stable. Normal signal void is seen in  the visualized carotid and basilar arteries.     There is thinning of the lenses bilaterally, suggesting cataract or  previous cataract surgery. There is right frontal sinus disease. The  mastoid air cells appear clear.     There are advanced periventricular and subcortical FLAIR signal  hyperintensities, a nonspecific finding most often seen as sequela of  chronic microvascular ischemia.       Impression:       1. Multiple adjacent areas of diffusion restriction involving the left  corona radiata in the periventricular left frontal lobe, compatible with  acute ischemia.     2. Sequela of chronic microvascular ischemia.  This report was finalized on 10/23/2019 16:21 by Dr. Alex Bacon MD.    US Carotid Bilateral [[id number]] Collected:  10/23/19 1538     Updated:  10/23/19 1542    Narrative:       History: Carotid occlusive disease       Impression:       Impression:  1. There is less than 50% stenosis of the right internal carotid artery.  2. There is less than 50% stenosis of the left internal carotid artery.  3. Antegrade flow is demonstrated in bilateral vertebral arteries.     Comments: Bilateral carotid vertebral arterial duplex scan was  performed.     Grayscale imaging shows intimal thickening and calcified elements at the  carotid bifurcation. The right internal carotid artery peak systolic  velocity is 67.0 cm/sec. The end-diastolic velocity is 19.3 cm/sec. The  right ICA/CCA ratio is  approximately 1.47 . These findings correlate  with less than 50% stenosis of the right internal carotid artery.     Grayscale imaging shows intimal thickening and calcified elements at the  carotid bifurcation. The left internal carotid artery peak systolic  velocity is 62.1 cm/sec. The end-diastolic velocity is 27.9 cm/sec. The  left ICA/CCA ratio is approximately 1.26 . These findings correlate with  less than 50% stenosis of the left internal carotid artery.     Antegrade flow is demonstrated in bilateral vertebral arteries.     This report was finalized on 10/23/2019 15:39 by Dr. Rodri Tapia MD.    CT Head Without Contrast [[id number]] Collected:  10/23/19 1041     Updated:  10/23/19 1048    Narrative:       EXAMINATION: CT HEAD WO CONTRAST- 10/23/2019 10:41 AM CDT     HISTORY: Altered mental status     COMPARISON: CT head without contrast 02/08/2017     DOSE: 584 mGy-cm     TECHNIQUE: Sequential imaging was performed from the vertex through the  base of the skull without the use of IV contrast.  Sagittal and coronal  reformations were made from the original source data and reviewed.  Automated exposure control was also utilized to decrease patient  radiation dose.     FINDINGS:   There is no evidence of acute intracranial hemorrhage or midline shift.  A calcified parafalcine meningioma is again identified, unchanged from  prior exams. Old right basal ganglia and left caudate head lacunar  infarcts are again noted, stable. There is no evidence of acute  territorial infarct. Periventricular and subcortical white matter  changes are present, a nonspecific finding most often seen as sequela of  chronic microvascular ischemia. The calvarium is intact. The visualized  paranasal sinuses and mastoid air cells appear clear. Calcifications are  seen in the cavernous carotid arteries.       Impression:       No acute intracranial findings. Old bilateral basal ganglia lacunar  infarcts. Sequela of chronic microvascular  ischemia.     This report was finalized on 10/23/2019 10:45 by Dr. Alex Bacon MD.          Results for orders placed during the hospital encounter of 10/23/19   Adult Transthoracic Echo Complete W/ Cont if Necessary Per Protocol    Narrative · Left ventricular wall thickness is consistent with hypertrophy.  · Left ventricular systolic function is normal.  · Left ventricular diastolic dysfunction (grade I) consistent with   impaired relaxation.  · Normal right ventricular cavity size and systolic function noted.  · Mild tricuspid valve regurgitation is present.  · There is no evidence of any intracardiac mass or thrombus.  · There is no evidence of a right to left shunt on bubble study.        Assessment/Plan     Hospital Problem List      Cerebrovascular accident (CVA) (CMS/[facility])    Impression:  1. Acute Left PVWM stroke likely related to missing several doses of plavix and BP medications.   2. Hyperlipidemia.   3. Hypertension  4. Hypokalemia. 3.6 today      Plan:  1. Resume and continue Plavix 75 mg daily. Counseled family on importance of them assisting patient with medication administration  2. Elicitor 80 mg daily for outpatient LDL goal less than 70.   3. BP management for systolic goal less than 160.   4. ST/OT/PT  5. Discharge planning to acute rehab  6. Ok to discharge from neurology standpoint and will need to follow up with Ronnie Haider PA-C in 4 weeks.   7. SCD in place for DVT prophylaxis.        Sandra Omalley, JACLYN  10/24/19  1:42 PM

## 2019-10-24 NOTE — H&P
[facility]  HISTORY AND PHYSICAL    Date of Admission: 10/23/2019  Primary Care Physician: Bryce Cardona APRN    Subjective     Chief Complaint: speech difficulty     History of Present Illness  Mrs. Goetz is a pleasant 74 year old lady with a history of HTN, HLD and previous stroke.  She works as a substitute .  She takes ASA and Plavix for prior stroke.      She taught school yeseterday, and afterwards noticed she was having some difficulty with word finding and speaking.  She also had difficulty swallowing.          Review of Systems   Constitutional: Negative for fatigue and fever.   HENT: Negative for congestion and ear pain.    Eyes: Negative for pain and visual disturbance.   Respiratory: Negative for cough, shortness of breath and wheezing.    Cardiovascular: Negative for chest pain and palpitations.   Gastrointestinal: Negative for diarrhea, nausea and vomiting.   Endocrine: Negative for heat intolerance.   Genitourinary: Negative for dysuria and frequency.   Musculoskeletal: Negative for arthralgias and back pain.   Skin: Negative for rash and wound.   Neurological: Positive for speech difficulty. Negative for dizziness and light-headedness.   Psychiatric/Behavioral: Negative for confusion. The patient is not nervous/anxious.    All other systems reviewed and are negative.       Otherwise complete ROS reviewed and negative except as mentioned in the HPI.    Past Medical History:   Past Medical History:   Diagnosis Date   • Basal cell carcinoma 10/28/2017   • CAD (coronary artery disease)    • CVA (cerebral vascular accident) (CMS/HCC) 2015   • Hyperlipidemia    • Hypertension    • Osteopenia    • Rheumatoid arthritis (CMS/[facility])    • TIA (transient ischemic attack)      Past Surgical History:  Past Surgical History:   Procedure Laterality Date   • APPENDECTOMY     • BREAST BIOPSY     • BREAST RECONSTRUCTION      breast reduction , yrs ago    • CARDIAC CATHETERIZATION     • CARDIAC SURGERY      cabg x3  2015 ,[facility]   • CATARACT EXTRACTION     • COLONOSCOPY N/A 1/18/2017    Procedure: COLONOSCOPY WITH ANESTHESIA;  Surgeon: Andrea Pizano DO;  Location: [facility] ENDOSCOPY;  Service:    • COLONOSCOPY N/A 2/7/2017    Procedure: COLONOSCOPY WITH ANESTHESIA;  Surgeon: Andrea Pizano DO;  Location: [facility] ENDOSCOPY;  Service:    • HEAD/NECK LESION/CYST EXCISION Right 10/18/2017    Procedure: Excision of basal cell carcinoma the right neck with complex closure;  Surgeon: Edward Cardona MD;  Location: [facility] OR;  Service:    • HERNIA REPAIR     • REDUCTION MAMMAPLASTY  1995   • RETINAL LASER PROCEDURE     • SPINE SURGERY     • TUBAL ABDOMINAL LIGATION       Social History:  reports that she has never smoked. She has never used smokeless tobacco. She reports that she does not drink alcohol or use drugs.    Family History: family history includes Cancer in her brother and sister; Emphysema in her mother; Heart disease in her father; Liver cancer in her maternal grandmother.       Allergies:  No Known Allergies  Medications:  Prior to Admission medications    Medication Sig Start Date End Date Taking? Authorizing Provider   alendronate (FOSAMAX) 70 MG tablet Take 70 mg by mouth Every 7 (Seven) Days.   Yes Estefania Waller MD   amLODIPine (NORVASC) 2.5 MG tablet Take 1 tablet by mouth daily. 9/19/16  Yes Reilly Mcqueen MD   clopidogrel (PLAVIX) 75 MG tablet Take 1 tablet by mouth Daily. 2/19/19  Yes Sandra Omalley APRN   gabapentin (NEURONTIN) 100 MG capsule Take 100 mg by mouth Every 8 (Eight) Hours As Needed.   Yes Estefania Waller MD   levothyroxine (SYNTHROID, LEVOTHROID) 50 MCG tablet Take 50 mcg by mouth Daily.   Yes Estefania Waller MD   predniSONE (DELTASONE) 5 MG tablet Take 10 mg by mouth Daily.   Yes Estefania Waller MD   Ascorbic Acid (VITAMIN C PO) Take 1,000 mg by mouth Daily.    Estefania Waller  "MD   Cholecalciferol (VITAMIN D3) 5000 UNITS capsule capsule Take 2,000 Units by mouth Daily.    ProviderEstefania MD   folic acid (FOLVITE) 1 MG tablet Take 1 mg by mouth Daily.    Estefania Waller MD   nitroglycerin (NITROSTAT) 0.4 MG SL tablet Place 0.4 mg under the tongue every 5 (five) minutes as needed for chest pain. Take no more than 3 doses in 15 minutes.    Estefania Waller MD   rosuvastatin (CRESTOR) 20 MG tablet Take 20 mg by mouth Daily.    Estefania Waller MD   traZODone (DESYREL) 50 MG tablet Take 50 mg by mouth Every Night.    Estefania Waller MD     Objective     Vital Signs: /67 (BP Location: Right arm, Patient Position: Lying)   Pulse 50   Temp 98.2 °F (36.8 °C) (Oral)   Resp 16   Ht 160 cm (62.99\")   Wt 57.2 kg (126 lb 1.7 oz)   LMP  (LMP Unknown)   SpO2 96%   BMI 22.34 kg/m²   Physical Exam   Constitutional: She is oriented to person, place, and time. She appears well-developed and well-nourished.   HENT:   Head: Normocephalic and atraumatic.   Right Ear: External ear normal.   Left Ear: External ear normal.   Nose: Nose normal.   Mouth/Throat: Oropharynx is clear and moist.   Eyes: Conjunctivae and EOM are normal.   Neck: Normal range of motion. Neck supple.   Cardiovascular: Normal rate, regular rhythm and normal heart sounds.   Pulmonary/Chest: Effort normal and breath sounds normal.   Abdominal: Soft. Bowel sounds are normal. She exhibits no distension. There is no tenderness.   Musculoskeletal: Normal range of motion.   Neurological: She is alert and oriented to person, place, and time.   Dysarthria    Skin: Skin is warm and dry.   Psychiatric: She has a normal mood and affect. Her speech is normal and behavior is normal. Cognition and memory are normal.         Results Reviewed:  Lab Results (last 24 hours)     Procedure Component Value Units Date/Time    Comprehensive Metabolic Panel [[id number]]  (Abnormal) Collected:  10/24/19 0541    Specimen:  " Blood Updated:  10/24/19 0639     Glucose 100 mg/dL      BUN 15 mg/dL      Creatinine 0.71 mg/dL      Sodium 144 mmol/L      Potassium 3.5 mmol/L      Chloride 108 mmol/L      CO2 26.0 mmol/L      Calcium 8.9 mg/dL      Total Protein 6.2 g/dL      Albumin 3.70 g/dL      ALT (SGPT) 12 U/L      AST (SGOT) 13 U/L      Alkaline Phosphatase 58 U/L      Total Bilirubin 0.8 mg/dL      eGFR Non African Amer 80 mL/min/1.73      Globulin 2.5 gm/dL      A/G Ratio 1.5 g/dL      BUN/Creatinine Ratio 21.1     Anion Gap 10.0 mmol/L     Narrative:       GFR Normal >60  Chronic Kidney Disease <60  Kidney Failure <15    CBC & Differential [[id number]] Collected:  10/24/19 0541    Specimen:  Blood Updated:  10/24/19 0614    Narrative:       The following orders were created for panel order CBC & Differential.  Procedure                               Abnormality         Status                     ---------                               -----------         ------                     CBC Auto Differential[[id number]]        Normal              Final result                 Please view results for these tests on the individual orders.    CBC Auto Differential [[id number]]  (Normal) Collected:  10/24/19 0541    Specimen:  Blood Updated:  10/24/19 0614     WBC 4.33 10*3/mm3      RBC 4.10 10*6/mm3      Hemoglobin 13.1 g/dL      Hematocrit 38.4 %      MCV 93.7 fL      MCH 32.0 pg      MCHC 34.1 g/dL      RDW 13.2 %      RDW-SD 44.9 fl      MPV 10.3 fL      Platelets 210 10*3/mm3      Neutrophil % 61.0 %      Lymphocyte % 27.5 %      Monocyte % 9.0 %      Eosinophil % 1.6 %      Basophil % 0.7 %      Immature Grans % 0.2 %      Neutrophils, Absolute 2.64 10*3/mm3      Lymphocytes, Absolute 1.19 10*3/mm3      Monocytes, Absolute 0.39 10*3/mm3      Eosinophils, Absolute 0.07 10*3/mm3      Basophils, Absolute 0.03 10*3/mm3      Immature Grans, Absolute 0.01 10*3/mm3      nRBC 0.0 /100 WBC     Hemoglobin A1c [[id number]]  (Normal) Collected:  10/23/19  1015    Specimen:  Blood Updated:  10/23/19 1358     Hemoglobin A1C 5.30 %     Narrative:       Hemoglobin A1C Ranges:    Increased Risk for Diabetes  5.7% to 6.4%  Diabetes                     >= 6.5%  Diabetic Goal                < 7.0%    Lipid Panel [[id number]]  (Abnormal) Collected:  10/23/19 1049    Specimen:  Blood Updated:  10/23/19 1357     Total Cholesterol 185 mg/dL      Triglycerides 134 mg/dL      HDL Cholesterol 44 mg/dL      LDL Cholesterol  114 mg/dL      VLDL Cholesterol 26.8 mg/dL      LDL/HDL Ratio 2.60    Narrative:       Cholesterol Reference Ranges  (U.S. Department of Health and Human Services ATP III Classifications)    Desirable          <200 mg/dL  Borderline High    200-239 mg/dL  High Risk          >240 mg/dL      Triglyceride Reference Ranges  (U.S. Department of Health and Human Services ATP III Classifications)    Normal           <150 mg/dL  Borderline High  150-199 mg/dL  High             200-499 mg/dL  Very High        >500 mg/dL    HDL Reference Ranges  (U.S. Department of Health and Human Services ATP III Classifcations)    Low     <40 mg/dl (major risk factor for CHD)  High    >60 mg/dl ('negative' risk factor for CHD)        LDL Reference Ranges  (U.S. Department of Health and Human Services ATP III Classifcations)    Optimal          <100 mg/dL  Near Optimal     100-129 mg/dL  Borderline High  130-159 mg/dL  High             160-189 mg/dL  Very High        >189 mg/dL        Imaging Results (last 24 hours)     Procedure Component Value Units Date/Time    MRI Brain Without Contrast [[id number]] Collected:  10/23/19 1616     Updated:  10/23/19 1624    Narrative:       EXAMINATION: MRI BRAIN WO CONTRAST- 10/23/2019 4:16 PM CDT     HISTORY: Stroke, weakness, history of meningioma     COMPARISON: CT head without contrast 10/23/2019, MRI brain with and  without contrast 11/12/2018     Technical: Multiplanar, multisequence imaging was performed through the  brain without the use of IV  contrast.     FINDINGS:  Areas of diffusion restriction are seen in the left corona radiata the  periventricular region. Findings are compatible with acute ischemia.  Approximately 5 adjacent foci of diffusion restriction are noted in this  region. No additional areas of diffusion restriction are seen.     Artifact from the known calcified meningioma near the vertex is noted.     There is no evidence of acute intracranial hemorrhage. There is  hemosiderin deposition in the region of the left caudate head,  compatible with previous microhemorrhage at the site of infarction.  Numerous foci of hemosiderin deposition are noted bilaterally,  compatible with previous areas of microhemorrhage.     The ventricles are prominent but stable. Normal signal void is seen in  the visualized carotid and basilar arteries.     There is thinning of the lenses bilaterally, suggesting cataract or  previous cataract surgery. There is right frontal sinus disease. The  mastoid air cells appear clear.     There are advanced periventricular and subcortical FLAIR signal  hyperintensities, a nonspecific finding most often seen as sequela of  chronic microvascular ischemia.       Impression:       1. Multiple adjacent areas of diffusion restriction involving the left  corona radiata in the periventricular left frontal lobe, compatible with  acute ischemia.     2. Sequela of chronic microvascular ischemia.  This report was finalized on 10/23/2019 16:21 by Dr. Alex Bacon MD.    US Carotid Bilateral [[id number]] Collected:  10/23/19 1538     Updated:  10/23/19 1542    Narrative:       History: Carotid occlusive disease       Impression:       Impression:  1. There is less than 50% stenosis of the right internal carotid artery.  2. There is less than 50% stenosis of the left internal carotid artery.  3. Antegrade flow is demonstrated in bilateral vertebral arteries.     Comments: Bilateral carotid vertebral arterial duplex scan was  performed.      Grayscale imaging shows intimal thickening and calcified elements at the  carotid bifurcation. The right internal carotid artery peak systolic  velocity is 67.0 cm/sec. The end-diastolic velocity is 19.3 cm/sec. The  right ICA/CCA ratio is approximately 1.47 . These findings correlate  with less than 50% stenosis of the right internal carotid artery.     Grayscale imaging shows intimal thickening and calcified elements at the  carotid bifurcation. The left internal carotid artery peak systolic  velocity is 62.1 cm/sec. The end-diastolic velocity is 27.9 cm/sec. The  left ICA/CCA ratio is approximately 1.26 . These findings correlate with  less than 50% stenosis of the left internal carotid artery.     Antegrade flow is demonstrated in bilateral vertebral arteries.     This report was finalized on 10/23/2019 15:39 by Dr. Rodri Tapia MD.        I have personally reviewed and interpreted the radiology studies and ECG obtained at time of admission.     Assessment / Plan     Assessment:   Active Hospital Problems    Diagnosis   • Cerebrovascular accident (CVA) (CMS/[facility])     1.  CVA  -ASA  -lipitor  -Plavix  -PT/OT/SLP  -Rehab to see pt in AM regarding d/c to ARU     2.  CAD  -ASA  -lipitor  -Plavix     3.  HTN  -monitor     4.  HLD  -Lipitor     5.  RA  -Prednisone  -Mtx     6.  Hypothyroidism  -Synthroid              Code Status: Full     I discussed the patient's findings and my recommendations with the patient, patients family    Estimated length of stay 1-2 days    Priyank Gilbert MD   10/24/19   1:47 PM

## 2019-10-24 NOTE — PLAN OF CARE
Problem: Patient Care Overview  Goal: Plan of Care Review  Outcome: Ongoing (interventions implemented as appropriate)   10/24/19 7986   Coping/Psychosocial   Plan of Care Reviewed With patient   OTHER   Outcome Summary PT evaluation completed. The patient presents with a flat affect and minimal verbal responses. Her responses for the most part are correct and she oriented. She does not demonstrate any significant focal weakness, but she is having difficulty with motor planning to maintain her balance and walk. She will benefit from skilled PT services to work on her motor planning and balance. Inpt acute rehab recommended upon discharge.    Plan of Care Review   Progress improving

## 2019-10-24 NOTE — THERAPY EVALUATION
Acute Care - Occupational Therapy Initial Evaluation  [facility]     Patient Name: Adela Goetz  : 1945  MRN: [id number]  Today's Date: 10/24/2019  Onset of Illness/Injury or Date of Surgery: 10/23/19  Date of Referral to OT: 10/23/19  Referring Physician: Dr. Miguelina Massey    Admit Date: 10/23/2019       ICD-10-CM ICD-9-CM   1. Cerebrovascular accident (CVA), unspecified mechanism (CMS/HCC) I63.9 434.91   2. Dysphagia, unspecified type R13.10 787.20   3. Decreased activities of daily living (ADL) Z78.9 V49.89   4. Aphasia R47.01 784.3     Patient Active Problem List   Diagnosis   • CAD (coronary artery disease)   • Hypertension   • Hyperlipidemia   • Cerebrovascular accident (CVA) due to thrombosis of left middle cerebral artery (CMS/HCC)   • Rheumatoid arthritis (CMS/HCC)   • Osteopenia   • Meningioma (CMS/HCC)   • Altered taste   • Cervical radiculopathy   • Pneumonia of left upper lobe due to infectious organism (CMS/HCC)   • Basal cell carcinoma   • Coronary artery disease involving coronary bypass graft of native heart without angina pectoris   • Non-smoker   • BMI 23.0-23.9, adult   • Impaired memory   • Cerebrovascular accident (CVA) (CMS/HCC)     Past Medical History:   Diagnosis Date   • Basal cell carcinoma 10/28/2017   • CAD (coronary artery disease)    • CVA (cerebral vascular accident) (CMS/HCC)    • Hyperlipidemia    • Hypertension    • Osteopenia    • Rheumatoid arthritis (CMS/HCC)    • TIA (transient ischemic attack)      Past Surgical History:   Procedure Laterality Date   • APPENDECTOMY     • BREAST BIOPSY     • BREAST RECONSTRUCTION      breast reduction , yrs ago   • CARDIAC CATHETERIZATION     • CARDIAC SURGERY      cabg x3   ,done at [facility]   • CATARACT EXTRACTION     • COLONOSCOPY N/A 2017    Procedure: COLONOSCOPY WITH ANESTHESIA;  Surgeon: Andrea Pizano DO;  Location: [facility] ENDOSCOPY;  Service:    • COLONOSCOPY N/A 2017    Procedure: COLONOSCOPY WITH  ANESTHESIA;  Surgeon: Andrea Pizano DO;  Location: [facility] ENDOSCOPY;  Service:    • HEAD/NECK LESION/CYST EXCISION Right 10/18/2017    Procedure: Excision of basal cell carcinoma the right neck with complex closure;  Surgeon: Edward Cardona MD;  Location: [facility] OR;  Service:    • HERNIA REPAIR     • REDUCTION MAMMAPLASTY  1995   • RETINAL LASER PROCEDURE     • SPINE SURGERY     • TUBAL ABDOMINAL LIGATION            OT ASSESSMENT FLOWSHEET (last 12 hours)      Occupational Therapy Evaluation     Row Name 10/24/19 1020                   OT Evaluation Time/Intention    Subjective Information  complains of;weakness;fatigue;dizziness;nausea/vomiting;dyspnea  -AC (r) LS (t) AC (c)        Document Type  evaluation  -AC (r) LS (t) AC (c)        Mode of Treatment  occupational therapy  -AC (r) LS (t) AC (c)        Patient Effort  good  -AC (r) LS (t) AC (c)        Symptoms Noted During/After Treatment  fatigue  -AC (r) LS (t) AC (c)           General Information    Patient Profile Reviewed?  yes  -AC (r) LS (t) AC (c)        Onset of Illness/Injury or Date of Surgery  10/23/19  -AC (r) LS (t) AC (c)        Referring Physician  Dr. Miguelina Massey  -AC (r) LS (t) AC (c)        Patient Observations  alert;cooperative;agree to therapy  -AC (r) LS (t) AC (c)        Patient/Family Observations  sons present  -AC (r) LS (t) AC (c)        General Observations of Patient  lying in fowlers, SCD present  -AC (r) LS (t) AC (c)        Prior Level of Function  independent:;all household mobility;community mobility;ADL's;dressing;bathing;cooking;cleaning  -AC (r) LS (t) AC (c)        Equipment Currently Used at Home  grab bar  -AC (r) LS (t) AC (c)        Pertinent History of Current Functional Problem  increased L sided weakness, slurred speech; dx: CVA; hx: CVA(2015), CAD, RA, TIA, osteopenia  -AC (r) LS (t) AC (c)        Existing Precautions/Restrictions  fall  -AC (r) LS (t) AC (c)        Risks Reviewed  patient and  family:;LOB;nausea/vomiting;dizziness;increased discomfort;change in vital signs;increased drainage;lines disloged  -AC (r) LS (t) AC (c)        Benefits Reviewed  patient and family:;improve function;increase independence;increase strength;increase balance;decrease pain;decrease risk of DVT;improve skin integrity;increase knowledge  -AC (r) LS (t) AC (c)        Barriers to Rehab  none identified  -AC (r) LS (t) AC (c)           Relationship/Environment    Lives With  spouse  -AC (r) LS (t) AC (c)        Family Caregiver if Needed  spouse;child(tmamie), adult;grandchild(tammie), adult  -AC (r) LS (t) AC (c)           Resource/Environmental Concerns    Current Living Arrangements  home/apartment/condo  -AC (r) LS (t) AC (c)           Home Main Entrance    Number of Stairs, Main Entrance  seven  -AC (r) LS (t) AC (c)        Stair Railings, Main Entrance  railing on right side (ascending)  -AC (r) LS (t) AC (c)           Stairs Within Home, Primary    Stairs, Within Home, Primary  two story home, but plans to move downstairs  -AC (r) LS (t) AC (c)        Number of Stairs, Within Home, Primary  other (see comments) 17  -AC (r) LS (t) AC (c)        Stair Railings, Within Home, Primary  railing on right side (ascending)  -AC (r) LS (t) AC (c)           Cognitive Assessment/Interventions    Additional Documentation  Cognitive Assessment/Intervention (Group)  -AC (r) LS (t) AC (c)           Cognitive Assessment/Intervention- PT/OT    Affect/Mental Status (Cognitive)  flat/blunted affect  -AC (r) LS (t) AC (c)        Orientation Status (Cognition)  oriented x 4;verbal cues/prompts needed for orientation  -AC (r) LS (t) AC (c)        Cognitive Function (Cognitive)  WNL  -AC (r) LS (t) AC (c)        Personal Safety Interventions  fall prevention program maintained;gait belt;muscle strengthening facilitated;nonskid shoes/slippers when out of bed;supervised activity  -AC (r) LS (t) AC (c)        Cognitive Assessment/Intervention  Comment  when asked about previous stroke, pt stated that she was unaware of a stroke but knew she had previous L sided weakness; pt demonstrates a flat affect and requires extra time to process instructions  -AC (r) LS (t) AC (c)           Safety Issues, Functional Mobility    Impairments Affecting Function (Mobility)  balance;endurance/activity tolerance;grasp;postural/trunk control;range of motion (ROM);strength  -AC (r) LS (t) AC (c)           Bed Mobility Assessment/Treatment    Bed Mobility Assessment/Treatment  scooting/bridging;supine-sit;sit-supine  -AC (r) LS (t) AC (c)        Scooting/Bridging [place] (Bed Mobility)  other (see comments) SBA  -AC (r) LS (t) AC (c)        Supine-Sit [place] (Bed Mobility)  other (see comments) SBA  -AC (r) LS (t) AC (c)        Sit-Supine [place] (Bed Mobility)  other (see comments) SBA  -AC (r) LS (t) AC (c)        Assistive Device (Bed Mobility)  bed rails  -AC (r) LS (t) AC (c)           Functional Mobility    Functional Mobility- Ind. Level  contact guard assist;2 person assist required  -AC (r) LS (t) AC (c)        Functional Mobility- Safety Issues  loses balance backward;step length decreased  -AC (r) LS (t) AC (c)        Functional Mobility- Comment  in hallway; unsteady w/ multiple LOB d/t leaning R and backward; required multiple VCs for taking big steps d/t shuffling gait; LOB noted w/ stepping L/R and backward  -AC (r) LS (t) AC (c)           Transfer Assessment/Treatment    Transfer Assessment/Treatment  sit-stand transfer;stand-sit transfer  -AC (r) LS (t) AC (c)           Sit-Stand Transfer    Sit-Stand [place] (Transfers)  minimum assist (75% patient effort);2 person assist  -AC (r) LS (t) AC (c)           Stand-Sit Transfer    Stand-Sit [place] (Transfers)  minimum assist (75% patient effort);2 person assist  -AC (r) LS (t) AC (c)           ADL Assessment/Intervention    BADL Assessment/Intervention  lower body dressing  -AC (r) LS  (t) AC (c)           Lower Body Dressing Assessment/Training    Lower Body Dressing [place] Level  don;socks;other (see comments) SBA  -AC (r) LS (t) AC (c)        Lower Body Dressing Position  supine  -AC (r) LS (t) AC (c)           BADL Safety/Performance    Impairments, BADL Safety/Performance  balance;endurance/activity tolerance;grasp/prehension;range of motion;strength;trunk/postural control  -AC (r) LS (t) AC (c)           General ROM    GENERAL ROM COMMENTS  BUE AROM WFL, B shoulder limited approx 25%  -AC (r) LS (t) AC (c)           MMT (Manual Muscle Testing)    General MMT Comments  B biceps and shoulders 4/5, B triceps 3+/5  -AC (r) LS (t) AC (c)           Motor Assessment/Interventions    Additional Documentation  Balance (Group);Fine Motor Testing & Training (Group);Gross Motor Coordination (Group)  -AC (r) LS (t) AC (c)           Gross Motor Coordination    Gross Motor Skill, Impairments Detail  finger>nose WNL B; B MAHOGANY accurate but slow  -AC (r) LS (t) AC (c)           Balance    Balance  static sitting balance;static standing balance;dynamic sitting balance  -AC (r) LS (t) AC (c)           Static Sitting Balance    Level of [place] (Unsupported Sitting, Static Balance)  standby assist  -AC (r) LS (t) AC (c)        Sitting Position (Unsupported Sitting, Static Balance)  sitting on edge of bed  -AC (r) LS (t) AC (c)        Comment (Unsupported Sitting, Static Balance)  occasional posterior lean; able to self correct  -AC (r) LS (t) AC (c)           Dynamic Sitting Balance    Level of [place], Reaches Outside Midline (Sitting, Dynamic Balance)  standby assist  -AC (r) LS (t) AC (c)        Sitting Position, Reaches Outside Midline (Sitting, Dynamic Balance)  sitting on edge of bed  -AC (r) LS (t) AC (c)           Static Standing Balance    Level of [place] (Supported Standing, Static Balance)  contact guard assist  -AC (r) LS (t) AC (c)        Assistive Device Utilized (Supported  Standing, Static Balance)  walker, rolling  -AC (r) LS (t) AC (c)        Comment (Supported Standing, Static Balance)  occasional posterior and R sided lean  -AC (r) LS (t) AC (c)           Fine Motor Testing & Training    Comment, Fine Motor Coordination  opposition WNL B  -AC (r) LS (t) AC (c)           Sensory Assessment/Intervention    Sensory General Assessment  no sensation deficits identified  -AC (r) LS (t) AC (c)           Positioning and Restraints    Pre-Treatment Position  in bed  -AC (r) LS (t) AC (c)        Post Treatment Position  bed  -AC (r) LS (t) AC (c)        In Bed  fowlers;call light within reach;encouraged to call for assist;with family/caregiver;side rails up x2;SCD pump applied  -AC (r) LS (t) AC (c)           Pain Assessment    Additional Documentation  Pain Scale: Numbers Pre/Post-Treatment (Group)  -AC (r) LS (t) AC (c)           Pain Scale: Numbers Pre/Post-Treatment    Pain Scale: Numbers, Pretreatment  0/10 - no pain  -AC (r) LS (t) AC (c)        Pain Scale: Numbers, Post-Treatment  0/10 - no pain  -AC (r) LS (t) AC (c)           Plan of Care Review    Plan of Care Reviewed With  patient;son  -AC (r) LS (t) AC (c)           Clinical Impression (OT)    Date of Referral to OT  10/23/19  -AC (r) LS (t) AC (c)        OT Diagnosis  decreased adl  -AC (r) LS (t) AC (c)        Prognosis (OT Eval)  good  -AC (r) LS (t) AC (c)        Criteria for Skilled Therapeutic Interventions Met (OT Eval)  yes;treatment indicated  -AC (r) LS (t) AC (c)        Rehab Potential (OT Eval)  good, to achieve stated therapy goals  -AC (r) LS (t) AC (c)        Therapy Frequency (OT Eval)  daily  -AC (r) LS (t) AC (c)        Predicted Duration of Therapy Intervention (Therapy Eval)  10 days  -AC (r) LS (t) AC (c)        Care Plan Review (OT)  evaluation/treatment results reviewed;care plan/treatment goals reviewed;risks/benefits reviewed;current/potential barriers reviewed;patient/other agree to care plan  -AC (r)  LS (t) AC (c)        Care Plan Review, Other Participant (OT Eval)  son  -AC (r) LS (t) AC (c)        Anticipated Discharge Disposition (OT)  inpatient rehabilitation facility  -AC (r) LS (t) AC (c)           Planned OT Interventions    Planned Therapy Interventions (OT Eval)  activity tolerance training;BADL retraining;functional balance retraining;occupation/activity based interventions;patient/caregiver education/training;ROM/therapeutic exercise;strengthening exercise;transfer/mobility retraining  -AC (r) LS (t) AC (c)           OT Goals    Transfer Goal Selection (OT)  transfer, OT goal 1  -AC (r) LS (t) AC (c)        Dressing Goal Selection (OT)  --  -AC (r) LS (t) AC (c)        Toileting Goal Selection (OT)  toileting, OT goal 1  -AC (r) LS (t) AC (c)        Strength Goal Selection (OT)  strength, OT goal 1  -AC (r) LS (t) AC (c)        Additional Documentation  Strength Goal Selection (OT) (Row)  -AC (r) LS (t) AC (c)           Transfer Goal 1 (OT)    Activity/Assistive Device (Transfer Goal 1, OT)  tub;toilet;commode  -AC (r) LS (t) AC (c)        [place] Level/Cues Needed (Transfer Goal 1, OT)  contact guard assist  -AC (r) LS (t) AC (c)        Time Frame (Transfer Goal 1, OT)  long term goal (LTG);10 days  -AC (r) LS (t) AC (c)        Progress/Outcome (Transfer Goal 1, OT)  goal ongoing  -AC (r) LS (t) AC (c)           Toileting Goal 1 (OT)    Activity/Device (Toileting Goal 1, OT)  toileting skills, all;commode  -AC (r) LS (t) AC (c)        [place] Level/Cues Needed (Toileting Goal 1, OT)  contact guard assist  -AC (r) LS (t) AC (c)        Time Frame (Toileting Goal 1, OT)  long term goal (LTG);10 days  -AC (r) LS (t) AC (c)        Progress/Outcome (Toileting Goal 1, OT)  goal ongoing  -AC (r) LS (t) AC (c)           Strength Goal 1 (OT)    Strength Goal 1 (OT)  Pt will demo 4+/5 in B shoulders and biceps and 4-/5 in B triceps to increase I w/ ADLs, bed mob, and T/Fs  -AC (r) LS (t) AC (c)         Time Frame (Strength Goal 1, OT)  long term goal (LTG);10 days  -AC (r) LS (t) AC (c)        Progress/Outcome (Strength Goal 1, OT)  goal ongoing  -AC (r) LS (t) AC (c)           Living Environment    Home Accessibility  stairs to enter home;tub/shower is not walk in;stairs within home  -AC (r) LS (t) AC (c)          User Key  (r) = Recorded By, (t) = Taken By, (c) = Cosigned By    Initials Name Effective Dates    AC Zander Henderson, OTR/L, CNT 04/09/19 -     LS Jennifer Hernandez, OT Student 08/14/19 -          Occupational Therapy Education     Title: PT OT SLP Therapies (Done)     Topic: Occupational Therapy (Done)     Point: ADL training (Done)     Description: Instruct learner(s) on proper safety adaptation and remediation techniques during self care or transfers.   Instruct in proper use of assistive devices.    Learning Progress Summary           Patient Acceptance, E, VU,DU,NR by  at 10/24/2019 11:43 AM    Comment:  benefits of exercise, safe T/F techniques, role of OT, OT POC   Family Acceptance, E, VU,DU,NR by  at 10/24/2019 11:43 AM    Comment:  benefits of exercise, safe T/F techniques, role of OT, OT POC                   Point: Body mechanics (Done)     Description: Instruct learner(s) on proper positioning and spine alignment during self-care, functional mobility activities and/or exercises.    Learning Progress Summary           Patient Acceptance, E, VU,DU,NR by  at 10/24/2019 11:43 AM    Comment:  benefits of exercise, safe T/F techniques, role of OT, OT POC   Family Acceptance, E, VU,DU,NR by  at 10/24/2019 11:43 AM    Comment:  benefits of exercise, safe T/F techniques, role of OT, OT POC                               User Key     Initials Effective Dates Name Provider Type Discipline     08/14/19 -  eJnnifer Hernandez, OT Student OT Student OT                  OT Recommendation and Plan  Outcome Summary/Treatment Plan (OT)  Anticipated Discharge Disposition (OT): inpatient rehabilitation  facility  Planned Therapy Interventions (OT Eval): activity tolerance training, BADL retraining, functional balance retraining, occupation/activity based interventions, patient/caregiver education/training, ROM/therapeutic exercise, strengthening exercise, transfer/mobility retraining  Therapy Frequency (OT Eval): daily  Plan of Care Review  Plan of Care Reviewed With: patient, son  Plan of Care Reviewed With: patient, son  Outcome Summary: (P) OT eval completed. When asked, pt stated that she was unaware of a previous stroke that had occurred in 2015 but noted that she had L sided weakness at baseline. Pt performed adjusting/donning of sock w/ SBA while supine. Pt completed bed mob w/ SBA. While seated EOB, pt demos posterior lean. Pt states that she is unaware of lean, but is able to self correct I'ly. During sit>stand t/f, pt required [place] x2. While standing, pt required CGA x2 d/t unsteadiness and LOB as a result of posterior and R sided lean. While walking in hallway, pt demos unsteadiness when walking L/R and forward/backward requiring occasional CGA x2. LOB noted when taking steps backward. Pt demos decreased BUE strength and grasp, balance, and act jewell/endurance. Skilled OT warranted to address these deficits, as well as provide education. Recommend D/C to IP rehab.     Outcome Measures     Row Name 10/24/19 1030             How much help from another is currently needed...    Putting on and taking off regular lower body clothing?  3  -AC (r) LS (t) AC (c)      Bathing (including washing, rinsing, and drying)  2  -AC (r) LS (t) AC (c)      Toileting (which includes using toilet bed pan or urinal)  3  -AC (r) LS (t) AC (c)      Putting on and taking off regular upper body clothing  4  -AC (r) LS (t) AC (c)      Taking care of personal grooming (such as brushing teeth)  4  -AC (r) LS (t) AC (c)      Eating meals  4  -AC (r) LS (t) AC (c)      AM-PAC 6 Clicks Score (OT)  20  -AC (r) LS (t)         Modified  [place] Scale    Pre-Stroke Modified [place] Scale  1 - No significant disability despite symptoms.  Able to carry out all usual duties and activities.  -AC (r) LS (t) AC (c)      Modified Zoe Scale  4 - Moderately severe disability.  Unable to walk without assistance, and unable to attend to own bodily needs without assistance.  -AC (r) LS (t) AC (c)         Functional Assessment    Outcome Measure Options  AM-PAC 6 Clicks Daily Activity (OT);Modified [place]  -AC (r) LS (t) AC (c)        User Key  (r) = Recorded By, (t) = Taken By, (c) = Cosigned By    Initials Name Provider Type    Zander Varma, OTR/L, CNT Occupational Therapist    Jennifer Torres, OT Student OT Student          Time Calculation:   Time Calculation- OT     Row Name 10/24/19 1143             Time Calculation- OT    OT Start Time  1020  -AC (r) LS (t) AC (c)      OT Stop Time  1120  -AC (r) LS (t) AC (c)      OT Time Calculation (min)  60 min  -AC (r) LS (t)      OT Received On  10/24/19  -AC (r) LS (t) AC (c)      OT Goal Re-Cert Due Date  11/03/19  -AC (r) LS (t) AC (c)        User Key  (r) = Recorded By, (t) = Taken By, (c) = Cosigned By    Initials Name Provider Type    Zander Varma, OTR/L, CNT Occupational Therapist    Jennifer Torres, OT Student OT Student        Therapy Charges for Today     Code Description Service Date Service Provider Modifiers Qty    [id number]  OT EVAL MOD COMPLEXITY 4 10/24/2019 Jennifer Hernandez OT Student GO 1               VERN Calvo  10/24/2019
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
## 2019-10-24 NOTE — THERAPY TREATMENT NOTE
Acute Care - Speech Language Pathology Initial Evaluation  [facility]     Patient Name: Adela Goetz  : 1945  MRN: [id number]  Today's Date: 10/24/2019  Onset of Illness/Injury or Date of Surgery: (P) 10/23/19     Referring Physician: (ALINE) Dr. Miguelina Massey      Admit Date: 10/23/2019   Aphasia Diagnostic Profile completed.  Pt was alert and willing to participate throughout evaluation. Pt had decreased articulation and phonation throughout evaluation. Pt's sons report that though she is a generally soft spoken individual, she is not typically this quiet or difficult to understand. She was oriented to personal information and her current situation. Pt demonstrated mild-moderate impairments in attention, confrontational naming, and word finding. She performed within functional limits on tasks requing her to follow directions, comprehend single words and stories, and completing verbal repetitions. 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 Pt demonstrates criteria for skilled intervention. It is recommended that pt receive speech therapy services to improve word finding skills, attention, and phonation. Will continue to follow and treat.  Ronald Borrero, Speech Student  Visit Dx:    ICD-10-CM ICD-9-CM   1. Cerebrovascular accident (CVA), unspecified mechanism (CMS/HCC) I63.9 434.91   2. Dysphagia, unspecified type R13.10 787.20   3. Decreased activities of daily living (ADL) Z78.9 V49.89   4. Aphasia R47.01 784.3     Patient Active Problem List   Diagnosis   • CAD (coronary artery disease)   • Hypertension   • Hyperlipidemia   • Cerebrovascular accident (CVA) due to thrombosis of left middle cerebral artery (CMS/HCC)   • Rheumatoid arthritis (CMS/HCC)   • Osteopenia   • Meningioma (CMS/HCC)   • Altered taste   • Cervical radiculopathy   • Pneumonia of left upper lobe due to infectious organism (CMS/HCC)   • Basal cell carcinoma   • Coronary artery disease involving coronary  bypass graft of native heart without angina pectoris   • Non-smoker   • BMI 23.0-23.9, adult   • Impaired memory   • Cerebrovascular accident (CVA) (CMS/HCC)     Past Medical History:   Diagnosis Date   • Basal cell carcinoma 10/28/2017   • CAD (coronary artery disease)    • CVA (cerebral vascular accident) (CMS/HCC) 2015   • Hyperlipidemia    • Hypertension    • Osteopenia    • Rheumatoid arthritis (CMS/HCC)    • TIA (transient ischemic attack)      Past Surgical History:   Procedure Laterality Date   • APPENDECTOMY     • BREAST BIOPSY     • BREAST RECONSTRUCTION      breast reduction , yrs ago   • CARDIAC CATHETERIZATION     • CARDIAC SURGERY      cabg x3  2015 ,done at [facility]   • CATARACT EXTRACTION     • COLONOSCOPY N/A 1/18/2017    Procedure: COLONOSCOPY WITH ANESTHESIA;  Surgeon: Andrea Pizano DO;  Location: [facility] ENDOSCOPY;  Service:    • COLONOSCOPY N/A 2/7/2017    Procedure: COLONOSCOPY WITH ANESTHESIA;  Surgeon: Andrea Pizano DO;  Location: [facility] ENDOSCOPY;  Service:    • HEAD/NECK LESION/CYST EXCISION Right 10/18/2017    Procedure: Excision of basal cell carcinoma the right neck with complex closure;  Surgeon: Edward Cardona MD;  Location: [facility] OR;  Service:    • HERNIA REPAIR     • REDUCTION MAMMAPLASTY  1995   • RETINAL LASER PROCEDURE     • SPINE SURGERY     • TUBAL ABDOMINAL LIGATION          SLP EVALUATION (last 72 hours)      SLP SLC Evaluation     Row Name 10/24/19 0825                   Communication Assessment/Intervention    Document Type  evaluation  (Pended)   -AS        Subjective Information  no complaints  (Pended)   -AS        Patient Observations  alert;cooperative  (Pended)   -AS        Patient/Family Observations  sons and  present  (Pended)   -AS        Patient Effort  good  (Pended)   -AS           General Information    Patient Profile Reviewed  yes  (Pended)   -AS        Pertinent History Of Current Problem  Speech changes, left side weakness, left  facial droop, hx of stroke  (Pended)   -AS        Precautions/Limitations, Vision  WFL with corrective lenses  (Pended)   -AS        Precautions/Limitations, Hearing  WFL;for purposes of eval  (Pended)   -AS        Plans/Goals Discussed with  patient and family  (Pended)   -AS        Barriers to Rehab  cognitive status  (Pended)   -AS        Standardized Assessment Used  ADP  (Pended)   -AS           Pain Scale: FACES Pre/Post-Treatment    Pain: FACES Scale, Pretreatment  0-->no hurt  (Pended)   -AS           Comprehension Assessment/Intervention    Comprehension Assessment/Intervention  Auditory Comprehension;Reading Comprehension  (Pended)   -AS           Auditory Comprehension Assessment/Intervention    Auditory Comprehension (Communication)  WFL  (Pended)   -AS        Able to Identify Objects/Pictures (Communication)  mild impairment  (Pended)   -AS        Answers Questions (Communication)  WFL  (Pended)   -AS        Able to Follow Commands (Communication)  WFL  (Pended)   -AS           Reading Comprehension Assessment/Intervention    Reading Comprehension (Communication)  WFL  (Pended)   -AS        Functional Reading Tasks  mild impairment  (Pended)   -AS           Expression Assessment/Intervention    Expression Assessment/Intervention  verbal expression;graphic expression  (Pended)   -AS           Verbal Expression Assessment/Intervention    Verbal Expression  mild impairment;moderate impairment  (Pended)   -AS        Repetition  words;phrases;WFL  (Pended)   -AS        Confrontational Naming  mild impairment  (Pended)   -AS        Conversational Discourse/Fluency  perseverations;circumlocution  (Pended)   -AS           Graphic Expression Assessment/Intervention    Graphic Expression  mild impairment;moderate impairment  (Pended)   -AS        Biographical Information  mild impairment;moderate impairment  (Pended)   -AS           Oral Motor Structure and Function    Oral Motor Structure and Function  mild  impairment  (Pended)   -AS        Dentition Assessment  natural, present and adequate  (Pended)   -AS           Oral Musculature and Cranial Nerve Assessment    Oral Motor General Assessment  oral labial or buccal impairment  (Pended)   -AS        Oral Labial or Buccal Impairment, Detail, Cranial Nerve VII (Facial):  CN7: Motor Impairment;left labial droop  (Pended)   -AS           Motor Speech Assessment/Intervention    Motor Speech Function  mild impairment;moderate impairment  (Pended)   -AS        Characteristics Consistent with Dysarthria  decreased intensity;decreased articulation  (Pended)   -AS        Verbal Repetition (Communication)  WFL  (Pended)   -AS           Cognitive Assessment Intervention- SLP    Cognitive Function (Cognition)  mild impairment  (Pended)   -AS        Orientation Status (Cognition)  awareness of basic personal information;situation;time;WFL  (Pended)   -AS        Memory (Cognitive)  short-term;WFL  (Pended)   -AS        Attention (Cognitive)  attention to detail;mild impairment  (Pended)   -AS           SLP Clinical Impressions    SLP Diagnosis  aphasia  (Pended)   -AS        Rehab Potential/Prognosis  fair  (Pended)   -AS        SLC Criteria for Skilled Therapy Interventions Met  yes  (Pended)   -AS        Functional Impact  difficulty communicating wants, needs;restrictions in personal and social life  (Pended)   -AS           Recommendations    Therapy Frequency (SLP SLC)  at least;3 days per week  (Pended)   -AS        Predicted Duration Therapy Intervention (Days)  until discharge  (Pended)   -AS        Anticipated Dischage Disposition  unknown  (Pended)   -AS           Communication Treatment Objective and Progress Goals (SLP)    Verbal Expression Treatment Objectives  Verbal Expression Treatment Objectives (Group)  (Pended)   -AS        Motor Speech/Dysarthria Treatment Objectives  Motor Speech/Dysarthria Treatment Objectives (Group)  (Pended)   -AS        Cognitive Linguistic  Treatment Objectives  Cognitive Linguistic Treatment Objectives (Group)  (Pended)   -AS           Verbal Expression Treatment Objectives    Word Retrieval Skills Selection  word retrieval, SLP goal 1  (Pended)   -AS        Phrase and Sentence Level Response Selection  phrase and sentence level response, SLP goal 1  (Pended)   -AS        Connected Speech Selection  connected speech, SLP goal 1  (Pended)   -AS           Word Retrieval Skills Goal 1 (SLP)    Improve Word Retrieval Skills By Goal 1 (SLP)  completing a divergent task;completing functional word finding tasks;confrontational naming task;with minimal cues (75-90%);responsive naming task  (Pended)   -AS        Time Frame (Word Retrieval Goal 1, SLP)  short term goal (STG);by discharge  (Pended)   -AS        Barriers (Word Retrieval Goal 1, SLP)  aphasia  (Pended)   -AS        Progress/Outcomes (Word Retrieval Goal 1, SLP)  goal ongoing  (Pended)   -AS           Ability to Construct Phrase and Sentence Level Response Goal 1 (SLP)    Improve Ability to Construct Phrase and Sentence Level Responses By Goal 1 (SLP)  constructing a sentence with a key word;with minimal cues (75-90%)  (Pended)   -AS        Time Frame (Phrase and Sentence Level Response Goal 1, SLP)  short term goal (STG);by discharge  (Pended)   -AS        Barriers (Phrase and Sentence Level Response Goal 1, SLP)  aphasia  (Pended)   -AS        Progress/Outcomes (Phrase and Sentence Level Response Goal 1, SLP)  goal ongoing  (Pended)   -AS           Connected Speech to Express Thoughts Goal 1 (SLP)    Improve Narrative Discourse to Express Thoughts By Goal 1 (SLP)  describing a picture;with minimal cues (75-90%)  (Pended)   -AS        Time Frame (Connected Speech Goal 1, SLP)  short term goal (STG);by discharge  (Pended)   -AS        Barriers (Connected Speech Goal 1, SLP)  aphasia  (Pended)   -AS           Motor Speech/Dysarthria Treatment Objectives    Respiratory Support Selection  --  (Pended)    -AS        Phonation Selection  phonation, SLP goal 1  (Pended)   -AS        Articulation Selection  articulation, SLP goal 1  (Pended)   -AS        Prosody Selection  --  (Pended)   -AS           Phonation Goal 1 (SLP)    Improve Phonation By Goal 1 (SLP)  using loud speech;independently (over 90% accuracy)  (Pended)   -AS        Time Frame (Phonation Goal 1, SLP)  short term goal (STG);by discharge  (Pended)   -AS        Barriers (Phonation Goal 1, SLP)  n/a  (Pended)   -AS        Progress/Outcomes (Phonation Goal 1, SLP)  goal ongoing  (Pended)   -AS           Articulation Goal 1 (SLP)    Improve Articulation Goal 1 (SLP)  by over-articulating at word level;by over-articulating at phrase level;independently (over 90% accuracy)  (Pended)   -AS        Time Frame (Articulation Goal 1, SLP)  short term goal (STG);by discharge  (Pended)   -AS        Barriers (Articulation Goal 1, SLP)  n/a  (Pended)   -AS        Progress/Outcomes (Articulation Goal 1, SLP)  goal ongoing  (Pended)   -AS           Cognitive Linguistic Treatment Objectives    Attention Selection  attention, SLP goal 1  (Pended)   -AS        Organizational Skills Selection  organizational skills, SLP goal 1  (Pended)   -AS        Executive Function Skills Selection  --  (Pended)   -AS           Attention Goal 1 (SLP)    Improve Attention by Goal 1 (SLP)  complete sustained attention task;with minimal cues (75-90%)  (Pended)   -AS        Time Frame (Attention Goal 1, SLP)  short term goal (STG);by discharge  (Pended)   -AS        Barriers (Attention Goal 1, SLP)  n/a  (Pended)   -AS           Organizational Skills Goal 1 (SLP)    Improve Thought Organization Through Goal 1 (SLP)  completing a divergent naming task;with minimal cues (75-90%)  (Pended)   -AS        Time Frame (Thought Organization Skills Goal 1, SLP)  short term goal (STG);by discharge  (Pended)   -AS        Barriers (Thought Organization Skills Goal 1, SLP)  aphasia  (Pended)   -AS            SLP Discharge Summary    Discharge Destination  unknown  (Pended)   -AS          User Key  (r) = Recorded By, (t) = Taken By, (c) = Cosigned By    Initials Name Effective Dates    AS Ronald Borrero, Speech Therapy Student 09/03/19 -              EDUCATION  The patient has been educated in the following areas:     Cognitive Impairment.    SLP Recommendation and Plan  SLP Diagnosis: (P) aphasia     SLC Criteria for Skilled Therapy Interventions Met: (P) yes  Anticipated Dischage Disposition: (P) unknown     Predicted Duration Therapy Intervention (Days): (P) until discharge    Plan of Care Reviewed With: (P) patient, family  Outcome Summary: (P) Communication evaluation completed. Pt was alert and willing to participate throughout evaluation. Pt had decreased articulation and phonation throughout evaluation. Pt's sons report that though she is a generally soft spoken individual, she is not typically this quiet or difficult to understand. She was oriented to personal information and her current situation. Pt demonstrated mild-moderate impairments in attention, confrontational naming, and word finding. She performed within functional limits on tasks requing her to follow directions, comprehend single words and stories, and completing verbal repetitions. Pt demonstrates criteria for skilled intervention. It is recommended that pt receive speech therapy services to improve word finding, attention, and phonation. Will continue to follow and treat.      SLP GOALS     Row Name 10/24/19 0825 10/23/19 [facility]          Oral Nutrition/Hydration Goal 1 (SLP)    Oral Nutrition/Hydration Goal 1, SLP  --  Pt will tolerate least restrictive diet with no overt s/s of aspiration.   -MB     Time Frame (Oral Nutrition/Hydration Goal 1, SLP)  --  by discharge  -MB     Barriers (Oral Nutrition/Hydration Goal 1, SLP)  --  n/a  -MB     Progress/Outcomes (Oral Nutrition/Hydration Goal 1, SLP)  --  goal ongoing  -MB        Labial  Strengthening Goal 1 (SLP)    Activity (Labial Strengthening Goal 1, SLP)  --  increase labial tone  -MB     Increase Labial Tone  --  labial resistance exercises  -MB     Sabine/Accuracy (Labial Strengthening Goal 1, SLP)  --  independently (over 90% accuracy)  -MB     Time Frame (Labial Strengthening Goal 1, SLP)  --  short term goal (STG);by discharge  -MB     Barriers (Labial Strengthening Goal 1, SLP)  --  n/a  -MB     Progress/Outcomes (Labial Strengthening Goal 1, SLP)  --  goal ongoing  -MB        Lingual Strengthening Goal 1 (SLP)    Activity (Lingual Strengthening Goal 1, SLP)  --  increase lingual tone/sensation/control/coordination/movement  -MB     Increase Lingual Tone/Sensation/Control/Coordination/Movement  --  lingual movement exercises  -MB     Sabine/Accuracy (Lingual Strengthening Goal 1, SLP)  --  independently (over 90% accuracy)  -MB     Time Frame (Lingual Strengthening Goal 1, SLP)  --  short term goal (STG);by discharge  -MB     Barriers (Lingual Strengthening Goal 1, SLP)  --  n/a  -MB     Progress/Outcomes (Lingual Strengthening Goal 1, SLP)  --  goal ongoing  -MB        Pharyngeal Strengthening Exercise Goal 1 (SLP)    Activity (Pharyngeal Strengthening Goal 1, SLP)  --  increase timing  -MB     Increase Timing  --  gustatory stimulation (sour/cold)  -MB     Sabine/Accuracy (Pharyngeal Strengthening Goal 1, SLP)  --  independently (over 90% accuracy)  -MB     Time Frame (Pharyngeal Strengthening Goal 1, SLP)  --  short term goal (STG);by discharge  -MB     Barriers (Pharyngeal Strengthening Goal 1, SLP)  --  n/a  -MB     Progress/Outcomes (Pharyngeal Strengthening Goal 1, SLP)  --  goal ongoing  -MB        Word Retrieval Skills Goal 1 (SLP)    Improve Word Retrieval Skills By Goal 1 (SLP)  completing a divergent task;completing functional word finding tasks;confrontational naming task;with minimal cues (75-90%);responsive naming task  (Pended)   -AS  --     Time Frame  (Word Retrieval Goal 1, SLP)  short term goal (STG);by discharge  (Pended)   -AS  --     Barriers (Word Retrieval Goal 1, SLP)  aphasia  (Pended)   -AS  --     Progress/Outcomes (Word Retrieval Goal 1, SLP)  goal ongoing  (Pended)   -AS  --        Ability to Construct Phrase and Sentence Level Response Goal 1 (SLP)    Improve Ability to Construct Phrase and Sentence Level Responses By Goal 1 (SLP)  constructing a sentence with a key word;with minimal cues (75-90%)  (Pended)   -AS  --     Time Frame (Phrase and Sentence Level Response Goal 1, SLP)  short term goal (STG);by discharge  (Pended)   -AS  --     Barriers (Phrase and Sentence Level Response Goal 1, SLP)  aphasia  (Pended)   -AS  --     Progress/Outcomes (Phrase and Sentence Level Response Goal 1, SLP)  goal ongoing  (Pended)   -AS  --        Connected Speech to Express Thoughts Goal 1 (SLP)    Improve Narrative Discourse to Express Thoughts By Goal 1 (SLP)  describing a picture;with minimal cues (75-90%)  (Pended)   -AS  --     Time Frame (Connected Speech Goal 1, SLP)  short term goal (STG);by discharge  (Pended)   -AS  --     Barriers (Connected Speech Goal 1, SLP)  aphasia  (Pended)   -AS  --        Phonation Goal 1 (SLP)    Improve Phonation By Goal 1 (SLP)  using loud speech;independently (over 90% accuracy)  (Pended)   -AS  --     Time Frame (Phonation Goal 1, SLP)  short term goal (STG);by discharge  (Pended)   -AS  --     Barriers (Phonation Goal 1, SLP)  n/a  (Pended)   -AS  --     Progress/Outcomes (Phonation Goal 1, SLP)  goal ongoing  (Pended)   -AS  --        Articulation Goal 1 (SLP)    Improve Articulation Goal 1 (SLP)  by over-articulating at word level;by over-articulating at phrase level;independently (over 90% accuracy)  (Pended)   -AS  --     Time Frame (Articulation Goal 1, SLP)  short term goal (STG);by discharge  (Pended)   -AS  --     Barriers (Articulation Goal 1, SLP)  n/a  (Pended)   -AS  --     Progress/Outcomes (Articulation  Goal 1, SLP)  goal ongoing  (Pended)   -AS  --        Attention Goal 1 (SLP)    Improve Attention by Goal 1 (SLP)  complete sustained attention task;with minimal cues (75-90%)  (Pended)   -AS  --     Time Frame (Attention Goal 1, SLP)  short term goal (STG);by discharge  (Pended)   -AS  --     Barriers (Attention Goal 1, SLP)  n/a  (Pended)   -AS  --        Organizational Skills Goal 1 (SLP)    Improve Thought Organization Through Goal 1 (SLP)  completing a divergent naming task;with minimal cues (75-90%)  (Pended)   -AS  --     Time Frame (Thought Organization Skills Goal 1, SLP)  short term goal (STG);by discharge  (Pended)   -AS  --     Barriers (Thought Organization Skills Goal 1, SLP)  aphasia  (Pended)   -AS  --       User Key  (r) = Recorded By, (t) = Taken By, (c) = Cosigned By    Initials Name Provider Type    Amanda Del Castillo CCC-SLP Speech and Language Pathologist    AS Ronald Borrero, Speech Therapy Student Speech Therapy Student                  Time Calculation:     Time Calculation- SLP     Row Name 10/24/19 1309             Time Calculation- SLP    SLP Start Time  0825  (Pended)   -AS      SLP Stop Time  1126  (Pended)   -AS      SLP Time Calculation (min)  181 min  (Pended)   -AS        User Key  (r) = Recorded By, (t) = Taken By, (c) = Cosigned By    Initials Name Provider Type    AS Ronald Borrero, Speech Therapy Student Speech Therapy Student          Therapy Charges for Today     Code Description Service Date Service Provider Modifiers Qty    [id number] [facility] ASSESSMENT OF APHASIA PER HOUR 10/24/2019 Ronald Borrero, Speech Therapy Student GN 3                     Ronald Borrero Speech Therapy Student  10/24/2019

## 2019-10-24 NOTE — PLAN OF CARE
Problem: Patient Care Overview  Goal: Plan of Care Review  Outcome: Ongoing (interventions implemented as appropriate)   10/24/19 0458   Coping/Psychosocial   Plan of Care Reviewed With patient   OTHER   Outcome Summary PT is A&Ox4. No complaints of pain during shift. NIH-1 for the drooping n right side of face. speech can be slightly slurred at times. Ambulates to BR with assist x1. No neuro changes noted. VSS. Safety maintained.    Plan of Care Review   Progress improving       Problem: Dysphagia (Adult)  Goal: Identify Related Risk Factors and Signs and Symptoms  Outcome: Ongoing (interventions implemented as appropriate)    Goal: Functional/Safe Swallow  Outcome: Ongoing (interventions implemented as appropriate)    Goal: Compensatory Techniques to Improve Safety/Function with Swallowing  Outcome: Ongoing (interventions implemented as appropriate)      Problem: Stroke (Ischemic) (Adult)  Goal: Signs and Symptoms of Listed Potential Problems Will be Absent, Minimized or Managed (Stroke)  Outcome: Ongoing (interventions implemented as appropriate)      Problem: Fall Risk (Adult)  Goal: Identify Related Risk Factors and Signs and Symptoms  Outcome: Ongoing (interventions implemented as appropriate)    Goal: Absence of Fall  Outcome: Ongoing (interventions implemented as appropriate)

## 2019-10-24 NOTE — PROGRESS NOTES
[facility] Medicine Services  INPATIENT PROGRESS NOTE    Patient Name: Adela Goetz  Date of Admission: 10/23/2019  Today's Date: 10/24/19  Length of Stay: 1  Primary Care Physician: Bryce Cardona APRN    Subjective   Chief Complaint: Speech difficulty  HPI   Doing ok.  Still having trouble with speech.    Said therapy went well        Review of Systems   Constitutional: Negative for fatigue and fever.   HENT: Negative for congestion and ear pain.    Eyes: Negative for pain and visual disturbance.   Respiratory: Negative for cough, shortness of breath and wheezing.    Cardiovascular: Negative for chest pain and palpitations.   Gastrointestinal: Negative for diarrhea, nausea and vomiting.   Endocrine: Negative for heat intolerance.   Genitourinary: Negative for dysuria and frequency.   Musculoskeletal: Negative for arthralgias and back pain.   Skin: Negative for rash and wound.   Neurological: Positive for speech difficulty. Negative for dizziness and light-headedness.   Psychiatric/Behavioral: Negative for confusion. The patient is not nervous/anxious.    All other systems reviewed and are negative.       All pertinent negatives and positives are as above. All other systems have been reviewed and are negative unless otherwise stated.     Objective    Temp:  [98.2 °F (36.8 °C)-98.5 °F (36.9 °C)] 98.2 °F (36.8 °C)  Heart Rate:  [50-75] 50  Resp:  [16] 16  BP: (116-159)/(67-87) 127/67  Physical Exam   Constitutional: She is oriented to person, place, and time. She appears well-developed and well-nourished.   HENT:   Head: Normocephalic and atraumatic.   Right Ear: External ear normal.   Left Ear: External ear normal.   Nose: Nose normal.   Mouth/Throat: Oropharynx is clear and moist.   Eyes: Conjunctivae and EOM are normal.   Neck: Normal range of motion. Neck supple.   Cardiovascular: Normal rate, regular rhythm and normal heart sounds.   Pulmonary/Chest: Effort normal and breath  sounds normal.   Abdominal: Soft. Bowel sounds are normal. She exhibits no distension. There is no tenderness.   Musculoskeletal: Normal range of motion.   Neurological: She is alert and oriented to person, place, and time.   Dysarthria    Skin: Skin is warm and dry.   Psychiatric: She has a normal mood and affect. Her speech is normal and behavior is normal. Cognition and memory are normal.           Results Review:  I have reviewed the labs, radiology results, and diagnostic studies.    Laboratory Data:   Results from last 7 days   Lab Units 10/24/19  0541 10/23/19  1015   WBC 10*3/mm3 4.33 4.34   HEMOGLOBIN g/dL 13.1 14.3   HEMATOCRIT % 38.4 42.2   PLATELETS 10*3/mm3 210 218        Results from last 7 days   Lab Units 10/24/19  0541 10/23/19  1049   SODIUM mmol/L 144 145   POTASSIUM mmol/L 3.5 3.4*   CHLORIDE mmol/L 108* 108*   CO2 mmol/L 26.0 25.0   BUN mg/dL 15 16   CREATININE mg/dL 0.71 0.80   CALCIUM mg/dL 8.9 8.9   BILIRUBIN mg/dL 0.8 0.7   ALK PHOS U/L 58 61   ALT (SGPT) U/L 12 16   AST (SGOT) U/L 13 17   GLUCOSE mg/dL 100* 84       Culture Data:        Radiology Data:   Imaging Results (last 24 hours)     Procedure Component Value Units Date/Time    MRI Brain Without Contrast [[id number]] Collected:  10/23/19 1616     Updated:  10/23/19 1624    Narrative:       EXAMINATION: MRI BRAIN WO CONTRAST- 10/23/2019 4:16 PM CDT     HISTORY: Stroke, weakness, history of meningioma     COMPARISON: CT head without contrast 10/23/2019, MRI brain with and  without contrast 11/12/2018     Technical: Multiplanar, multisequence imaging was performed through the  brain without the use of IV contrast.     FINDINGS:  Areas of diffusion restriction are seen in the left corona radiata the  periventricular region. Findings are compatible with acute ischemia.  Approximately 5 adjacent foci of diffusion restriction are noted in this  region. No additional areas of diffusion restriction are seen.     Artifact from the known  calcified meningioma near the vertex is noted.     There is no evidence of acute intracranial hemorrhage. There is  hemosiderin deposition in the region of the left caudate head,  compatible with previous microhemorrhage at the site of infarction.  Numerous foci of hemosiderin deposition are noted bilaterally,  compatible with previous areas of microhemorrhage.     The ventricles are prominent but stable. Normal signal void is seen in  the visualized carotid and basilar arteries.     There is thinning of the lenses bilaterally, suggesting cataract or  previous cataract surgery. There is right frontal sinus disease. The  mastoid air cells appear clear.     There are advanced periventricular and subcortical FLAIR signal  hyperintensities, a nonspecific finding most often seen as sequela of  chronic microvascular ischemia.       Impression:       1. Multiple adjacent areas of diffusion restriction involving the left  corona radiata in the periventricular left frontal lobe, compatible with  acute ischemia.     2. Sequela of chronic microvascular ischemia.  This report was finalized on 10/23/2019 16:21 by Dr. Alex Bacon MD.    US Carotid Bilateral [[id number]] Collected:  10/23/19 1538     Updated:  10/23/19 1542    Narrative:       History: Carotid occlusive disease       Impression:       Impression:  1. There is less than 50% stenosis of the right internal carotid artery.  2. There is less than 50% stenosis of the left internal carotid artery.  3. Antegrade flow is demonstrated in bilateral vertebral arteries.     Comments: Bilateral carotid vertebral arterial duplex scan was  performed.     Grayscale imaging shows intimal thickening and calcified elements at the  carotid bifurcation. The right internal carotid artery peak systolic  velocity is 67.0 cm/sec. The end-diastolic velocity is 19.3 cm/sec. The  right ICA/CCA ratio is approximately 1.47 . These findings correlate  with less than 50% stenosis of the right  internal carotid artery.     Grayscale imaging shows intimal thickening and calcified elements at the  carotid bifurcation. The left internal carotid artery peak systolic  velocity is 62.1 cm/sec. The end-diastolic velocity is 27.9 cm/sec. The  left ICA/CCA ratio is approximately 1.26 . These findings correlate with  less than 50% stenosis of the left internal carotid artery.     Antegrade flow is demonstrated in bilateral vertebral arteries.     This report was finalized on 10/23/2019 15:39 by Dr. Rodri Tapia MD.          I have reviewed the patient's current medications.     Assessment/Plan     Active Hospital Problems    Diagnosis   • Cerebrovascular accident (CVA) (CMS/[facility])     1.  CVA  -ASA  -lipitor  -Plavix  -PT/OT/SLP  -Rehab to see pt in AM regarding d/c to ARU    2.  CAD  -ASA  -lipitor  -Plavix    3.  HTN  -monitor    4.  HLD  -Lipitor    5.  RA  -Prednisone  -Mtx    6.  Hypothyroidism  -Synthroid                Discharge Planning: I expect the patient to be discharged to rehab in 1-2 days    Priyank Gilbert MD   10/24/19   1:30 PM

## 2019-10-24 NOTE — DISCHARGE PLACEMENT REQUEST
"Akira Goetz (74 y.o. Female)     Date of Birth Social Security Number Address Home Phone MRN    1945  [id number]  [facility] [address] [phone] [id number]    Orthodox Marital Status          Church        Admission Date Admission Type Admitting Provider Attending Provider Department, Room/Bed    10/23/19 Emergency Priyank Gilbert MD Moore, Jonathan Scott, MD [facility] 3A, 335/1    Discharge Date Discharge Disposition Discharge Destination                       Attending Provider:  Priyank Gilbert MD    Allergies:  No Known Allergies    Isolation:  None   Infection:  None   Code Status:  CPR    Ht:  160 cm (62.99\")   Wt:  57.2 kg (126 lb 1.7 oz)    Admission Cmt:  None   Principal Problem:  None                Active Insurance as of 10/23/2019     Primary Coverage     Payor Plan Insurance Group Employer/Plan Group    MEDICARE MEDICARE A & B      Payor Plan Address Payor Plan Phone Number Payor Plan Fax Number Effective Dates    [address] [phone]  3/1/2009 - None Entered    [facility] [id number]       Subscriber Name Subscriber Birth Date Member ID       AKIRA GOETZ 1945 [id number]           Secondary Coverage     Payor Plan Insurance Group Employer/Plan Group    ANTHEM BLUE CROSS ANTHEM [facility] SUPP [id number]     Payor Plan Address Payor Plan Phone Number Payor Plan Fax Number Effective Dates    [address]   3/23/2012 - None Entered    [facility] [id number]       Subscriber Name Subscriber Birth Date Member ID       AKIRA GOETZ 1945 [id number]                 Emergency Contacts      (Rel.) Home Phone Work Phone Mobile Phone    KarriDevon (Spouse) [phone] -- --    KarriYoandy (Son) [phone] -- --    KARRIKRISTIAN (Son) -- -- [phone]            Insurance Information                MEDICARE/MEDICARE A & B Phone: [phone]    Subscriber: Akira Goetz Subscriber#: [id number]    Group#:  Precert#:         JUSTEN HEBERT" CROSS/[place]EN [place]  SUPP Phone:     Subscriber: Adela Goetz Subscriber#: [id number]    Group#: [id number] Precert#:

## 2019-10-24 NOTE — PLAN OF CARE
Problem: Patient Care Overview  Goal: Plan of Care Review  Outcome: Ongoing (interventions implemented as appropriate)   10/24/19 5337   Coping/Psychosocial   Plan of Care Reviewed With patient;spouse;family   OTHER   Outcome Summary Pt is A&Ox4. VSS. Denies pain. NIH=0. Family present at bedside. Meds whole in applesauce. Tolerates well. Pt is very soft spoken and presents with a flat affect. Difficulty with word finding. Safety maintained. Will cont to monitor.    Plan of Care Review   Progress improving     Goal: Individualization and Mutuality  Outcome: Ongoing (interventions implemented as appropriate)    Goal: Interprofessional Rounds/Family Conf  Outcome: Ongoing (interventions implemented as appropriate)      Problem: Dysphagia (Adult)  Goal: Identify Related Risk Factors and Signs and Symptoms  Outcome: Ongoing (interventions implemented as appropriate)    Goal: Functional/Safe Swallow  Outcome: Ongoing (interventions implemented as appropriate)    Goal: Compensatory Techniques to Improve Safety/Function with Swallowing  Outcome: Ongoing (interventions implemented as appropriate)      Problem: Stroke (Ischemic) (Adult)  Goal: Signs and Symptoms of Listed Potential Problems Will be Absent, Minimized or Managed (Stroke)  Outcome: Ongoing (interventions implemented as appropriate)      Problem: Fall Risk (Adult)  Goal: Identify Related Risk Factors and Signs and Symptoms  Outcome: Ongoing (interventions implemented as appropriate)    Goal: Absence of Fall  Outcome: Ongoing (interventions implemented as appropriate)
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
## 2019-10-25 ENCOUNTER — HOSPITAL ENCOUNTER (INPATIENT)
Age: 74
LOS: 13 days | Discharge: HOME HEALTH | DRG: 057 | End: 2019-11-07
Attending: PSYCHIATRY & NEUROLOGY | Admitting: PSYCHIATRY & NEUROLOGY
Payer: MEDICARE

## 2019-10-25 VITALS
HEIGHT: 63 IN | HEART RATE: 56 BPM | OXYGEN SATURATION: 98 % | TEMPERATURE: 98.2 F | DIASTOLIC BLOOD PRESSURE: 86 MMHG | WEIGHT: 126.1 LBS | RESPIRATION RATE: 18 BRPM | SYSTOLIC BLOOD PRESSURE: 146 MMHG | BODY MASS INDEX: 22.34 KG/M2

## 2019-10-25 DIAGNOSIS — R47.1 DYSARTHRIA: ICD-10-CM

## 2019-10-25 DIAGNOSIS — R26.9 GAIT ABNORMALITY: ICD-10-CM

## 2019-10-25 DIAGNOSIS — G47.09 OTHER INSOMNIA: ICD-10-CM

## 2019-10-25 DIAGNOSIS — I10 ESSENTIAL HYPERTENSION: ICD-10-CM

## 2019-10-25 DIAGNOSIS — E78.49 OTHER HYPERLIPIDEMIA: ICD-10-CM

## 2019-10-25 DIAGNOSIS — I69.391 DYSPHAGIA DUE TO RECENT CEREBRAL INFARCTION: ICD-10-CM

## 2019-10-25 DIAGNOSIS — I63.9 ACUTE ISCHEMIC STROKE (HCC): Primary | ICD-10-CM

## 2019-10-25 DIAGNOSIS — E03.8 OTHER SPECIFIED HYPOTHYROIDISM: ICD-10-CM

## 2019-10-25 DIAGNOSIS — E55.9 VITAMIN D DEFICIENCY: ICD-10-CM

## 2019-10-25 DIAGNOSIS — R53.1 WEAKNESS: ICD-10-CM

## 2019-10-25 PROBLEM — M81.8 OTHER OSTEOPOROSIS WITHOUT CURRENT PATHOLOGICAL FRACTURE: Status: ACTIVE | Noted: 2019-10-25

## 2019-10-25 LAB
ALBUMIN SERPL-MCNC: 3.7 G/DL (ref 3.5–5.2)
ALBUMIN/GLOB SERPL: 1.5 G/DL
ALP SERPL-CCNC: 57 U/L (ref 39–117)
ALT SERPL W P-5'-P-CCNC: 11 U/L (ref 1–33)
ANION GAP SERPL CALCULATED.3IONS-SCNC: 11 MMOL/L (ref 5–15)
AST SERPL-CCNC: 13 U/L (ref 1–32)
BASOPHILS # BLD AUTO: 0.03 10*3/MM3 (ref 0–0.2)
BASOPHILS NFR BLD AUTO: 0.6 % (ref 0–1.5)
BILIRUB SERPL-MCNC: 0.7 MG/DL (ref 0.2–1.2)
BUN BLD-MCNC: 26 MG/DL (ref 8–23)
BUN/CREAT SERPL: 29.2 (ref 7–25)
CALCIUM SPEC-SCNC: 9.1 MG/DL (ref 8.6–10.5)
CHLORIDE SERPL-SCNC: 108 MMOL/L (ref 98–107)
CO2 SERPL-SCNC: 26 MMOL/L (ref 22–29)
CREAT BLD-MCNC: 0.89 MG/DL (ref 0.57–1)
DEPRECATED RDW RBC AUTO: 45.1 FL (ref 37–54)
EOSINOPHIL # BLD AUTO: 0.09 10*3/MM3 (ref 0–0.4)
EOSINOPHIL NFR BLD AUTO: 1.7 % (ref 0.3–6.2)
ERYTHROCYTE [DISTWIDTH] IN BLOOD BY AUTOMATED COUNT: 13.2 % (ref 12.3–15.4)
GFR SERPL CREATININE-BSD FRML MDRD: 62 ML/MIN/1.73
GLOBULIN UR ELPH-MCNC: 2.5 GM/DL
GLUCOSE BLD-MCNC: 106 MG/DL (ref 65–99)
HCT VFR BLD AUTO: 39.2 % (ref 34–46.6)
HGB BLD-MCNC: 13.4 G/DL (ref 12–15.9)
IMM GRANULOCYTES # BLD AUTO: 0.01 10*3/MM3 (ref 0–0.05)
IMM GRANULOCYTES NFR BLD AUTO: 0.2 % (ref 0–0.5)
LYMPHOCYTES # BLD AUTO: 1.55 10*3/MM3 (ref 0.7–3.1)
LYMPHOCYTES NFR BLD AUTO: 28.9 % (ref 19.6–45.3)
MCH RBC QN AUTO: 32.3 PG (ref 26.6–33)
MCHC RBC AUTO-ENTMCNC: 34.2 G/DL (ref 31.5–35.7)
MCV RBC AUTO: 94.5 FL (ref 79–97)
MONOCYTES # BLD AUTO: 0.44 10*3/MM3 (ref 0.1–0.9)
MONOCYTES NFR BLD AUTO: 8.2 % (ref 5–12)
NEUTROPHILS # BLD AUTO: 3.24 10*3/MM3 (ref 1.7–7)
NEUTROPHILS NFR BLD AUTO: 60.4 % (ref 42.7–76)
NRBC BLD AUTO-RTO: 0 /100 WBC (ref 0–0.2)
PLATELET # BLD AUTO: 208 10*3/MM3 (ref 140–450)
PMV BLD AUTO: 10.4 FL (ref 6–12)
POTASSIUM BLD-SCNC: 3.5 MMOL/L (ref 3.5–5.2)
PROT SERPL-MCNC: 6.2 G/DL (ref 6–8.5)
RBC # BLD AUTO: 4.15 10*6/MM3 (ref 3.77–5.28)
SODIUM BLD-SCNC: 145 MMOL/L (ref 136–145)
WBC NRBC COR # BLD: 5.36 10*3/MM3 (ref 3.4–10.8)

## 2019-10-25 PROCEDURE — 80053 COMPREHEN METABOLIC PANEL: CPT | Performed by: FAMILY MEDICINE

## 2019-10-25 PROCEDURE — 6370000000 HC RX 637 (ALT 250 FOR IP): Performed by: PSYCHIATRY & NEUROLOGY

## 2019-10-25 PROCEDURE — 97535 SELF CARE MNGMENT TRAINING: CPT

## 2019-10-25 PROCEDURE — 6360000002 HC RX W HCPCS: Performed by: PSYCHIATRY & NEUROLOGY

## 2019-10-25 PROCEDURE — 92507 TX SP LANG VOICE COMM INDIV: CPT

## 2019-10-25 PROCEDURE — 63710000001 PREDNISONE PER 5 MG: Performed by: FAMILY MEDICINE

## 2019-10-25 PROCEDURE — 85025 COMPLETE CBC W/AUTO DIFF WBC: CPT | Performed by: FAMILY MEDICINE

## 2019-10-25 PROCEDURE — 97116 GAIT TRAINING THERAPY: CPT

## 2019-10-25 PROCEDURE — 1180000000 HC REHAB R&B

## 2019-10-25 PROCEDURE — 97110 THERAPEUTIC EXERCISES: CPT

## 2019-10-25 PROCEDURE — 92526 ORAL FUNCTION THERAPY: CPT

## 2019-10-25 PROCEDURE — 99231 SBSQ HOSP IP/OBS SF/LOW 25: CPT | Performed by: PSYCHIATRY & NEUROLOGY

## 2019-10-25 RX ORDER — ASCORBIC ACID 500 MG
1000 TABLET ORAL DAILY
Status: DISCONTINUED | OUTPATIENT
Start: 2019-10-25 | End: 2019-11-07 | Stop reason: HOSPADM

## 2019-10-25 RX ORDER — PREDNISONE 10 MG/1
10 TABLET ORAL DAILY
Status: DISCONTINUED | OUTPATIENT
Start: 2019-10-26 | End: 2019-11-07 | Stop reason: HOSPADM

## 2019-10-25 RX ORDER — ATORVASTATIN CALCIUM 80 MG/1
80 TABLET, FILM COATED ORAL NIGHTLY
Qty: 30 TABLET | Refills: 0 | Status: SHIPPED | OUTPATIENT
Start: 2019-10-25 | End: 2019-11-12

## 2019-10-25 RX ORDER — LEVOTHYROXINE SODIUM 0.05 MG/1
50 TABLET ORAL DAILY
Status: DISCONTINUED | OUTPATIENT
Start: 2019-10-26 | End: 2019-11-07 | Stop reason: HOSPADM

## 2019-10-25 RX ORDER — AMLODIPINE BESYLATE 5 MG/1
2.5 TABLET ORAL DAILY
Status: DISCONTINUED | OUTPATIENT
Start: 2019-10-25 | End: 2019-11-07 | Stop reason: HOSPADM

## 2019-10-25 RX ORDER — CLOPIDOGREL BISULFATE 75 MG/1
75 TABLET ORAL DAILY
Status: ON HOLD | COMMUNITY
End: 2019-11-06 | Stop reason: SDUPTHER

## 2019-10-25 RX ORDER — FOLIC ACID 1 MG/1
1 TABLET ORAL DAILY
Status: ON HOLD | COMMUNITY
End: 2019-11-06 | Stop reason: SDUPTHER

## 2019-10-25 RX ORDER — GABAPENTIN 100 MG/1
100 CAPSULE ORAL EVERY 8 HOURS PRN
Status: DISCONTINUED | OUTPATIENT
Start: 2019-10-25 | End: 2019-11-07 | Stop reason: HOSPADM

## 2019-10-25 RX ORDER — TRAZODONE HYDROCHLORIDE 50 MG/1
50 TABLET ORAL NIGHTLY
Status: ON HOLD | COMMUNITY
End: 2019-11-06 | Stop reason: SDUPTHER

## 2019-10-25 RX ORDER — GABAPENTIN 100 MG/1
100 CAPSULE ORAL EVERY 8 HOURS PRN
Status: ON HOLD | COMMUNITY
End: 2019-11-06 | Stop reason: SDUPTHER

## 2019-10-25 RX ORDER — AMLODIPINE BESYLATE 2.5 MG/1
2.5 TABLET ORAL DAILY
Status: ON HOLD | COMMUNITY
End: 2019-11-06 | Stop reason: SDUPTHER

## 2019-10-25 RX ORDER — PREDNISONE 10 MG/1
10 TABLET ORAL DAILY
Status: ON HOLD | COMMUNITY
End: 2019-11-06 | Stop reason: SDUPTHER

## 2019-10-25 RX ORDER — NITROGLYCERIN 0.4 MG/1
0.4 TABLET SUBLINGUAL EVERY 5 MIN PRN
Status: DISCONTINUED | OUTPATIENT
Start: 2019-10-25 | End: 2019-11-07 | Stop reason: HOSPADM

## 2019-10-25 RX ORDER — LEVOTHYROXINE SODIUM 0.05 MG/1
50 TABLET ORAL DAILY
Status: ON HOLD | COMMUNITY
End: 2019-11-06 | Stop reason: SDUPTHER

## 2019-10-25 RX ORDER — ALENDRONATE SODIUM 70 MG/1
70 TABLET ORAL
Status: DISCONTINUED | OUTPATIENT
Start: 2019-10-25 | End: 2019-10-25

## 2019-10-25 RX ORDER — NITROGLYCERIN 0.4 MG/1
0.4 TABLET SUBLINGUAL EVERY 5 MIN PRN
Status: ON HOLD | COMMUNITY
End: 2019-11-06 | Stop reason: HOSPADM

## 2019-10-25 RX ORDER — MULTIVIT WITH MINERALS/LUTEIN
1000 TABLET ORAL DAILY
COMMUNITY

## 2019-10-25 RX ORDER — ATORVASTATIN CALCIUM 80 MG/1
80 TABLET, FILM COATED ORAL NIGHTLY
Status: ON HOLD | COMMUNITY
End: 2019-11-06 | Stop reason: SDUPTHER

## 2019-10-25 RX ORDER — ACETAMINOPHEN 325 MG/1
650 TABLET ORAL EVERY 4 HOURS PRN
Status: DISCONTINUED | OUTPATIENT
Start: 2019-10-25 | End: 2019-11-07 | Stop reason: HOSPADM

## 2019-10-25 RX ORDER — VITAMIN B COMPLEX
5000 TABLET ORAL DAILY
Status: DISCONTINUED | OUTPATIENT
Start: 2019-10-26 | End: 2019-11-07 | Stop reason: HOSPADM

## 2019-10-25 RX ORDER — CLOPIDOGREL BISULFATE 75 MG/1
75 TABLET ORAL DAILY
Status: DISCONTINUED | OUTPATIENT
Start: 2019-10-25 | End: 2019-11-07 | Stop reason: HOSPADM

## 2019-10-25 RX ORDER — ALENDRONATE SODIUM 70 MG/1
70 TABLET ORAL
Status: ON HOLD | COMMUNITY
End: 2019-11-06 | Stop reason: SDUPTHER

## 2019-10-25 RX ORDER — TRAZODONE HYDROCHLORIDE 50 MG/1
50 TABLET ORAL NIGHTLY
Status: DISCONTINUED | OUTPATIENT
Start: 2019-10-25 | End: 2019-11-07 | Stop reason: HOSPADM

## 2019-10-25 RX ORDER — ATORVASTATIN CALCIUM 80 MG/1
80 TABLET, FILM COATED ORAL NIGHTLY
Status: DISCONTINUED | OUTPATIENT
Start: 2019-10-25 | End: 2019-11-07 | Stop reason: HOSPADM

## 2019-10-25 RX ORDER — FOLIC ACID 1 MG/1
1 TABLET ORAL DAILY
Status: DISCONTINUED | OUTPATIENT
Start: 2019-10-25 | End: 2019-11-07 | Stop reason: HOSPADM

## 2019-10-25 RX ORDER — POLYETHYLENE GLYCOL 3350 17 G/17G
17 POWDER, FOR SOLUTION ORAL DAILY PRN
Status: DISCONTINUED | OUTPATIENT
Start: 2019-10-25 | End: 2019-11-07 | Stop reason: HOSPADM

## 2019-10-25 RX ADMIN — LEVOTHYROXINE SODIUM 50 MCG: 50 TABLET ORAL at 09:38

## 2019-10-25 RX ADMIN — GABAPENTIN 100 MG: 100 CAPSULE ORAL at 16:12

## 2019-10-25 RX ADMIN — ATORVASTATIN CALCIUM 80 MG: 80 TABLET, FILM COATED ORAL at 20:56

## 2019-10-25 RX ADMIN — CLOPIDOGREL 75 MG: 75 TABLET, FILM COATED ORAL at 09:38

## 2019-10-25 RX ADMIN — TRAZODONE HYDROCHLORIDE 50 MG: 50 TABLET ORAL at 20:56

## 2019-10-25 RX ADMIN — PREDNISONE 5 MG: 5 TABLET ORAL at 09:38

## 2019-10-25 RX ADMIN — ASPIRIN 81 MG: 81 TABLET ORAL at 09:38

## 2019-10-25 RX ADMIN — SERTRALINE 50 MG: 50 TABLET, FILM COATED ORAL at 09:38

## 2019-10-25 RX ADMIN — GABAPENTIN 100 MG: 100 CAPSULE ORAL at 05:24

## 2019-10-25 RX ADMIN — ENOXAPARIN SODIUM 40 MG: 40 INJECTION SUBCUTANEOUS at 20:56

## 2019-10-25 SDOH — HEALTH STABILITY: MENTAL HEALTH: HOW OFTEN DO YOU HAVE A DRINK CONTAINING ALCOHOL?: NEVER

## 2019-10-25 ASSESSMENT — PAIN SCALES - GENERAL: PAINLEVEL_OUTOF10: 0

## 2019-10-25 NOTE — PROGRESS NOTES
Neurology Progress Note      Chief Complaint: f/u stroke    Subjective     Subjective:      She admits to being a little tired today but wakes up quickly as I enter the room.  No complaints.  No changes in neuro status.        TTE shows LV hypertrophy, mild tricuspid valve regurgitation, no intracardiac mass/thrombus. No PFO.    Medications:  Current Facility-Administered Medications   Medication Dose Route Frequency Provider Last Rate Last Dose   • acetaminophen (TYLENOL) tablet 650 mg  650 mg Oral Q6H PRN Priyank Gilbert MD       • aspirin EC tablet 81 mg  81 mg Oral Daily Priyank Gilbert MD   81 mg at 10/25/19 0938   • atorvastatin (LIPITOR) tablet 80 mg  80 mg Oral Nightly Priyank Gilbert MD   80 mg at 10/24/19 2025   • clopidogrel (PLAVIX) tablet 75 mg  75 mg Oral Daily Priyank Gilbert MD   75 mg at 10/25/19 0938   • gabapentin (NEURONTIN) capsule 100 mg  100 mg Oral Q8H Priyank Gilbert MD   100 mg at 10/25/19 0524   • HYDROcodone-acetaminophen (NORCO) 5-325 MG per tablet 1 tablet  1 tablet Oral Q6H PRN Priyank Gilbert MD       • labetalol (NORMODYNE,TRANDATE) injection 20 mg  20 mg Intravenous Once Oz Mendez Jr., MD   Stopped at 10/23/19 1204   • levothyroxine (SYNTHROID, LEVOTHROID) tablet 50 mcg  50 mcg Oral Daily Priyank Gilbert MD   50 mcg at 10/25/19 0938   • ondansetron (ZOFRAN) injection 4 mg  4 mg Intravenous Q6H PRN Priyank Gilbert MD       • predniSONE (DELTASONE) tablet 5 mg  5 mg Oral Daily With Breakfast Priyank Gilbert MD   5 mg at 10/25/19 0938   • sertraline (ZOLOFT) tablet 50 mg  50 mg Oral Daily Priyank Gilbert MD   50 mg at 10/25/19 0938   • sodium chloride 0.9 % flush 10 mL  10 mL Intravenous PRN Oz Mendez Jr., MD       • traZODone (DESYREL) tablet 50 mg  50 mg Oral Nightly Priyank Gilbert MD   50 mg at 10/24/19 2025       Review of Systems:   -A 14 point review of systems is completed and is  negative except for mild aphasi, and mild flattening of right nasal fold.       Objective      Vital Signs  Temp:  [98.5 °F (36.9 °C)-99 °F (37.2 °C)] 98.6 °F (37 °C)  Heart Rate:  [55-73] 55  Resp:  [16-18] 18  BP: (114-152)/(74-95) 114/75      SB-S 49-61    Physical Exam:    General Exam:  Head:  Normal cephalic, atraumatic  HEENT:  Neck supple  CVS:  Regular rate and rhythm.  No murmurs  Carotid Examination:  No bruits  Lungs:  Clear to auscultation  Abdomen:  Non-tender, Non-distended  Extremities:  No signs of peripheral edema  Skin:  No rashes    Neurologic Exam:    Mental Status:    -Awake, Alert, Oriented X 3  -mild wordd finding difficulties  -No aphasia  -mild dysarthria  -Follows simple and complex commands    CN II:  Visual fields full.  Pupils equally reactive to light  CN III, IV, VI:  Extraocular Muscles full with no signs of nystagmus  CN V:  Facial sensory is symmetric with no asymetries.  CN VII:  Facial motor asymmetric with subtle flattening of right nasal crease.  CN VIII:  Gross hearing intact bilaterally  CN IX:  Palate elevates symmetrically  CN X:  Palate elevates symmetrically  CN XI:  Shoulder shrug symmetric  CN XII:  Tongue protrudes to midline  Motor: (strength out of 5:  1= minimal movement, 2 = movement in plane of gravity, 3 = movement against gravity, 4 = movement against some resistance, 5 = full strength)    -Right Upper Ext: Proximal: 5 Distal: 5  -Left Upper Ext: Proximal: 5 Distal: 5    -Right Lower Ext: Proximal: 5 Distal: 5  -Left Lower Ext: Proximal: 5 Distal: 5    DTR:  -Right   Bicep: 2+ Tricep: 2+ Brachoradialis: 2+   Patella: 2+ Ankle: 2+ Neg Babinski  -Left   Bicep: 2+ Tricep: 2+ Brachoradialis: 2+   Patella: 2+ Ankle: 2+ Neg Babinski    Sensory:  -Intact to light touch, pinprick, temperature, pain, and proprioception    Coordination:  -Finger to nose intact  -Heel to shin intact  - no ataxia         Results Review:    I reviewed the patient's new clinical  results.    Results from last 7 days   Lab Units 10/25/19  0548 10/24/19  0541 10/23/19  1015   WBC 10*3/mm3 5.36 4.33 4.34   HEMOGLOBIN g/dL 13.4 13.1 14.3   HEMATOCRIT % 39.2 38.4 42.2   PLATELETS 10*3/mm3 208 210 218        Results from last 7 days   Lab Units 10/25/19  0548 10/24/19  0541 10/23/19  1049   SODIUM mmol/L 145 144 145   POTASSIUM mmol/L 3.5 3.5 3.4*   CHLORIDE mmol/L 108* 108* 108*   CO2 mmol/L 26.0 26.0 25.0   BUN mg/dL 26* 15 16   CREATININE mg/dL 0.89 0.71 0.80   CALCIUM mg/dL 9.1 8.9 8.9   BILIRUBIN mg/dL 0.7 0.8 0.7   ALK PHOS U/L 57 58 61   ALT (SGPT) U/L 11 12 16   AST (SGOT) U/L 13 13 17   GLUCOSE mg/dL 106* 100* 84        Lab Results   Component Value Date    PHOS 2.9 02/10/2017    MG 2.3 (H) 02/22/2019    PROTIME 12.4 10/23/2019    INR 0.90 (L) 10/23/2019     No components found for: POCGLUC  No components found for: A1C  Lab Results   Component Value Date    HDL 44 10/23/2019     (H) 10/23/2019     No components found for: B12  Lab Results   Component Value Date    TSH 1.240 10/23/2019     Imaging Results (last 72 hours)     Procedure Component Value Units Date/Time    MRI Brain Without Contrast [[id number]] Collected:  10/23/19 1616     Updated:  10/23/19 1624    Narrative:       EXAMINATION: MRI BRAIN WO CONTRAST- 10/23/2019 4:16 PM CDT     HISTORY: Stroke, weakness, history of meningioma     COMPARISON: CT head without contrast 10/23/2019, MRI brain with and  without contrast 11/12/2018     Technical: Multiplanar, multisequence imaging was performed through the  brain without the use of IV contrast.     FINDINGS:  Areas of diffusion restriction are seen in the left corona radiata the  periventricular region. Findings are compatible with acute ischemia.  Approximately 5 adjacent foci of diffusion restriction are noted in this  region. No additional areas of diffusion restriction are seen.     Artifact from the known calcified meningioma near the vertex is noted.     There is no  evidence of acute intracranial hemorrhage. There is  hemosiderin deposition in the region of the left caudate head,  compatible with previous microhemorrhage at the site of infarction.  Numerous foci of hemosiderin deposition are noted bilaterally,  compatible with previous areas of microhemorrhage.     The ventricles are prominent but stable. Normal signal void is seen in  the visualized carotid and basilar arteries.     There is thinning of the lenses bilaterally, suggesting cataract or  previous cataract surgery. There is right frontal sinus disease. The  mastoid air cells appear clear.     There are advanced periventricular and subcortical FLAIR signal  hyperintensities, a nonspecific finding most often seen as sequela of  chronic microvascular ischemia.       Impression:       1. Multiple adjacent areas of diffusion restriction involving the left  corona radiata in the periventricular left frontal lobe, compatible with  acute ischemia.     2. Sequela of chronic microvascular ischemia.  This report was finalized on 10/23/2019 16:21 by Dr. Alex Bacon MD.    US Carotid Bilateral [[id number]] Collected:  10/23/19 1538     Updated:  10/23/19 1542    Narrative:       History: Carotid occlusive disease       Impression:       Impression:  1. There is less than 50% stenosis of the right internal carotid artery.  2. There is less than 50% stenosis of the left internal carotid artery.  3. Antegrade flow is demonstrated in bilateral vertebral arteries.     Comments: Bilateral carotid vertebral arterial duplex scan was  performed.     Grayscale imaging shows intimal thickening and calcified elements at the  carotid bifurcation. The right internal carotid artery peak systolic  velocity is 67.0 cm/sec. The end-diastolic velocity is 19.3 cm/sec. The  right ICA/CCA ratio is approximately 1.47 . These findings correlate  with less than 50% stenosis of the right internal carotid artery.     Grayscale imaging shows intimal  thickening and calcified elements at the  carotid bifurcation. The left internal carotid artery peak systolic  velocity is 62.1 cm/sec. The end-diastolic velocity is 27.9 cm/sec. The  left ICA/CCA ratio is approximately 1.26 . These findings correlate with  less than 50% stenosis of the left internal carotid artery.     Antegrade flow is demonstrated in bilateral vertebral arteries.     This report was finalized on 10/23/2019 15:39 by Dr. Rodri Tapia MD.    CT Head Without Contrast [[id number]] Collected:  10/23/19 1041     Updated:  10/23/19 1048    Narrative:       EXAMINATION: CT HEAD WO CONTRAST- 10/23/2019 10:41 AM CDT     HISTORY: Altered mental status     COMPARISON: CT head without contrast 02/08/2017     DOSE: 584 mGy-cm     TECHNIQUE: Sequential imaging was performed from the vertex through the  base of the skull without the use of IV contrast.  Sagittal and coronal  reformations were made from the original source data and reviewed.  Automated exposure control was also utilized to decrease patient  radiation dose.     FINDINGS:   There is no evidence of acute intracranial hemorrhage or midline shift.  A calcified parafalcine meningioma is again identified, unchanged from  prior exams. Old right basal ganglia and left caudate head lacunar  infarcts are again noted, stable. There is no evidence of acute  territorial infarct. Periventricular and subcortical white matter  changes are present, a nonspecific finding most often seen as sequela of  chronic microvascular ischemia. The calvarium is intact. The visualized  paranasal sinuses and mastoid air cells appear clear. Calcifications are  seen in the cavernous carotid arteries.       Impression:       No acute intracranial findings. Old bilateral basal ganglia lacunar  infarcts. Sequela of chronic microvascular ischemia.     This report was finalized on 10/23/2019 10:45 by Dr. Alex Bacon MD.          Results for orders placed during the hospital  encounter of 10/23/19   Adult Transthoracic Echo Complete W/ Cont if Necessary Per Protocol    Narrative · Left ventricular wall thickness is consistent with hypertrophy.  · Left ventricular systolic function is normal.  · Left ventricular diastolic dysfunction (grade I) consistent with   impaired relaxation.  · Normal right ventricular cavity size and systolic function noted.  · Mild tricuspid valve regurgitation is present.  · There is no evidence of any intracardiac mass or thrombus.  · There is no evidence of a right to left shunt on bubble study.        Assessment/Plan     Hospital Problem List      Cerebrovascular accident (CVA) (CMS/[facility])    Impression:  1. Acute Left PVWM stroke likely related to missing several doses of plavix and BP medications.   2. Hyperlipidemia.   3. Hypertension  4. Hypokalemia. 3.6 today      Plan:  1. Resume and continue Plavix 75 mg daily. Counseled family on importance of them assisting patient with medication administration  2. Lipitor 80 mg daily for outpatient LDL goal less than 70.   3. BP management for systolic goal less than 160.   4. ST/OT/PT  5. Discharge planning to acute rehab  6. Ok to discharge from neurology standpoint and will need to follow up with Ronnie Haider PA-C in 4 weeks.   7. SCD in place for DVT prophylaxis.        Kareem Apple MD  10/25/19  10:22 AM

## 2019-10-25 NOTE — THERAPY TREATMENT NOTE
Acute Care - Occupational Therapy Treatment Note  [facility]     Patient Name: Adela Goetz  : 1945  MRN: [id number]  Today's Date: 10/25/2019  Onset of Illness/Injury or Date of Surgery: 10/23/19  Date of Referral to OT: 10/23/19  Referring Physician: Dr. Miguelina Massey    Admit Date: 10/23/2019       ICD-10-CM ICD-9-CM   1. Cerebrovascular accident (CVA), unspecified mechanism (CMS/HCC) I63.9 434.91   2. Dysphagia, unspecified type R13.10 787.20   3. Decreased activities of daily living (ADL) Z78.9 V49.89   4. Aphasia R47.01 784.3   5. Impaired functional mobility, balance, gait, and endurance Z74.09 V49.89     Patient Active Problem List   Diagnosis   • CAD (coronary artery disease)   • Hypertension   • Hyperlipidemia   • Cerebrovascular accident (CVA) due to thrombosis of left middle cerebral artery (CMS/HCC)   • Rheumatoid arthritis (CMS/HCC)   • Osteopenia   • Meningioma (CMS/HCC)   • Altered taste   • Cervical radiculopathy   • Pneumonia of left upper lobe due to infectious organism (CMS/HCC)   • Basal cell carcinoma   • Coronary artery disease involving coronary bypass graft of native heart without angina pectoris   • Non-smoker   • BMI 23.0-23.9, adult   • Impaired memory   • Cerebrovascular accident (CVA) (CMS/HCC)     Past Medical History:   Diagnosis Date   • Basal cell carcinoma 10/28/2017   • CAD (coronary artery disease)    • CVA (cerebral vascular accident) (CMS/HCC)    • Hyperlipidemia    • Hypertension    • Osteopenia    • Rheumatoid arthritis (CMS/HCC)    • TIA (transient ischemic attack)      Past Surgical History:   Procedure Laterality Date   • APPENDECTOMY     • BREAST BIOPSY     • BREAST RECONSTRUCTION      breast reduction , yrs ago   • CARDIAC CATHETERIZATION     • CARDIAC SURGERY      cabg x3   ,done at [facility]   • CATARACT EXTRACTION     • COLONOSCOPY N/A 2017    Procedure: COLONOSCOPY WITH ANESTHESIA;  Surgeon: Andrea Pizano DO;  Location: [facility];   Service:    • COLONOSCOPY N/A 2/7/2017    Procedure: COLONOSCOPY WITH ANESTHESIA;  Surgeon: Andrea Pizano DO;  Location:  [facility];  Service:    • HEAD/NECK LESION/CYST EXCISION Right 10/18/2017    Procedure: Excision of basal cell carcinoma the right neck with complex closure;  Surgeon: Edward Cardona MD;  Location: [facility] OR;  Service:    • HERNIA REPAIR     • REDUCTION MAMMAPLASTY  1995   • RETINAL LASER PROCEDURE     • SPINE SURGERY     • TUBAL ABDOMINAL LIGATION         Therapy Treatment    Rehabilitation Treatment Summary     Row Name 10/25/19 0856 10/25/19 0715          Treatment Time/Intention    Discipline  physical therapy assistant  -KJ  occupational therapist  -AC,LS,AC2     Document Type  therapy note (daily note)  -KJ2  therapy note (daily note)  -AC,LS,AC2     Subjective Information  complains of;pain  -KJ2  no complaints  -AC,LS,AC2     Mode of Treatment  physical therapy  -KJ2  occupational therapy  -AC,LS,AC2     Patient/Family Observations  son/spouse present  -KJ2   present  -AC,LS,AC2     Care Plan Review  --  evaluation/treatment results reviewed;care plan/treatment goals reviewed;risks/benefits reviewed;current/potential barriers reviewed;patient/other agree to care plan  -AC,LS,AC2     Care Plan Review, Other Participant(s)  --  spouse  -AC,LS,AC2     Patient Effort  good  -KJ2  good  -AC,LS,AC2     Comment  c/o right knee pn; limiting activity this am  -KJ2  --     Existing Precautions/Restrictions  fall  -KJ2  fall  -AC,LS,AC2     Recorded by [KJ] Raiza Morgan, PTA 10/25/19 0856  [KJ2] Raiza Morgan, PTA 10/25/19 0926 [AC,LS,AC2] Zander Henderson, OTR/L, CNT (r) Jennifer Hernandez OT Student (t) Zander Henderson, OTR/L, CNT (c) 10/25/19 1108     Row Name 10/25/19 0715             Cognitive Assessment/Intervention    Additional Documentation  Cognitive Assessment/Intervention (Group)  -AC,LS,AC2      Recorded by [AC,LS,AC2] Zander Henderson, OTR/L, CNT (r) Mary  Jennifer, OT Student (t) Zander Henderson, OTR/L, CNT (c) 10/25/19 1107      Row Name 10/25/19 0715             Cognitive Assessment/Intervention- PT/OT    Affect/Mental Status (Cognitive)  flat/blunted affect  -AC,LS,AC2      Orientation Status (Cognition)  oriented to;person;place;situation;disoriented to;time  -AC,LS,AC2      Personal Safety Interventions  fall prevention program maintained;gait belt;muscle strengthening facilitated;nonskid shoes/slippers when out of bed;supervised activity  -AC,LS,AC2      Cognitive Assessment/Intervention Comment  pt required long processing time for birth year; disoriented to current year; pt demos flat affect w/ occasional slurred and soft speech  -AC,LS,AC2      Recorded by [AC,LS,AC2] Zander Henderson OTR/L, CNT (r) Jennifer Hernandez, OT Student (t) Zander Henderson, OTR/L, CNT (c) 10/25/19 1107      Row Name 10/25/19 0715             Safety Issues, Functional Mobility    Safety Issues Affecting Function (Mobility)  positioning of assistive device;problem solving  -AC,LS,AC2      Recorded by [AC,LS,AC2] Zander Henderson OTR/L, CNT (r) Jennifer Hernandez, OT Student (t) Zander Henderson, OTR/L, CNT (c) 10/25/19 1107      Row Name 10/25/19 0856 10/25/19 0715          Bed Mobility Assessment/Treatment    Bed Mobility Assessment/Treatment  --  scooting/bridging;supine-sit  -AC,LS,AC2     Scooting/Bridging [place] (Bed Mobility)  --  minimum assist (75% patient effort);verbal cues  -AC,LS,AC2     Supine-Sit [place] (Bed Mobility)  --  minimum assist (75% patient effort);verbal cues  -AC,LS,AC2     Assistive Device (Bed Mobility)  --  bed rails;draw sheet;head of bed elevated  -AC,LS,AC2     Comment (Bed Mobility)  sitting in chair  -KJ  --     Recorded by [KJ] Raiza Morgan, PTA 10/25/19 7588 [AC,LS,AC2] Zander Henderson, OTR/L, CNT (r) Jennifer Hernandez OT Student (t) Zander Henderson, OTLEEANNA/L, CNT (c) 10/25/19 1107     Row Name 10/25/19 0715             Functional  Mobility    Functional Mobility- Ind. Level  contact guard assist;verbal cues required  -AC,LS,AC2      Functional Mobility- Device  rolling walker  -AC,LS,AC2      Functional Mobility- Safety Issues  step length decreased;other (see comments) positioning of rw  -AC,LS,AC2      Functional Mobility- Comment  to BR; pt does not take rw w/ self when making turns or walking backward; pt will begin using rw again following VCs and placement of rw in front of pt; multiple VCs required for taking large steps, pt continued shuffling gait  -AC,LS,AC2      Recorded by [AC,LS,AC2] Zander Henderson OTR/L, CNT (r) Jennifer Hernandez, OT Student (t) Zander Henderson, OTR/L, CNT (c) 10/25/19 1107      Row Name 10/25/19 0715             Transfer Assessment/Treatment    Transfer Assessment/Treatment  sit-stand transfer;stand-sit transfer;toilet transfer  -AC,LS,AC2      Recorded by [AC,LS,AC2] Zander Henderson OTR/L, CNT (r) Jennifer Hernandez, OT Student (t) Zander Henderson, OTR/L, CNT (c) 10/25/19 1107      Row Name 10/25/19 0856 10/25/19 0715          Sit-Stand Transfer    Sit-Stand [place] (Transfers)  verbal cues;moderate assist (50% patient effort) from recliner  -KJ  minimum assist (75% patient effort)  -AC,LS,AC2     Assistive Device (Sit-Stand Transfers)  walker, front-wheeled  -KJ  walker, front-wheeled  -AC,LS,AC2     Recorded by [KJ] Raiza Morgan, PTA 10/25/19 0926 [AC,LS,AC2] Zander Henderson, OTR/L, CNT (r) Jennifer Hernandez, OT Student (t) Zander Henderson, OTR/L, CNT (c) 10/25/19 1107     Row Name 10/25/19 0856 10/25/19 0715          Stand-Sit Transfer    Stand-Sit [place] (Transfers)  verbal cues;minimum assist (75% patient effort)  -KJ  minimum assist (75% patient effort)  -AC,LS,AC2     Assistive Device (Stand-Sit Transfers)  walker, front-wheeled  -KJ  walker, front-wheeled  -AC,LS,AC2     Recorded by [KJ] Raiza Morgan, PTA 10/25/19 0960 [AC,LS,AC2] Zander Henderson, OTR/L, CNT (r) SharerJennifer, OT  Student (t) Zander Henderson, OTR/L, CNT (c) 10/25/19 1107     Row Name 10/25/19 0715             Toilet Transfer    Type (Toilet Transfer)  sit-stand;stand-sit  -AC,LS,AC2      [place] Level (Toilet Transfer)  contact guard  -AC,LS,AC2      Assistive Device (Toilet Transfer)  walker, front-wheeled;grab bars/safety frame  -AC,LS,AC2      Recorded by [AC,LS,AC2] Zander Henderson OTR/L, CNT (r) Jennifer Hernandez OT Student (t) Zander Henderson, OTR/L, CNT (c) 10/25/19 1107      Row Name 10/25/19 0856             Gait/Stairs Assessment/Training    [place] Level (Gait)  verbal cues;minimum assist (75% patient effort)  -KJ      Assistive Device (Gait)  walker, front-wheeled  -KJ      Distance in Feet (Gait)  30' x 2  -KJ      Pattern (Gait)  step-to  -KJ      Deviations/Abnormal Patterns (Gait)  bilateral deviations;base of support, narrow;festinating/shuffling;gait speed decreased;stride length decreased  -KJ      Bilateral Gait Deviations  heel strike decreased  -KJ      Comment (Gait/Stairs)  cues to increase step length; shuffling BLE's > on right. Pain Right knee limiting activity this morning  -KJ      Recorded by [KJ] Raiza Morgan, PTA 10/25/19 0926      Row Name 10/25/19 0715             ADL Assessment/Intervention    BADL Assessment/Intervention  lower body dressing;grooming  -AC,LS,AC2      Recorded by [AC,LS,AC2] Zander Henderson OTR/L, CNT (r) Jennifer Hernandez OT Student (t) Zander Henderson, OTR/L, CNT (c) 10/25/19 1107      Row Name 10/25/19 0715             Lower Body Dressing Assessment/Training    Lower Body Dressing [place] Level  don;socks;other (see comments) SBA  -AC,LS,AC2      Lower Body Dressing Position  edge of bed sitting  -AC,LS,AC2      Recorded by [AC,LS,AC2] Zander Henderson, OTR/L, CNT (r) Jennifer Hernandez OT Student (t) Zander Henderson, OTR/L, CNT (c) 10/25/19 1107      Row Name 10/25/19 8390             Grooming Assessment/Training    [place] Level (Grooming)   oral care regimen;wash face, hands;hair care, combing/brushing;other (see comments) SBA  -AC,LS,AC2      Grooming Position  supported standing  -AC,LS,AC2      Comment (Grooming)  pt requested help w/ pulling her hair back w/ hair tie following brushing; after standing for approx 20 minutes, pt c/o R knee pain and R sided and posterior lean worsened, 1 LOB noted; chair was brought to pt for rest break  -AC,LS,AC2      Recorded by [AC,LS,AC2] Zander Henderson, OTR/L, CNT (r) Jennifer Hernandez, OT Student (t) Zander Henderson, OTR/L, CNT (c) 10/25/19 1107      Row Name 10/25/19 0811             Motor Skills Assessment/Interventions    Additional Documentation  Therapeutic Exercise (Group)  -KJ      Recorded by [KJ] Raiza Morgan, PTA 10/25/19 0926      Row Name 10/25/19 0856             Therapeutic Exercise    Exercise Type (Therapeutic Exercise)  AAROM (active assistive range of motion);AROM (active range of motion)  -KJ      Position (Therapeutic Exercise)  supine;seated  -KJ      Sets/Reps (Therapeutic Exercise)  10  -KJ      Recorded by [KJ] Raiza Morgan, PTA 10/25/19 0926      Row Name 10/25/19 0856 10/25/19 0715          Positioning and Restraints    Pre-Treatment Position  sitting in chair/recliner  -KJ  in bed  -AC,LS,AC2     Post Treatment Position  chair  -KJ  chair  -AC,LS,AC2     In Bed  call light within reach;with family/caregiver  -KJ  --     In Chair  --  sitting;call light within reach;encouraged to call for assist;with family/caregiver  -AC,LS,AC2     Recorded by [KJ] Raiza Morgan, PTA 10/25/19 0926 [AC,LS,AC2] Zander Henderson, OTR/L, CNT (r) Jennifer Hernandez, OT Student (t) Zander Henderson, OTR/L, CNT (c) 10/25/19 1106     Row Name 10/25/19 0715             Pain Assessment    Additional Documentation  Pain Scale: Numbers Pre/Post-Treatment (Group)  -AC,LS,AC2      Recorded by [AC,LS,AC2] Zander Henderson, OTR/L, CNT (r) Jennifer Hernandez OT Student (t) Zander Henderson, MANOLO/L, JUAN PABLO (c) 10/25/19  1107      Row Name 10/25/19 0856 10/25/19 0715          Pain Scale: Numbers Pre/Post-Treatment    Pain Scale: Numbers, Pretreatment  9/10  -KJ  0/10 - no pain  -AC,LS,AC2     Pain Scale: Numbers, Post-Treatment  9/10  -KJ  0/10 - no pain  -AC,LS,AC2     Pain Location - Side  Right  -KJ  --     Pain Location  knee  -KJ  --     Pre/Post Treatment Pain Comment  --  c/o R knee pain after standing for approx 20 minutes  -AC,LS,AC2     Pain Intervention(s)  --  Repositioned;Ambulation/increased activity  -AC,LS,AC2     Recorded by [KJ] Raiza Morgan, PTA 10/25/19 0926 [AC,LS,AC2] Zander Henderson, OTR/L, CNT (r) Jennifer Hernandez, OT Student (t) Zander Henderson, OTR/L, CNT (c) 10/25/19 1107     Row Name 10/25/19 0715             Plan of Care Review    Plan of Care Reviewed With  patient;spouse  -AC,LS,AC2      Recorded by [AC,LS,AC2] Zander Henderson, OTR/L, CNT (r) Jennifer Hernandez OT Student (t) Zander Henderson, OTR/L, CNT (c) 10/25/19 1107      Row Name 10/25/19 0715             Outcome Summary/Treatment Plan (OT)    Daily Summary of Progress (OT)  progress toward functional goals is good  -AC,LS,AC2      Barriers to Overall Progress (OT)  cognitive status  -AC,LS,AC2      Recorded by [AC,LS,AC2] Zander Henderson, OTR/L, CNT (r) Jennifer Hernandez OT Student (t) Zander Henderson, OTR/L, CNT (c) 10/25/19 1107        User Key  (r) = Recorded By, (t) = Taken By, (c) = Cosigned By    Initials Name Effective Dates Discipline    AC Zander Henderson, OTR/L, CNT 04/09/19 -  OT    KJ Raiza Morgan, PTA 08/02/16 -  PT    Jennifer Torres OT Student 08/14/19 -  OT             Occupational Therapy Education     Title: PT OT SLP Therapies (In Progress)     Topic: Occupational Therapy (Done)     Point: ADL training (Done)     Description: Instruct learner(s) on proper safety adaptation and remediation techniques during self care or transfers.   Instruct in proper use of assistive devices.    Learning Progress Summary            Patient Acceptance, E, VU,DU,NR by  at 10/25/2019  8:43 AM    Comment:  safe T/F techniques, positioning of rw, energy conservation, role of OT, OT POC    Acceptance, E, VU,DU,NR by  at 10/24/2019 11:43 AM    Comment:  benefits of exercise, safe T/F techniques, role of OT, OT POC   Family Acceptance, E, VU,DU,NR by  at 10/25/2019  8:43 AM    Comment:  safe T/F techniques, positioning of rw, energy conservation, role of OT, OT POC    Acceptance, E, VU,DU,NR by  at 10/24/2019 11:43 AM    Comment:  benefits of exercise, safe T/F techniques, role of OT, OT POC                   Point: Body mechanics (Done)     Description: Instruct learner(s) on proper positioning and spine alignment during self-care, functional mobility activities and/or exercises.    Learning Progress Summary           Patient Acceptance, E, VU,DU,NR by  at 10/25/2019  8:43 AM    Comment:  safe T/F techniques, positioning of rw, energy conservation, role of OT, OT POC    Acceptance, E, VU,DU,NR by  at 10/24/2019 11:43 AM    Comment:  benefits of exercise, safe T/F techniques, role of OT, OT POC   Family Acceptance, E, VU,DU,NR by  at 10/25/2019  8:43 AM    Comment:  safe T/F techniques, positioning of rw, energy conservation, role of OT, OT POC    Acceptance, E, VU,DU,NR by  at 10/24/2019 11:43 AM    Comment:  benefits of exercise, safe T/F techniques, role of OT, OT POC                               User Key     Initials Effective Dates Name Provider Type Discipline     08/14/19 -  Jennifer Hernandez OT Student OT Student OT                OT Recommendation and Plan  Outcome Summary/Treatment Plan (OT)  Daily Summary of Progress (OT): progress toward functional goals is good  Barriers to Overall Progress (OT): cognitive status  Anticipated Discharge Disposition (OT): inpatient rehabilitation facility  Planned Therapy Interventions (OT Eval): activity tolerance training, BADL retraining, functional balance retraining,  occupation/activity based interventions, patient/caregiver education/training, ROM/therapeutic exercise, strengthening exercise, transfer/mobility retraining  Therapy Frequency (OT Eval): daily  Daily Summary of Progress (OT): progress toward functional goals is good  Plan of Care Review  Plan of Care Reviewed With: patient, spouse  Plan of Care Reviewed With: patient, spouse  Outcome Summary: (P) OT tx completed. Pt A&O x3, disoriented to present year and required increased processing time for birth year. Pt completed bed mob w/ Deniz and VCs. Pt donned socks w/ SBA while seated EOB. Pt required Deniz to stand and CGA to walk to BR w/ rw. VCs given for taking larger steps, but pt continued w/ shuffling gait. Pt attempts to push rw away when making turns or walking backwards. Education provided on importance of positioning of rw. Pt CGA for toilet T/F. Pt stood supported at sink for approx 20 minutes, completing oral care, face washing, and brushing of hair w/ SBA. Pt c/o pain in R knee and posterior and R lean increased, 1 LOB noted. Chair brought to pt for rest break. D/C to IP rehab discussed w/ pt and , both are agreeable.   Outcome Measures     Row Name 10/25/19 3029 10/24/19 1035          How much help from another is currently needed...    Putting on and taking off regular lower body clothing?  3  -AC (r) LS (t) AC (c)  3  -AC (r) LS (t) AC (c)     Bathing (including washing, rinsing, and drying)  2  -AC (r) LS (t) AC (c)  2  -AC (r) LS (t) AC (c)     Toileting (which includes using toilet bed pan or urinal)  3  -AC (r) LS (t) AC (c)  3  -AC (r) LS (t) AC (c)     Putting on and taking off regular upper body clothing  4  -AC (r) LS (t) AC (c)  4  -AC (r) LS (t) AC (c)     Taking care of personal grooming (such as brushing teeth)  3  -AC (r) LS (t) AC (c)  4  -AC (r) LS (t) AC (c)     Eating meals  4  -AC (r) LS (t) AC (c)  4  -AC (r) LS (t) AC (c)     AM-PAC 6 Clicks Score (OT)  19  -AC (r) LS (t)  20   -AC (r) LS (t)        Modified Whiteside Scale    Pre-Stroke Modified Zoe Scale  --  1 - No significant disability despite symptoms.  Able to carry out all usual duties and activities.  -AC (r) LS (t) AC (c)     Modified [place]ites[place] Scale  --  4 - Moderately severe disability.  Unable to walk without assistance, and unable to attend to own bodily needs without assistance.  -AC (r) LS (t) AC (c)        Functional Assessment    Outcome Measure Options  AM-PAC 6 Clicks Daily Activity (OT)  -AC (r) LS (t) AC (c)  AM-PAC 6 Clicks Daily Activity (OT);Modified Zoe  -AC (r) LS (t) AC (c)       User Key  (r) = Recorded By, (t) = Taken By, (c) = Cosigned By    Initials Name Provider Type    Zander Varma, OTR/L, JUAN PABLO Occupational Therapist    Jennifer oTrres, OT Student OT Student           Time Calculation:   Time Calculation- OT     Row Name 10/25/19 0844             Time Calculation- OT    OT Start Time  0717  -AC (r) LS (t) AC (c)      OT Stop Time  0820  -AC (r) LS (t) AC (c)      OT Time Calculation (min)  63 min  -AC (r) LS (t)      Total Timed Code Minutes- OT  63 minute(s)  -AC (r) LS (t) AC (c)      OT Received On  10/25/19  -AC (r) LS (t) AC (c)        User Key  (r) = Recorded By, (t) = Taken By, (c) = Cosigned By    Initials Name Provider Type    Zander Varma, OTR/L, JUAN PABLO Occupational Therapist    Jennifer Torres, OT Student OT Student        Therapy Charges for Today     Code Description Service Date Service Provider Modifiers Qty    [id number]  OT EVAL MOD COMPLEXITY 4 10/24/2019 Jennifer Hernandez OT Student GO 1    [id number]  OT SELF CARE/MGMT/TRAIN EA 15 MIN 10/25/2019 Jennifer Hernandez OT Student GO 4               Jennifer Hernandez OT Student  10/25/2019

## 2019-10-25 NOTE — PLAN OF CARE
Problem: Patient Care Overview  Goal: Plan of Care Review  Outcome: Ongoing (interventions implemented as appropriate)   10/25/19 5171   Coping/Psychosocial   Plan of Care Reviewed With patient;spouse   OTHER   Outcome Summary OT tx completed. Pt A&O x3, disoriented to present year and required increased processing time for birth year. Pt completed bed mob w/ Deniz and VCs. Pt donned socks w/ SBA while seated EOB. Pt required Deniz to stand and CGA to walk to BR w/ rw. VCs given for taking larger steps, but pt continued w/ shuffling gait. Pt attempts to push rw away when making turns or walking backwards. Education provided on importance of positioning of rw. Pt CGA for toilet T/F. Pt stood supported at sink for approx 20 minutes, completing oral care, face washing, and brushing of hair w/ SBA. Pt c/o pain in R knee and posterior and R lean increased, 1 LOB noted. Chair brought to pt for rest break. D/C to IP rehab discussed w/ pt and , both are agreeable.    Plan of Care Review   Progress no change

## 2019-10-25 NOTE — DISCHARGE SUMMARY
"    [facility] Medicine Services  DISCHARGE SUMMARY       Date of Admission: 10/23/2019  Date of Discharge:  10/25/2019  Primary Care Physician: Bryce Cardona APRN    Presenting Problem/History of Present Illness:  Cerebrovascular accident (CVA), unspecified mechanism (CMS/HCC) [I63.9]     Final Discharge Diagnoses:  Active Hospital Problems    Diagnosis   • Cerebrovascular accident (CVA) (CMS/HCC)   1.  CVA  -ASA  -lipitor  -Plavix  -PT/OT/SLP  -Rehab to see pt in AM regarding d/c to ARU     2.  CAD  -lipitor  -Plavix     3.  HTN  -monitor     4.  HLD  -Lipitor     5.  RA  -Prednisone  -Mtx     6.  Hypothyroidism  -Synthroid       Consults: Neurology    Procedures Performed: NA    Pertinent Test Results:   MRI:  1. Multiple adjacent areas of diffusion restriction involving the left  corona radiata in the periventricular left frontal lobe, compatible with  acute ischemia.    Carotids <50% bilaterally    Echo:  No PFO        Chief Complaint on Day of Discharge: speech difficulty    History of Present Illness on Day of Discharge:   Still having some speech issues     Hospital Course:  The patient is a 74 y.o. female who presented to [facility] with speech difficulty.  She was found to have had a stroke.  Neurology was consulted.  She was treated with Aspirin and Lipitor 80.  Workup showed the results above.  She has worked well with PT, OT and SLP.  She will be discharged to [facility] rehab for further rehab.    Sheis comfortable with being discharged and with the discharge plan.  Shewas given the chance to ask questions and all questions were answered to her satisfaction.          Condition on Discharge:  Stable    Physical Exam on Discharge:  /86 (BP Location: Right arm, Patient Position: Sitting)   Pulse 56   Temp 98.2 °F (36.8 °C) (Oral)   Resp 18   Ht 160 cm (62.99\")   Wt 57.2 kg (126 lb 1.7 oz)   LMP  (LMP Unknown)   SpO2 98%   BMI 22.34 kg/m² "   Physical Exam   Constitutional: She is oriented to person, place, and time. She appears well-developed and well-nourished.   HENT:   Head: Normocephalic and atraumatic.   Right Ear: External ear normal.   Left Ear: External ear normal.   Nose: Nose normal.   Mouth/Throat: Oropharynx is clear and moist.   Eyes: Conjunctivae and EOM are normal.   Neck: Normal range of motion. Neck supple.   Cardiovascular: Normal rate, regular rhythm and normal heart sounds.   Pulmonary/Chest: Effort normal and breath sounds normal.   Abdominal: Soft. Bowel sounds are normal. She exhibits no distension. There is no tenderness.   Musculoskeletal: Normal range of motion.   Neurological: She is alert and oriented to person, place, and time.   Skin: Skin is warm and dry.   Psychiatric: She has a normal mood and affect. Her speech is normal and behavior is normal. Cognition and memory are normal.         Discharge Disposition:  Rehab Facility or Unit (DC - External)    Discharge Medications:     Discharge Medications      New Medications      Instructions Start Date   atorvastatin 80 MG tablet  Commonly known as:  LIPITOR   80 mg, Oral, Nightly         Continue These Medications      Instructions Start Date   alendronate 70 MG tablet  Commonly known as:  FOSAMAX   70 mg, Oral, Every 7 Days      amLODIPine 2.5 MG tablet  Commonly known as:  NORVASC   2.5 mg, Oral, Daily      clopidogrel 75 MG tablet  Commonly known as:  PLAVIX   75 mg, Oral, Daily      folic acid 1 MG tablet  Commonly known as:  FOLVITE   1 mg, Oral, Daily      gabapentin 100 MG capsule  Commonly known as:  NEURONTIN   100 mg, Oral, Every 8 Hours PRN      levothyroxine 50 MCG tablet  Commonly known as:  SYNTHROID, LEVOTHROID   50 mcg, Oral, Daily      nitroglycerin 0.4 MG SL tablet  Commonly known as:  NITROSTAT   0.4 mg, Sublingual, Every 5 Minutes PRN, Take no more than 3 doses in 15 minutes.      predniSONE 5 MG tablet  Commonly known as:  DELTASONE   10 mg, Oral,  Daily      traZODone 50 MG tablet  Commonly known as:  DESYREL   50 mg, Oral, Nightly      VITAMIN C PO   1,000 mg, Oral, Daily      vitamin D3 125 MCG (5000 UT) capsule capsule   2,000 Units, Oral, Daily         Stop These Medications    rosuvastatin 20 MG tablet  Commonly known as:  CRESTOR            Discharge Diet: regular    Activity at Discharge: as tolerated    Discharge Care Plan/Instructions: Go to ER for stroke-like symptoms    Follow-up Appointments:   No future appointments.    Test Results Pending at Discharge: KATHLEEN Gilbert MD  10/25/19  12:55 PM    Time: 35 minutes

## 2019-10-25 NOTE — PLAN OF CARE
Problem: Patient Care Overview  Goal: Plan of Care Review  Outcome: Ongoing (interventions implemented as appropriate)   10/25/19 0313   Coping/Psychosocial   Plan of Care Reviewed With patient   OTHER   Outcome Summary A&Ox4. VSS. No c/o pain this shift.  at bedside at all times. Meds taken whole with applesauce. Tolerating diet well. NIH=1. Plan to d/c to [facility] rehab.    Plan of Care Review   Progress improving       Problem: Dysphagia (Adult)  Goal: Identify Related Risk Factors and Signs and Symptoms  Outcome: Ongoing (interventions implemented as appropriate)    Goal: Functional/Safe Swallow  Outcome: Ongoing (interventions implemented as appropriate)    Goal: Compensatory Techniques to Improve Safety/Function with Swallowing  Outcome: Ongoing (interventions implemented as appropriate)      Problem: Stroke (Ischemic) (Adult)  Goal: Signs and Symptoms of Listed Potential Problems Will be Absent, Minimized or Managed (Stroke)  Outcome: Ongoing (interventions implemented as appropriate)      Problem: Fall Risk (Adult)  Goal: Identify Related Risk Factors and Signs and Symptoms  Outcome: Ongoing (interventions implemented as appropriate)    Goal: Absence of Fall  Outcome: Ongoing (interventions implemented as appropriate)

## 2019-10-25 NOTE — PLAN OF CARE
"Problem: Patient Care Overview  Goal: Plan of Care Review  Outcome: Ongoing (interventions implemented as appropriate)   10/25/19 1312   Coping/Psychosocial   Plan of Care Reviewed With patient;son   OTHER   Outcome Summary Speech tx completed. Pt was alert and willing to participate.Pt family stated that she had a coughing spell last night on a thickened sprite. They state they have not noticed any other instances of this but were concerned. Completed trials of nectar thick during tx and pt did not demonstrate any overt s/s of aspiration. At least 10 minutes after last PO trial pt had a severe cough and stated she was choking on her oral secretions. During cognitive tasks, pt was able to independently name 12/15 items. She had a 8-9 second delay on 10/12 of the items and needed extra cues for the remaining 3 items. When asked to describe the pictures, pt offered 1-2 descriptions but needed prompts for further information such as \"what color is it?\" During diverent tasks, pt was able to cmplete task when given significant amounts of response time. For example, it took 4 minutes to name 8 animals. Pt was prompted to project her voice so therapist could count divergent items from across the room. She needed repeated cues to continue to project voice the 4-5 feet. Pt family was educated on purpose of exercises. Additionally, they were educated on possible use of oral suction to help manage secretions and on swallow enhancement techniques to help reduce risks of aspiration during meals. It is recommended that pt continue to receive SLP services. Will continue to follow and treat   Plan of Care Review   Progress improving         "

## 2019-10-25 NOTE — THERAPY TREATMENT NOTE
"Acute Care - Speech Language Pathology Treatment Note  [facility]     Patient Name: Adela Goetz  : 1945  MRN: [id number]  Today's Date: 10/25/2019         Admit Date: 10/23/2019  Speech tx completed. Pt was alert and willing to participate.Pt family stated that she had a coughing spell last night on a thickened sprite. They state they have not noticed any other instances of this but were concerned. Completed trials of nectar thick during tx and pt did not demonstrate any overt s/s of aspiration. At least 10 minutes after last PO trial pt had a severe cough and stated she was choking on her oral secretions. During cognitive tasks, pt was able to independently name 12/15 items. She had a 8-9 second delay on 10 of the items and needed extra cues for the remaining 3 items. When asked to describe the pictures, pt offered 1-2 descriptions but needed prompts for further information such as \"what color is it?\" During diverent tasks, pt was able to cmplete task when given significant amounts of response time. For example, it took 4 minutes to name 8 animals. Pt was prompted to project her voice so therapist could count divergent items from across the room. She needed repeated cues to continue to project voice thr 4-5 feet. Pt family was educated on purpose of exercises. Additionally, they were educated on possible use of oral suction to help manage secretions and on swallow enhancement techniques to help reduce risks of aspiration during meals. It is recommended that pt continue to receive SLP services. Will continue to follow and treat  Ronald Borrero, Speech Student  Visit Dx:      ICD-10-CM ICD-9-CM   1. Cerebrovascular accident (CVA), unspecified mechanism (CMS/HCC) I63.9 434.91   2. Dysphagia, unspecified type R13.10 787.20   3. Decreased activities of daily living (ADL) Z78.9 V49.89   4. Aphasia R47.01 784.3   5. Impaired functional mobility, balance, gait, and endurance Z74.09 V49.89     Patient Active " Problem List   Diagnosis   • CAD (coronary artery disease)   • Hypertension   • Hyperlipidemia   • Cerebrovascular accident (CVA) due to thrombosis of left middle cerebral artery (CMS/HCC)   • Rheumatoid arthritis (CMS/HCC)   • Osteopenia   • Meningioma (CMS/HCC)   • Altered taste   • Cervical radiculopathy   • Pneumonia of left upper lobe due to infectious organism (CMS/HCC)   • Basal cell carcinoma   • Coronary artery disease involving coronary bypass graft of native heart without angina pectoris   • Non-smoker   • BMI 23.0-23.9, adult   • Impaired memory   • Cerebrovascular accident (CVA) (CMS/HCC)        Therapy Treatment  Rehabilitation Treatment Summary     Row Name 10/25/19 1220 10/25/19 0856 10/25/19 0715       Treatment Time/Intention    Discipline  speech language pathologist  (Pended)   -AS  physical therapy assistant  -KJ  occupational therapist  -HOLLY ARENAS AC2    Document Type  therapy note (daily note)  (Pended)   -AS  therapy note (daily note)  -KJ2  therapy note (daily note)  -HOLLY ARENAS AC2    Subjective Information  no complaints  (Pended)   -AS  complains of;pain  -KJ2  no complaints  -HOLLY ARENAS AC2    Mode of Treatment  speech-language pathology  (Pended)   -AS  physical therapy  -KJ2  occupational therapy  -HOLLY ARENAS AC2    Patient/Family Observations  sons present  (Pended)   -AS  son/spouse present  -KJ2   present  -HOLLY ARENAS AC2    Care Plan Review  care plan/treatment goals reviewed  (Pended)   -AS  --  evaluation/treatment results reviewed;care plan/treatment goals reviewed;risks/benefits reviewed;current/potential barriers reviewed;patient/other agree to care plan  -HOLLY ARENAS AC2    Care Plan Review, Other Participant(s)  --  --  spouse  -HOLLY ARENAS AC2    Patient Effort  good  (Pended)   -AS  good  -KJ2  good  -HOLLY ARENAS AC2    Comment  --  c/o right knee pn; limiting activity this am  -KJ2  --    Existing Precautions/Restrictions  --  fall  -KJ2  fall  -HOLLY ARENAS AC2    Recorded by [AS] Ronald Borrero Speech  Therapy Student 10/25/19 1308 [KJ] Raiza Morgan, PTA 10/25/19 0856  [KJ2] Raiza Morgan, PTA 10/25/19 0926 [AC,LS,AC2] Zander Henderson OTR/L, CNT (r) Jennifer Hernandez OT Student (t) Zander Henderson, OTR/L, CNT (c) 10/25/19 1107    Row Name 10/25/19 0715             Cognitive Assessment/Intervention    Additional Documentation  Cognitive Assessment/Intervention (Group)  -AC,LS,AC2      Recorded by [AC,LS,AC2] Zander Henderson OTR/L, CNT (r) Jennifer Hernandez OT Student (t) Zander Henderson, OTR/L, CNT (c) 10/25/19 1107      Row Name 10/25/19 0715             Cognitive Assessment/Intervention- PT/OT    Affect/Mental Status (Cognitive)  flat/blunted affect  -AC,LS,AC2      Orientation Status (Cognition)  oriented to;person;place;situation;disoriented to;time  -AC,LS,AC2      Personal Safety Interventions  fall prevention program maintained;gait belt;muscle strengthening facilitated;nonskid shoes/slippers when out of bed;supervised activity  -AC,LS,AC2      Cognitive Assessment/Intervention Comment  pt required long processing time for birth year; disoriented to current year; pt demos flat affect w/ occasional slurred and soft speech  -AC,LS,AC2      Recorded by [AC,LS,AC2] Zander Henderson OTR/L, CNT (r) Jennifer Hernandez OT Student (t) Zander Henderson, OTR/L, CNT (c) 10/25/19 1107      Row Name 10/25/19 0715             Safety Issues, Functional Mobility    Safety Issues Affecting Function (Mobility)  positioning of assistive device;problem solving  -AC,LS,AC2      Recorded by [AC,LS,AC2] Zander Henderson OTR/L, CNT (r) Jennifer Hernandez OT Student (t) Zander Henderson, OTR/L, CNT (c) 10/25/19 1107      Row Name 10/25/19 0856 10/25/19 0715          Bed Mobility Assessment/Treatment    Bed Mobility Assessment/Treatment  --  scooting/bridging;supine-sit  -AC,LS,AC2     Scooting/Bridging [place] (Bed Mobility)  --  minimum assist (75% patient effort);verbal cues  -AC,LS,AC2     Supine-Sit [place] (Bed  Mobility)  --  minimum assist (75% patient effort);verbal cues  -AC,LS,AC2     Assistive Device (Bed Mobility)  --  bed rails;draw sheet;head of bed elevated  -AC,LS,AC2     Comment (Bed Mobility)  sitting in chair  -KJ  --     Recorded by [KJ] Raiza Morgan PTA 10/25/19 0989 [AC,LS,AC2] Zander Henderson, OTR/L, CNT (r) Jennifer Hernandez, OT Student (t) Zander Henderson, OTR/L, CNT (c) 10/25/19 1107     Row Name 10/25/19 0715             Functional Mobility    Functional Mobility- Ind. Level  contact guard assist;verbal cues required  -AC,LS,AC2      Functional Mobility- Device  rolling walker  -AC,LS,AC2      Functional Mobility- Safety Issues  step length decreased;other (see comments) positioning of rw  -AC,LS,AC2      Functional Mobility- Comment  to BR; pt does not take rw w/ self when making turns or walking backward; pt will begin using rw again following VCs and placement of rw in front of pt; multiple VCs required for taking large steps, pt continued shuffling gait  -AC,LS,AC2      Recorded by [AC,LS,AC2] Zander Henderson, OTR/L, CNT (r) Jennifer Hernandez, OT Student (t) Zander Henderson, OTR/L, CNT (c) 10/25/19 1107      Row Name 10/25/19 0715             Transfer Assessment/Treatment    Transfer Assessment/Treatment  sit-stand transfer;stand-sit transfer;toilet transfer  -AC,LS,AC2      Recorded by [AC,LS,AC2] Zander Henderson, OTR/L, CNT (r) Jennifer Hernandez, OT Student (t) Zander Henderson, OTR/L, CNT (c) 10/25/19 1107      Row Name 10/25/19 0856 10/25/19 0715          Sit-Stand Transfer    Sit-Stand [place] (Transfers)  verbal cues;moderate assist (50% patient effort) from recliner  -KJ  minimum assist (75% patient effort)  -AC,LS,AC2     Assistive Device (Sit-Stand Transfers)  walker, front-wheeled  -KJ  walker, front-wheeled  -AC,LS,AC2     Recorded by [KJ] Raiza Morgan, PTA 10/25/19 0926 [AC,LS,AC2] Zander Henderson, OTR/L, CNT (r) DeliarJennifer OT Student (t) Zander Henderson, OTR/L, CNT  (c) 10/25/19 1107     Row Name 10/25/19 0856 10/25/19 0715          Stand-Sit Transfer    Stand-Sit [place] (Transfers)  verbal cues;minimum assist (75% patient effort)  -KJ  minimum assist (75% patient effort)  -AC,LS,AC2     Assistive Device (Stand-Sit Transfers)  walker, front-wheeled  -KJ  walker, front-wheeled  -AC,LS,AC2     Recorded by [KJ] Raiza Morgan, PTA 10/25/19 0926 [AC,LS,AC2] Zander Henderson, OTR/L, CNT (r) Jennifer Hernandez, OT Student (t) Zander Henderson, OTR/L, CNT (c) 10/25/19 1107     Row Name 10/25/19 0715             Toilet Transfer    Type (Toilet Transfer)  sit-stand;stand-sit  -AC,LS,AC2      [place] Level (Toilet Transfer)  contact guard  -AC,LS,AC2      Assistive Device (Toilet Transfer)  walker, front-wheeled;grab bars/safety frame  -AC,LS,AC2      Recorded by [AC,LS,AC2] Zander Henderson, OTR/L, CNT (r) Jennifer Hernandez, OT Student (t) Zander Henderson, OTR/L, CNT (c) 10/25/19 1107      Row Name 10/25/19 0856             Gait/Stairs Assessment/Training    [place] Level (Gait)  verbal cues;minimum assist (75% patient effort)  -KJ      Assistive Device (Gait)  walker, front-wheeled  -KJ      Distance in Feet (Gait)  30' x 2  -KJ      Pattern (Gait)  step-to  -KJ      Deviations/Abnormal Patterns (Gait)  bilateral deviations;base of support, narrow;festinating/shuffling;gait speed decreased;stride length decreased  -KJ      Bilateral Gait Deviations  heel strike decreased  -KJ      Comment (Gait/Stairs)  cues to increase step length; shuffling BLE's > on right. Pain Right knee limiting activity this morning  -KJ      Recorded by [KJ] Raiza Morgan, PTA 10/25/19 0926      Row Name 10/25/19 0715             ADL Assessment/Intervention    BADL Assessment/Intervention  lower body dressing;grooming  -AC,LS,AC2      Recorded by [AC,LS,AC2] Zander Henderson, MANOLO/KEVIN, CNT (r) Jennifer Hernandez OT Student (t) Zander Henderson, MANOLO/L, JUAN PABLO (c) 10/25/19 1107      Row Name 10/25/19 0614              Lower Body Dressing Assessment/Training    Lower Body Dressing [place] Level  don;socks;other (see comments) SBA  -AC,LS,AC2      Lower Body Dressing Position  edge of bed sitting  -AC,LS,AC2      Recorded by [AC,LS,AC2] Zander Henderson, OTR/L, CNT (r) Jennifer Hernandez OT Student (t) Zander Henderson, OTR/L, CNT (c) 10/25/19 1107      Row Name 10/25/19 0715             Grooming Assessment/Training    [place] Level (Grooming)  oral care regimen;wash face, hands;hair care, combing/brushing;other (see comments) SBA  -AC,LS,AC2      Grooming Position  supported standing  -AC,LS,AC2      Comment (Grooming)  pt requested help w/ pulling her hair back w/ hair tie following brushing; after standing for approx 20 minutes, pt c/o R knee pain and R sided and posterior lean worsened, 1 LOB noted; chair was brought to pt for rest break  -AC,LS,AC2      Recorded by [AC,LS,AC2] Zander Henderson, OTR/L, CNT (r) Jennifer Hernandez, OT Student (t) Zander Henderson, OTR/L, CNT (c) 10/25/19 1107      Row Name 10/25/19 0856             Motor Skills Assessment/Interventions    Additional Documentation  Therapeutic Exercise (Group)  -KJ      Recorded by [KJ] Raiza Morgan, PTA 10/25/19 0926      Row Name 10/25/19 0856             Therapeutic Exercise    Exercise Type (Therapeutic Exercise)  AAROM (active assistive range of motion);AROM (active range of motion)  -KJ      Position (Therapeutic Exercise)  supine;seated  -KJ      Sets/Reps (Therapeutic Exercise)  10  -KJ      Recorded by [KJ] Raiza Morgan, PTA 10/25/19 0926      Row Name 10/25/19 0856 10/25/19 0715          Positioning and Restraints    Pre-Treatment Position  sitting in chair/recliner  -KJ  in bed  -AC,LS,AC2     Post Treatment Position  chair  -KJ  chair  -AC,LS,AC2     In Bed  call light within reach;with family/caregiver  -KJ  --     In Chair  --  sitting;call light within reach;encouraged to call for assist;with family/caregiver  -AC,LS,AC2      Recorded by [KJ] Raiza Morgan, PTA 10/25/19 0926 [AC,LS,AC2] Zander Henderson OTR/L, CNT (r) Jennifer Hernandez, OT Student (t) Zander Henderson, OTR/L, CNT (c) 10/25/19 1107     Row Name 10/25/19 0715             Pain Assessment    Additional Documentation  Pain Scale: Numbers Pre/Post-Treatment (Group)  -AC,LS,AC2      Recorded by [AC,LS,AC2] Zander Henderson OTR/L, CNT (r) Jennifer Hernandez, OT Student (t) Zander Henderson, OTR/L, CNT (c) 10/25/19 1107      Row Name 10/25/19 0856 10/25/19 0715          Pain Scale: Numbers Pre/Post-Treatment    Pain Scale: Numbers, Pretreatment  9/10  -KJ  0/10 - no pain  -AC,LS,AC2     Pain Scale: Numbers, Post-Treatment  9/10  -KJ  0/10 - no pain  -AC,LS,AC2     Pain Location - Side  Right  -KJ  --     Pain Location  knee  -KJ  --     Pre/Post Treatment Pain Comment  --  c/o R knee pain after standing for approx 20 minutes  -AC,LS,AC2     Pain Intervention(s)  --  Repositioned;Ambulation/increased activity  -AC,LS,AC2     Recorded by [KJ] Raiza Morgan, PTA 10/25/19 0926 [AC,LS,AC2] Zander Henderson, OTR/L, CNT (r) Jennifer Hernandez, OT Student (t) Zander Henderson, OTR/L, CNT (c) 10/25/19 1107     Row Name 10/25/19 1220             Pain Scale: FACES Pre/Post-Treatment    Pain: FACES Scale, Pretreatment  0-->no hurt  (Pended)   -AS      Recorded by [AS] Ronald Borrero, Speech Therapy Student 10/25/19 1308      Row Name 10/25/19 0715             Plan of Care Review    Plan of Care Reviewed With  patient;spouse  -AC,LS,AC2      Recorded by [AC,LS,AC2] Zander Henderson, OTR/L, CNT (r) Jennifer Hernandez, OT Student (t) Zander Henderson, OTR/L, CNT (c) 10/25/19 1107      Row Name 10/25/19 0715             Outcome Summary/Treatment Plan (OT)    Daily Summary of Progress (OT)  progress toward functional goals is good  -AC,LS,AC2      Barriers to Overall Progress (OT)  cognitive status  -AC,LS,AC2      Recorded by [AC,LS,AC2] Zander Henderson, OTR/L, CNT (r) Jennifer Hernandez OT Student  (t) Zander Henderson, OTR/L, CNT (c) 10/25/19 1107      Row Name 10/25/19 1220             Outcome Summary/Treatment Plan (SLP)    Daily Summary of Progress (SLP)  progress towards functional goals is fair  (Pended)   -AS      Barriers to Overall Progress (SLP)  cognition  (Pended)   -AS      Plan for Continued Treatment (SLP)  continue to follow   (Pended)   -AS      Anticipated Dischage Disposition  unknown  (Pended)   -AS      Recorded by [AS] Ronald Borrero, Speech Therapy Student 10/25/19 1308        User Key  (r) = Recorded By, (t) = Taken By, (c) = Cosigned By    Initials Name Effective Dates Discipline    AC Zander Henderson, OTR/L, CNT 04/09/19 -  OT    Raiza Zarate, PTA 08/02/16 -  PT    Jennifer Torres, OT Student 08/14/19 -  OT    AS Ronald Borrero, Speech Therapy Student 09/03/19 -  SLP          EDUCATION  The patient has been educated in the following areas:   Cognitive Impairment.    SLP Recommendation and Plan  Daily Summary of Progress (SLP): (P) progress towards functional goals is fair  Barriers to Overall Progress (SLP): (P) cognition  Plan for Continued Treatment (SLP): (P) continue to follow   Anticipated Dischage Disposition: (P) unknown             SLP GOALS     Row Name 10/25/19 1220 10/24/19 0825 10/23/19 1038       Oral Nutrition/Hydration Goal 1 (SLP)    Oral Nutrition/Hydration Goal 1, SLP  Pt will tolerate least restrictive diet with no overt s/s of aspiration.   (Pended)   -AS  --  Pt will tolerate least restrictive diet with no overt s/s of aspiration.   -MB    Time Frame (Oral Nutrition/Hydration Goal 1, SLP)  by discharge  (Pended)   -AS  --  by discharge  -MB    Barriers (Oral Nutrition/Hydration Goal 1, SLP)  n/a  (Pended)   -AS  --  n/a  -MB    Progress/Outcomes (Oral Nutrition/Hydration Goal 1, SLP)  goal ongoing  (Pended)   -AS  --  goal ongoing  -MB       Labial Strengthening Goal 1 (SLP)    Activity (Labial Strengthening Goal 1, SLP)  increase labial tone   (Pended)   -AS  --  increase labial tone  -MB    Increase Labial Tone  labial resistance exercises  (Pended)   -AS  --  labial resistance exercises  -MB    [place]/Accuracy (Labial Strengthening Goal 1, SLP)  independently (over 90% accuracy)  (Pended)   -AS  --  independently (over 90% accuracy)  -MB    Time Frame (Labial Strengthening Goal 1, SLP)  short term goal (STG);by discharge  (Pended)   -AS  --  short term goal (STG);by discharge  -MB    Barriers (Labial Strengthening Goal 1, SLP)  n/a  (Pended)   -AS  --  n/a  -MB    Progress/Outcomes (Labial Strengthening Goal 1, SLP)  goal ongoing  (Pended)   -AS  --  goal ongoing  -MB       Lingual Strengthening Goal 1 (SLP)    Activity (Lingual Strengthening Goal 1, SLP)  increase lingual tone/sensation/control/coordination/movement  (Pended)   -AS  --  increase lingual tone/sensation/control/coordination/movement  -MB    Increase Lingual Tone/Sensation/Control/Coordination/Movement  lingual movement exercises  (Pended)   -AS  --  lingual movement exercises  -MB    [place]/Accuracy (Lingual Strengthening Goal 1, SLP)  independently (over 90% accuracy)  (Pended)   -AS  --  independently (over 90% accuracy)  -MB    Time Frame (Lingual Strengthening Goal 1, SLP)  short term goal (STG);by discharge  (Pended)   -AS  --  short term goal (STG);by discharge  -MB    Barriers (Lingual Strengthening Goal 1, SLP)  n/a  (Pended)   -AS  --  n/a  -MB    Progress/Outcomes (Lingual Strengthening Goal 1, SLP)  goal ongoing  (Pended)   -AS  --  goal ongoing  -MB       Pharyngeal Strengthening Exercise Goal 1 (SLP)    Activity (Pharyngeal Strengthening Goal 1, SLP)  increase timing  (Pended)   -AS  --  increase timing  -MB    Increase Timing  gustatory stimulation (sour/cold)  (Pended)   -AS  --  gustatory stimulation (sour/cold)  -MB    [place]/Accuracy (Pharyngeal Strengthening Goal 1, SLP)  independently (over 90% accuracy)  (Pended)   -AS  --  independently (over 90%  accuracy)  -MB    Time Frame (Pharyngeal Strengthening Goal 1, SLP)  short term goal (STG);by discharge  (Pended)   -AS  --  short term goal (STG);by discharge  -MB    Barriers (Pharyngeal Strengthening Goal 1, SLP)  n/a  (Pended)   -AS  --  n/a  -MB    Progress/Outcomes (Pharyngeal Strengthening Goal 1, SLP)  goal ongoing  (Pended)   -AS  --  goal ongoing  -MB       Word Retrieval Skills Goal 1 (SLP)    Improve Word Retrieval Skills By Goal 1 (SLP)  completing a divergent task;completing functional word finding tasks;confrontational naming task;with minimal cues (75-90%);responsive naming task  (Pended)   -AS  completing a divergent task;completing functional word finding tasks;confrontational naming task;with minimal cues (75-90%);responsive naming task  -MB (r) AS (t) MB (c)  --    Time Frame (Word Retrieval Goal 1, SLP)  short term goal (STG);by discharge  (Pended)   -AS  short term goal (STG);by discharge  -MB (r) AS (t) MB (c)  --    Barriers (Word Retrieval Goal 1, SLP)  aphasia  (Pended)   -AS  aphasia  -MB (r) AS (t) MB (c)  --    Progress (Word Retrieval Skills Goal 1, SLP)  80%;with minimal cues (75-90%)  (Pended)   -AS  --  --    Progress/Outcomes (Word Retrieval Goal 1, SLP)  continuing progress toward goal  (Pended)   -AS  goal ongoing  -MB (r) AS (t) MB (c)  --       Ability to Construct Phrase and Sentence Level Response Goal 1 (SLP)    Improve Ability to Construct Phrase and Sentence Level Responses By Goal 1 (SLP)  constructing a sentence with a key word;with minimal cues (75-90%)  (Pended)   -AS  constructing a sentence with a key word;with minimal cues (75-90%)  -MB (r) AS (t) MB (c)  --    Time Frame (Phrase and Sentence Level Response Goal 1, SLP)  short term goal (STG);by discharge  (Pended)   -AS  short term goal (STG);by discharge  -MB (r) AS (t) MB (c)  --    Barriers (Phrase and Sentence Level Response Goal 1, SLP)  aphasia  (Pended)   -AS  aphasia  -MB (r) AS (t) MB (c)  --    Progress  (Construct Phrase and Sentence Level Response Goal 1, SLP)  80%;with minimal cues (75-90%)  (Pended)   -AS  --  --    Progress/Outcomes (Phrase and Sentence Level Response Goal 1, SLP)  continuing progress toward goal  (Pended)   -AS  goal ongoing  -MB (r) AS (t) MB (c)  --       Connected Speech to Express Thoughts Goal 1 (SLP)    Improve Narrative Discourse to Express Thoughts By Goal 1 (SLP)  describing a picture;with minimal cues (75-90%)  (Pended)   -AS  describing a picture;with minimal cues (75-90%)  -MB (r) AS (t) MB (c)  --    Time Frame (Connected Speech Goal 1, SLP)  short term goal (STG);by discharge  (Pended)   -AS  short term goal (STG);by discharge  -MB (r) AS (t) MB (c)  --    Barriers (Connected Speech Goal 1, SLP)  aphasia  (Pended)   -AS  aphasia  -MB (r) AS (t) MB (c)  --    Progress (Connected Speech Goal 1, SLP)  40%  (Pended)   -AS  --  --    Progress/Outcomes (Connected Speech Goal 1, SLP)  continuing progress toward goal  (Pended)   -AS  --  --       Phonation Goal 1 (SLP)    Improve Phonation By Goal 1 (SLP)  using loud speech;independently (over 90% accuracy)  (Pended)   -AS  using loud speech;independently (over 90% accuracy)  -MB (r) AS (t) MB (c)  --    Time Frame (Phonation Goal 1, SLP)  short term goal (STG);by discharge  (Pended)   -AS  short term goal (STG);by discharge  -MB (r) AS (t) MB (c)  --    Barriers (Phonation Goal 1, SLP)  n/a  (Pended)   -AS  n/a  -MB (r) AS (t) MB (c)  --    Progress (Phonation Goal 1, SLP)  50%;with moderate cues (50-74%)  (Pended)   -AS  --  --    Progress/Outcomes (Phonation Goal 1, SLP)  continuing progress toward goal  (Pended)   -AS  goal ongoing  -MB (r) AS (t) MB (c)  --       Articulation Goal 1 (SLP)    Improve Articulation Goal 1 (SLP)  by over-articulating at word level;by over-articulating at phrase level;independently (over 90% accuracy)  (Pended)   -AS  by over-articulating at word level;by over-articulating at phrase level;independently  (over 90% accuracy)  -MB (r) AS (t) MB (c)  --    Time Frame (Articulation Goal 1, SLP)  short term goal (STG);by discharge  (Pended)   -AS  short term goal (STG);by discharge  -MB (r) AS (t) MB (c)  --    Barriers (Articulation Goal 1, SLP)  n/a  (Pended)   -AS  n/a  -MB (r) AS (t) MB (c)  --    Progress/Outcomes (Articulation Goal 1, SLP)  goal ongoing  (Pended)   -AS  goal ongoing  -MB (r) AS (t) MB (c)  --       Attention Goal 1 (SLP)    Improve Attention by Goal 1 (SLP)  complete sustained attention task;with minimal cues (75-90%)  (Pended)   -AS  complete sustained attention task;with minimal cues (75-90%)  -MB (r) AS (t) MB (c)  --    Time Frame (Attention Goal 1, SLP)  short term goal (STG);by discharge  (Pended)   -AS  short term goal (STG);by discharge  -MB (r) AS (t) MB (c)  --    Barriers (Attention Goal 1, SLP)  n/a  (Pended)   -AS  n/a  -MB (r) AS (t) MB (c)  --    Progress/Outcomes (Attention Goal 1, SLP)  goal ongoing  (Pended)   -AS  --  --       Organizational Skills Goal 1 (SLP)    Improve Thought Organization Through Goal 1 (SLP)  completing a divergent naming task;with minimal cues (75-90%)  (Pended)   -AS  completing a divergent naming task;with minimal cues (75-90%)  -MB (r) AS (t) MB (c)  --    Time Frame (Thought Organization Skills Goal 1, SLP)  short term goal (STG);by discharge  (Pended)   -AS  short term goal (STG);by discharge  -MB (r) AS (t) MB (c)  --    Barriers (Thought Organization Skills Goal 1, SLP)  aphasia  (Pended)   -AS  aphasia  -MB (r) AS (t) MB (c)  --    Progress (Thought Organization Skills Goal 1, SLP)  70%;with moderate cues (50-74%)  (Pended)   -AS  --  --      User Key  (r) = Recorded By, (t) = Taken By, (c) = Cosigned By    Initials Name Provider Type    Amanda Del Castillo CCC-SLP Speech and Language Pathologist    AS Ronald Borrero, Speech Therapy Student Speech Therapy Student              Time Calculation:     Time Calculation- SLP     Row Name 10/25/19  1328             Time Calculation- SLP    SLP Start Time  1220  (Pended)   -AS      SLP Stop Time  1259  (Pended)   -AS      SLP Time Calculation (min)  39 min  (Pended)   -AS      SLP Received On  10/25/19  (Pended)   -AS        User Key  (r) = Recorded By, (t) = Taken By, (c) = Cosigned By    Initials Name Provider Type    AS Ronald Borrero Speech Therapy Student Speech Therapy Student          Therapy Charges for Today     Code Description Service Date Service Provider Modifiers Qty    [id number] HC ST ASSESSMENT OF APHASIA PER HOUR 10/24/2019 Ronald Borrero Speech Therapy Student GN 3    [id number] HC ST TREATMENT SWALLOW 1 10/25/2019 Ronald Borrero Speech Therapy Student GN 1    [id number] HC ST TREATMENT SPEECH 2 10/25/2019 Ronald Borrero Speech Therapy Student GN 1                     Cony Carr Therapy Student  10/25/2019   and Acute Care - Speech Language Pathology   Swallow Treatment Note  [place]     Patient Name: Adela Goetz  : 1945  MRN: [id number]  Today's Date: 10/25/2019  Onset of Illness/Injury or Date of Surgery: 10/23/19     Referring Physician: Dr. Miguelina Massey      Admit Date: 10/23/2019    Visit Dx:      ICD-10-CM ICD-9-CM   1. Cerebrovascular accident (CVA), unspecified mechanism (CMS/HCC) I63.9 434.91   2. Dysphagia, unspecified type R13.10 787.20   3. Decreased activities of daily living (ADL) Z78.9 V49.89   4. Aphasia R47.01 784.3   5. Impaired functional mobility, balance, gait, and endurance Z74.09 V49.89     Patient Active Problem List   Diagnosis   • CAD (coronary artery disease)   • Hypertension   • Hyperlipidemia   • Cerebrovascular accident (CVA) due to thrombosis of left middle cerebral artery (CMS/HCC)   • Rheumatoid arthritis (CMS/HCC)   • Osteopenia   • Meningioma (CMS/HCC)   • Altered taste   • Cervical radiculopathy   • Pneumonia of left upper lobe due to infectious organism (CMS/HCC)   • Basal cell carcinoma   • Coronary artery disease  involving coronary bypass graft of native heart without angina pectoris   • Non-smoker   • BMI 23.0-23.9, adult   • Impaired memory   • Cerebrovascular accident (CVA) (CMS/[facility])       Therapy Treatment  Rehabilitation Treatment Summary     Row Name 10/25/19 1220 10/25/19 0856 10/25/19 0715       Treatment Time/Intention    Discipline  speech language pathologist  (Pended)   -AS  physical therapy assistant  -KJ  occupational therapist  -DEEPALS,AC2    Document Type  therapy note (daily note)  (Pended)   -AS  therapy note (daily note)  -KJ2  therapy note (daily note)  -DEEPALS,AC2    Subjective Information  no complaints  (Pended)   -AS  complains of;pain  -KJ2  no complaints  -AC,LS,AC2    Mode of Treatment  speech-language pathology  (Pended)   -AS  physical therapy  -KJ2  occupational therapy  -AC,LS,AC2    Patient/Family Observations  sons present  (Pended)   -AS  son/spouse present  -KJ2   present  -AC,LS,AC2    Care Plan Review  care plan/treatment goals reviewed  (Pended)   -AS  --  evaluation/treatment results reviewed;care plan/treatment goals reviewed;risks/benefits reviewed;current/potential barriers reviewed;patient/other agree to care plan  -AC,LS,AC2    Care Plan Review, Other Participant(s)  --  --  spouse  -DEEPALS,AC2    Patient Effort  good  (Pended)   -AS  good  -KJ2  good  -AC,LS,AC2    Comment  --  c/o right knee pn; limiting activity this am  -KJ2  --    Existing Precautions/Restrictions  --  fall  -KJ2  fall  -AC,LS,AC2    Recorded by [AS] Ronald Borrero, Speech Therapy Student 10/25/19 1308 [KJ] Raiza Morgan, PTA 10/25/19 0856  [KJ2] Raiza Morgan, PTA 10/25/19 0926 [AC,LS,AC2] Zander Henderson, OTR/L, CNT (r) Jennifer Hernandez OT Student (t) Zander Henderson, OTR/L, CNT (c) 10/25/19 1107    Row Name 10/25/19 0715             Cognitive Assessment/Intervention    Additional Documentation  Cognitive Assessment/Intervention (Group)  -AC,LS,AC2      Recorded by [DEEPALS,AC2] Zander Henderson,  OTLEEANNA/KEVIN, CNT (r) Jennifer Hernandez, OT Student (t) Zander Henderson OTR/L, CNT (c) 10/25/19 1107      Row Name 10/25/19 0715             Cognitive Assessment/Intervention- PT/OT    Affect/Mental Status (Cognitive)  flat/blunted affect  -AC,LS,AC2      Orientation Status (Cognition)  oriented to;person;place;situation;disoriented to;time  -AC,LS,AC2      Personal Safety Interventions  fall prevention program maintained;gait belt;muscle strengthening facilitated;nonskid shoes/slippers when out of bed;supervised activity  -AC,LS,AC2      Cognitive Assessment/Intervention Comment  pt required long processing time for birth year; disoriented to current year; pt demos flat affect w/ occasional slurred and soft speech  -AC,LS,AC2      Recorded by [AC,LS,AC2] Zander Henderson OTR/KEVIN, JUAN PABLO (r) Jennifer Hernandez, OT Student (t) Zander Henderson OTR/L, CNT (c) 10/25/19 1107      Row Name 10/25/19 0715             Safety Issues, Functional Mobility    Safety Issues Affecting Function (Mobility)  positioning of assistive device;problem solving  -AC,LS,AC2      Recorded by [AC,LS,AC2] Zander Henderson OTR/KEVIN, JUAN PABLO (r) Jennifer Hernandez, OT Student (t) Zander Henderson OTR/L, CNT (c) 10/25/19 1107      Row Name 10/25/19 0856 10/25/19 0715          Bed Mobility Assessment/Treatment    Bed Mobility Assessment/Treatment  --  scooting/bridging;supine-sit  -AC,LS,AC2     Scooting/Bridging [place] (Bed Mobility)  --  minimum assist (75% patient effort);verbal cues  -AC,LS,AC2     Supine-Sit [place] (Bed Mobility)  --  minimum assist (75% patient effort);verbal cues  -AC,LS,AC2     Assistive Device (Bed Mobility)  --  bed rails;draw sheet;head of bed elevated  -AC,LS,AC2     Comment (Bed Mobility)  sitting in chair  -KJ  --     Recorded by [KJ] Raiza Morgan, PTA 10/25/19 6911 [AC,LS,AC2] Zander Henderson OTR/KEVIN, CNT (r) Jennifer Hernandez OT Student (t) Zander Henderson OTR/L, JUAN PABLO (c) 10/25/19 1107     Row Name 10/25/19 0720              Functional Mobility    Functional Mobility- Ind. Level  contact guard assist;verbal cues required  -AC,LS,AC2      Functional Mobility- Device  rolling walker  -AC,LS,AC2      Functional Mobility- Safety Issues  step length decreased;other (see comments) positioning of rw  -AC,LS,AC2      Functional Mobility- Comment  to BR; pt does not take rw w/ self when making turns or walking backward; pt will begin using rw again following VCs and placement of rw in front of pt; multiple VCs required for taking large steps, pt continued shuffling gait  -AC,LS,AC2      Recorded by [AC,LS,AC2] Zander Henderson OTR/L, CNT (r) Jennifer Hernandez, OT Student (t) Zander Henderson, OTR/L, CNT (c) 10/25/19 1107      Row Name 10/25/19 0715             Transfer Assessment/Treatment    Transfer Assessment/Treatment  sit-stand transfer;stand-sit transfer;toilet transfer  -AC,LS,AC2      Recorded by [AC,LS,AC2] Zander Henderson OTR/L, CNT (r) Jennifer Hernandez, OT Student (t) Zander Henderson, OTR/L, CNT (c) 10/25/19 1107      Row Name 10/25/19 0856 10/25/19 0715          Sit-Stand Transfer    Sit-Stand [place] (Transfers)  verbal cues;moderate assist (50% patient effort) from recliner  -KJ  minimum assist (75% patient effort)  -AC,LS,AC2     Assistive Device (Sit-Stand Transfers)  walker, front-wheeled  -KJ  walker, front-wheeled  -AC,LS,AC2     Recorded by [KJ] Raiza Morgan, PTA 10/25/19 0926 [AC,LS,AC2] Zander Henderson, OTR/L, CNT (r) Jennifer Hernandez, OT Student (t) Zander Henderson, OTR/L, CNT (c) 10/25/19 1107     Row Name 10/25/19 0856 10/25/19 0715          Stand-Sit Transfer    Stand-Sit [place] (Transfers)  verbal cues;minimum assist (75% patient effort)  -KJ  minimum assist (75% patient effort)  -AC,LS,AC2     Assistive Device (Stand-Sit Transfers)  walker, front-wheeled  -KJ  walker, front-wheeled  -AC,LS,AC2     Recorded by [KJ] Raiza Morgan, PTA 10/25/19 0932 [AC,LS,AC2] Zander Henderson, OTR/L, CNT (r)  Jennifer Hernandez, VERN Student (t) Zander Henderson OTR/L, CNT (c) 10/25/19 1107     Row Name 10/25/19 0715             Toilet Transfer    Type (Toilet Transfer)  sit-stand;stand-sit  -AC,LS,AC2      [place] Level (Toilet Transfer)  contact guard  -AC,LS,AC2      Assistive Device (Toilet Transfer)  walker, front-wheeled;grab bars/safety frame  -AC,LS,AC2      Recorded by [AC,LS,AC2] Zander Henderson, OTR/L, CNT (r) Jennifer Hernandez, OT Student (t) Zander Henderson, OTR/L, CNT (c) 10/25/19 1107      Row Name 10/25/19 0856             Gait/Stairs Assessment/Training    [place] Level (Gait)  verbal cues;minimum assist (75% patient effort)  -KJ      Assistive Device (Gait)  walker, front-wheeled  -KJ      Distance in Feet (Gait)  30' x 2  -KJ      Pattern (Gait)  step-to  -KJ      Deviations/Abnormal Patterns (Gait)  bilateral deviations;base of support, narrow;festinating/shuffling;gait speed decreased;stride length decreased  -KJ      Bilateral Gait Deviations  heel strike decreased  -KJ      Comment (Gait/Stairs)  cues to increase step length; shuffling BLE's > on right. Pain Right knee limiting activity this morning  -KJ      Recorded by [KJ] Raiza Morgan, PTA 10/25/19 0926      Row Name 10/25/19 0715             ADL Assessment/Intervention    BADL Assessment/Intervention  lower body dressing;grooming  -AC,LS,AC2      Recorded by [AC,LS,AC2] Zander Henderson, OTR/L, CNT (r) Jennifer Hernandez, VERN Student (t) Zander Henderson, OTR/L, CNT (c) 10/25/19 1107      Row Name 10/25/19 0715             Lower Body Dressing Assessment/Training    Lower Body Dressing [place] Level  don;socks;other (see comments) SBA  -AC,LS,AC2      Lower Body Dressing Position  edge of bed sitting  -AC,LS,AC2      Recorded by [AC,LS,AC2] Zander Henderson, OTR/L, CNT (r) Jennifer Hernandez OT Student (t) Zander Henderson, OTR/L, CNT (c) 10/25/19 1107      Row Name 10/25/19 0715             Grooming Assessment/Training    [place]  Level (Grooming)  oral care regimen;wash face, hands;hair care, combing/brushing;other (see comments) SBA  -AC,LS,AC2      Grooming Position  supported standing  -AC,LS,AC2      Comment (Grooming)  pt requested help w/ pulling her hair back w/ hair tie following brushing; after standing for approx 20 minutes, pt c/o R knee pain and R sided and posterior lean worsened, 1 LOB noted; chair was brought to pt for rest break  -AC,LS,AC2      Recorded by [AC,LS,AC2] Zander Henderson, OTR/L, CNT (r) Jennifer Hernandez, OT Student (t) Zander Henderson, OTR/L, CNT (c) 10/25/19 1107      Row Name 10/25/19 0827             Motor Skills Assessment/Interventions    Additional Documentation  Therapeutic Exercise (Group)  -KJ      Recorded by [KJ] Raiza Morgan, PTA 10/25/19 0926      Row Name 10/25/19 0856             Therapeutic Exercise    Exercise Type (Therapeutic Exercise)  AAROM (active assistive range of motion);AROM (active range of motion)  -KJ      Position (Therapeutic Exercise)  supine;seated  -KJ      Sets/Reps (Therapeutic Exercise)  10  -KJ      Recorded by [KJ] Raiza Morgan, PTA 10/25/19 0926      Row Name 10/25/19 0856 10/25/19 0715          Positioning and Restraints    Pre-Treatment Position  sitting in chair/recliner  -KJ  in bed  -AC,LS,AC2     Post Treatment Position  chair  -KJ  chair  -AC,LS,AC2     In Bed  call light within reach;with family/caregiver  -KJ  --     In Chair  --  sitting;call light within reach;encouraged to call for assist;with family/caregiver  -AC,LS,AC2     Recorded by [KJ] Raiza Morgan, PTA 10/25/19 0926 [AC,LS,AC2] Zander Henderson, OTR/L, CNT (r) Jennifer Hernandez, OT Student (t) Zander Henderson, OTR/L, CNT (c) 10/25/19 1106     Row Name 10/25/19 0715             Pain Assessment    Additional Documentation  Pain Scale: Numbers Pre/Post-Treatment (Group)  -AC,LS,AC2      Recorded by [AC,LS,AC2] Zander Henderson, OTR/L, CNT (r) DeliarJennifer OT Student (t) Zander Henderson,  OTR/L, CNT (c) 10/25/19 1107      Row Name 10/25/19 0856 10/25/19 0715          Pain Scale: Numbers Pre/Post-Treatment    Pain Scale: Numbers, Pretreatment  9/10  -KJ  0/10 - no pain  -AC,LS,AC2     Pain Scale: Numbers, Post-Treatment  9/10  -KJ  0/10 - no pain  -AC,LS,AC2     Pain Location - Side  Right  -KJ  --     Pain Location  knee  -KJ  --     Pre/Post Treatment Pain Comment  --  c/o R knee pain after standing for approx 20 minutes  -AC,LS,AC2     Pain Intervention(s)  --  Repositioned;Ambulation/increased activity  -AC,LS,AC2     Recorded by [KJ] Raiza Morgan, PTA 10/25/19 0926 [AC,LS,AC2] Zander Henderson, OTR/L, CNT (r) Jennifer Hernandez, OT Student (t) Zander Henderson, OTR/L, CNT (c) 10/25/19 1107     Row Name 10/25/19 1220             Pain Scale: FACES Pre/Post-Treatment    Pain: FACES Scale, Pretreatment  0-->no hurt  (Pended)   -AS      Recorded by [AS] Ronald Borrero, Speech Therapy Student 10/25/19 1308      Row Name 10/25/19 0715             Plan of Care Review    Plan of Care Reviewed With  patient;spouse  -AC,LS,AC2      Recorded by [AC,LS,AC2] Zander Henderson, OTR/L, CNT (r) Jennifer Hernandez, OT Student (t) Zander Henderson, OTR/L, CNT (c) 10/25/19 1107      Row Name 10/25/19 0715             Outcome Summary/Treatment Plan (OT)    Daily Summary of Progress (OT)  progress toward functional goals is good  -AC,LS,AC2      Barriers to Overall Progress (OT)  cognitive status  -AC,LS,AC2      Recorded by [AC,LS,AC2] Zander Henderson, OTR/L, CNT (r) Jennifer Hernandez, OT Student (t) Zander Henderson, OTR/L, CNT (c) 10/25/19 1107      Row Name 10/25/19 1220             Outcome Summary/Treatment Plan (SLP)    Daily Summary of Progress (SLP)  progress towards functional goals is fair  (Pended)   -AS      Barriers to Overall Progress (SLP)  cognition  (Pended)   -AS      Plan for Continued Treatment (SLP)  continue to follow   (Pended)   -AS      Anticipated Dischage Disposition  unknown  (Pended)   -AS       Recorded by [AS] Ronald Borrero, Speech Therapy Student 10/25/19 1308        User Key  (r) = Recorded By, (t) = Taken By, (c) = Cosigned By    Initials Name Effective Dates Discipline    AC Zander Henderson, OTR/L, CNT 04/09/19 -  OT    Raiza Zarate, PTA 08/02/16 -  PT    Jennifer Torres, OT Student 08/14/19 -  OT    AS Ronald Borrero, Speech Therapy Student 09/03/19 -  SLP          Outcome Summary  Outcome Summary/Treatment Plan (SLP)  Daily Summary of Progress (SLP): (P) progress towards functional goals is fair (10/25/19 1220 : Ronald Borrero, Speech Therapy Student)  Barriers to Overall Progress (SLP): (P) cognition (10/25/19 1220 : Ronald Borrero, Speech Therapy Student)  Plan for Continued Treatment (SLP): (P) continue to follow  (10/25/19 1220 : Ronald Borrero, Speech Therapy Student)  Anticipated Dischage Disposition: (P) unknown (10/25/19 1220 : Ronald Borrero, Speech Therapy Student)      SLP GOALS     Row Name 10/25/19 1220 10/24/19 0825 10/23/19 1038       Oral Nutrition/Hydration Goal 1 (SLP)    Oral Nutrition/Hydration Goal 1, SLP  Pt will tolerate least restrictive diet with no overt s/s of aspiration.   (Pended)   -AS  --  Pt will tolerate least restrictive diet with no overt s/s of aspiration.   -MB    Time Frame (Oral Nutrition/Hydration Goal 1, SLP)  by discharge  (Pended)   -AS  --  by discharge  -MB    Barriers (Oral Nutrition/Hydration Goal 1, SLP)  n/a  (Pended)   -AS  --  n/a  -MB    Progress/Outcomes (Oral Nutrition/Hydration Goal 1, SLP)  goal ongoing  (Pended)   -AS  --  goal ongoing  -MB       Labial Strengthening Goal 1 (SLP)    Activity (Labial Strengthening Goal 1, SLP)  increase labial tone  (Pended)   -AS  --  increase labial tone  -MB    Increase Labial Tone  labial resistance exercises  (Pended)   -AS  --  labial resistance exercises  -MB    [place]/Accuracy (Labial Strengthening Goal 1, SLP)  independently (over 90% accuracy)  (Pended)   -AS  --   independently (over 90% accuracy)  -MB    Time Frame (Labial Strengthening Goal 1, SLP)  short term goal (STG);by discharge  (Pended)   -AS  --  short term goal (STG);by discharge  -MB    Barriers (Labial Strengthening Goal 1, SLP)  n/a  (Pended)   -AS  --  n/a  -MB    Progress/Outcomes (Labial Strengthening Goal 1, SLP)  goal ongoing  (Pended)   -AS  --  goal ongoing  -MB       Lingual Strengthening Goal 1 (SLP)    Activity (Lingual Strengthening Goal 1, SLP)  increase lingual tone/sensation/control/coordination/movement  (Pended)   -AS  --  increase lingual tone/sensation/control/coordination/movement  -MB    Increase Lingual Tone/Sensation/Control/Coordination/Movement  lingual movement exercises  (Pended)   -AS  --  lingual movement exercises  -MB    [place]/Accuracy (Lingual Strengthening Goal 1, SLP)  independently (over 90% accuracy)  (Pended)   -AS  --  independently (over 90% accuracy)  -MB    Time Frame (Lingual Strengthening Goal 1, SLP)  short term goal (STG);by discharge  (Pended)   -AS  --  short term goal (STG);by discharge  -MB    Barriers (Lingual Strengthening Goal 1, SLP)  n/a  (Pended)   -AS  --  n/a  -MB    Progress/Outcomes (Lingual Strengthening Goal 1, SLP)  goal ongoing  (Pended)   -AS  --  goal ongoing  -MB       Pharyngeal Strengthening Exercise Goal 1 (SLP)    Activity (Pharyngeal Strengthening Goal 1, SLP)  increase timing  (Pended)   -AS  --  increase timing  -MB    Increase Timing  gustatory stimulation (sour/cold)  (Pended)   -AS  --  gustatory stimulation (sour/cold)  -MB    [place]/Accuracy (Pharyngeal Strengthening Goal 1, SLP)  independently (over 90% accuracy)  (Pended)   -AS  --  independently (over 90% accuracy)  -MB    Time Frame (Pharyngeal Strengthening Goal 1, SLP)  short term goal (STG);by discharge  (Pended)   -AS  --  short term goal (STG);by discharge  -MB    Barriers (Pharyngeal Strengthening Goal 1, SLP)  n/a  (Pended)   -AS  --  n/a  -MB     Progress/Outcomes (Pharyngeal Strengthening Goal 1, SLP)  goal ongoing  (Pended)   -AS  --  goal ongoing  -MB       Word Retrieval Skills Goal 1 (SLP)    Improve Word Retrieval Skills By Goal 1 (SLP)  completing a divergent task;completing functional word finding tasks;confrontational naming task;with minimal cues (75-90%);responsive naming task  (Pended)   -AS  completing a divergent task;completing functional word finding tasks;confrontational naming task;with minimal cues (75-90%);responsive naming task  -MB (r) AS (t) MB (c)  --    Time Frame (Word Retrieval Goal 1, SLP)  short term goal (STG);by discharge  (Pended)   -AS  short term goal (STG);by discharge  -MB (r) AS (t) MB (c)  --    Barriers (Word Retrieval Goal 1, SLP)  aphasia  (Pended)   -AS  aphasia  -MB (r) AS (t) MB (c)  --    Progress (Word Retrieval Skills Goal 1, SLP)  80%;with minimal cues (75-90%)  (Pended)   -AS  --  --    Progress/Outcomes (Word Retrieval Goal 1, SLP)  continuing progress toward goal  (Pended)   -AS  goal ongoing  -MB (r) AS (t) MB (c)  --       Ability to Construct Phrase and Sentence Level Response Goal 1 (SLP)    Improve Ability to Construct Phrase and Sentence Level Responses By Goal 1 (SLP)  constructing a sentence with a key word;with minimal cues (75-90%)  (Pended)   -AS  constructing a sentence with a key word;with minimal cues (75-90%)  -MB (r) AS (t) MB (c)  --    Time Frame (Phrase and Sentence Level Response Goal 1, SLP)  short term goal (STG);by discharge  (Pended)   -AS  short term goal (STG);by discharge  -MB (r) AS (t) MB (c)  --    Barriers (Phrase and Sentence Level Response Goal 1, SLP)  aphasia  (Pended)   -AS  aphasia  -MB (r) AS (t) MB (c)  --    Progress (Construct Phrase and Sentence Level Response Goal 1, SLP)  80%;with minimal cues (75-90%)  (Pended)   -AS  --  --    Progress/Outcomes (Phrase and Sentence Level Response Goal 1, SLP)  continuing progress toward goal  (Pended)   -AS  goal ongoing  -MB (r)  AS (t) MB (c)  --       Connected Speech to Express Thoughts Goal 1 (SLP)    Improve Narrative Discourse to Express Thoughts By Goal 1 (SLP)  describing a picture;with minimal cues (75-90%)  (Pended)   -AS  describing a picture;with minimal cues (75-90%)  -MB (r) AS (t) MB (c)  --    Time Frame (Connected Speech Goal 1, SLP)  short term goal (STG);by discharge  (Pended)   -AS  short term goal (STG);by discharge  -MB (r) AS (t) MB (c)  --    Barriers (Connected Speech Goal 1, SLP)  aphasia  (Pended)   -AS  aphasia  -MB (r) AS (t) MB (c)  --    Progress (Connected Speech Goal 1, SLP)  40%  (Pended)   -AS  --  --    Progress/Outcomes (Connected Speech Goal 1, SLP)  continuing progress toward goal  (Pended)   -AS  --  --       Phonation Goal 1 (SLP)    Improve Phonation By Goal 1 (SLP)  using loud speech;independently (over 90% accuracy)  (Pended)   -AS  using loud speech;independently (over 90% accuracy)  -MB (r) AS (t) MB (c)  --    Time Frame (Phonation Goal 1, SLP)  short term goal (STG);by discharge  (Pended)   -AS  short term goal (STG);by discharge  -MB (r) AS (t) MB (c)  --    Barriers (Phonation Goal 1, SLP)  n/a  (Pended)   -AS  n/a  -MB (r) AS (t) MB (c)  --    Progress (Phonation Goal 1, SLP)  50%;with moderate cues (50-74%)  (Pended)   -AS  --  --    Progress/Outcomes (Phonation Goal 1, SLP)  continuing progress toward goal  (Pended)   -AS  goal ongoing  -MB (r) AS (t) MB (c)  --       Articulation Goal 1 (SLP)    Improve Articulation Goal 1 (SLP)  by over-articulating at word level;by over-articulating at phrase level;independently (over 90% accuracy)  (Pended)   -AS  by over-articulating at word level;by over-articulating at phrase level;independently (over 90% accuracy)  -MB (r) AS (t) MB (c)  --    Time Frame (Articulation Goal 1, SLP)  short term goal (STG);by discharge  (Pended)   -AS  short term goal (STG);by discharge  -MB (r) AS (t) MB (c)  --    Barriers (Articulation Goal 1, SLP)  n/a  (Pended)    -AS  n/a  -MB (r) AS (t) MB (c)  --    Progress/Outcomes (Articulation Goal 1, SLP)  goal ongoing  (Pended)   -AS  goal ongoing  -MB (r) AS (t) MB (c)  --       Attention Goal 1 (SLP)    Improve Attention by Goal 1 (SLP)  complete sustained attention task;with minimal cues (75-90%)  (Pended)   -AS  complete sustained attention task;with minimal cues (75-90%)  -MB (r) AS (t) MB (c)  --    Time Frame (Attention Goal 1, SLP)  short term goal (STG);by discharge  (Pended)   -AS  short term goal (STG);by discharge  -MB (r) AS (t) MB (c)  --    Barriers (Attention Goal 1, SLP)  n/a  (Pended)   -AS  n/a  -MB (r) AS (t) MB (c)  --    Progress/Outcomes (Attention Goal 1, SLP)  goal ongoing  (Pended)   -AS  --  --       Organizational Skills Goal 1 (SLP)    Improve Thought Organization Through Goal 1 (SLP)  completing a divergent naming task;with minimal cues (75-90%)  (Pended)   -AS  completing a divergent naming task;with minimal cues (75-90%)  -MB (r) AS (t) MB (c)  --    Time Frame (Thought Organization Skills Goal 1, SLP)  short term goal (STG);by discharge  (Pended)   -AS  short term goal (STG);by discharge  -MB (r) AS (t) MB (c)  --    Barriers (Thought Organization Skills Goal 1, SLP)  aphasia  (Pended)   -AS  aphasia  -MB (r) AS (t) MB (c)  --    Progress (Thought Organization Skills Goal 1, SLP)  70%;with moderate cues (50-74%)  (Pended)   -AS  --  --      User Key  (r) = Recorded By, (t) = Taken By, (c) = Cosigned By    Initials Name Provider Type    Amanda Del Castillo, CCC-SLP Speech and Language Pathologist    AS Ronald Borrero, Speech Therapy Student Speech Therapy Student          EDUCATION  The patient has been educated in the following areas:   Dysphagia (Swallowing Impairment).    SLP Recommendation and Plan  Daily Summary of Progress (SLP): (P) progress towards functional goals is fair  Barriers to Overall Progress (SLP): (P) cognition  Plan for Continued Treatment (SLP): (P) continue to follow    Anticipated Dischage Disposition: (P) unknown                    Time Calculation:   Time Calculation- SLP     Row Name 10/25/19 1328             Time Calculation- SLP    SLP Start Time  1220  (Pended)   -AS      SLP Stop Time  1259  (Pended)   -AS      SLP Time Calculation (min)  39 min  (Pended)   -AS      SLP Received On  10/25/19  (Pended)   -AS        User Key  (r) = Recorded By, (t) = Taken By, (c) = Cosigned By    Initials Name Provider Type    AS Ronald Borrero, Speech Therapy Student Speech Therapy Student          Therapy Charges for Today     Code Description Service Date Service Provider Modifiers Qty    62444608999 HC ST ASSESSMENT OF APHASIA PER HOUR 10/24/2019 Ronald Borrero Speech Therapy Student GN 3    [id number] HC ST TREATMENT SWALLOW 1 10/25/2019 Ronald Borrero Speech Therapy Student GN 1    [id number] HC ST TREATMENT SPEECH 2 10/25/2019 Ronald Borrero Speech Therapy Student GN 1                 Cony Carr Therapy Student  10/25/2019

## 2019-10-25 NOTE — PLAN OF CARE
Problem: Patient Care Overview  Goal: Plan of Care Review  Outcome: Outcome(s) achieved Date Met: 10/25/19   10/25/19 1600   Coping/Psychosocial   Plan of Care Reviewed With patient;family   OTHER   Outcome Summary Patient to be [facility]. Safety maintained, family at bedside. NIH:2, facial droop, dysarthria. Patient has delayed swallowing, nectar thickened liquids, meds whole in applesauce   Plan of Care Review   Progress improving     Goal: Individualization and Mutuality  Outcome: Outcome(s) achieved Date Met: 10/25/19    Goal: Discharge Needs Assessment  Outcome: Outcome(s) achieved Date Met: 10/25/19    Goal: Interprofessional Rounds/Family Conf  Outcome: Outcome(s) achieved Date Met: 10/25/19      Problem: Dysphagia (Adult)  Goal: Identify Related Risk Factors and Signs and Symptoms  Outcome: Outcome(s) achieved Date Met: 10/25/19    Goal: Functional/Safe Swallow  Outcome: Outcome(s) achieved Date Met: 10/25/19    Goal: Compensatory Techniques to Improve Safety/Function with Swallowing  Outcome: Outcome(s) achieved Date Met: 10/25/19      Problem: Stroke (Ischemic) (Adult)  Goal: Signs and Symptoms of Listed Potential Problems Will be Absent, Minimized or Managed (Stroke)  Outcome: Outcome(s) achieved Date Met: 10/25/19      Problem: Fall Risk (Adult)  Goal: Identify Related Risk Factors and Signs and Symptoms  Outcome: Outcome(s) achieved Date Met: 10/25/19    Goal: Absence of Fall  Outcome: Outcome(s) achieved Date Met: 10/25/19

## 2019-10-25 NOTE — PROGRESS NOTES
Continued Stay Note  [facility]     Patient Name: Adela Goetz  MRN: [id number]  Today's Date: 10/25/2019    Admit Date: 10/23/2019    Discharge Plan     Row Name 10/25/19 1050       Plan    Final Discharge Disposition Code  62 - inpatient rehab facility    Final Note  [facility] DID OFFER A BED AND PT ACCEPTS. [facility] BEING [facility]. CALL REPORT NUMBER [phone]        Discharge Codes    No documentation.             MAGRIE Kelley

## 2019-10-25 NOTE — THERAPY TREATMENT NOTE
Acute Care - Physical Therapy Treatment Note  [facility]     Patient Name: Adela Goetz  : 1945  MRN: [id number]  Today's Date: 10/25/2019  Onset of Illness/Injury or Date of Surgery: 10/23/19     Referring Physician: Dr. Miguelina Massey    Admit Date: 10/23/2019    Visit Dx:    ICD-10-CM ICD-9-CM   1. Cerebrovascular accident (CVA), unspecified mechanism (CMS/HCC) I63.9 434.91   2. Dysphagia, unspecified type R13.10 787.20   3. Decreased activities of daily living (ADL) Z78.9 V49.89   4. Aphasia R47.01 784.3   5. Impaired functional mobility, balance, gait, and endurance Z74.09 V49.89     Patient Active Problem List   Diagnosis   • CAD (coronary artery disease)   • Hypertension   • Hyperlipidemia   • Cerebrovascular accident (CVA) due to thrombosis of left middle cerebral artery (CMS/HCC)   • Rheumatoid arthritis (CMS/HCC)   • Osteopenia   • Meningioma (CMS/HCC)   • Altered taste   • Cervical radiculopathy   • Pneumonia of left upper lobe due to infectious organism (CMS/HCC)   • Basal cell carcinoma   • Coronary artery disease involving coronary bypass graft of native heart without angina pectoris   • Non-smoker   • BMI 23.0-23.9, adult   • Impaired memory   • Cerebrovascular accident (CVA) (CMS/HCC)       Therapy Treatment    Rehabilitation Treatment Summary     Row Name 10/25/19 0856 10/25/19 0715          Treatment Time/Intention    Discipline  physical therapy assistant  -KJ  occupational therapist  (Pended)   -     Document Type  therapy note (daily note)  -KJ2  therapy note (daily note)  (Pended)   -     Subjective Information  complains of;pain  -KJ2  no complaints  (Pended)   -LS2     Mode of Treatment  physical therapy  -KJ2  occupational therapy  (Pended)   -     Patient/Family Observations  son/spouse present  -KJ2   present  (Pended)   -LS3     Care Plan Review  --  evaluation/treatment results reviewed;care plan/treatment goals reviewed;risks/benefits reviewed;current/potential  barriers reviewed;patient/other agree to care plan  (Pended)   -LS3     Care Plan Review, Other Participant(s)  --  spouse  (Pended)   -LS3     Patient Effort  good  -KJ2  good  (Pended)   -LS2     Comment  c/o right knee pn; limiting activity this am  -KJ2  --     Existing Precautions/Restrictions  fall  -KJ2  fall  (Pended)   -LS2     Recorded by [KJ] Raiza Morgan, PTA 10/25/19 0856  [KJ2] Raiza Morgan, PTA 10/25/19 0926 [LS] Jennifer Hernandez, OT Student 10/25/19 0726  [LS2] Jennifer Hernandez OT Student 10/25/19 0833  [LS3] Jennifer Hernandez OT Student 10/25/19 0738     Row Name 10/25/19 0715             Cognitive Assessment/Intervention    Additional Documentation  Cognitive Assessment/Intervention (Group)  (Pended)   -LS      Recorded by [LS] Jennifer Hernandez OT Student 10/25/19 0738      Row Name 10/25/19 0715             Cognitive Assessment/Intervention- PT/OT    Affect/Mental Status (Cognitive)  flat/blunted affect  (Pended)   -      Orientation Status (Cognition)  oriented to;person;place;situation;disoriented to;time  (Pended)   -      Personal Safety Interventions  fall prevention program maintained;gait belt;muscle strengthening facilitated;nonskid shoes/slippers when out of bed;supervised activity  (Pended)   -      Cognitive Assessment/Intervention Comment  pt required long processing time for birth year; disoriented to current year; pt demos flat affect w/ occasional slurred and soft speech  (Pended)   -LS      Recorded by [LS] Jennifer Hernandez OT Student 10/25/19 0833      Row Name 10/25/19 0715             Safety Issues, Functional Mobility    Safety Issues Affecting Function (Mobility)  positioning of assistive device;problem solving  (Pended)   -LS      Recorded by [LS] Jennifer Hernandez OT Student 10/25/19 0833      Row Name 10/25/19 0856 10/25/19 0715          Bed Mobility Assessment/Treatment    Bed Mobility Assessment/Treatment  --  scooting/bridging;supine-sit  (Pended)   -      Scooting/Bridging [place] (Bed Mobility)  --  minimum assist (75% patient effort);verbal cues  (Pended)   -LS     Supine-Sit [place] (Bed Mobility)  --  minimum assist (75% patient effort);verbal cues  (Pended)   -     Assistive Device (Bed Mobility)  --  bed rails;draw sheet;head of bed elevated  (Pended)   -LS     Comment (Bed Mobility)  sitting in chair  -KJ  --     Recorded by [KJ] Raiza Morgan, PTA 10/25/19 0926 [LS] Jennifer Hernandez, OT Student 10/25/19 0833     Row Name 10/25/19 0715             Functional Mobility    Functional Mobility- Ind. Level  contact guard assist  (Pended)   -LS      Functional Mobility- Device  rolling walker  (Pended)   -LS      Functional Mobility- Safety Issues  step length decreased;other (see comments)  (Pended)  positioning of rw  -LS      Functional Mobility- Comment  to BR; pt does not take rw w/ self when making turns or walking backwards; multiple VCs required for taking large steps, pt continued shuffling gait  (Pended)   -LS      Recorded by [LS] Jennifer Hernandez, OT Student 10/25/19 0833      Row Name 10/25/19 0715             Transfer Assessment/Treatment    Transfer Assessment/Treatment  sit-stand transfer;stand-sit transfer;toilet transfer  (Pended)   -LS      Recorded by [LS] Jennifer Hernandez, OT Student 10/25/19 0833      Row Name 10/25/19 0856 10/25/19 0715          Sit-Stand Transfer    Sit-Stand [place] (Transfers)  verbal cues;moderate assist (50% patient effort) from recliner  -KJ  minimum assist (75% patient effort)  (Pended)   -LS     Assistive Device (Sit-Stand Transfers)  walker, front-wheeled  -KJ  walker, front-wheeled  (Pended)   -LS     Recorded by [KJ] Raiza Morgan, PTA 10/25/19 0926 [LS] Jennifer Hernandez, OT Student 10/25/19 0834     Row Name 10/25/19 0856 10/25/19 0715          Stand-Sit Transfer    Stand-Sit [place] (Transfers)  verbal cues;minimum assist (75% patient effort)  -KJ  minimum assist (75% patient effort)   (Pended)   -LS     Assistive Device (Stand-Sit Transfers)  walker, front-wheeled  -KJ  walker, front-wheeled  (Pended)   -LS     Recorded by [KJ] Raiza Morgan, PTA 10/25/19 0926 [LS] Jennifer Hernandez, OT Student 10/25/19 0834     Row Name 10/25/19 0715             Toilet Transfer    Type (Toilet Transfer)  sit-stand;stand-sit  (Pended)   -LS      [place] Level (Toilet Transfer)  contact guard  (Pended)   -LS      Assistive Device (Toilet Transfer)  walker, front-wheeled;grab bars/safety frame  (Pended)   -LS2      Recorded by [LS] Jennifer Hernandez, OT Student 10/25/19 0834  [LS2] Jennifer Hernandez, OT Student 10/25/19 0919      Row Name 10/25/19 0856             Gait/Stairs Assessment/Training    [place] Level (Gait)  verbal cues;minimum assist (75% patient effort)  -KJ      Assistive Device (Gait)  walker, front-wheeled  -KJ      Distance in Feet (Gait)  30' x 2  -KJ      Pattern (Gait)  step-to  -KJ      Deviations/Abnormal Patterns (Gait)  bilateral deviations;base of support, narrow;festinating/shuffling;gait speed decreased;stride length decreased  -KJ      Bilateral Gait Deviations  heel strike decreased  -KJ      Comment (Gait/Stairs)  cues to increase step length; shuffling BLE's > on right. Pain Right knee limiting activity this morning  -KJ      Recorded by [KJ] Raiza Morgan, PTA 10/25/19 0926      Row Name 10/25/19 0715             ADL Assessment/Intervention    BADL Assessment/Intervention  lower body dressing;grooming  (Pended)   -LS      Recorded by [LS] Jennifer Hernandez OT Student 10/25/19 0834      Row Name 10/25/19 0715             Lower Body Dressing Assessment/Training    Lower Body Dressing [place] Level  don;socks;other (see comments)  (Pended)  SBA  -LS      Lower Body Dressing Position  edge of bed sitting  (Pended)   -LS2      Recorded by [LS] Jennifer Hernandez, OT Student 10/25/19 0842  [LS2] Jennifer Hernandez, OT Student 10/25/19 0836      Row Name 10/25/19 0744              Grooming Assessment/Training    [place] Level (Grooming)  oral care regimen;wash face, hands;other (see comments);hair care, combing/brushing;minimum assist (75% patient effort)  (Pended)  SBA  -LS      Grooming Position  supported standing  (Pended)   -LS      Comment (Grooming)  pt requested that I help w/ pulling her hair back; after standing for approx 20 minutes, pt c/o R knee pain and R sided and posterior lean worsened, 1 LOB noted; chair was brought to pt for RB  (Pended)   -LS2      Recorded by [LS] Jennifer Hernandez, OT Student 10/25/19 0836  [LS2] Jennifer Hernandez, OT Student 10/25/19 0856      Row Name 10/25/19 0856             Motor Skills Assessment/Interventions    Additional Documentation  Therapeutic Exercise (Group)  -KJ      Recorded by [KJ] Raiza Morgan, PTA 10/25/19 0926      Row Name 10/25/19 0856             Therapeutic Exercise    Exercise Type (Therapeutic Exercise)  AAROM (active assistive range of motion);AROM (active range of motion)  -KJ      Position (Therapeutic Exercise)  supine;seated  -KJ      Sets/Reps (Therapeutic Exercise)  10  -KJ      Recorded by [KJ] Raiza Morgan, PTA 10/25/19 0926      Row Name 10/25/19 0856 10/25/19 0715          Positioning and Restraints    Pre-Treatment Position  sitting in chair/recliner  -KJ  in bed  (Pended)   -LS     Post Treatment Position  chair  -KJ  chair  (Pended)   -LS     In Bed  call light within reach;with family/caregiver  -KJ  --     In Chair  --  sitting;call light within reach;encouraged to call for assist;with family/caregiver  (Pended)   -LS2     Recorded by [KJ] Raiza Morgan, PTA 10/25/19 0926 [LS] Jennifer Hernandez, OT Student 10/25/19 0836  [LS2] Jennifer Hernandez, OT Student 10/25/19 0838     Row Name 10/25/19 0715             Pain Assessment    Additional Documentation  Pain Scale: Numbers Pre/Post-Treatment (Group)  (Pended)   -LS      Recorded by [LS] Jennifer Hernandez, OT Student 10/25/19 0838      Row Name 10/25/19 0856  10/25/19 0715          Pain Scale: Numbers Pre/Post-Treatment    Pain Scale: Numbers, Pretreatment  9/10  -KJ  0/10 - no pain  (Pended)   -LS     Pain Scale: Numbers, Post-Treatment  9/10  -KJ  0/10 - no pain  (Pended)   -LS     Pain Location - Side  Right  -KJ  --     Pain Location  knee  -KJ  --     Pre/Post Treatment Pain Comment  --  c/o R knee pain after standing for approx 20 minutes  (Pended)   -LS     Pain Intervention(s)  --  Repositioned;Ambulation/increased activity  (Pended)   -LS     Recorded by [KJ] Raiza Morgan, PTA 10/25/19 0926 [LS] Jennifer Hernandez, OT Student 10/25/19 0839     Row Name 10/25/19 0715             Plan of Care Review    Plan of Care Reviewed With  patient;spouse  (Pended)   -LS      Recorded by [LS] Jennifer Hernandez OT Student 10/25/19 0839      Row Name 10/25/19 0715             Outcome Summary/Treatment Plan (OT)    Daily Summary of Progress (OT)  progress toward functional goals is good  (Pended)   -LS      Barriers to Overall Progress (OT)  cognitive status  (Pended)   -LS2      Recorded by [LS] Jennifer Hernandez, OT Student 10/25/19 0839  [LS2] Jennifer Hernandez, OT Student 10/25/19 0840        User Key  (r) = Recorded By, (t) = Taken By, (c) = Cosigned By    Initials Name Effective Dates Discipline    Raiza Zarate, PTA 08/02/16 -  PT    LS Jennifer Hernandez OT Student 08/14/19 -  OT                   Physical Therapy Education     Title: PT OT SLP Therapies (In Progress)     Topic: Physical Therapy (In Progress)     Point: Mobility training (Done)     Learning Progress Summary           Patient Acceptance, E, VU by MS at 10/24/2019  3:04 PM    Comment:  role of PT in her care                               User Key     Initials Effective Dates Name Provider Type Discipline    MS 06/19/18 -  Cira Del Cid, PT, DPT, NCS Physical Therapist PT                PT Recommendation and Plan     Plan of Care Reviewed With: patient  Progress: improving  Outcome Summary: PT tx  completed.Pt sitting in chair. C/O R knee pain. Rates 9/10. Required increased assistance this morning due to pain/weakness. Min/ModA sit<>stand, amb 30' x 2 with r wx Cj. Cues given for  increased step length, but continues to have shuffle gait. Decreased safety with walker, requires assistance manueveriing it with turns and walking. Performed A-AAROM BLEs' x 10 reps.Plan for inpatient rehab soon.  Outcome Measures     Row Name 10/25/19 0720 10/24/19 1030          How much help from another is currently needed...    Putting on and taking off regular lower body clothing?  3  (Pended)   -LS  3  -AC (r) LS (t) AC (c)     Bathing (including washing, rinsing, and drying)  2  (Pended)   -LS  2  -AC (r) LS (t) AC (c)     Toileting (which includes using toilet bed pan or urinal)  3  (Pended)   -LS  3  -AC (r) LS (t) AC (c)     Putting on and taking off regular upper body clothing  4  (Pended)   -LS  4  -AC (r) LS (t) AC (c)     Taking care of personal grooming (such as brushing teeth)  3  (Pended)   -LS  4  -AC (r) LS (t) AC (c)     Eating meals  4  (Pended)   -LS  4  -AC (r) LS (t) AC (c)     AM-PAC 6 Clicks Score (OT)  19  (Pended)   -LS  20  -AC (r) LS (t)        Modified [place] Scale    Pre-Stroke Modified Zoe Scale  --  1 - No significant disability despite symptoms.  Able to carry out all usual duties and activities.  -AC (r) LS (t) AC (c)     Modified [place] Scale  --  4 - Moderately severe disability.  Unable to walk without assistance, and unable to attend to own bodily needs without assistance.  -AC (r) LS (t) AC (c)        Functional Assessment    Outcome Measure Options  AM-PAC 6 Clicks Daily Activity (OT)  (Pended)   -LS  AM-PAC 6 Clicks Daily Activity (OT);Modified [place]  -AC (r) LS (t) AC (c)       User Key  (r) = Recorded By, (t) = Taken By, (c) = Cosigned By    Initials Name Provider Type    AC Zander Henderson, OTR/L, CNT Occupational Therapist    HOLLY LinrJennifer, OT Student OT Student          Time Calculation:   PT Charges     Row Name 10/25/19 0926             Time Calculation    Start Time  0856  -KJ      Stop Time  0922  -KJ      Time Calculation (min)  26 min  -KJ      PT Received On  10/25/19  -KJ      PT Goal Re-Cert Due Date  11/03/19  -KJ         Time Calculation- PT    Total Timed Code Minutes- PT  26 minute(s)  -KJ        User Key  (r) = Recorded By, (t) = Taken By, (c) = Cosigned By    Initials Name Provider Type    Raiza Zarate, PTA Physical Therapy Assistant        Therapy Charges for Today     Code Description Service Date Service Provider Modifiers Qty    70547777554 HC GAIT TRAINING EA 15 MIN 10/25/2019 Raiza Morgan, PTA GP 1    97042075272 HC PT THER PROC EA 15 MIN 10/25/2019 Raiza Morgan, PTA GP 1          PT G-Codes  Outcome Measure Options: (P) AM-PAC 6 Clicks Daily Activity (OT)  AM-PAC 6 Clicks Score (PT): 16  AM-PAC 6 Clicks Score (OT): (P) 19  Modified Zoe Scale: 4 - Moderately severe disability.  Unable to walk without assistance, and unable to attend to own bodily needs without assistance.    Raiza Morgan PTA  10/25/2019

## 2019-10-25 NOTE — PLAN OF CARE
Problem: Patient Care Overview  Goal: Plan of Care Review  Outcome: Ongoing (interventions implemented as appropriate)   10/25/19 9780   Coping/Psychosocial   Plan of Care Reviewed With patient   OTHER   Outcome Summary PT tx completed.Pt sitting in chair. C/O R knee pain. Rates 9/10. Required increased assistance this morning due to pain/weakness. Min/ModA sit<>stand, amb 30' x 2 with r wx Cj. Cues given for increased step length, but continues to have shuffle gait. Decreased safety with walker, requires assistance manueveriing it with turns and walking. Performed A-AAROM BLEs' x 10 reps.Plan for inpatient rehab soon.   Plan of Care Review   Progress improving

## 2019-10-26 PROBLEM — I69.391 DYSPHAGIA DUE TO RECENT CEREBRAL INFARCTION: Status: ACTIVE | Noted: 2019-10-26

## 2019-10-26 PROBLEM — M06.9 RHEUMATOID ARTHRITIS (HCC): Status: ACTIVE | Noted: 2019-10-26

## 2019-10-26 PROBLEM — R47.1 DYSARTHRIA: Status: ACTIVE | Noted: 2019-10-26

## 2019-10-26 PROBLEM — R26.9 GAIT ABNORMALITY: Status: ACTIVE | Noted: 2019-10-26

## 2019-10-26 LAB
ALBUMIN SERPL-MCNC: 4 G/DL (ref 3.5–5.2)
ALP BLD-CCNC: 61 U/L (ref 35–104)
ALT SERPL-CCNC: 14 U/L (ref 5–33)
ANION GAP SERPL CALCULATED.3IONS-SCNC: 13 MMOL/L (ref 7–19)
AST SERPL-CCNC: 17 U/L (ref 5–32)
BILIRUB SERPL-MCNC: 0.5 MG/DL (ref 0.2–1.2)
BUN BLDV-MCNC: 27 MG/DL (ref 8–23)
CALCIUM SERPL-MCNC: 9.3 MG/DL (ref 8.8–10.2)
CHLORIDE BLD-SCNC: 107 MMOL/L (ref 98–111)
CO2: 25 MMOL/L (ref 22–29)
CREAT SERPL-MCNC: 0.9 MG/DL (ref 0.5–0.9)
GFR NON-AFRICAN AMERICAN: >60
GLUCOSE BLD-MCNC: 108 MG/DL (ref 70–99)
GLUCOSE BLD-MCNC: 110 MG/DL (ref 70–99)
GLUCOSE BLD-MCNC: 122 MG/DL (ref 74–109)
HCT VFR BLD CALC: 44.6 % (ref 37–47)
HEMOGLOBIN: 14 G/DL (ref 12–16)
MCH RBC QN AUTO: 31.8 PG (ref 27–31)
MCHC RBC AUTO-ENTMCNC: 31.4 G/DL (ref 33–37)
MCV RBC AUTO: 101.4 FL (ref 81–99)
PDW BLD-RTO: 13.1 % (ref 11.5–14.5)
PERFORMED ON: ABNORMAL
PERFORMED ON: ABNORMAL
PLATELET # BLD: 211 K/UL (ref 130–400)
PMV BLD AUTO: 10.3 FL (ref 9.4–12.3)
POTASSIUM SERPL-SCNC: 3.4 MMOL/L (ref 3.5–5)
PREALBUMIN: 29 MG/DL (ref 20–40)
RBC # BLD: 4.4 M/UL (ref 4.2–5.4)
SODIUM BLD-SCNC: 145 MMOL/L (ref 136–145)
T4 FREE: 1.7 NG/DL (ref 0.9–1.7)
TOTAL PROTEIN: 7.1 G/DL (ref 6.6–8.7)
TSH SERPL DL<=0.05 MIU/L-ACNC: 0.83 UIU/ML (ref 0.27–4.2)
VITAMIN B-12: 510 PG/ML (ref 211–946)
WBC # BLD: 6.5 K/UL (ref 4.8–10.8)

## 2019-10-26 PROCEDURE — 1180000000 HC REHAB R&B

## 2019-10-26 PROCEDURE — 80053 COMPREHEN METABOLIC PANEL: CPT

## 2019-10-26 PROCEDURE — 97161 PT EVAL LOW COMPLEX 20 MIN: CPT

## 2019-10-26 PROCEDURE — 82607 VITAMIN B-12: CPT

## 2019-10-26 PROCEDURE — 96125 COGNITIVE TEST BY HC PRO: CPT

## 2019-10-26 PROCEDURE — 85027 COMPLETE CBC AUTOMATED: CPT

## 2019-10-26 PROCEDURE — 6360000002 HC RX W HCPCS: Performed by: PSYCHIATRY & NEUROLOGY

## 2019-10-26 PROCEDURE — 82948 REAGENT STRIP/BLOOD GLUCOSE: CPT

## 2019-10-26 PROCEDURE — 99223 1ST HOSP IP/OBS HIGH 75: CPT | Performed by: PSYCHIATRY & NEUROLOGY

## 2019-10-26 PROCEDURE — 97530 THERAPEUTIC ACTIVITIES: CPT

## 2019-10-26 PROCEDURE — 97116 GAIT TRAINING THERAPY: CPT

## 2019-10-26 PROCEDURE — 97535 SELF CARE MNGMENT TRAINING: CPT

## 2019-10-26 PROCEDURE — 84439 ASSAY OF FREE THYROXINE: CPT

## 2019-10-26 PROCEDURE — 84443 ASSAY THYROID STIM HORMONE: CPT

## 2019-10-26 PROCEDURE — 84134 ASSAY OF PREALBUMIN: CPT

## 2019-10-26 PROCEDURE — 92526 ORAL FUNCTION THERAPY: CPT

## 2019-10-26 PROCEDURE — 92522 EVALUATE SPEECH PRODUCTION: CPT

## 2019-10-26 PROCEDURE — 6370000000 HC RX 637 (ALT 250 FOR IP): Performed by: PSYCHIATRY & NEUROLOGY

## 2019-10-26 PROCEDURE — 97165 OT EVAL LOW COMPLEX 30 MIN: CPT

## 2019-10-26 PROCEDURE — 92610 EVALUATE SWALLOWING FUNCTION: CPT

## 2019-10-26 PROCEDURE — 97110 THERAPEUTIC EXERCISES: CPT

## 2019-10-26 PROCEDURE — 36415 COLL VENOUS BLD VENIPUNCTURE: CPT

## 2019-10-26 RX ORDER — NICOTINE POLACRILEX 4 MG
15 LOZENGE BUCCAL PRN
Status: DISCONTINUED | OUTPATIENT
Start: 2019-10-26 | End: 2019-11-07 | Stop reason: HOSPADM

## 2019-10-26 RX ORDER — DEXTROSE MONOHYDRATE 25 G/50ML
12.5 INJECTION, SOLUTION INTRAVENOUS PRN
Status: DISCONTINUED | OUTPATIENT
Start: 2019-10-26 | End: 2019-11-07 | Stop reason: HOSPADM

## 2019-10-26 RX ORDER — DEXTROSE MONOHYDRATE 50 MG/ML
100 INJECTION, SOLUTION INTRAVENOUS PRN
Status: DISCONTINUED | OUTPATIENT
Start: 2019-10-26 | End: 2019-11-07 | Stop reason: HOSPADM

## 2019-10-26 RX ADMIN — ENOXAPARIN SODIUM 40 MG: 40 INJECTION SUBCUTANEOUS at 20:56

## 2019-10-26 RX ADMIN — FOLIC ACID 1 MG: 1 TABLET ORAL at 08:12

## 2019-10-26 RX ADMIN — OXYCODONE HYDROCHLORIDE AND ACETAMINOPHEN 1000 MG: 500 TABLET ORAL at 08:12

## 2019-10-26 RX ADMIN — TRAZODONE HYDROCHLORIDE 50 MG: 50 TABLET ORAL at 20:56

## 2019-10-26 RX ADMIN — AMLODIPINE BESYLATE 2.5 MG: 5 TABLET ORAL at 08:12

## 2019-10-26 RX ADMIN — CLOPIDOGREL BISULFATE 75 MG: 75 TABLET ORAL at 08:12

## 2019-10-26 RX ADMIN — PREDNISONE 10 MG: 10 TABLET ORAL at 08:12

## 2019-10-26 RX ADMIN — LEVOTHYROXINE SODIUM 50 MCG: 50 TABLET ORAL at 06:00

## 2019-10-26 RX ADMIN — ATORVASTATIN CALCIUM 80 MG: 80 TABLET, FILM COATED ORAL at 20:56

## 2019-10-26 NOTE — THERAPY DISCHARGE NOTE
Acute Care - Occupational Therapy Discharge Summary  [facility]     Patient Name: Adela Goetz  : 1945  MRN: [id number]    Today's Date: 10/26/2019  Onset of Illness/Injury or Date of Surgery: 10/23/19    Date of Referral to OT: 10/23/19  Referring Physician: Dr. Miguelina Massey      Admit Date: 10/23/2019        OT Recommendation and Plan    Visit Dx:    ICD-10-CM ICD-9-CM   1. Cerebrovascular accident (CVA), unspecified mechanism (CMS/HCC) I63.9 434.91   2. Dysphagia, unspecified type R13.10 787.20   3. Decreased activities of daily living (ADL) Z78.9 V49.89   4. Aphasia R47.01 784.3   5. Impaired functional mobility, balance, gait, and endurance Z74.09 V49.89               Rehab Goal Summary     Row Name 10/26/19 0700             Transfer Goal 1 (OT)    Activity/Assistive Device (Transfer Goal 1, OT)  tub;toilet;commode  -TS      [place] Level/Cues Needed (Transfer Goal 1, OT)  contact guard assist  -TS      Time Frame (Transfer Goal 1, OT)  long term goal (LTG);10 days  -TS      Progress/Outcome (Transfer Goal 1, OT)  goal not met  -TS         Toileting Goal 1 (OT)    Activity/Device (Toileting Goal 1, OT)  toileting skills, all;commode  -TS      [place] Level/Cues Needed (Toileting Goal 1, OT)  contact guard assist  -TS      Time Frame (Toileting Goal 1, OT)  long term goal (LTG);10 days  -TS      Progress/Outcome (Toileting Goal 1, OT)  goal not met  -TS         Strength Goal 1 (OT)    Strength Goal 1 (OT)  Pt will demo 4+/5 in B shoulders and biceps and 4-/5 in B triceps to increase I w/ ADLs, bed mob, and T/Fs  -TS      Time Frame (Strength Goal 1, OT)  long term goal (LTG);10 days  -TS      Progress/Outcome (Strength Goal 1, OT)  goal not met  -TS        User Key  (r) = Recorded By, (t) = Taken By, (c) = Cosigned By    Initials Name Provider Type Discipline    TS Sasha Jrarett, RAY/L Occupational Therapy Assistant OT          Outcome Measures     Row Name 10/25/19 0720 10/24/19 1033           How much help from another is currently needed...    Putting on and taking off regular lower body clothing?  3  -AC (r) LS (t) AC (c)  3  -AC (r) LS (t) AC (c)     Bathing (including washing, rinsing, and drying)  2  -AC (r) LS (t) AC (c)  2  -AC (r) LS (t) AC (c)     Toileting (which includes using toilet bed pan or urinal)  3  -AC (r) LS (t) AC (c)  3  -AC (r) LS (t) AC (c)     Putting on and taking off regular upper body clothing  4  -AC (r) LS (t) AC (c)  4  -AC (r) LS (t) AC (c)     Taking care of personal grooming (such as brushing teeth)  3  -AC (r) LS (t) AC (c)  4  -AC (r) LS (t) AC (c)     Eating meals  4  -AC (r) LS (t) AC (c)  4  -AC (r) LS (t) AC (c)     AM-PAC 6 Clicks Score (OT)  19  -AC (r) LS (t)  20  -AC (r) LS (t)        Modified Zoe Scale    Pre-Stroke Modified [place] Scale  --  1 - No significant disability despite symptoms.  Able to carry out all usual duties and activities.  -AC (r) LS (t) AC (c)     Modified Zoe Scale  --  4 - Moderately severe disability.  Unable to walk without assistance, and unable to attend to own bodily needs without assistance.  -AC (r) LS (t) AC (c)        Functional Assessment    Outcome Measure Options  AM-PAC 6 Clicks Daily Activity (OT)  -AC (r) LS (t) AC (c)  AM-PAC 6 Clicks Daily Activity (OT);Modified [place]  -AC (r) LS (t) AC (c)       User Key  (r) = Recorded By, (t) = Taken By, (c) = Cosigned By    Initials Name Provider Type    Zander Varma, OTR/L, CNT Occupational Therapist    HOLLY LinrJennifer OT Student OT Student          Therapy Suggested Charges     Code   Minutes Charges    None                 OT Discharge Summary  Reason for Discharge: Discharge from facility  Outcomes Achieved: Refer to plan of care for updates on goals achieved  Discharge Destination: Inpatient rehabilitation facility      MISSY Roy  10/26/2019

## 2019-10-27 LAB
GLUCOSE BLD-MCNC: 83 MG/DL (ref 70–99)
PERFORMED ON: NORMAL

## 2019-10-27 PROCEDURE — 6360000002 HC RX W HCPCS: Performed by: PSYCHIATRY & NEUROLOGY

## 2019-10-27 PROCEDURE — 82948 REAGENT STRIP/BLOOD GLUCOSE: CPT

## 2019-10-27 PROCEDURE — 1180000000 HC REHAB R&B

## 2019-10-27 PROCEDURE — 99232 SBSQ HOSP IP/OBS MODERATE 35: CPT | Performed by: PSYCHIATRY & NEUROLOGY

## 2019-10-27 PROCEDURE — 6370000000 HC RX 637 (ALT 250 FOR IP): Performed by: PSYCHIATRY & NEUROLOGY

## 2019-10-27 RX ADMIN — TRAZODONE HYDROCHLORIDE 50 MG: 50 TABLET ORAL at 21:11

## 2019-10-27 RX ADMIN — CLOPIDOGREL BISULFATE 75 MG: 75 TABLET ORAL at 07:58

## 2019-10-27 RX ADMIN — ATORVASTATIN CALCIUM 80 MG: 80 TABLET, FILM COATED ORAL at 21:11

## 2019-10-27 RX ADMIN — FOLIC ACID 1 MG: 1 TABLET ORAL at 07:58

## 2019-10-27 RX ADMIN — LEVOTHYROXINE SODIUM 50 MCG: 50 TABLET ORAL at 05:40

## 2019-10-27 RX ADMIN — AMLODIPINE BESYLATE 2.5 MG: 5 TABLET ORAL at 07:57

## 2019-10-27 RX ADMIN — OXYCODONE HYDROCHLORIDE AND ACETAMINOPHEN 1000 MG: 500 TABLET ORAL at 07:58

## 2019-10-27 RX ADMIN — ENOXAPARIN SODIUM 40 MG: 40 INJECTION SUBCUTANEOUS at 21:11

## 2019-10-27 RX ADMIN — PREDNISONE 10 MG: 10 TABLET ORAL at 07:58

## 2019-10-27 RX ADMIN — VITAMIN D, TAB 1000IU (100/BT) 5000 UNITS: 25 TAB at 08:06

## 2019-10-27 ASSESSMENT — PAIN SCALES - GENERAL: PAINLEVEL_OUTOF10: 0

## 2019-10-28 LAB
ALBUMIN SERPL-MCNC: 3.7 G/DL (ref 3.5–5.2)
ALP BLD-CCNC: 57 U/L (ref 35–104)
ALT SERPL-CCNC: 21 U/L (ref 5–33)
ANION GAP SERPL CALCULATED.3IONS-SCNC: 10 MMOL/L (ref 7–19)
AST SERPL-CCNC: 28 U/L (ref 5–32)
BASOPHILS ABSOLUTE: 0 K/UL (ref 0–0.2)
BASOPHILS RELATIVE PERCENT: 0.6 % (ref 0–1)
BILIRUB SERPL-MCNC: 0.8 MG/DL (ref 0.2–1.2)
BILIRUBIN URINE: NEGATIVE
BLOOD, URINE: NEGATIVE
BUN BLDV-MCNC: 24 MG/DL (ref 8–23)
CALCIUM SERPL-MCNC: 9 MG/DL (ref 8.8–10.2)
CHLORIDE BLD-SCNC: 107 MMOL/L (ref 98–111)
CLARITY: ABNORMAL
CO2: 24 MMOL/L (ref 22–29)
COLOR: ABNORMAL
CREAT SERPL-MCNC: 0.9 MG/DL (ref 0.5–0.9)
EOSINOPHILS ABSOLUTE: 0.1 K/UL (ref 0–0.6)
EOSINOPHILS RELATIVE PERCENT: 1.2 % (ref 0–5)
GFR NON-AFRICAN AMERICAN: >60
GLUCOSE BLD-MCNC: 96 MG/DL (ref 74–109)
GLUCOSE URINE: NEGATIVE MG/DL
HCT VFR BLD CALC: 40.8 % (ref 37–47)
HEMOGLOBIN: 13.7 G/DL (ref 12–16)
IMMATURE GRANULOCYTES #: 0 K/UL
KETONES, URINE: NEGATIVE MG/DL
LEUKOCYTE ESTERASE, URINE: NEGATIVE
LYMPHOCYTES ABSOLUTE: 1.6 K/UL (ref 1.1–4.5)
LYMPHOCYTES RELATIVE PERCENT: 31.3 % (ref 20–40)
MAGNESIUM: 2.3 MG/DL (ref 1.6–2.4)
MCH RBC QN AUTO: 32.5 PG (ref 27–31)
MCHC RBC AUTO-ENTMCNC: 33.6 G/DL (ref 33–37)
MCV RBC AUTO: 96.7 FL (ref 81–99)
MONOCYTES ABSOLUTE: 0.5 K/UL (ref 0–0.9)
MONOCYTES RELATIVE PERCENT: 8.9 % (ref 0–10)
NEUTROPHILS ABSOLUTE: 3 K/UL (ref 1.5–7.5)
NEUTROPHILS RELATIVE PERCENT: 57.8 % (ref 50–65)
NITRITE, URINE: NEGATIVE
PDW BLD-RTO: 12.8 % (ref 11.5–14.5)
PH UA: 6 (ref 5–8)
PLATELET # BLD: 212 K/UL (ref 130–400)
PMV BLD AUTO: 10.3 FL (ref 9.4–12.3)
POTASSIUM REFLEX MAGNESIUM: 3.3 MMOL/L (ref 3.5–5)
PROTEIN UA: NEGATIVE MG/DL
RBC # BLD: 4.22 M/UL (ref 4.2–5.4)
SODIUM BLD-SCNC: 141 MMOL/L (ref 136–145)
SPECIFIC GRAVITY UA: 1.03 (ref 1–1.03)
TOTAL PROTEIN: 6.4 G/DL (ref 6.6–8.7)
URINE REFLEX TO CULTURE: ABNORMAL
UROBILINOGEN, URINE: 1 E.U./DL
WBC # BLD: 5.2 K/UL (ref 4.8–10.8)

## 2019-10-28 PROCEDURE — 97535 SELF CARE MNGMENT TRAINING: CPT

## 2019-10-28 PROCEDURE — 92526 ORAL FUNCTION THERAPY: CPT

## 2019-10-28 PROCEDURE — 1180000000 HC REHAB R&B

## 2019-10-28 PROCEDURE — 97116 GAIT TRAINING THERAPY: CPT

## 2019-10-28 PROCEDURE — 97530 THERAPEUTIC ACTIVITIES: CPT

## 2019-10-28 PROCEDURE — 87086 URINE CULTURE/COLONY COUNT: CPT

## 2019-10-28 PROCEDURE — 85025 COMPLETE CBC W/AUTO DIFF WBC: CPT

## 2019-10-28 PROCEDURE — 80053 COMPREHEN METABOLIC PANEL: CPT

## 2019-10-28 PROCEDURE — 99232 SBSQ HOSP IP/OBS MODERATE 35: CPT | Performed by: PSYCHIATRY & NEUROLOGY

## 2019-10-28 PROCEDURE — 6360000002 HC RX W HCPCS: Performed by: PSYCHIATRY & NEUROLOGY

## 2019-10-28 PROCEDURE — 81003 URINALYSIS AUTO W/O SCOPE: CPT

## 2019-10-28 PROCEDURE — 6370000000 HC RX 637 (ALT 250 FOR IP): Performed by: PSYCHIATRY & NEUROLOGY

## 2019-10-28 PROCEDURE — 83735 ASSAY OF MAGNESIUM: CPT

## 2019-10-28 PROCEDURE — 36415 COLL VENOUS BLD VENIPUNCTURE: CPT

## 2019-10-28 PROCEDURE — 97127 HC SP THER IVNTJ W/FOCUS COG FUNCJ: CPT

## 2019-10-28 RX ADMIN — CLOPIDOGREL BISULFATE 75 MG: 75 TABLET ORAL at 08:35

## 2019-10-28 RX ADMIN — POLYETHYLENE GLYCOL 3350 17 G: 17 POWDER, FOR SOLUTION ORAL at 21:00

## 2019-10-28 RX ADMIN — LEVOTHYROXINE SODIUM 50 MCG: 50 TABLET ORAL at 06:09

## 2019-10-28 RX ADMIN — ATORVASTATIN CALCIUM 80 MG: 80 TABLET, FILM COATED ORAL at 21:00

## 2019-10-28 RX ADMIN — FOLIC ACID 1 MG: 1 TABLET ORAL at 08:35

## 2019-10-28 RX ADMIN — AMLODIPINE BESYLATE 2.5 MG: 5 TABLET ORAL at 08:35

## 2019-10-28 RX ADMIN — TRAZODONE HYDROCHLORIDE 50 MG: 50 TABLET ORAL at 21:00

## 2019-10-28 RX ADMIN — OXYCODONE HYDROCHLORIDE AND ACETAMINOPHEN 1000 MG: 500 TABLET ORAL at 08:35

## 2019-10-28 RX ADMIN — PREDNISONE 10 MG: 10 TABLET ORAL at 08:35

## 2019-10-28 RX ADMIN — ENOXAPARIN SODIUM 40 MG: 40 INJECTION SUBCUTANEOUS at 21:00

## 2019-10-28 ASSESSMENT — PAIN SCALES - GENERAL
PAINLEVEL_OUTOF10: 0

## 2019-10-28 NOTE — THERAPY DISCHARGE NOTE
Acute Care - Physical Therapy Discharge Summary  [facility]       Patient Name: Adela Goetz  : 1945  MRN: [id number]    Today's Date: 10/28/2019  Onset of Illness/Injury or Date of Surgery: 10/23/19       Referring Physician: Dr. Miguelina Massey      Admit Date: 10/23/2019      PT Recommendation and Plan    Visit Dx:    ICD-10-CM ICD-9-CM   1. Cerebrovascular accident (CVA), unspecified mechanism (CMS/HCC) I63.9 434.91   2. Dysphagia, unspecified type R13.10 787.20   3. Decreased activities of daily living (ADL) Z78.9 V49.89   4. Aphasia R47.01 784.3   5. Impaired functional mobility, balance, gait, and endurance Z74.09 V49.89               Rehab Goal Summary     Row Name 10/28/19 1146             Transfer Goal 1 (PT)    Activity/Assistive Device (Transfer Goal 1, PT)  sit-to-stand/stand-to-sit;bed-to-chair/chair-to-bed;walker, rolling  -AH      [place] Level/Cues Needed (Transfer Goal 1, PT)  supervision required;verbal cues required  -      Time Frame (Transfer Goal 1, PT)  long term goal (LTG);by discharge  -      Progress/Outcome (Transfer Goal 1, PT)  goal not met  -         Gait Training Goal 1 (PT)    Activity/Assistive Device (Gait Training Goal 1, PT)  gait (walking locomotion);assistive device use;backward stepping;decrease fall risk;forward stepping;improve balance and speed;increase endurance/gait distance;walker, rolling  -      [place] Level (Gait Training Goal 1, PT)  contact guard assist  -      Distance (Gait Goal 1, PT)  100ft demonstrate step through gait pattern 75% of the time  -      Time Frame (Gait Training Goal 1, PT)  long term goal (LTG);by discharge  -      Progress/Outcome (Gait Training Goal 1, PT)  goal not met  -        User Key  (r) = Recorded By, (t) = Taken By, (c) = Cosigned By    Initials Name Provider Type Discipline    Haley Snell, PTA Physical Therapy Assistant PT              PT Discharge Summary  Reason for Discharge: Discharge from  facility  Outcomes Achieved: Refer to plan of care for updates on goals achieved  Discharge Destination: Inpatient rehabilitation facility      Haley Carballo, PTA   10/28/2019

## 2019-10-29 PROCEDURE — 97535 SELF CARE MNGMENT TRAINING: CPT

## 2019-10-29 PROCEDURE — 92526 ORAL FUNCTION THERAPY: CPT

## 2019-10-29 PROCEDURE — 97127 HC SP THER IVNTJ W/FOCUS COG FUNCJ: CPT

## 2019-10-29 PROCEDURE — 99233 SBSQ HOSP IP/OBS HIGH 50: CPT | Performed by: PSYCHIATRY & NEUROLOGY

## 2019-10-29 PROCEDURE — 97116 GAIT TRAINING THERAPY: CPT

## 2019-10-29 PROCEDURE — 97530 THERAPEUTIC ACTIVITIES: CPT

## 2019-10-29 PROCEDURE — 97110 THERAPEUTIC EXERCISES: CPT

## 2019-10-29 PROCEDURE — 1180000000 HC REHAB R&B

## 2019-10-29 PROCEDURE — 6360000002 HC RX W HCPCS: Performed by: PSYCHIATRY & NEUROLOGY

## 2019-10-29 PROCEDURE — 6370000000 HC RX 637 (ALT 250 FOR IP): Performed by: PSYCHIATRY & NEUROLOGY

## 2019-10-29 RX ADMIN — AMLODIPINE BESYLATE 2.5 MG: 5 TABLET ORAL at 08:51

## 2019-10-29 RX ADMIN — LEVOTHYROXINE SODIUM 50 MCG: 50 TABLET ORAL at 06:00

## 2019-10-29 RX ADMIN — VITAMIN D, TAB 1000IU (100/BT) 5000 UNITS: 25 TAB at 16:09

## 2019-10-29 RX ADMIN — CLOPIDOGREL BISULFATE 75 MG: 75 TABLET ORAL at 08:50

## 2019-10-29 RX ADMIN — OXYCODONE HYDROCHLORIDE AND ACETAMINOPHEN 1000 MG: 500 TABLET ORAL at 08:49

## 2019-10-29 RX ADMIN — ATORVASTATIN CALCIUM 80 MG: 80 TABLET, FILM COATED ORAL at 20:45

## 2019-10-29 RX ADMIN — TRAZODONE HYDROCHLORIDE 50 MG: 50 TABLET ORAL at 20:45

## 2019-10-29 RX ADMIN — PREDNISONE 10 MG: 10 TABLET ORAL at 08:50

## 2019-10-29 RX ADMIN — ENOXAPARIN SODIUM 40 MG: 40 INJECTION SUBCUTANEOUS at 20:45

## 2019-10-29 RX ADMIN — FOLIC ACID 1 MG: 1 TABLET ORAL at 08:50

## 2019-10-29 ASSESSMENT — PAIN SCALES - GENERAL
PAINLEVEL_OUTOF10: 0

## 2019-10-30 LAB — URINE CULTURE, ROUTINE: NORMAL

## 2019-10-30 PROCEDURE — 97535 SELF CARE MNGMENT TRAINING: CPT

## 2019-10-30 PROCEDURE — 97110 THERAPEUTIC EXERCISES: CPT

## 2019-10-30 PROCEDURE — 92526 ORAL FUNCTION THERAPY: CPT

## 2019-10-30 PROCEDURE — 1180000000 HC REHAB R&B

## 2019-10-30 PROCEDURE — 97127 HC SP THER IVNTJ W/FOCUS COG FUNCJ: CPT

## 2019-10-30 PROCEDURE — 6360000002 HC RX W HCPCS: Performed by: PSYCHIATRY & NEUROLOGY

## 2019-10-30 PROCEDURE — 97530 THERAPEUTIC ACTIVITIES: CPT

## 2019-10-30 PROCEDURE — 99232 SBSQ HOSP IP/OBS MODERATE 35: CPT | Performed by: PSYCHIATRY & NEUROLOGY

## 2019-10-30 PROCEDURE — 97116 GAIT TRAINING THERAPY: CPT

## 2019-10-30 PROCEDURE — 6370000000 HC RX 637 (ALT 250 FOR IP): Performed by: PSYCHIATRY & NEUROLOGY

## 2019-10-30 RX ADMIN — ATORVASTATIN CALCIUM 80 MG: 80 TABLET, FILM COATED ORAL at 20:48

## 2019-10-30 RX ADMIN — AMLODIPINE BESYLATE 2.5 MG: 5 TABLET ORAL at 08:56

## 2019-10-30 RX ADMIN — ENOXAPARIN SODIUM 40 MG: 40 INJECTION SUBCUTANEOUS at 20:48

## 2019-10-30 RX ADMIN — VITAMIN D, TAB 1000IU (100/BT) 5000 UNITS: 25 TAB at 08:55

## 2019-10-30 RX ADMIN — OXYCODONE HYDROCHLORIDE AND ACETAMINOPHEN 1000 MG: 500 TABLET ORAL at 08:56

## 2019-10-30 RX ADMIN — PREDNISONE 10 MG: 10 TABLET ORAL at 08:56

## 2019-10-30 RX ADMIN — FOLIC ACID 1 MG: 1 TABLET ORAL at 08:56

## 2019-10-30 RX ADMIN — TRAZODONE HYDROCHLORIDE 50 MG: 50 TABLET ORAL at 20:48

## 2019-10-30 RX ADMIN — LEVOTHYROXINE SODIUM 50 MCG: 50 TABLET ORAL at 06:04

## 2019-10-30 RX ADMIN — CLOPIDOGREL BISULFATE 75 MG: 75 TABLET ORAL at 08:56

## 2019-10-30 ASSESSMENT — PAIN SCALES - GENERAL
PAINLEVEL_OUTOF10: 0
PAINLEVEL_OUTOF10: 0

## 2019-10-31 LAB
ALBUMIN SERPL-MCNC: 3.8 G/DL (ref 3.5–5.2)
ALP BLD-CCNC: 54 U/L (ref 35–104)
ALT SERPL-CCNC: 42 U/L (ref 5–33)
ANION GAP SERPL CALCULATED.3IONS-SCNC: 13 MMOL/L (ref 7–19)
AST SERPL-CCNC: 39 U/L (ref 5–32)
BASOPHILS ABSOLUTE: 0 K/UL (ref 0–0.2)
BASOPHILS RELATIVE PERCENT: 0.4 % (ref 0–1)
BILIRUB SERPL-MCNC: 0.7 MG/DL (ref 0.2–1.2)
BUN BLDV-MCNC: 30 MG/DL (ref 8–23)
CALCIUM SERPL-MCNC: 9.3 MG/DL (ref 8.8–10.2)
CHLORIDE BLD-SCNC: 108 MMOL/L (ref 98–111)
CO2: 24 MMOL/L (ref 22–29)
CREAT SERPL-MCNC: 0.9 MG/DL (ref 0.5–0.9)
EOSINOPHILS ABSOLUTE: 0 K/UL (ref 0–0.6)
EOSINOPHILS RELATIVE PERCENT: 0.8 % (ref 0–5)
GFR NON-AFRICAN AMERICAN: >60
GLUCOSE BLD-MCNC: 92 MG/DL (ref 74–109)
HCT VFR BLD CALC: 41.4 % (ref 37–47)
HEMOGLOBIN: 13.4 G/DL (ref 12–16)
IMMATURE GRANULOCYTES #: 0 K/UL
LYMPHOCYTES ABSOLUTE: 1.9 K/UL (ref 1.1–4.5)
LYMPHOCYTES RELATIVE PERCENT: 36 % (ref 20–40)
MAGNESIUM: 2.3 MG/DL (ref 1.6–2.4)
MCH RBC QN AUTO: 31.4 PG (ref 27–31)
MCHC RBC AUTO-ENTMCNC: 32.4 G/DL (ref 33–37)
MCV RBC AUTO: 97 FL (ref 81–99)
MONOCYTES ABSOLUTE: 0.5 K/UL (ref 0–0.9)
MONOCYTES RELATIVE PERCENT: 8.5 % (ref 0–10)
NEUTROPHILS ABSOLUTE: 2.9 K/UL (ref 1.5–7.5)
NEUTROPHILS RELATIVE PERCENT: 54.1 % (ref 50–65)
PDW BLD-RTO: 12.5 % (ref 11.5–14.5)
PLATELET # BLD: 216 K/UL (ref 130–400)
PMV BLD AUTO: 10.5 FL (ref 9.4–12.3)
POTASSIUM REFLEX MAGNESIUM: 3.4 MMOL/L (ref 3.5–5)
RBC # BLD: 4.27 M/UL (ref 4.2–5.4)
SODIUM BLD-SCNC: 145 MMOL/L (ref 136–145)
TOTAL PROTEIN: 6 G/DL (ref 6.6–8.7)
WBC # BLD: 5.3 K/UL (ref 4.8–10.8)

## 2019-10-31 PROCEDURE — 97110 THERAPEUTIC EXERCISES: CPT

## 2019-10-31 PROCEDURE — 97127 HC SP THER IVNTJ W/FOCUS COG FUNCJ: CPT

## 2019-10-31 PROCEDURE — 97535 SELF CARE MNGMENT TRAINING: CPT

## 2019-10-31 PROCEDURE — 1180000000 HC REHAB R&B

## 2019-10-31 PROCEDURE — 92526 ORAL FUNCTION THERAPY: CPT

## 2019-10-31 PROCEDURE — 83735 ASSAY OF MAGNESIUM: CPT

## 2019-10-31 PROCEDURE — 36415 COLL VENOUS BLD VENIPUNCTURE: CPT

## 2019-10-31 PROCEDURE — 6370000000 HC RX 637 (ALT 250 FOR IP): Performed by: PSYCHIATRY & NEUROLOGY

## 2019-10-31 PROCEDURE — 97116 GAIT TRAINING THERAPY: CPT

## 2019-10-31 PROCEDURE — 99232 SBSQ HOSP IP/OBS MODERATE 35: CPT | Performed by: PSYCHIATRY & NEUROLOGY

## 2019-10-31 PROCEDURE — 6360000002 HC RX W HCPCS: Performed by: PSYCHIATRY & NEUROLOGY

## 2019-10-31 PROCEDURE — 85025 COMPLETE CBC W/AUTO DIFF WBC: CPT

## 2019-10-31 PROCEDURE — 80053 COMPREHEN METABOLIC PANEL: CPT

## 2019-10-31 RX ADMIN — AMLODIPINE BESYLATE 2.5 MG: 5 TABLET ORAL at 08:44

## 2019-10-31 RX ADMIN — ATORVASTATIN CALCIUM 80 MG: 80 TABLET, FILM COATED ORAL at 20:56

## 2019-10-31 RX ADMIN — OXYCODONE HYDROCHLORIDE AND ACETAMINOPHEN 1000 MG: 500 TABLET ORAL at 08:43

## 2019-10-31 RX ADMIN — CLOPIDOGREL BISULFATE 75 MG: 75 TABLET ORAL at 08:43

## 2019-10-31 RX ADMIN — TRAZODONE HYDROCHLORIDE 50 MG: 50 TABLET ORAL at 20:55

## 2019-10-31 RX ADMIN — ENOXAPARIN SODIUM 40 MG: 40 INJECTION SUBCUTANEOUS at 20:56

## 2019-10-31 RX ADMIN — FOLIC ACID 1 MG: 1 TABLET ORAL at 08:43

## 2019-10-31 RX ADMIN — PREDNISONE 10 MG: 10 TABLET ORAL at 08:43

## 2019-10-31 RX ADMIN — LEVOTHYROXINE SODIUM 50 MCG: 50 TABLET ORAL at 05:09

## 2019-10-31 RX ADMIN — VITAMIN D, TAB 1000IU (100/BT) 5000 UNITS: 25 TAB at 08:45

## 2019-10-31 ASSESSMENT — PAIN SCALES - GENERAL: PAINLEVEL_OUTOF10: 0

## 2019-11-01 PROCEDURE — 97127 HC SP THER IVNTJ W/FOCUS COG FUNCJ: CPT

## 2019-11-01 PROCEDURE — 1180000000 HC REHAB R&B

## 2019-11-01 PROCEDURE — 97530 THERAPEUTIC ACTIVITIES: CPT

## 2019-11-01 PROCEDURE — 6360000002 HC RX W HCPCS: Performed by: PSYCHIATRY & NEUROLOGY

## 2019-11-01 PROCEDURE — 97110 THERAPEUTIC EXERCISES: CPT

## 2019-11-01 PROCEDURE — 97116 GAIT TRAINING THERAPY: CPT

## 2019-11-01 PROCEDURE — 6370000000 HC RX 637 (ALT 250 FOR IP): Performed by: PSYCHIATRY & NEUROLOGY

## 2019-11-01 PROCEDURE — 92526 ORAL FUNCTION THERAPY: CPT

## 2019-11-01 PROCEDURE — 99232 SBSQ HOSP IP/OBS MODERATE 35: CPT | Performed by: PSYCHIATRY & NEUROLOGY

## 2019-11-01 RX ADMIN — ENOXAPARIN SODIUM 40 MG: 40 INJECTION SUBCUTANEOUS at 20:41

## 2019-11-01 RX ADMIN — LEVOTHYROXINE SODIUM 50 MCG: 50 TABLET ORAL at 05:42

## 2019-11-01 RX ADMIN — TRAZODONE HYDROCHLORIDE 50 MG: 50 TABLET ORAL at 20:40

## 2019-11-01 RX ADMIN — FOLIC ACID 1 MG: 1 TABLET ORAL at 08:49

## 2019-11-01 RX ADMIN — PREDNISONE 10 MG: 10 TABLET ORAL at 08:49

## 2019-11-01 RX ADMIN — AMLODIPINE BESYLATE 2.5 MG: 5 TABLET ORAL at 08:48

## 2019-11-01 RX ADMIN — ATORVASTATIN CALCIUM 80 MG: 80 TABLET, FILM COATED ORAL at 20:41

## 2019-11-01 RX ADMIN — OXYCODONE HYDROCHLORIDE AND ACETAMINOPHEN 1000 MG: 500 TABLET ORAL at 08:49

## 2019-11-01 RX ADMIN — VITAMIN D, TAB 1000IU (100/BT) 5000 UNITS: 25 TAB at 08:48

## 2019-11-01 RX ADMIN — CLOPIDOGREL BISULFATE 75 MG: 75 TABLET ORAL at 08:49

## 2019-11-01 ASSESSMENT — PAIN SCALES - GENERAL: PAINLEVEL_OUTOF10: 0

## 2019-11-02 PROCEDURE — 1180000000 HC REHAB R&B

## 2019-11-02 PROCEDURE — 6370000000 HC RX 637 (ALT 250 FOR IP): Performed by: PSYCHIATRY & NEUROLOGY

## 2019-11-02 PROCEDURE — 97530 THERAPEUTIC ACTIVITIES: CPT

## 2019-11-02 PROCEDURE — 6360000002 HC RX W HCPCS: Performed by: PSYCHIATRY & NEUROLOGY

## 2019-11-02 PROCEDURE — 99232 SBSQ HOSP IP/OBS MODERATE 35: CPT | Performed by: PSYCHIATRY & NEUROLOGY

## 2019-11-02 RX ADMIN — ENOXAPARIN SODIUM 40 MG: 40 INJECTION SUBCUTANEOUS at 21:45

## 2019-11-02 RX ADMIN — TRAZODONE HYDROCHLORIDE 50 MG: 50 TABLET ORAL at 21:45

## 2019-11-02 RX ADMIN — AMLODIPINE BESYLATE 2.5 MG: 5 TABLET ORAL at 10:41

## 2019-11-02 RX ADMIN — LEVOTHYROXINE SODIUM 50 MCG: 50 TABLET ORAL at 06:08

## 2019-11-02 RX ADMIN — FOLIC ACID 1 MG: 1 TABLET ORAL at 10:41

## 2019-11-02 RX ADMIN — VITAMIN D, TAB 1000IU (100/BT) 5000 UNITS: 25 TAB at 10:40

## 2019-11-02 RX ADMIN — OXYCODONE HYDROCHLORIDE AND ACETAMINOPHEN 1000 MG: 500 TABLET ORAL at 10:40

## 2019-11-02 RX ADMIN — CLOPIDOGREL BISULFATE 75 MG: 75 TABLET ORAL at 10:40

## 2019-11-02 RX ADMIN — PREDNISONE 10 MG: 10 TABLET ORAL at 10:41

## 2019-11-02 RX ADMIN — ATORVASTATIN CALCIUM 80 MG: 80 TABLET, FILM COATED ORAL at 21:45

## 2019-11-02 ASSESSMENT — PAIN SCALES - GENERAL: PAINLEVEL_OUTOF10: 0

## 2019-11-03 PROCEDURE — 1180000000 HC REHAB R&B

## 2019-11-03 PROCEDURE — 6370000000 HC RX 637 (ALT 250 FOR IP): Performed by: PSYCHIATRY & NEUROLOGY

## 2019-11-03 PROCEDURE — 6360000002 HC RX W HCPCS: Performed by: PSYCHIATRY & NEUROLOGY

## 2019-11-03 RX ADMIN — CLOPIDOGREL BISULFATE 75 MG: 75 TABLET ORAL at 07:59

## 2019-11-03 RX ADMIN — TRAZODONE HYDROCHLORIDE 50 MG: 50 TABLET ORAL at 20:44

## 2019-11-03 RX ADMIN — ENOXAPARIN SODIUM 40 MG: 40 INJECTION SUBCUTANEOUS at 20:44

## 2019-11-03 RX ADMIN — OXYCODONE HYDROCHLORIDE AND ACETAMINOPHEN 1000 MG: 500 TABLET ORAL at 07:59

## 2019-11-03 RX ADMIN — PREDNISONE 10 MG: 10 TABLET ORAL at 07:59

## 2019-11-03 RX ADMIN — AMLODIPINE BESYLATE 2.5 MG: 5 TABLET ORAL at 07:58

## 2019-11-03 RX ADMIN — VITAMIN D, TAB 1000IU (100/BT) 5000 UNITS: 25 TAB at 07:59

## 2019-11-03 RX ADMIN — FOLIC ACID 1 MG: 1 TABLET ORAL at 07:59

## 2019-11-03 RX ADMIN — ATORVASTATIN CALCIUM 80 MG: 80 TABLET, FILM COATED ORAL at 20:44

## 2019-11-03 RX ADMIN — LEVOTHYROXINE SODIUM 50 MCG: 50 TABLET ORAL at 05:59

## 2019-11-04 LAB
ALBUMIN SERPL-MCNC: 3.6 G/DL (ref 3.5–5.2)
ALP BLD-CCNC: 49 U/L (ref 35–104)
ALT SERPL-CCNC: 31 U/L (ref 5–33)
ANION GAP SERPL CALCULATED.3IONS-SCNC: 12 MMOL/L (ref 7–19)
AST SERPL-CCNC: 18 U/L (ref 5–32)
BASOPHILS ABSOLUTE: 0 K/UL (ref 0–0.2)
BASOPHILS RELATIVE PERCENT: 0.4 % (ref 0–1)
BILIRUB SERPL-MCNC: 0.4 MG/DL (ref 0.2–1.2)
BUN BLDV-MCNC: 30 MG/DL (ref 8–23)
CALCIUM SERPL-MCNC: 9.3 MG/DL (ref 8.8–10.2)
CHLORIDE BLD-SCNC: 110 MMOL/L (ref 98–111)
CO2: 24 MMOL/L (ref 22–29)
CREAT SERPL-MCNC: 0.8 MG/DL (ref 0.5–0.9)
EOSINOPHILS ABSOLUTE: 0.1 K/UL (ref 0–0.6)
EOSINOPHILS RELATIVE PERCENT: 1.5 % (ref 0–5)
GFR NON-AFRICAN AMERICAN: >60
GLUCOSE BLD-MCNC: 97 MG/DL (ref 74–109)
HCT VFR BLD CALC: 39.9 % (ref 37–47)
HEMOGLOBIN: 12.9 G/DL (ref 12–16)
IMMATURE GRANULOCYTES #: 0 K/UL
LYMPHOCYTES ABSOLUTE: 1.8 K/UL (ref 1.1–4.5)
LYMPHOCYTES RELATIVE PERCENT: 33.1 % (ref 20–40)
MAGNESIUM: 2.3 MG/DL (ref 1.6–2.4)
MCH RBC QN AUTO: 31.5 PG (ref 27–31)
MCHC RBC AUTO-ENTMCNC: 32.3 G/DL (ref 33–37)
MCV RBC AUTO: 97.3 FL (ref 81–99)
MONOCYTES ABSOLUTE: 0.5 K/UL (ref 0–0.9)
MONOCYTES RELATIVE PERCENT: 9.3 % (ref 0–10)
NEUTROPHILS ABSOLUTE: 2.9 K/UL (ref 1.5–7.5)
NEUTROPHILS RELATIVE PERCENT: 55.3 % (ref 50–65)
PDW BLD-RTO: 12.5 % (ref 11.5–14.5)
PLATELET # BLD: 211 K/UL (ref 130–400)
PMV BLD AUTO: 10.8 FL (ref 9.4–12.3)
POTASSIUM REFLEX MAGNESIUM: 3.3 MMOL/L (ref 3.5–5)
RBC # BLD: 4.1 M/UL (ref 4.2–5.4)
SODIUM BLD-SCNC: 146 MMOL/L (ref 136–145)
TOTAL PROTEIN: 6 G/DL (ref 6.6–8.7)
WBC # BLD: 5.3 K/UL (ref 4.8–10.8)

## 2019-11-04 PROCEDURE — 99232 SBSQ HOSP IP/OBS MODERATE 35: CPT | Performed by: PSYCHIATRY & NEUROLOGY

## 2019-11-04 PROCEDURE — 80053 COMPREHEN METABOLIC PANEL: CPT

## 2019-11-04 PROCEDURE — 97127 HC SP THER IVNTJ W/FOCUS COG FUNCJ: CPT

## 2019-11-04 PROCEDURE — 97110 THERAPEUTIC EXERCISES: CPT

## 2019-11-04 PROCEDURE — 6370000000 HC RX 637 (ALT 250 FOR IP): Performed by: PSYCHIATRY & NEUROLOGY

## 2019-11-04 PROCEDURE — 92526 ORAL FUNCTION THERAPY: CPT

## 2019-11-04 PROCEDURE — 6360000002 HC RX W HCPCS: Performed by: PSYCHIATRY & NEUROLOGY

## 2019-11-04 PROCEDURE — 85025 COMPLETE CBC W/AUTO DIFF WBC: CPT

## 2019-11-04 PROCEDURE — 36415 COLL VENOUS BLD VENIPUNCTURE: CPT

## 2019-11-04 PROCEDURE — 1180000000 HC REHAB R&B

## 2019-11-04 PROCEDURE — 97535 SELF CARE MNGMENT TRAINING: CPT

## 2019-11-04 PROCEDURE — 83735 ASSAY OF MAGNESIUM: CPT

## 2019-11-04 PROCEDURE — 97116 GAIT TRAINING THERAPY: CPT

## 2019-11-04 RX ADMIN — ENOXAPARIN SODIUM 40 MG: 40 INJECTION SUBCUTANEOUS at 21:21

## 2019-11-04 RX ADMIN — ATORVASTATIN CALCIUM 80 MG: 80 TABLET, FILM COATED ORAL at 21:22

## 2019-11-04 RX ADMIN — CLOPIDOGREL BISULFATE 75 MG: 75 TABLET ORAL at 08:28

## 2019-11-04 RX ADMIN — FOLIC ACID 1 MG: 1 TABLET ORAL at 08:28

## 2019-11-04 RX ADMIN — PREDNISONE 10 MG: 10 TABLET ORAL at 08:29

## 2019-11-04 RX ADMIN — TRAZODONE HYDROCHLORIDE 50 MG: 50 TABLET ORAL at 21:22

## 2019-11-04 RX ADMIN — AMLODIPINE BESYLATE 2.5 MG: 5 TABLET ORAL at 08:29

## 2019-11-04 RX ADMIN — OXYCODONE HYDROCHLORIDE AND ACETAMINOPHEN 1000 MG: 500 TABLET ORAL at 08:28

## 2019-11-04 RX ADMIN — LEVOTHYROXINE SODIUM 50 MCG: 50 TABLET ORAL at 06:01

## 2019-11-04 RX ADMIN — VITAMIN D, TAB 1000IU (100/BT) 5000 UNITS: 25 TAB at 08:29

## 2019-11-04 ASSESSMENT — PAIN SCALES - GENERAL
PAINLEVEL_OUTOF10: 0
PAINLEVEL_OUTOF10: 0

## 2019-11-05 PROCEDURE — 97530 THERAPEUTIC ACTIVITIES: CPT

## 2019-11-05 PROCEDURE — 1180000000 HC REHAB R&B

## 2019-11-05 PROCEDURE — 6360000002 HC RX W HCPCS: Performed by: PSYCHIATRY & NEUROLOGY

## 2019-11-05 PROCEDURE — 97127 HC SP THER IVNTJ W/FOCUS COG FUNCJ: CPT

## 2019-11-05 PROCEDURE — 97116 GAIT TRAINING THERAPY: CPT

## 2019-11-05 PROCEDURE — 99233 SBSQ HOSP IP/OBS HIGH 50: CPT | Performed by: PSYCHIATRY & NEUROLOGY

## 2019-11-05 PROCEDURE — 97110 THERAPEUTIC EXERCISES: CPT

## 2019-11-05 PROCEDURE — 6370000000 HC RX 637 (ALT 250 FOR IP): Performed by: PSYCHIATRY & NEUROLOGY

## 2019-11-05 RX ADMIN — ATORVASTATIN CALCIUM 80 MG: 80 TABLET, FILM COATED ORAL at 20:56

## 2019-11-05 RX ADMIN — OXYCODONE HYDROCHLORIDE AND ACETAMINOPHEN 1000 MG: 500 TABLET ORAL at 08:22

## 2019-11-05 RX ADMIN — CLOPIDOGREL BISULFATE 75 MG: 75 TABLET ORAL at 08:22

## 2019-11-05 RX ADMIN — VITAMIN D, TAB 1000IU (100/BT) 5000 UNITS: 25 TAB at 08:21

## 2019-11-05 RX ADMIN — TRAZODONE HYDROCHLORIDE 50 MG: 50 TABLET ORAL at 20:56

## 2019-11-05 RX ADMIN — AMLODIPINE BESYLATE 2.5 MG: 5 TABLET ORAL at 08:21

## 2019-11-05 RX ADMIN — FOLIC ACID 1 MG: 1 TABLET ORAL at 08:22

## 2019-11-05 RX ADMIN — LEVOTHYROXINE SODIUM 50 MCG: 50 TABLET ORAL at 05:59

## 2019-11-05 RX ADMIN — PREDNISONE 10 MG: 10 TABLET ORAL at 08:22

## 2019-11-05 RX ADMIN — ENOXAPARIN SODIUM 40 MG: 40 INJECTION SUBCUTANEOUS at 20:56

## 2019-11-05 ASSESSMENT — PAIN SCALES - GENERAL: PAINLEVEL_OUTOF10: 0

## 2019-11-05 ASSESSMENT — 9 HOLE PEG TEST
TESTTIME_SECONDS: 36
TESTTIME_SECONDS: 28

## 2019-11-06 PROCEDURE — 99232 SBSQ HOSP IP/OBS MODERATE 35: CPT | Performed by: PSYCHIATRY & NEUROLOGY

## 2019-11-06 PROCEDURE — 1180000000 HC REHAB R&B

## 2019-11-06 PROCEDURE — 97530 THERAPEUTIC ACTIVITIES: CPT

## 2019-11-06 PROCEDURE — 97127 HC SP THER IVNTJ W/FOCUS COG FUNCJ: CPT

## 2019-11-06 PROCEDURE — 6360000002 HC RX W HCPCS: Performed by: PSYCHIATRY & NEUROLOGY

## 2019-11-06 PROCEDURE — 97116 GAIT TRAINING THERAPY: CPT

## 2019-11-06 PROCEDURE — 97110 THERAPEUTIC EXERCISES: CPT

## 2019-11-06 PROCEDURE — 6370000000 HC RX 637 (ALT 250 FOR IP): Performed by: PSYCHIATRY & NEUROLOGY

## 2019-11-06 RX ORDER — CLOPIDOGREL BISULFATE 75 MG/1
75 TABLET ORAL DAILY
Qty: 30 TABLET | Refills: 0 | Status: ON HOLD | OUTPATIENT
Start: 2019-11-06 | End: 2019-11-29 | Stop reason: HOSPADM

## 2019-11-06 RX ORDER — ALENDRONATE SODIUM 70 MG/1
70 TABLET ORAL
Qty: 4 TABLET | Refills: 0 | Status: SHIPPED | OUTPATIENT
Start: 2019-11-06

## 2019-11-06 RX ORDER — AMLODIPINE BESYLATE 2.5 MG/1
2.5 TABLET ORAL DAILY
Qty: 30 TABLET | Refills: 0 | Status: ON HOLD | OUTPATIENT
Start: 2019-11-06 | End: 2019-11-29 | Stop reason: HOSPADM

## 2019-11-06 RX ORDER — LEVOTHYROXINE SODIUM 0.05 MG/1
50 TABLET ORAL DAILY
Qty: 30 TABLET | Refills: 0 | Status: ON HOLD | OUTPATIENT
Start: 2019-11-06 | End: 2019-11-29 | Stop reason: HOSPADM

## 2019-11-06 RX ORDER — ATORVASTATIN CALCIUM 80 MG/1
80 TABLET, FILM COATED ORAL NIGHTLY
Qty: 30 TABLET | Refills: 0 | Status: ON HOLD | OUTPATIENT
Start: 2019-11-06 | End: 2019-11-29 | Stop reason: SDUPTHER

## 2019-11-06 RX ORDER — GABAPENTIN 100 MG/1
100 CAPSULE ORAL EVERY 8 HOURS PRN
Qty: 90 CAPSULE | Refills: 0 | Status: SHIPPED | OUTPATIENT
Start: 2019-11-06 | End: 2019-11-13

## 2019-11-06 RX ORDER — FOLIC ACID 1 MG/1
1 TABLET ORAL DAILY
Qty: 30 TABLET | Refills: 0 | Status: SHIPPED | OUTPATIENT
Start: 2019-11-06

## 2019-11-06 RX ORDER — PREDNISONE 10 MG/1
10 TABLET ORAL DAILY
Qty: 30 TABLET | Refills: 0 | Status: ON HOLD | OUTPATIENT
Start: 2019-11-06 | End: 2019-11-29 | Stop reason: HOSPADM

## 2019-11-06 RX ORDER — NITROGLYCERIN 0.4 MG/1
TABLET SUBLINGUAL
Qty: 25 TABLET | Refills: 0 | Status: SHIPPED | OUTPATIENT
Start: 2019-11-06

## 2019-11-06 RX ORDER — TRAZODONE HYDROCHLORIDE 50 MG/1
50 TABLET ORAL NIGHTLY
Qty: 30 TABLET | Refills: 0 | Status: SHIPPED | OUTPATIENT
Start: 2019-11-06

## 2019-11-06 RX ADMIN — AMLODIPINE BESYLATE 2.5 MG: 5 TABLET ORAL at 07:49

## 2019-11-06 RX ADMIN — VITAMIN D, TAB 1000IU (100/BT) 5000 UNITS: 25 TAB at 07:49

## 2019-11-06 RX ADMIN — ENOXAPARIN SODIUM 40 MG: 40 INJECTION SUBCUTANEOUS at 20:48

## 2019-11-06 RX ADMIN — PREDNISONE 10 MG: 10 TABLET ORAL at 07:49

## 2019-11-06 RX ADMIN — ATORVASTATIN CALCIUM 80 MG: 80 TABLET, FILM COATED ORAL at 20:48

## 2019-11-06 RX ADMIN — FOLIC ACID 1 MG: 1 TABLET ORAL at 07:49

## 2019-11-06 RX ADMIN — LEVOTHYROXINE SODIUM 50 MCG: 50 TABLET ORAL at 06:00

## 2019-11-06 RX ADMIN — TRAZODONE HYDROCHLORIDE 50 MG: 50 TABLET ORAL at 20:48

## 2019-11-06 RX ADMIN — CLOPIDOGREL BISULFATE 75 MG: 75 TABLET ORAL at 07:49

## 2019-11-06 RX ADMIN — OXYCODONE HYDROCHLORIDE AND ACETAMINOPHEN 1000 MG: 500 TABLET ORAL at 07:49

## 2019-11-06 ASSESSMENT — PAIN SCALES - GENERAL
PAINLEVEL_OUTOF10: 0
PAINLEVEL_OUTOF10: 0

## 2019-11-07 VITALS
OXYGEN SATURATION: 97 % | SYSTOLIC BLOOD PRESSURE: 147 MMHG | HEIGHT: 63 IN | HEART RATE: 56 BPM | BODY MASS INDEX: 22.68 KG/M2 | DIASTOLIC BLOOD PRESSURE: 91 MMHG | RESPIRATION RATE: 18 BRPM | TEMPERATURE: 97.7 F | WEIGHT: 128 LBS

## 2019-11-07 LAB
ALBUMIN SERPL-MCNC: 3.5 G/DL (ref 3.5–5.2)
ALP BLD-CCNC: 45 U/L (ref 35–104)
ALT SERPL-CCNC: 31 U/L (ref 5–33)
ANION GAP SERPL CALCULATED.3IONS-SCNC: 11 MMOL/L (ref 7–19)
AST SERPL-CCNC: 17 U/L (ref 5–32)
BASOPHILS ABSOLUTE: 0 K/UL (ref 0–0.2)
BASOPHILS RELATIVE PERCENT: 0.7 % (ref 0–1)
BILIRUB SERPL-MCNC: 0.5 MG/DL (ref 0.2–1.2)
BUN BLDV-MCNC: 22 MG/DL (ref 8–23)
CALCIUM SERPL-MCNC: 9.1 MG/DL (ref 8.8–10.2)
CHLORIDE BLD-SCNC: 110 MMOL/L (ref 98–111)
CO2: 22 MMOL/L (ref 22–29)
CREAT SERPL-MCNC: 0.7 MG/DL (ref 0.5–0.9)
EOSINOPHILS ABSOLUTE: 0.1 K/UL (ref 0–0.6)
EOSINOPHILS RELATIVE PERCENT: 2.1 % (ref 0–5)
GFR NON-AFRICAN AMERICAN: >60
GLUCOSE BLD-MCNC: 93 MG/DL (ref 74–109)
HCT VFR BLD CALC: 40.1 % (ref 37–47)
HEMOGLOBIN: 13.2 G/DL (ref 12–16)
IMMATURE GRANULOCYTES #: 0 K/UL
LYMPHOCYTES ABSOLUTE: 2 K/UL (ref 1.1–4.5)
LYMPHOCYTES RELATIVE PERCENT: 35.3 % (ref 20–40)
MAGNESIUM: 2.1 MG/DL (ref 1.6–2.4)
MCH RBC QN AUTO: 31.8 PG (ref 27–31)
MCHC RBC AUTO-ENTMCNC: 32.9 G/DL (ref 33–37)
MCV RBC AUTO: 96.6 FL (ref 81–99)
MONOCYTES ABSOLUTE: 0.4 K/UL (ref 0–0.9)
MONOCYTES RELATIVE PERCENT: 7.2 % (ref 0–10)
NEUTROPHILS ABSOLUTE: 3.1 K/UL (ref 1.5–7.5)
NEUTROPHILS RELATIVE PERCENT: 54.5 % (ref 50–65)
PDW BLD-RTO: 12.3 % (ref 11.5–14.5)
PLATELET # BLD: 211 K/UL (ref 130–400)
PMV BLD AUTO: 10.6 FL (ref 9.4–12.3)
POTASSIUM REFLEX MAGNESIUM: 3.4 MMOL/L (ref 3.5–5)
RBC # BLD: 4.15 M/UL (ref 4.2–5.4)
SODIUM BLD-SCNC: 143 MMOL/L (ref 136–145)
TOTAL PROTEIN: 5.4 G/DL (ref 6.6–8.7)
WBC # BLD: 5.7 K/UL (ref 4.8–10.8)

## 2019-11-07 PROCEDURE — 36415 COLL VENOUS BLD VENIPUNCTURE: CPT

## 2019-11-07 PROCEDURE — 83735 ASSAY OF MAGNESIUM: CPT

## 2019-11-07 PROCEDURE — 99239 HOSP IP/OBS DSCHRG MGMT >30: CPT | Performed by: PSYCHIATRY & NEUROLOGY

## 2019-11-07 PROCEDURE — 6370000000 HC RX 637 (ALT 250 FOR IP): Performed by: PSYCHIATRY & NEUROLOGY

## 2019-11-07 PROCEDURE — 85025 COMPLETE CBC W/AUTO DIFF WBC: CPT

## 2019-11-07 PROCEDURE — 80053 COMPREHEN METABOLIC PANEL: CPT

## 2019-11-07 RX ADMIN — FOLIC ACID 1 MG: 1 TABLET ORAL at 08:59

## 2019-11-07 RX ADMIN — OXYCODONE HYDROCHLORIDE AND ACETAMINOPHEN 1000 MG: 500 TABLET ORAL at 09:00

## 2019-11-07 RX ADMIN — VITAMIN D, TAB 1000IU (100/BT) 5000 UNITS: 25 TAB at 09:00

## 2019-11-07 RX ADMIN — AMLODIPINE BESYLATE 2.5 MG: 5 TABLET ORAL at 08:59

## 2019-11-07 RX ADMIN — CLOPIDOGREL BISULFATE 75 MG: 75 TABLET ORAL at 08:59

## 2019-11-07 RX ADMIN — LEVOTHYROXINE SODIUM 50 MCG: 50 TABLET ORAL at 05:49

## 2019-11-07 RX ADMIN — PREDNISONE 10 MG: 10 TABLET ORAL at 09:00

## 2019-11-12 ENCOUNTER — LAB (OUTPATIENT)
Dept: LAB | Facility: HOSPITAL | Age: 74
End: 2019-11-12

## 2019-11-12 ENCOUNTER — OFFICE VISIT (OUTPATIENT)
Dept: NEUROLOGY | Facility: CLINIC | Age: 74
End: 2019-11-12

## 2019-11-12 VITALS
BODY MASS INDEX: 23.04 KG/M2 | SYSTOLIC BLOOD PRESSURE: 108 MMHG | WEIGHT: 130 LBS | OXYGEN SATURATION: 98 % | DIASTOLIC BLOOD PRESSURE: 80 MMHG | HEIGHT: 63 IN | HEART RATE: 77 BPM

## 2019-11-12 DIAGNOSIS — I63.312 CEREBROVASCULAR ACCIDENT (CVA) DUE TO THROMBOSIS OF LEFT MIDDLE CEREBRAL ARTERY (HCC): Primary | ICD-10-CM

## 2019-11-12 DIAGNOSIS — F32.A DEPRESSIVE DISORDER: ICD-10-CM

## 2019-11-12 DIAGNOSIS — I63.312 CEREBROVASCULAR ACCIDENT (CVA) DUE TO THROMBOSIS OF LEFT MIDDLE CEREBRAL ARTERY (HCC): ICD-10-CM

## 2019-11-12 LAB
ALBUMIN SERPL-MCNC: 4.1 G/DL (ref 3.5–5.2)
ALBUMIN/GLOB SERPL: 1.5 G/DL
ALP SERPL-CCNC: 50 U/L (ref 39–117)
ALT SERPL W P-5'-P-CCNC: 44 U/L (ref 1–33)
ANION GAP SERPL CALCULATED.3IONS-SCNC: 12.7 MMOL/L (ref 5–15)
AST SERPL-CCNC: 26 U/L (ref 1–32)
BILIRUB SERPL-MCNC: 0.3 MG/DL (ref 0.2–1.2)
BUN BLD-MCNC: 17 MG/DL (ref 8–23)
BUN/CREAT SERPL: 17.9 (ref 7–25)
CALCIUM SPEC-SCNC: 9.5 MG/DL (ref 8.6–10.5)
CHLORIDE SERPL-SCNC: 106 MMOL/L (ref 98–107)
CHOLEST SERPL-MCNC: 140 MG/DL (ref 0–200)
CO2 SERPL-SCNC: 28.3 MMOL/L (ref 22–29)
CREAT BLD-MCNC: 0.95 MG/DL (ref 0.57–1)
GFR SERPL CREATININE-BSD FRML MDRD: 57 ML/MIN/1.73
GLOBULIN UR ELPH-MCNC: 2.7 GM/DL
GLUCOSE BLD-MCNC: 109 MG/DL (ref 65–99)
HCT VFR BLD AUTO: 42.9 % (ref 34–46.6)
HDLC SERPL-MCNC: 51 MG/DL (ref 40–60)
LDLC SERPL CALC-MCNC: 68 MG/DL (ref 0–100)
LDLC/HDLC SERPL: 1.33 {RATIO}
PA ADP PRP-ACNC: 205 PRU
PLATELET # BLD AUTO: 233 10*3/MM3 (ref 140–450)
POTASSIUM BLD-SCNC: 3.6 MMOL/L (ref 3.5–5.2)
PROT SERPL-MCNC: 6.8 G/DL (ref 6–8.5)
SODIUM BLD-SCNC: 147 MMOL/L (ref 136–145)
TRIGL SERPL-MCNC: 105 MG/DL (ref 0–150)
VLDLC SERPL-MCNC: 21 MG/DL (ref 5–40)

## 2019-11-12 PROCEDURE — 85576 BLOOD PLATELET AGGREGATION: CPT | Performed by: PHYSICIAN ASSISTANT

## 2019-11-12 PROCEDURE — 80061 LIPID PANEL: CPT | Performed by: PHYSICIAN ASSISTANT

## 2019-11-12 PROCEDURE — 36415 COLL VENOUS BLD VENIPUNCTURE: CPT

## 2019-11-12 PROCEDURE — 80053 COMPREHEN METABOLIC PANEL: CPT | Performed by: PHYSICIAN ASSISTANT

## 2019-11-12 PROCEDURE — 99214 OFFICE O/P EST MOD 30 MIN: CPT | Performed by: PHYSICIAN ASSISTANT

## 2019-11-12 RX ORDER — MULTIVIT WITH MINERALS/LUTEIN
1000 TABLET ORAL
COMMUNITY

## 2019-11-12 RX ORDER — ROSUVASTATIN CALCIUM 20 MG/1
1 TABLET, COATED ORAL DAILY
Refills: 1 | COMMUNITY
Start: 2019-10-28 | End: 2020-09-03

## 2019-11-12 RX ORDER — VENLAFAXINE HYDROCHLORIDE 75 MG/1
75 CAPSULE, EXTENDED RELEASE ORAL DAILY
Qty: 30 CAPSULE | Refills: 2 | Status: SHIPPED | OUTPATIENT
Start: 2019-11-12 | End: 2020-09-03

## 2019-11-12 NOTE — PROGRESS NOTES
Neurology Progress Note      Chief Complaint:    Left periventricular white matter CVA off of Plavix  Short-term memory impairment  History of basal ganglia CVA    Subjective     Subjective:  This is a pleasant 74-year-old female coming to the office by her  today who I had recently seen on October 21 for history of remote basal ganglia CVA.  The patient had been doing well on Plavix, statin and antihypertensives, however, the patient ran out of medication and forgot to refill this for approximately 4 days, by her 's report, and she developed evidence of some confusion and altered mental state according to some friends and family members prompting her to present to [facility] for further evaluation.  She was found to have subacute infarct involving the left periventricular white matter.  The patient's symptoms slowly resolved.  She was outside the window for TPA administration by approximately 24 hours after the onset of symptoms.  Since that time, she has no residual effects according to the patient, however, she is having some depressed mood.  She continues to feel as though she has short-term memory impairment.  In the past, she had been on gabapentin, however, she states she is no longer taking this.  She also takes trazodone at night to help her sleep.  She is back on the Plavix and her statin therapy as well as antihypertensives.  She has had no further focal neurologic deficits and she continues with home health PT, OT and ST.      Past Medical History:   Diagnosis Date   • Basal cell carcinoma 10/28/2017   • CAD (coronary artery disease)    • CVA (cerebral vascular accident) (CMS/[facility]) 2015   • Hyperlipidemia    • Hypertension    • Osteopenia    • Rheumatoid arthritis (CMS/[facility])    • TIA (transient ischemic attack)      Past Surgical History:   Procedure Laterality Date   • APPENDECTOMY     • BREAST BIOPSY     • BREAST RECONSTRUCTION      breast reduction , yrs ago   • CARDIAC CATHETERIZATION      • CARDIAC SURGERY      cabg x3  2015 ,done at [facility]   • CATARACT EXTRACTION     • COLONOSCOPY N/A 1/18/2017    Procedure: COLONOSCOPY WITH ANESTHESIA;  Surgeon: Andrea Pizano DO;  Location: [facility] ENDOSCOPY;  Service:    • COLONOSCOPY N/A 2/7/2017    Procedure: COLONOSCOPY WITH ANESTHESIA;  Surgeon: Andrea Pizano DO;  Location: [facility] ENDOSCOPY;  Service:    • HEAD/NECK LESION/CYST EXCISION Right 10/18/2017    Procedure: Excision of basal cell carcinoma the right neck with complex closure;  Surgeon: Edward Cardona MD;  Location: [facility] OR;  Service:    • HERNIA REPAIR     • REDUCTION MAMMAPLASTY  1995   • RETINAL LASER PROCEDURE     • SPINE SURGERY     • TUBAL ABDOMINAL LIGATION       Family History   Problem Relation Age of Onset   • Emphysema Mother    • Heart disease Father    • Cancer Sister    • Cancer Brother    • Liver cancer Maternal Grandmother      Social History     Tobacco Use   • Smoking status: Never Smoker   • Smokeless tobacco: Never Used   Substance Use Topics   • Alcohol use: No   • Drug use: No       Medications:  Current Outpatient Medications   Medication Sig Dispense Refill   • alendronate (FOSAMAX) 70 MG tablet Take 70 mg by mouth Every 7 (Seven) Days.     • amLODIPine (NORVASC) 2.5 MG tablet Take 1 tablet by mouth daily. 30 tablet 11   • Ascorbic Acid (VITAMIN C PO) Take 1,000 mg by mouth Daily.     • atorvastatin (LIPITOR) 80 MG tablet Take 1 tablet by mouth Every Night. 30 tablet 0   • Cholecalciferol (VITAMIN D3) 5000 UNITS capsule capsule Take 2,000 Units by mouth Daily.     • clopidogrel (PLAVIX) 75 MG tablet Take 1 tablet by mouth Daily. 90 tablet 3   • folic acid (FOLVITE) 1 MG tablet Take 1 mg by mouth Daily.     • gabapentin (NEURONTIN) 100 MG capsule Take 100 mg by mouth Every 8 (Eight) Hours As Needed.     • levothyroxine (SYNTHROID, LEVOTHROID) 50 MCG tablet Take 50 mcg by mouth Daily.     • nitroglycerin (NITROSTAT) 0.4 MG SL tablet Place 0.4 mg under the  tongue every 5 (five) minutes as needed for chest pain. Take no more than 3 doses in 15 minutes.     • predniSONE (DELTASONE) 5 MG tablet Take 10 mg by mouth Daily.     • traZODone (DESYREL) 50 MG tablet Take 50 mg by mouth Every Night.       No current facility-administered medications for this visit.        Allergies:    Patient has no known allergies.    Review of Systems:   -A 14 point review of systems is completed and is negative.      Objective      Vital Signs  BP: 108/80  Pulse: 77  SPO2: 98% room air    Physical Exam:    General Exam:  Head:  Normocephalic, atraumatic.  HEENT: PERRLA.  Full EOM.  Neck:  No lymphadenopathy, thyromegaly or bruit.  Cardiac:  Regular rate and rhythm.  Normal S1, S2.  No murmur, rub or gallop.  Lungs:  Clear to auscultation bilaterally.  No wheeze, rales or rhonchi.  Abdomen:  Non-tender, Non-distended.  Bowel sounds normoactive.  Extremities: Full peripheral pulses.  No clubbing, cyanosis or edema.  Skin: No ulceration, breakdown or rash.      Neurologic Exam:  Mental Status:    -Awake. Alert. Oriented to person, place & time.  -No word finding difficulties.  -No aphasia.  -No dysarthria.  -Follows simple commands.     CN II:  Full visual fields with confrontation.  Pupils equally reactive to light.  CN III, IV, VI:  Extraocular muscles function intact with no nystagmus.  CN V:  Facial sensory is symmetric.  CN VII:  Facial motor symmetric.  CN VIII:  Gross hearing intact bilaterally.  CN IX/X:  Palate elevates symmetrically.  CN XI:  Shoulder shrug symmetric.  CN XII:  Tongue is midline on protrusion.     Motor: (strength out of 5:  1= minimal movement, 2 = movement in plane of gravity, 3 = movement against gravity, 4 = movement against some resistance, 5 = full strength)     -5/5 in bilateral biceps, triceps, brachioradialis, wrist extensors and intrinsic muscles of the hand.    -5/5 in bilateral hip flexors, quadriceps, hamstrings, gastrocsoleus complex, anterior tibialis  "and extensor hallucis longus.       Deep Tendon Reflexes:  -Right              Bicep: 2+         Triceps: 2+      Brachioradialis: 2+              Patella: 2+       Ankle: 2+         Babinski:  negative  -Left              Bicep: 2+         Triceps: 2+      Brachioradialis: 2+              Patella: 2+       Ankle: 2+         Babinski:  negative     Sensory:  -Intact to light touch, pinprick BUE (C5-T1) and BLE (L2-S1).     Coordination:  -Finger to nose intact BUEs  -Heel to shin intact BLEs  -No ataxia     Gait  -No signs of ataxia  -ambulates unassisted       Results Review:    I reviewed the patient's new clinical results and findings.      No components found for: A1C  Lab Results   Component Value Date    HDL 44 10/23/2019     (H) 10/23/2019     No components found for: B12  Lab Results   Component Value Date    TSH 1.240 10/23/2019       Assessment/Plan     Impression:  1.  Left periventricular white matter CVA on 10/21/2019  2.  Remote right basal ganglia CVA  3.  Hypertension, essential  4.  Dyslipidemia, mixed  5.  Chronic left upper extremity weakness  6.  Parafalcine meningioma  7.  Coronary artery disease with previous coronary artery bypass grafting, May 2015      Plan:  1.  Fortunately, the patient has had no further neurologic sequela since her hospitalization.  Of note, it is documented that the patient had been off of her antiplatelet and antihypertensives for approximately 3 days at the time of her stroke, however, I saw her in clinic only 2 days prior and this was not reported nor did she receive any instruction from me to discontinue such medications.  A large contributor to this is likely medical noncompliance and she should continue on antiplatelet therapy life-long as well as statin and antihypertensive therapy.    2.  Patient is counseled on stroke signs and symptoms using FAST and \"Time Saved is Brain Saved.\"    3.  Patient's LDL was not to goal at hospitalization.  She will continue " on atorvastatin 80 mg daily, however, I would still have a target LDL of less than 70 and provided an order for follow-up CMP and lipid panel.    4.  Secondary stroke prophylaxis should also include target systolic pressure less than 140 and diastolic less than 90.    5.  I have recommended instituting regular cardiovascular exercise in the form of walking, biking or swimming 30-40 minutes at a time at least 3-4 times per week..    6.  I believe it would be prudent to obtain a Plavix platelet and patient test notes she is back on antiplatelet therapy to assure that she is responsive to the Plavix.  This order was also provided today.    7.  Finally, the patient states she is no longer taking gabapentin.  She does take trazodone occasionally for sleep at night.  I have recommended that we transition her to Effexor XR 75 mg daily as an SNRI to help with depressed mood as well as some of her anhedonia/decreased motivation.  I will follow-up with her in the next 4-6 weeks to evaluate her clinical response.  She is to contact me with any concerns or questions in the interim.  Additionally, I do not see that she has any clear underlying cognitive impairment, but rather, is likely been impacted by her depressed mood.  Once we have her stable, I will have her follow-up with her family nurse practitioner, JACLYN Oneal, for ongoing management, thereafter.    The patient and her  voiced understanding and agreement with plan of care.  I will likely transition her into outpatient speech therapy at follow-up, as needed.  They will call for any concerns or questions in the interim.  We did discuss the potential side effects the medication and we also expressed the importance of compliance with her antiplatelet therapy.  We reviewed pertinent diagnostic studies and the potential risks and benefits of the prescribed regimen of treatment.    We discussed compliance of the prescribed treatment regimen and instructions on  medication, therapy, physical activity, etc. and potential for improvement and impact these have on their healing/recovery and risk reduction in the future.    I spent greater than 25 minutes in direct face to face contact with the patient with greater than 50% of the time being spent in education and counseling.          Ronnie Haider PA-C  11/12/19  10:55 AM

## 2019-11-13 ENCOUNTER — APPOINTMENT (OUTPATIENT)
Dept: CT IMAGING | Age: 74
DRG: 066 | End: 2019-11-13
Payer: MEDICARE

## 2019-11-13 ENCOUNTER — APPOINTMENT (OUTPATIENT)
Dept: MRI IMAGING | Age: 74
DRG: 066 | End: 2019-11-13
Payer: MEDICARE

## 2019-11-13 ENCOUNTER — APPOINTMENT (OUTPATIENT)
Dept: GENERAL RADIOLOGY | Age: 74
DRG: 066 | End: 2019-11-13
Payer: MEDICARE

## 2019-11-13 ENCOUNTER — HOSPITAL ENCOUNTER (INPATIENT)
Age: 74
LOS: 2 days | Discharge: NURSING, REHAB OR LONG-TERM CARE | DRG: 066 | End: 2019-11-15
Attending: EMERGENCY MEDICINE | Admitting: INTERNAL MEDICINE
Payer: MEDICARE

## 2019-11-13 DIAGNOSIS — Z86.73 HISTORY OF CVA IN ADULTHOOD: ICD-10-CM

## 2019-11-13 DIAGNOSIS — R53.1 ACUTE RIGHT-SIDED WEAKNESS: ICD-10-CM

## 2019-11-13 DIAGNOSIS — R47.01 APHASIA: ICD-10-CM

## 2019-11-13 DIAGNOSIS — I63.9 CEREBROVASCULAR ACCIDENT (CVA), UNSPECIFIED MECHANISM (HCC): Primary | ICD-10-CM

## 2019-11-13 DIAGNOSIS — E87.6 HYPOKALEMIA: ICD-10-CM

## 2019-11-13 PROBLEM — R29.90 STROKE-LIKE SYMPTOMS: Status: ACTIVE | Noted: 2019-11-13

## 2019-11-13 LAB
ANION GAP SERPL CALCULATED.3IONS-SCNC: 10 MMOL/L (ref 7–19)
APTT: 23.7 SEC (ref 26–36.2)
BASOPHILS ABSOLUTE: 0 K/UL (ref 0–0.2)
BASOPHILS RELATIVE PERCENT: 0.4 % (ref 0–1)
BILIRUBIN URINE: NEGATIVE
BLOOD, URINE: NEGATIVE
BUN BLDV-MCNC: 17 MG/DL (ref 8–23)
CALCIUM SERPL-MCNC: 9.6 MG/DL (ref 8.8–10.2)
CHLORIDE BLD-SCNC: 107 MMOL/L (ref 98–111)
CLARITY: CLEAR
CO2: 28 MMOL/L (ref 22–29)
COLOR: YELLOW
CREAT SERPL-MCNC: 0.8 MG/DL (ref 0.5–0.9)
EKG P AXIS: 50 DEGREES
EKG P-R INTERVAL: 152 MS
EKG Q-T INTERVAL: 402 MS
EKG QRS DURATION: 92 MS
EKG QTC CALCULATION (BAZETT): 419 MS
EKG T AXIS: -3 DEGREES
EOSINOPHILS ABSOLUTE: 0.2 K/UL (ref 0–0.6)
EOSINOPHILS RELATIVE PERCENT: 2.6 % (ref 0–5)
GFR NON-AFRICAN AMERICAN: >60
GLUCOSE BLD-MCNC: 102 MG/DL (ref 74–109)
GLUCOSE URINE: NEGATIVE MG/DL
HCT VFR BLD CALC: 44.8 % (ref 37–47)
HEMOGLOBIN: 15 G/DL (ref 12–16)
IMMATURE GRANULOCYTES #: 0 K/UL
INR BLD: 0.98 (ref 0.88–1.18)
KETONES, URINE: NEGATIVE MG/DL
LEUKOCYTE ESTERASE, URINE: NEGATIVE
LV EF: 58 %
LVEF MODALITY: NORMAL
LYMPHOCYTES ABSOLUTE: 1.8 K/UL (ref 1.1–4.5)
LYMPHOCYTES RELATIVE PERCENT: 24 % (ref 20–40)
MAGNESIUM: 2.2 MG/DL (ref 1.6–2.4)
MCH RBC QN AUTO: 32.3 PG (ref 27–31)
MCHC RBC AUTO-ENTMCNC: 33.5 G/DL (ref 33–37)
MCV RBC AUTO: 96.6 FL (ref 81–99)
MONOCYTES ABSOLUTE: 0.5 K/UL (ref 0–0.9)
MONOCYTES RELATIVE PERCENT: 6.6 % (ref 0–10)
NEUTROPHILS ABSOLUTE: 5 K/UL (ref 1.5–7.5)
NEUTROPHILS RELATIVE PERCENT: 66.1 % (ref 50–65)
NITRITE, URINE: NEGATIVE
P2Y12 RESULT: 227 PRU (ref 194–418)
PDW BLD-RTO: 12.3 % (ref 11.5–14.5)
PH UA: 7 (ref 5–8)
PLATELET # BLD: 207 K/UL (ref 130–400)
PMV BLD AUTO: 10.4 FL (ref 9.4–12.3)
POTASSIUM REFLEX MAGNESIUM: 3.1 MMOL/L (ref 3.5–5)
PROTEIN UA: NEGATIVE MG/DL
PROTHROMBIN TIME: 12.4 SEC (ref 12–14.6)
RBC # BLD: 4.64 M/UL (ref 4.2–5.4)
SODIUM BLD-SCNC: 145 MMOL/L (ref 136–145)
SPECIFIC GRAVITY UA: 1.04 (ref 1–1.03)
UROBILINOGEN, URINE: 0.2 E.U./DL
WBC # BLD: 7.6 K/UL (ref 4.8–10.8)

## 2019-11-13 PROCEDURE — 93880 EXTRACRANIAL BILAT STUDY: CPT

## 2019-11-13 PROCEDURE — 6360000002 HC RX W HCPCS: Performed by: EMERGENCY MEDICINE

## 2019-11-13 PROCEDURE — 36415 COLL VENOUS BLD VENIPUNCTURE: CPT

## 2019-11-13 PROCEDURE — 99285 EMERGENCY DEPT VISIT HI MDM: CPT

## 2019-11-13 PROCEDURE — 93010 ELECTROCARDIOGRAM REPORT: CPT | Performed by: INTERNAL MEDICINE

## 2019-11-13 PROCEDURE — 80048 BASIC METABOLIC PNL TOTAL CA: CPT

## 2019-11-13 PROCEDURE — 92610 EVALUATE SWALLOWING FUNCTION: CPT

## 2019-11-13 PROCEDURE — 83735 ASSAY OF MAGNESIUM: CPT

## 2019-11-13 PROCEDURE — 6370000000 HC RX 637 (ALT 250 FOR IP): Performed by: PHYSICIAN ASSISTANT

## 2019-11-13 PROCEDURE — 70450 CT HEAD/BRAIN W/O DYE: CPT

## 2019-11-13 PROCEDURE — 85025 COMPLETE CBC W/AUTO DIFF WBC: CPT

## 2019-11-13 PROCEDURE — 93005 ELECTROCARDIOGRAM TRACING: CPT | Performed by: EMERGENCY MEDICINE

## 2019-11-13 PROCEDURE — 99999 PR OFFICE/OUTPT VISIT,PROCEDURE ONLY: CPT | Performed by: EMERGENCY MEDICINE

## 2019-11-13 PROCEDURE — 6360000004 HC RX CONTRAST MEDICATION: Performed by: EMERGENCY MEDICINE

## 2019-11-13 PROCEDURE — 93306 TTE W/DOPPLER COMPLETE: CPT

## 2019-11-13 PROCEDURE — 70498 CT ANGIOGRAPHY NECK: CPT

## 2019-11-13 PROCEDURE — 71046 X-RAY EXAM CHEST 2 VIEWS: CPT

## 2019-11-13 PROCEDURE — 70496 CT ANGIOGRAPHY HEAD: CPT

## 2019-11-13 PROCEDURE — 85610 PROTHROMBIN TIME: CPT

## 2019-11-13 PROCEDURE — 81003 URINALYSIS AUTO W/O SCOPE: CPT

## 2019-11-13 PROCEDURE — 85730 THROMBOPLASTIN TIME PARTIAL: CPT

## 2019-11-13 PROCEDURE — 1210000000 HC MED SURG R&B

## 2019-11-13 PROCEDURE — 70551 MRI BRAIN STEM W/O DYE: CPT

## 2019-11-13 PROCEDURE — 85576 BLOOD PLATELET AGGREGATION: CPT

## 2019-11-13 PROCEDURE — 6370000000 HC RX 637 (ALT 250 FOR IP): Performed by: PSYCHIATRY & NEUROLOGY

## 2019-11-13 RX ORDER — FOLIC ACID 1 MG/1
1 TABLET ORAL DAILY
Status: DISCONTINUED | OUTPATIENT
Start: 2019-11-14 | End: 2019-11-15 | Stop reason: HOSPADM

## 2019-11-13 RX ORDER — CLOPIDOGREL BISULFATE 75 MG/1
75 TABLET ORAL DAILY
Status: DISCONTINUED | OUTPATIENT
Start: 2019-11-14 | End: 2019-11-14

## 2019-11-13 RX ORDER — ASPIRIN 300 MG/1
300 SUPPOSITORY RECTAL DAILY
Status: DISCONTINUED | OUTPATIENT
Start: 2019-11-13 | End: 2019-11-15 | Stop reason: HOSPADM

## 2019-11-13 RX ORDER — LABETALOL 20 MG/4 ML (5 MG/ML) INTRAVENOUS SYRINGE
10 EVERY 10 MIN PRN
Status: DISCONTINUED | OUTPATIENT
Start: 2019-11-13 | End: 2019-11-15 | Stop reason: HOSPADM

## 2019-11-13 RX ORDER — CILOSTAZOL 100 MG/1
50 TABLET ORAL 2 TIMES DAILY
Status: DISCONTINUED | OUTPATIENT
Start: 2019-11-13 | End: 2019-11-15 | Stop reason: HOSPADM

## 2019-11-13 RX ORDER — SODIUM CHLORIDE 0.9 % (FLUSH) 0.9 %
10 SYRINGE (ML) INJECTION PRN
Status: DISCONTINUED | OUTPATIENT
Start: 2019-11-13 | End: 2019-11-15 | Stop reason: HOSPADM

## 2019-11-13 RX ORDER — PREDNISONE 10 MG/1
10 TABLET ORAL DAILY
Status: DISCONTINUED | OUTPATIENT
Start: 2019-11-14 | End: 2019-11-15 | Stop reason: HOSPADM

## 2019-11-13 RX ORDER — ASPIRIN 81 MG/1
81 TABLET ORAL DAILY
Status: DISCONTINUED | OUTPATIENT
Start: 2019-11-13 | End: 2019-11-15 | Stop reason: HOSPADM

## 2019-11-13 RX ORDER — LEVOTHYROXINE SODIUM 0.05 MG/1
50 TABLET ORAL DAILY
Status: DISCONTINUED | OUTPATIENT
Start: 2019-11-14 | End: 2019-11-15 | Stop reason: HOSPADM

## 2019-11-13 RX ORDER — POTASSIUM CHLORIDE 7.45 MG/ML
10 INJECTION INTRAVENOUS
Status: COMPLETED | OUTPATIENT
Start: 2019-11-13 | End: 2019-11-13

## 2019-11-13 RX ORDER — NITROGLYCERIN 0.4 MG/1
0.4 TABLET SUBLINGUAL EVERY 5 MIN PRN
Status: DISCONTINUED | OUTPATIENT
Start: 2019-11-13 | End: 2019-11-15 | Stop reason: HOSPADM

## 2019-11-13 RX ORDER — SODIUM CHLORIDE 0.9 % (FLUSH) 0.9 %
10 SYRINGE (ML) INJECTION EVERY 12 HOURS SCHEDULED
Status: DISCONTINUED | OUTPATIENT
Start: 2019-11-13 | End: 2019-11-15 | Stop reason: HOSPADM

## 2019-11-13 RX ORDER — ROSUVASTATIN CALCIUM 10 MG/1
40 TABLET, COATED ORAL NIGHTLY
Status: DISCONTINUED | OUTPATIENT
Start: 2019-11-13 | End: 2019-11-15 | Stop reason: HOSPADM

## 2019-11-13 RX ORDER — TRAZODONE HYDROCHLORIDE 50 MG/1
50 TABLET ORAL NIGHTLY
Status: DISCONTINUED | OUTPATIENT
Start: 2019-11-13 | End: 2019-11-15 | Stop reason: HOSPADM

## 2019-11-13 RX ADMIN — Medication 10 MEQ: at 17:34

## 2019-11-13 RX ADMIN — ASPIRIN 81 MG: 81 TABLET, COATED ORAL at 17:33

## 2019-11-13 RX ADMIN — IOPAMIDOL 90 ML: 755 INJECTION, SOLUTION INTRAVENOUS at 11:18

## 2019-11-13 RX ADMIN — TRAZODONE HYDROCHLORIDE 50 MG: 50 TABLET ORAL at 22:40

## 2019-11-13 RX ADMIN — CILOSTAZOL 50 MG: 100 TABLET ORAL at 17:33

## 2019-11-13 RX ADMIN — Medication 10 MEQ: at 13:14

## 2019-11-13 ASSESSMENT — ENCOUNTER SYMPTOMS
EYE PAIN: 0
COUGH: 0
ABDOMINAL PAIN: 0
VOMITING: 0
RHINORRHEA: 0
SHORTNESS OF BREATH: 0
VOICE CHANGE: 0
EYE REDNESS: 0
DIARRHEA: 0

## 2019-11-13 ASSESSMENT — PAIN SCALES - GENERAL: PAINLEVEL_OUTOF10: 0

## 2019-11-14 LAB
ANION GAP SERPL CALCULATED.3IONS-SCNC: 13 MMOL/L (ref 7–19)
BUN BLDV-MCNC: 14 MG/DL (ref 8–23)
CALCIUM SERPL-MCNC: 9.1 MG/DL (ref 8.8–10.2)
CHLORIDE BLD-SCNC: 107 MMOL/L (ref 98–111)
CHOLESTEROL, TOTAL: 119 MG/DL (ref 160–199)
CO2: 24 MMOL/L (ref 22–29)
CREAT SERPL-MCNC: 0.8 MG/DL (ref 0.5–0.9)
GFR NON-AFRICAN AMERICAN: >60
GLUCOSE BLD-MCNC: 92 MG/DL (ref 74–109)
HBA1C MFR BLD: 5.4 % (ref 4–6)
HCT VFR BLD CALC: 39.5 % (ref 37–47)
HCT VFR BLD CALC: 39.9 % (ref 37–47)
HDLC SERPL-MCNC: 48 MG/DL (ref 65–121)
HEMOGLOBIN: 13 G/DL (ref 12–16)
LDL CHOLESTEROL CALCULATED: 57 MG/DL
MCH RBC QN AUTO: 31.5 PG (ref 27–31)
MCHC RBC AUTO-ENTMCNC: 32.6 G/DL (ref 33–37)
MCV RBC AUTO: 96.6 FL (ref 81–99)
PDW BLD-RTO: 12.4 % (ref 11.5–14.5)
PLATELET # BLD: 193 K/UL (ref 130–400)
PLATELET # BLD: 195 K/UL (ref 130–400)
PMV BLD AUTO: 10.6 FL (ref 9.4–12.3)
POTASSIUM REFLEX MAGNESIUM: 3.9 MMOL/L (ref 3.5–5)
RBC # BLD: 4.13 M/UL (ref 4.2–5.4)
SODIUM BLD-SCNC: 144 MMOL/L (ref 136–145)
TRIGL SERPL-MCNC: 70 MG/DL (ref 0–149)
WBC # BLD: 6.8 K/UL (ref 4.8–10.8)

## 2019-11-14 PROCEDURE — 2580000003 HC RX 258: Performed by: PHYSICIAN ASSISTANT

## 2019-11-14 PROCEDURE — 6360000002 HC RX W HCPCS: Performed by: PHYSICIAN ASSISTANT

## 2019-11-14 PROCEDURE — 36415 COLL VENOUS BLD VENIPUNCTURE: CPT

## 2019-11-14 PROCEDURE — 97530 THERAPEUTIC ACTIVITIES: CPT

## 2019-11-14 PROCEDURE — 6370000000 HC RX 637 (ALT 250 FOR IP): Performed by: PHYSICIAN ASSISTANT

## 2019-11-14 PROCEDURE — 99223 1ST HOSP IP/OBS HIGH 75: CPT | Performed by: PSYCHIATRY & NEUROLOGY

## 2019-11-14 PROCEDURE — 6370000000 HC RX 637 (ALT 250 FOR IP): Performed by: PSYCHIATRY & NEUROLOGY

## 2019-11-14 PROCEDURE — 97116 GAIT TRAINING THERAPY: CPT

## 2019-11-14 PROCEDURE — 85027 COMPLETE CBC AUTOMATED: CPT

## 2019-11-14 PROCEDURE — 1210000000 HC MED SURG R&B

## 2019-11-14 PROCEDURE — 85049 AUTOMATED PLATELET COUNT: CPT

## 2019-11-14 PROCEDURE — 92610 EVALUATE SWALLOWING FUNCTION: CPT

## 2019-11-14 PROCEDURE — 85014 HEMATOCRIT: CPT

## 2019-11-14 PROCEDURE — 97165 OT EVAL LOW COMPLEX 30 MIN: CPT

## 2019-11-14 PROCEDURE — 83036 HEMOGLOBIN GLYCOSYLATED A1C: CPT

## 2019-11-14 PROCEDURE — 80048 BASIC METABOLIC PNL TOTAL CA: CPT

## 2019-11-14 PROCEDURE — 80061 LIPID PANEL: CPT

## 2019-11-14 PROCEDURE — 97162 PT EVAL MOD COMPLEX 30 MIN: CPT

## 2019-11-14 PROCEDURE — 97161 PT EVAL LOW COMPLEX 20 MIN: CPT

## 2019-11-14 RX ORDER — ASPIRIN AND DIPYRIDAMOLE 25; 200 MG/1; MG/1
1 CAPSULE, EXTENDED RELEASE ORAL DAILY
Status: DISCONTINUED | OUTPATIENT
Start: 2019-11-14 | End: 2019-11-15 | Stop reason: HOSPADM

## 2019-11-14 RX ORDER — ONDANSETRON 2 MG/ML
4 INJECTION INTRAMUSCULAR; INTRAVENOUS EVERY 6 HOURS PRN
Status: DISCONTINUED | OUTPATIENT
Start: 2019-11-14 | End: 2019-11-15 | Stop reason: HOSPADM

## 2019-11-14 RX ORDER — ASPIRIN AND DIPYRIDAMOLE 25; 200 MG/1; MG/1
1 CAPSULE, EXTENDED RELEASE ORAL 2 TIMES DAILY
Status: DISCONTINUED | OUTPATIENT
Start: 2019-11-21 | End: 2019-11-15 | Stop reason: HOSPADM

## 2019-11-14 RX ADMIN — Medication 5 ML: at 14:30

## 2019-11-14 RX ADMIN — ASPIRIN AND DIPYRIDAMOLE 1 CAPSULE: 25; 200 CAPSULE, EXTENDED RELEASE ORAL at 09:14

## 2019-11-14 RX ADMIN — ASPIRIN 81 MG: 81 TABLET, COATED ORAL at 09:14

## 2019-11-14 RX ADMIN — PREDNISONE 10 MG: 10 TABLET ORAL at 09:14

## 2019-11-14 RX ADMIN — Medication 10 ML: at 09:14

## 2019-11-14 RX ADMIN — CILOSTAZOL 50 MG: 100 TABLET ORAL at 09:17

## 2019-11-14 RX ADMIN — ONDANSETRON 4 MG: 2 INJECTION INTRAMUSCULAR; INTRAVENOUS at 14:30

## 2019-11-14 RX ADMIN — FOLIC ACID 1 MG: 1 TABLET ORAL at 09:14

## 2019-11-15 ENCOUNTER — HOSPITAL ENCOUNTER (INPATIENT)
Age: 74
LOS: 14 days | Discharge: HOME HEALTH | DRG: 057 | End: 2019-11-29
Attending: PSYCHIATRY & NEUROLOGY | Admitting: PSYCHIATRY & NEUROLOGY
Payer: MEDICARE

## 2019-11-15 VITALS
TEMPERATURE: 98.3 F | WEIGHT: 130 LBS | OXYGEN SATURATION: 97 % | BODY MASS INDEX: 23.92 KG/M2 | RESPIRATION RATE: 16 BRPM | HEIGHT: 62 IN | HEART RATE: 70 BPM | DIASTOLIC BLOOD PRESSURE: 76 MMHG | SYSTOLIC BLOOD PRESSURE: 111 MMHG

## 2019-11-15 DIAGNOSIS — I63.9 ACUTE ISCHEMIC STROKE (HCC): Primary | ICD-10-CM

## 2019-11-15 LAB
ANION GAP SERPL CALCULATED.3IONS-SCNC: 10 MMOL/L (ref 7–19)
BASOPHILS ABSOLUTE: 0 K/UL (ref 0–0.2)
BASOPHILS RELATIVE PERCENT: 0.4 % (ref 0–1)
BUN BLDV-MCNC: 21 MG/DL (ref 8–23)
CALCIUM SERPL-MCNC: 9.3 MG/DL (ref 8.8–10.2)
CHLORIDE BLD-SCNC: 104 MMOL/L (ref 98–111)
CO2: 25 MMOL/L (ref 22–29)
CREAT SERPL-MCNC: 0.9 MG/DL (ref 0.5–0.9)
EOSINOPHILS ABSOLUTE: 0.1 K/UL (ref 0–0.6)
EOSINOPHILS RELATIVE PERCENT: 0.9 % (ref 0–5)
GFR NON-AFRICAN AMERICAN: >60
GLUCOSE BLD-MCNC: 84 MG/DL (ref 74–109)
HCT VFR BLD CALC: 41.9 % (ref 37–47)
HEMOGLOBIN: 14.3 G/DL (ref 12–16)
IMMATURE GRANULOCYTES #: 0 K/UL
LYMPHOCYTES ABSOLUTE: 0.7 K/UL (ref 1.1–4.5)
LYMPHOCYTES RELATIVE PERCENT: 7.8 % (ref 20–40)
MCH RBC QN AUTO: 32.6 PG (ref 27–31)
MCHC RBC AUTO-ENTMCNC: 34.1 G/DL (ref 33–37)
MCV RBC AUTO: 95.4 FL (ref 81–99)
MONOCYTES ABSOLUTE: 0.3 K/UL (ref 0–0.9)
MONOCYTES RELATIVE PERCENT: 2.9 % (ref 0–10)
NEUTROPHILS ABSOLUTE: 7.5 K/UL (ref 1.5–7.5)
NEUTROPHILS RELATIVE PERCENT: 87.6 % (ref 50–65)
PDW BLD-RTO: 12.3 % (ref 11.5–14.5)
PLATELET # BLD: 221 K/UL (ref 130–400)
PMV BLD AUTO: 10.1 FL (ref 9.4–12.3)
POTASSIUM REFLEX MAGNESIUM: 3.9 MMOL/L (ref 3.5–5)
PREALBUMIN: 22 MG/DL (ref 20–40)
RBC # BLD: 4.39 M/UL (ref 4.2–5.4)
SODIUM BLD-SCNC: 139 MMOL/L (ref 136–145)
WBC # BLD: 8.5 K/UL (ref 4.8–10.8)

## 2019-11-15 PROCEDURE — 6370000000 HC RX 637 (ALT 250 FOR IP): Performed by: PHYSICIAN ASSISTANT

## 2019-11-15 PROCEDURE — 80048 BASIC METABOLIC PNL TOTAL CA: CPT

## 2019-11-15 PROCEDURE — 99233 SBSQ HOSP IP/OBS HIGH 50: CPT | Performed by: PSYCHIATRY & NEUROLOGY

## 2019-11-15 PROCEDURE — 1180000000 HC REHAB R&B

## 2019-11-15 PROCEDURE — 2580000003 HC RX 258: Performed by: PHYSICIAN ASSISTANT

## 2019-11-15 PROCEDURE — 92526 ORAL FUNCTION THERAPY: CPT

## 2019-11-15 PROCEDURE — 6370000000 HC RX 637 (ALT 250 FOR IP): Performed by: PSYCHIATRY & NEUROLOGY

## 2019-11-15 PROCEDURE — 84134 ASSAY OF PREALBUMIN: CPT

## 2019-11-15 PROCEDURE — 85025 COMPLETE CBC W/AUTO DIFF WBC: CPT

## 2019-11-15 PROCEDURE — 36415 COLL VENOUS BLD VENIPUNCTURE: CPT

## 2019-11-15 RX ORDER — DOCUSATE SODIUM 100 MG/1
100 CAPSULE, LIQUID FILLED ORAL 2 TIMES DAILY
Status: DISCONTINUED | OUTPATIENT
Start: 2019-11-15 | End: 2019-11-29 | Stop reason: HOSPADM

## 2019-11-15 RX ORDER — BISACODYL 10 MG
10 SUPPOSITORY, RECTAL RECTAL DAILY PRN
Status: DISCONTINUED | OUTPATIENT
Start: 2019-11-15 | End: 2019-11-29 | Stop reason: HOSPADM

## 2019-11-15 RX ORDER — TRAZODONE HYDROCHLORIDE 50 MG/1
50 TABLET ORAL NIGHTLY
Status: CANCELLED | OUTPATIENT
Start: 2019-11-15

## 2019-11-15 RX ORDER — POLYETHYLENE GLYCOL 3350 17 G/17G
17 POWDER, FOR SOLUTION ORAL DAILY
Status: DISCONTINUED | OUTPATIENT
Start: 2019-11-15 | End: 2019-11-29 | Stop reason: HOSPADM

## 2019-11-15 RX ORDER — SODIUM CHLORIDE 0.9 % (FLUSH) 0.9 %
10 SYRINGE (ML) INJECTION PRN
Status: CANCELLED | OUTPATIENT
Start: 2019-11-15

## 2019-11-15 RX ORDER — FOLIC ACID 1 MG/1
1 TABLET ORAL DAILY
Status: CANCELLED | OUTPATIENT
Start: 2019-11-16

## 2019-11-15 RX ORDER — ASPIRIN 81 MG/1
81 TABLET ORAL DAILY
Status: CANCELLED | OUTPATIENT
Start: 2019-11-16

## 2019-11-15 RX ORDER — FOLIC ACID 1 MG/1
1 TABLET ORAL DAILY
Status: DISCONTINUED | OUTPATIENT
Start: 2019-11-16 | End: 2019-11-29 | Stop reason: HOSPADM

## 2019-11-15 RX ORDER — ROSUVASTATIN CALCIUM 10 MG/1
40 TABLET, COATED ORAL NIGHTLY
Status: CANCELLED | OUTPATIENT
Start: 2019-11-15

## 2019-11-15 RX ORDER — SODIUM CHLORIDE 0.9 % (FLUSH) 0.9 %
10 SYRINGE (ML) INJECTION PRN
Status: DISCONTINUED | OUTPATIENT
Start: 2019-11-15 | End: 2019-11-16

## 2019-11-15 RX ORDER — PREDNISONE 10 MG/1
10 TABLET ORAL DAILY
Status: DISCONTINUED | OUTPATIENT
Start: 2019-11-16 | End: 2019-11-21

## 2019-11-15 RX ORDER — ROSUVASTATIN CALCIUM 10 MG/1
40 TABLET, COATED ORAL NIGHTLY
Status: DISCONTINUED | OUTPATIENT
Start: 2019-11-15 | End: 2019-11-29 | Stop reason: HOSPADM

## 2019-11-15 RX ORDER — ONDANSETRON 2 MG/ML
4 INJECTION INTRAMUSCULAR; INTRAVENOUS EVERY 6 HOURS PRN
Status: CANCELLED | OUTPATIENT
Start: 2019-11-15

## 2019-11-15 RX ORDER — ONDANSETRON 2 MG/ML
4 INJECTION INTRAMUSCULAR; INTRAVENOUS EVERY 6 HOURS PRN
Status: DISCONTINUED | OUTPATIENT
Start: 2019-11-15 | End: 2019-11-29 | Stop reason: HOSPADM

## 2019-11-15 RX ORDER — SODIUM CHLORIDE 0.9 % (FLUSH) 0.9 %
10 SYRINGE (ML) INJECTION EVERY 12 HOURS SCHEDULED
Status: CANCELLED | OUTPATIENT
Start: 2019-11-15

## 2019-11-15 RX ORDER — CILOSTAZOL 50 MG/1
50 TABLET ORAL 2 TIMES DAILY
Status: DISCONTINUED | OUTPATIENT
Start: 2019-11-15 | End: 2019-11-22

## 2019-11-15 RX ORDER — ACETAMINOPHEN 325 MG/1
650 TABLET ORAL EVERY 4 HOURS PRN
Status: DISCONTINUED | OUTPATIENT
Start: 2019-11-15 | End: 2019-11-29 | Stop reason: HOSPADM

## 2019-11-15 RX ORDER — ASPIRIN 300 MG/1
300 SUPPOSITORY RECTAL DAILY
Status: DISCONTINUED | OUTPATIENT
Start: 2019-11-16 | End: 2019-11-20

## 2019-11-15 RX ORDER — LEVOTHYROXINE SODIUM 0.05 MG/1
50 TABLET ORAL DAILY
Status: CANCELLED | OUTPATIENT
Start: 2019-11-16

## 2019-11-15 RX ORDER — ASPIRIN AND DIPYRIDAMOLE 25; 200 MG/1; MG/1
1 CAPSULE, EXTENDED RELEASE ORAL 2 TIMES DAILY
Status: CANCELLED | OUTPATIENT
Start: 2019-11-21

## 2019-11-15 RX ORDER — LABETALOL 20 MG/4 ML (5 MG/ML) INTRAVENOUS SYRINGE
10 EVERY 10 MIN PRN
Status: DISCONTINUED | OUTPATIENT
Start: 2019-11-15 | End: 2019-11-29 | Stop reason: HOSPADM

## 2019-11-15 RX ORDER — ASPIRIN 81 MG/1
81 TABLET ORAL DAILY
Status: DISCONTINUED | OUTPATIENT
Start: 2019-11-16 | End: 2019-11-20

## 2019-11-15 RX ORDER — CILOSTAZOL 100 MG/1
50 TABLET ORAL 2 TIMES DAILY
Status: CANCELLED | OUTPATIENT
Start: 2019-11-15

## 2019-11-15 RX ORDER — ASPIRIN AND DIPYRIDAMOLE 25; 200 MG/1; MG/1
1 CAPSULE, EXTENDED RELEASE ORAL DAILY
Status: CANCELLED | OUTPATIENT
Start: 2019-11-16 | End: 2019-11-21

## 2019-11-15 RX ORDER — NITROGLYCERIN 0.4 MG/1
0.4 TABLET SUBLINGUAL EVERY 5 MIN PRN
Status: DISCONTINUED | OUTPATIENT
Start: 2019-11-15 | End: 2019-11-29 | Stop reason: HOSPADM

## 2019-11-15 RX ORDER — SODIUM CHLORIDE 0.9 % (FLUSH) 0.9 %
10 SYRINGE (ML) INJECTION EVERY 12 HOURS SCHEDULED
Status: DISCONTINUED | OUTPATIENT
Start: 2019-11-15 | End: 2019-11-17

## 2019-11-15 RX ORDER — LABETALOL 20 MG/4 ML (5 MG/ML) INTRAVENOUS SYRINGE
10 EVERY 10 MIN PRN
Status: CANCELLED | OUTPATIENT
Start: 2019-11-15

## 2019-11-15 RX ORDER — NITROGLYCERIN 0.4 MG/1
0.4 TABLET SUBLINGUAL EVERY 5 MIN PRN
Status: CANCELLED | OUTPATIENT
Start: 2019-11-15

## 2019-11-15 RX ORDER — ASPIRIN 300 MG/1
300 SUPPOSITORY RECTAL DAILY
Status: CANCELLED | OUTPATIENT
Start: 2019-11-16

## 2019-11-15 RX ORDER — PREDNISONE 10 MG/1
10 TABLET ORAL DAILY
Status: CANCELLED | OUTPATIENT
Start: 2019-11-16

## 2019-11-15 RX ORDER — ASPIRIN AND DIPYRIDAMOLE 25; 200 MG/1; MG/1
1 CAPSULE, EXTENDED RELEASE ORAL 2 TIMES DAILY
Status: DISCONTINUED | OUTPATIENT
Start: 2019-11-21 | End: 2019-11-22

## 2019-11-15 RX ORDER — TRAZODONE HYDROCHLORIDE 50 MG/1
50 TABLET ORAL NIGHTLY
Status: DISCONTINUED | OUTPATIENT
Start: 2019-11-15 | End: 2019-11-29 | Stop reason: HOSPADM

## 2019-11-15 RX ORDER — ASPIRIN AND DIPYRIDAMOLE 25; 200 MG/1; MG/1
1 CAPSULE, EXTENDED RELEASE ORAL DAILY
Status: COMPLETED | OUTPATIENT
Start: 2019-11-16 | End: 2019-11-20

## 2019-11-15 RX ORDER — LEVOTHYROXINE SODIUM 0.05 MG/1
50 TABLET ORAL DAILY
Status: DISCONTINUED | OUTPATIENT
Start: 2019-11-16 | End: 2019-11-29 | Stop reason: HOSPADM

## 2019-11-15 RX ADMIN — FOLIC ACID 1 MG: 1 TABLET ORAL at 09:52

## 2019-11-15 RX ADMIN — ROSUVASTATIN CALCIUM 40 MG: 10 TABLET, FILM COATED ORAL at 20:29

## 2019-11-15 RX ADMIN — Medication 10 ML: at 09:53

## 2019-11-15 RX ADMIN — DOCUSATE SODIUM 100 MG: 100 CAPSULE, LIQUID FILLED ORAL at 20:29

## 2019-11-15 RX ADMIN — PREDNISONE 10 MG: 10 TABLET ORAL at 09:52

## 2019-11-15 RX ADMIN — LEVOTHYROXINE SODIUM 50 MCG: 50 TABLET ORAL at 07:49

## 2019-11-15 RX ADMIN — CILOSTAZOL 50 MG: 100 TABLET ORAL at 09:53

## 2019-11-15 RX ADMIN — TRAZODONE HYDROCHLORIDE 50 MG: 50 TABLET ORAL at 20:29

## 2019-11-15 RX ADMIN — Medication 10 ML: at 20:38

## 2019-11-15 RX ADMIN — ASPIRIN 81 MG: 81 TABLET, COATED ORAL at 09:52

## 2019-11-15 RX ADMIN — CILOSTAZOL 50 MG: 50 TABLET ORAL at 20:29

## 2019-11-15 RX ADMIN — ASPIRIN AND DIPYRIDAMOLE 1 CAPSULE: 25; 200 CAPSULE, EXTENDED RELEASE ORAL at 09:53

## 2019-11-15 ASSESSMENT — PAIN SCALES - GENERAL: PAINLEVEL_OUTOF10: 0

## 2019-11-16 PROCEDURE — 97110 THERAPEUTIC EXERCISES: CPT

## 2019-11-16 PROCEDURE — 1180000000 HC REHAB R&B

## 2019-11-16 PROCEDURE — 92522 EVALUATE SPEECH PRODUCTION: CPT

## 2019-11-16 PROCEDURE — 97165 OT EVAL LOW COMPLEX 30 MIN: CPT

## 2019-11-16 PROCEDURE — 6370000000 HC RX 637 (ALT 250 FOR IP): Performed by: PHYSICIAN ASSISTANT

## 2019-11-16 PROCEDURE — 97161 PT EVAL LOW COMPLEX 20 MIN: CPT

## 2019-11-16 PROCEDURE — 92610 EVALUATE SWALLOWING FUNCTION: CPT

## 2019-11-16 PROCEDURE — 99223 1ST HOSP IP/OBS HIGH 75: CPT | Performed by: PSYCHIATRY & NEUROLOGY

## 2019-11-16 PROCEDURE — 97535 SELF CARE MNGMENT TRAINING: CPT

## 2019-11-16 PROCEDURE — 97530 THERAPEUTIC ACTIVITIES: CPT

## 2019-11-16 PROCEDURE — 6370000000 HC RX 637 (ALT 250 FOR IP): Performed by: PSYCHIATRY & NEUROLOGY

## 2019-11-16 PROCEDURE — 97116 GAIT TRAINING THERAPY: CPT

## 2019-11-16 RX ADMIN — CILOSTAZOL 50 MG: 50 TABLET ORAL at 20:08

## 2019-11-16 RX ADMIN — FOLIC ACID 1 MG: 1 TABLET ORAL at 08:40

## 2019-11-16 RX ADMIN — LEVOTHYROXINE SODIUM 50 MCG: 50 TABLET ORAL at 05:57

## 2019-11-16 RX ADMIN — DOCUSATE SODIUM 100 MG: 100 CAPSULE, LIQUID FILLED ORAL at 20:08

## 2019-11-16 RX ADMIN — ROSUVASTATIN CALCIUM 40 MG: 10 TABLET, FILM COATED ORAL at 20:08

## 2019-11-16 RX ADMIN — PREDNISONE 10 MG: 10 TABLET ORAL at 08:40

## 2019-11-16 RX ADMIN — TRAZODONE HYDROCHLORIDE 50 MG: 50 TABLET ORAL at 20:08

## 2019-11-16 RX ADMIN — ASPIRIN 81 MG: 81 TABLET, COATED ORAL at 08:40

## 2019-11-16 RX ADMIN — DOCUSATE SODIUM 100 MG: 100 CAPSULE, LIQUID FILLED ORAL at 08:40

## 2019-11-16 RX ADMIN — ASPIRIN AND DIPYRIDAMOLE 1 CAPSULE: 25; 200 CAPSULE, EXTENDED RELEASE ORAL at 08:40

## 2019-11-16 RX ADMIN — CILOSTAZOL 50 MG: 50 TABLET ORAL at 08:40

## 2019-11-17 PROCEDURE — 1180000000 HC REHAB R&B

## 2019-11-17 PROCEDURE — 6370000000 HC RX 637 (ALT 250 FOR IP): Performed by: PSYCHIATRY & NEUROLOGY

## 2019-11-17 PROCEDURE — 6370000000 HC RX 637 (ALT 250 FOR IP): Performed by: PHYSICIAN ASSISTANT

## 2019-11-17 RX ADMIN — FOLIC ACID 1 MG: 1 TABLET ORAL at 09:26

## 2019-11-17 RX ADMIN — ROSUVASTATIN CALCIUM 40 MG: 10 TABLET, FILM COATED ORAL at 20:12

## 2019-11-17 RX ADMIN — LEVOTHYROXINE SODIUM 50 MCG: 50 TABLET ORAL at 06:14

## 2019-11-17 RX ADMIN — CILOSTAZOL 50 MG: 50 TABLET ORAL at 20:12

## 2019-11-17 RX ADMIN — DOCUSATE SODIUM 100 MG: 100 CAPSULE, LIQUID FILLED ORAL at 20:12

## 2019-11-17 RX ADMIN — CILOSTAZOL 50 MG: 50 TABLET ORAL at 09:26

## 2019-11-17 RX ADMIN — ASPIRIN 81 MG: 81 TABLET, COATED ORAL at 09:27

## 2019-11-17 RX ADMIN — MAGNESIUM HYDROXIDE 30 ML: 400 SUSPENSION ORAL at 20:12

## 2019-11-17 RX ADMIN — ASPIRIN AND DIPYRIDAMOLE 1 CAPSULE: 25; 200 CAPSULE, EXTENDED RELEASE ORAL at 09:26

## 2019-11-17 RX ADMIN — DOCUSATE SODIUM 100 MG: 100 CAPSULE, LIQUID FILLED ORAL at 09:26

## 2019-11-17 RX ADMIN — PREDNISONE 10 MG: 10 TABLET ORAL at 09:26

## 2019-11-17 RX ADMIN — TRAZODONE HYDROCHLORIDE 50 MG: 50 TABLET ORAL at 20:12

## 2019-11-17 ASSESSMENT — PAIN SCALES - GENERAL: PAINLEVEL_OUTOF10: 0

## 2019-11-18 LAB
ANION GAP SERPL CALCULATED.3IONS-SCNC: 12 MMOL/L (ref 7–19)
BASOPHILS ABSOLUTE: 0 K/UL (ref 0–0.2)
BASOPHILS RELATIVE PERCENT: 0.5 % (ref 0–1)
BUN BLDV-MCNC: 32 MG/DL (ref 8–23)
CALCIUM SERPL-MCNC: 9 MG/DL (ref 8.8–10.2)
CHLORIDE BLD-SCNC: 110 MMOL/L (ref 98–111)
CO2: 22 MMOL/L (ref 22–29)
CREAT SERPL-MCNC: 0.9 MG/DL (ref 0.5–0.9)
EOSINOPHILS ABSOLUTE: 0.1 K/UL (ref 0–0.6)
EOSINOPHILS RELATIVE PERCENT: 2.2 % (ref 0–5)
GFR NON-AFRICAN AMERICAN: >60
GLUCOSE BLD-MCNC: 103 MG/DL (ref 74–109)
HCT VFR BLD CALC: 37 % (ref 37–47)
HEMOGLOBIN: 12.5 G/DL (ref 12–16)
IMMATURE GRANULOCYTES #: 0 K/UL
LYMPHOCYTES ABSOLUTE: 1.6 K/UL (ref 1.1–4.5)
LYMPHOCYTES RELATIVE PERCENT: 26.9 % (ref 20–40)
MAGNESIUM: 2.4 MG/DL (ref 1.6–2.4)
MCH RBC QN AUTO: 32.1 PG (ref 27–31)
MCHC RBC AUTO-ENTMCNC: 33.8 G/DL (ref 33–37)
MCV RBC AUTO: 94.9 FL (ref 81–99)
MONOCYTES ABSOLUTE: 0.5 K/UL (ref 0–0.9)
MONOCYTES RELATIVE PERCENT: 7.8 % (ref 0–10)
NEUTROPHILS ABSOLUTE: 3.7 K/UL (ref 1.5–7.5)
NEUTROPHILS RELATIVE PERCENT: 62.4 % (ref 50–65)
PDW BLD-RTO: 12.3 % (ref 11.5–14.5)
PLATELET # BLD: 199 K/UL (ref 130–400)
PMV BLD AUTO: 10.4 FL (ref 9.4–12.3)
POTASSIUM REFLEX MAGNESIUM: 3.4 MMOL/L (ref 3.5–5)
RBC # BLD: 3.9 M/UL (ref 4.2–5.4)
SODIUM BLD-SCNC: 144 MMOL/L (ref 136–145)
WBC # BLD: 5.9 K/UL (ref 4.8–10.8)

## 2019-11-18 PROCEDURE — 83735 ASSAY OF MAGNESIUM: CPT

## 2019-11-18 PROCEDURE — 85025 COMPLETE CBC W/AUTO DIFF WBC: CPT

## 2019-11-18 PROCEDURE — 99232 SBSQ HOSP IP/OBS MODERATE 35: CPT | Performed by: PSYCHIATRY & NEUROLOGY

## 2019-11-18 PROCEDURE — 6370000000 HC RX 637 (ALT 250 FOR IP): Performed by: PSYCHIATRY & NEUROLOGY

## 2019-11-18 PROCEDURE — 1180000000 HC REHAB R&B

## 2019-11-18 PROCEDURE — 97127 HC SP THER IVNTJ W/FOCUS COG FUNCJ: CPT

## 2019-11-18 PROCEDURE — 80048 BASIC METABOLIC PNL TOTAL CA: CPT

## 2019-11-18 PROCEDURE — 97530 THERAPEUTIC ACTIVITIES: CPT

## 2019-11-18 PROCEDURE — 92526 ORAL FUNCTION THERAPY: CPT

## 2019-11-18 PROCEDURE — 36415 COLL VENOUS BLD VENIPUNCTURE: CPT

## 2019-11-18 PROCEDURE — 97535 SELF CARE MNGMENT TRAINING: CPT

## 2019-11-18 PROCEDURE — 6370000000 HC RX 637 (ALT 250 FOR IP): Performed by: PHYSICIAN ASSISTANT

## 2019-11-18 PROCEDURE — 97110 THERAPEUTIC EXERCISES: CPT

## 2019-11-18 PROCEDURE — 97116 GAIT TRAINING THERAPY: CPT

## 2019-11-18 RX ADMIN — TRAZODONE HYDROCHLORIDE 50 MG: 50 TABLET ORAL at 21:35

## 2019-11-18 RX ADMIN — DOCUSATE SODIUM 100 MG: 100 CAPSULE, LIQUID FILLED ORAL at 08:39

## 2019-11-18 RX ADMIN — ASPIRIN AND DIPYRIDAMOLE 1 CAPSULE: 25; 200 CAPSULE, EXTENDED RELEASE ORAL at 08:39

## 2019-11-18 RX ADMIN — LEVOTHYROXINE SODIUM 50 MCG: 50 TABLET ORAL at 05:31

## 2019-11-18 RX ADMIN — ROSUVASTATIN CALCIUM 40 MG: 10 TABLET, FILM COATED ORAL at 21:35

## 2019-11-18 RX ADMIN — FOLIC ACID 1 MG: 1 TABLET ORAL at 08:39

## 2019-11-18 RX ADMIN — POLYETHYLENE GLYCOL 3350 17 G: 17 POWDER, FOR SOLUTION ORAL at 08:38

## 2019-11-18 RX ADMIN — DOCUSATE SODIUM 100 MG: 100 CAPSULE, LIQUID FILLED ORAL at 21:35

## 2019-11-18 RX ADMIN — CILOSTAZOL 50 MG: 50 TABLET ORAL at 21:36

## 2019-11-18 RX ADMIN — CILOSTAZOL 50 MG: 50 TABLET ORAL at 08:39

## 2019-11-18 RX ADMIN — PREDNISONE 10 MG: 10 TABLET ORAL at 08:39

## 2019-11-18 RX ADMIN — ASPIRIN 81 MG: 81 TABLET, COATED ORAL at 08:38

## 2019-11-18 ASSESSMENT — 9 HOLE PEG TEST
TESTTIME_SECONDS: 40
TESTTIME_SECONDS: 34

## 2019-11-19 PROCEDURE — 92526 ORAL FUNCTION THERAPY: CPT

## 2019-11-19 PROCEDURE — 97116 GAIT TRAINING THERAPY: CPT

## 2019-11-19 PROCEDURE — 97535 SELF CARE MNGMENT TRAINING: CPT

## 2019-11-19 PROCEDURE — 6370000000 HC RX 637 (ALT 250 FOR IP): Performed by: PHYSICIAN ASSISTANT

## 2019-11-19 PROCEDURE — 6370000000 HC RX 637 (ALT 250 FOR IP): Performed by: PSYCHIATRY & NEUROLOGY

## 2019-11-19 PROCEDURE — 1180000000 HC REHAB R&B

## 2019-11-19 PROCEDURE — 99232 SBSQ HOSP IP/OBS MODERATE 35: CPT | Performed by: PSYCHIATRY & NEUROLOGY

## 2019-11-19 PROCEDURE — 97127 HC SP THER IVNTJ W/FOCUS COG FUNCJ: CPT

## 2019-11-19 PROCEDURE — 97110 THERAPEUTIC EXERCISES: CPT

## 2019-11-19 RX ADMIN — MAGNESIUM HYDROXIDE 30 ML: 400 SUSPENSION ORAL at 20:55

## 2019-11-19 RX ADMIN — TRAZODONE HYDROCHLORIDE 50 MG: 50 TABLET ORAL at 20:55

## 2019-11-19 RX ADMIN — FOLIC ACID 1 MG: 1 TABLET ORAL at 08:47

## 2019-11-19 RX ADMIN — DOCUSATE SODIUM 100 MG: 100 CAPSULE, LIQUID FILLED ORAL at 20:55

## 2019-11-19 RX ADMIN — PREDNISONE 10 MG: 10 TABLET ORAL at 08:47

## 2019-11-19 RX ADMIN — DOCUSATE SODIUM 100 MG: 100 CAPSULE, LIQUID FILLED ORAL at 08:47

## 2019-11-19 RX ADMIN — CILOSTAZOL 50 MG: 50 TABLET ORAL at 08:47

## 2019-11-19 RX ADMIN — ROSUVASTATIN CALCIUM 40 MG: 10 TABLET, FILM COATED ORAL at 20:55

## 2019-11-19 RX ADMIN — ASPIRIN AND DIPYRIDAMOLE 1 CAPSULE: 25; 200 CAPSULE, EXTENDED RELEASE ORAL at 08:47

## 2019-11-19 RX ADMIN — LEVOTHYROXINE SODIUM 50 MCG: 50 TABLET ORAL at 06:21

## 2019-11-19 RX ADMIN — POLYETHYLENE GLYCOL 3350 17 G: 17 POWDER, FOR SOLUTION ORAL at 08:47

## 2019-11-19 RX ADMIN — ASPIRIN 81 MG: 81 TABLET, COATED ORAL at 08:47

## 2019-11-19 RX ADMIN — CILOSTAZOL 50 MG: 50 TABLET ORAL at 20:55

## 2019-11-19 ASSESSMENT — PAIN SCALES - GENERAL: PAINLEVEL_OUTOF10: 0

## 2019-11-20 PROCEDURE — 6370000000 HC RX 637 (ALT 250 FOR IP): Performed by: PSYCHIATRY & NEUROLOGY

## 2019-11-20 PROCEDURE — 97110 THERAPEUTIC EXERCISES: CPT

## 2019-11-20 PROCEDURE — 97116 GAIT TRAINING THERAPY: CPT

## 2019-11-20 PROCEDURE — 97127 HC SP THER IVNTJ W/FOCUS COG FUNCJ: CPT

## 2019-11-20 PROCEDURE — 6360000002 HC RX W HCPCS: Performed by: PSYCHIATRY & NEUROLOGY

## 2019-11-20 PROCEDURE — 6370000000 HC RX 637 (ALT 250 FOR IP): Performed by: PHYSICIAN ASSISTANT

## 2019-11-20 PROCEDURE — 92526 ORAL FUNCTION THERAPY: CPT

## 2019-11-20 PROCEDURE — 1180000000 HC REHAB R&B

## 2019-11-20 PROCEDURE — 99233 SBSQ HOSP IP/OBS HIGH 50: CPT | Performed by: PSYCHIATRY & NEUROLOGY

## 2019-11-20 PROCEDURE — 97530 THERAPEUTIC ACTIVITIES: CPT

## 2019-11-20 RX ORDER — PANTOPRAZOLE SODIUM 40 MG/1
40 TABLET, DELAYED RELEASE ORAL
Status: DISCONTINUED | OUTPATIENT
Start: 2019-11-20 | End: 2019-11-22

## 2019-11-20 RX ADMIN — DOCUSATE SODIUM 100 MG: 100 CAPSULE, LIQUID FILLED ORAL at 08:19

## 2019-11-20 RX ADMIN — DOCUSATE SODIUM 100 MG: 100 CAPSULE, LIQUID FILLED ORAL at 20:56

## 2019-11-20 RX ADMIN — FOLIC ACID 1 MG: 1 TABLET ORAL at 08:19

## 2019-11-20 RX ADMIN — TRAZODONE HYDROCHLORIDE 50 MG: 50 TABLET ORAL at 20:56

## 2019-11-20 RX ADMIN — ASPIRIN 81 MG: 81 TABLET, COATED ORAL at 08:19

## 2019-11-20 RX ADMIN — CILOSTAZOL 50 MG: 50 TABLET ORAL at 20:56

## 2019-11-20 RX ADMIN — ROSUVASTATIN CALCIUM 40 MG: 10 TABLET, FILM COATED ORAL at 20:56

## 2019-11-20 RX ADMIN — LEVOTHYROXINE SODIUM 50 MCG: 50 TABLET ORAL at 06:30

## 2019-11-20 RX ADMIN — CILOSTAZOL 50 MG: 50 TABLET ORAL at 08:19

## 2019-11-20 RX ADMIN — ENOXAPARIN SODIUM 40 MG: 40 INJECTION SUBCUTANEOUS at 09:57

## 2019-11-20 RX ADMIN — ASPIRIN AND DIPYRIDAMOLE 1 CAPSULE: 25; 200 CAPSULE, EXTENDED RELEASE ORAL at 08:19

## 2019-11-20 RX ADMIN — POLYETHYLENE GLYCOL 3350 17 G: 17 POWDER, FOR SOLUTION ORAL at 08:19

## 2019-11-20 RX ADMIN — PREDNISONE 10 MG: 10 TABLET ORAL at 08:19

## 2019-11-20 ASSESSMENT — PAIN SCALES - GENERAL
PAINLEVEL_OUTOF10: 0

## 2019-11-21 LAB
ANION GAP SERPL CALCULATED.3IONS-SCNC: 10 MMOL/L (ref 7–19)
BASOPHILS ABSOLUTE: 0 K/UL (ref 0–0.2)
BASOPHILS RELATIVE PERCENT: 0.6 % (ref 0–1)
BUN BLDV-MCNC: 25 MG/DL (ref 8–23)
CALCIUM SERPL-MCNC: 8.9 MG/DL (ref 8.8–10.2)
CHLORIDE BLD-SCNC: 108 MMOL/L (ref 98–111)
CO2: 25 MMOL/L (ref 22–29)
CREAT SERPL-MCNC: 1.1 MG/DL (ref 0.5–0.9)
EOSINOPHILS ABSOLUTE: 0.1 K/UL (ref 0–0.6)
EOSINOPHILS RELATIVE PERCENT: 2.3 % (ref 0–5)
GFR NON-AFRICAN AMERICAN: 48
GLUCOSE BLD-MCNC: 88 MG/DL (ref 74–109)
HCT VFR BLD CALC: 36.6 % (ref 37–47)
HEMOGLOBIN: 12 G/DL (ref 12–16)
IMMATURE GRANULOCYTES #: 0 K/UL
LYMPHOCYTES ABSOLUTE: 1.9 K/UL (ref 1.1–4.5)
LYMPHOCYTES RELATIVE PERCENT: 39.1 % (ref 20–40)
MAGNESIUM: 2.5 MG/DL (ref 1.6–2.4)
MCH RBC QN AUTO: 31.8 PG (ref 27–31)
MCHC RBC AUTO-ENTMCNC: 32.8 G/DL (ref 33–37)
MCV RBC AUTO: 97.1 FL (ref 81–99)
MONOCYTES ABSOLUTE: 0.4 K/UL (ref 0–0.9)
MONOCYTES RELATIVE PERCENT: 8.3 % (ref 0–10)
NEUTROPHILS ABSOLUTE: 2.4 K/UL (ref 1.5–7.5)
NEUTROPHILS RELATIVE PERCENT: 49.5 % (ref 50–65)
PDW BLD-RTO: 12.2 % (ref 11.5–14.5)
PLATELET # BLD: 192 K/UL (ref 130–400)
PMV BLD AUTO: 10.7 FL (ref 9.4–12.3)
POTASSIUM REFLEX MAGNESIUM: 3.5 MMOL/L (ref 3.5–5)
RBC # BLD: 3.77 M/UL (ref 4.2–5.4)
SODIUM BLD-SCNC: 143 MMOL/L (ref 136–145)
WBC # BLD: 4.8 K/UL (ref 4.8–10.8)

## 2019-11-21 PROCEDURE — 1180000000 HC REHAB R&B

## 2019-11-21 PROCEDURE — 36415 COLL VENOUS BLD VENIPUNCTURE: CPT

## 2019-11-21 PROCEDURE — 99232 SBSQ HOSP IP/OBS MODERATE 35: CPT | Performed by: PSYCHIATRY & NEUROLOGY

## 2019-11-21 PROCEDURE — 6370000000 HC RX 637 (ALT 250 FOR IP): Performed by: PSYCHIATRY & NEUROLOGY

## 2019-11-21 PROCEDURE — 85025 COMPLETE CBC W/AUTO DIFF WBC: CPT

## 2019-11-21 PROCEDURE — 97110 THERAPEUTIC EXERCISES: CPT

## 2019-11-21 PROCEDURE — 6370000000 HC RX 637 (ALT 250 FOR IP): Performed by: PHYSICIAN ASSISTANT

## 2019-11-21 PROCEDURE — 92526 ORAL FUNCTION THERAPY: CPT

## 2019-11-21 PROCEDURE — 80048 BASIC METABOLIC PNL TOTAL CA: CPT

## 2019-11-21 PROCEDURE — 97116 GAIT TRAINING THERAPY: CPT

## 2019-11-21 PROCEDURE — 83735 ASSAY OF MAGNESIUM: CPT

## 2019-11-21 PROCEDURE — 97127 HC SP THER IVNTJ W/FOCUS COG FUNCJ: CPT

## 2019-11-21 PROCEDURE — 97530 THERAPEUTIC ACTIVITIES: CPT

## 2019-11-21 RX ORDER — PREDNISONE 10 MG/1
5 TABLET ORAL DAILY
Status: DISCONTINUED | OUTPATIENT
Start: 2019-11-21 | End: 2019-11-29 | Stop reason: HOSPADM

## 2019-11-21 RX ADMIN — CILOSTAZOL 50 MG: 50 TABLET ORAL at 20:10

## 2019-11-21 RX ADMIN — TRAZODONE HYDROCHLORIDE 50 MG: 50 TABLET ORAL at 20:10

## 2019-11-21 RX ADMIN — LEVOTHYROXINE SODIUM 50 MCG: 50 TABLET ORAL at 06:23

## 2019-11-21 RX ADMIN — POLYETHYLENE GLYCOL 3350 17 G: 17 POWDER, FOR SOLUTION ORAL at 08:43

## 2019-11-21 RX ADMIN — FOLIC ACID 1 MG: 1 TABLET ORAL at 08:43

## 2019-11-21 RX ADMIN — CILOSTAZOL 50 MG: 50 TABLET ORAL at 08:43

## 2019-11-21 RX ADMIN — ASPIRIN AND DIPYRIDAMOLE 1 CAPSULE: 25; 200 CAPSULE, EXTENDED RELEASE ORAL at 08:43

## 2019-11-21 RX ADMIN — ROSUVASTATIN CALCIUM 40 MG: 10 TABLET, FILM COATED ORAL at 20:10

## 2019-11-21 RX ADMIN — PANTOPRAZOLE SODIUM 40 MG: 40 TABLET, DELAYED RELEASE ORAL at 06:23

## 2019-11-21 RX ADMIN — PREDNISONE 5 MG: 10 TABLET ORAL at 08:44

## 2019-11-21 RX ADMIN — ASPIRIN AND DIPYRIDAMOLE 1 CAPSULE: 25; 200 CAPSULE, EXTENDED RELEASE ORAL at 20:10

## 2019-11-21 RX ADMIN — DOCUSATE SODIUM 100 MG: 100 CAPSULE, LIQUID FILLED ORAL at 08:44

## 2019-11-22 PROCEDURE — 92526 ORAL FUNCTION THERAPY: CPT

## 2019-11-22 PROCEDURE — 6370000000 HC RX 637 (ALT 250 FOR IP): Performed by: PSYCHIATRY & NEUROLOGY

## 2019-11-22 PROCEDURE — 97535 SELF CARE MNGMENT TRAINING: CPT

## 2019-11-22 PROCEDURE — 1180000000 HC REHAB R&B

## 2019-11-22 PROCEDURE — 97110 THERAPEUTIC EXERCISES: CPT

## 2019-11-22 PROCEDURE — 97116 GAIT TRAINING THERAPY: CPT

## 2019-11-22 PROCEDURE — 6370000000 HC RX 637 (ALT 250 FOR IP): Performed by: PHYSICIAN ASSISTANT

## 2019-11-22 PROCEDURE — 97530 THERAPEUTIC ACTIVITIES: CPT

## 2019-11-22 PROCEDURE — 97127 HC SP THER IVNTJ W/FOCUS COG FUNCJ: CPT

## 2019-11-22 RX ORDER — CILOSTAZOL 50 MG/1
100 TABLET ORAL 2 TIMES DAILY
Status: DISCONTINUED | OUTPATIENT
Start: 2019-11-22 | End: 2019-11-29 | Stop reason: HOSPADM

## 2019-11-22 RX ORDER — PANTOPRAZOLE SODIUM 40 MG/1
40 TABLET, DELAYED RELEASE ORAL
Status: DISCONTINUED | OUTPATIENT
Start: 2019-11-23 | End: 2019-11-29 | Stop reason: HOSPADM

## 2019-11-22 RX ORDER — ASPIRIN 325 MG
325 TABLET ORAL DAILY
Status: DISCONTINUED | OUTPATIENT
Start: 2019-11-22 | End: 2019-11-29 | Stop reason: HOSPADM

## 2019-11-22 RX ADMIN — CILOSTAZOL 100 MG: 50 TABLET ORAL at 09:14

## 2019-11-22 RX ADMIN — PREDNISONE 5 MG: 10 TABLET ORAL at 09:14

## 2019-11-22 RX ADMIN — ASPIRIN 325 MG ORAL TABLET 325 MG: 325 PILL ORAL at 09:14

## 2019-11-22 RX ADMIN — ROSUVASTATIN CALCIUM 40 MG: 10 TABLET, FILM COATED ORAL at 20:54

## 2019-11-22 RX ADMIN — DOCUSATE SODIUM 100 MG: 100 CAPSULE, LIQUID FILLED ORAL at 09:14

## 2019-11-22 RX ADMIN — PANTOPRAZOLE SODIUM 40 MG: 40 TABLET, DELAYED RELEASE ORAL at 06:17

## 2019-11-22 RX ADMIN — FOLIC ACID 1 MG: 1 TABLET ORAL at 09:14

## 2019-11-22 RX ADMIN — POLYETHYLENE GLYCOL 3350 17 G: 17 POWDER, FOR SOLUTION ORAL at 09:15

## 2019-11-22 RX ADMIN — TRAZODONE HYDROCHLORIDE 50 MG: 50 TABLET ORAL at 20:54

## 2019-11-22 RX ADMIN — LEVOTHYROXINE SODIUM 50 MCG: 50 TABLET ORAL at 06:17

## 2019-11-22 RX ADMIN — CILOSTAZOL 100 MG: 50 TABLET ORAL at 20:54

## 2019-11-22 ASSESSMENT — PAIN SCALES - GENERAL
PAINLEVEL_OUTOF10: 0
PAINLEVEL_OUTOF10: 0

## 2019-11-23 PROCEDURE — 6370000000 HC RX 637 (ALT 250 FOR IP): Performed by: PSYCHIATRY & NEUROLOGY

## 2019-11-23 PROCEDURE — 6370000000 HC RX 637 (ALT 250 FOR IP): Performed by: PHYSICIAN ASSISTANT

## 2019-11-23 PROCEDURE — 99232 SBSQ HOSP IP/OBS MODERATE 35: CPT | Performed by: PSYCHIATRY & NEUROLOGY

## 2019-11-23 PROCEDURE — 1180000000 HC REHAB R&B

## 2019-11-23 RX ADMIN — LEVOTHYROXINE SODIUM 50 MCG: 50 TABLET ORAL at 06:05

## 2019-11-23 RX ADMIN — PANTOPRAZOLE SODIUM 40 MG: 40 TABLET, DELAYED RELEASE ORAL at 06:05

## 2019-11-23 RX ADMIN — PREDNISONE 5 MG: 10 TABLET ORAL at 08:59

## 2019-11-23 RX ADMIN — CILOSTAZOL 100 MG: 50 TABLET ORAL at 08:59

## 2019-11-23 RX ADMIN — CILOSTAZOL 100 MG: 50 TABLET ORAL at 21:45

## 2019-11-23 RX ADMIN — ASPIRIN 325 MG ORAL TABLET 325 MG: 325 PILL ORAL at 08:59

## 2019-11-23 RX ADMIN — TRAZODONE HYDROCHLORIDE 50 MG: 50 TABLET ORAL at 21:45

## 2019-11-23 RX ADMIN — ROSUVASTATIN CALCIUM 40 MG: 10 TABLET, FILM COATED ORAL at 21:45

## 2019-11-23 RX ADMIN — DOCUSATE SODIUM 100 MG: 100 CAPSULE, LIQUID FILLED ORAL at 21:45

## 2019-11-23 RX ADMIN — FOLIC ACID 1 MG: 1 TABLET ORAL at 09:00

## 2019-11-24 PROCEDURE — 1180000000 HC REHAB R&B

## 2019-11-24 PROCEDURE — 6370000000 HC RX 637 (ALT 250 FOR IP): Performed by: PHYSICIAN ASSISTANT

## 2019-11-24 PROCEDURE — 99232 SBSQ HOSP IP/OBS MODERATE 35: CPT | Performed by: PSYCHIATRY & NEUROLOGY

## 2019-11-24 PROCEDURE — 6370000000 HC RX 637 (ALT 250 FOR IP): Performed by: PSYCHIATRY & NEUROLOGY

## 2019-11-24 RX ADMIN — CILOSTAZOL 100 MG: 50 TABLET ORAL at 07:40

## 2019-11-24 RX ADMIN — ASPIRIN 325 MG ORAL TABLET 325 MG: 325 PILL ORAL at 07:40

## 2019-11-24 RX ADMIN — CILOSTAZOL 100 MG: 50 TABLET ORAL at 20:10

## 2019-11-24 RX ADMIN — DOCUSATE SODIUM 100 MG: 100 CAPSULE, LIQUID FILLED ORAL at 20:10

## 2019-11-24 RX ADMIN — PANTOPRAZOLE SODIUM 40 MG: 40 TABLET, DELAYED RELEASE ORAL at 06:13

## 2019-11-24 RX ADMIN — PREDNISONE 5 MG: 10 TABLET ORAL at 07:39

## 2019-11-24 RX ADMIN — LEVOTHYROXINE SODIUM 50 MCG: 50 TABLET ORAL at 06:13

## 2019-11-24 RX ADMIN — FOLIC ACID 1 MG: 1 TABLET ORAL at 07:39

## 2019-11-24 RX ADMIN — ROSUVASTATIN CALCIUM 40 MG: 10 TABLET, FILM COATED ORAL at 20:10

## 2019-11-24 RX ADMIN — TRAZODONE HYDROCHLORIDE 50 MG: 50 TABLET ORAL at 20:10

## 2019-11-25 LAB
ANION GAP SERPL CALCULATED.3IONS-SCNC: 10 MMOL/L (ref 7–19)
BASOPHILS ABSOLUTE: 0 K/UL (ref 0–0.2)
BASOPHILS RELATIVE PERCENT: 0.5 % (ref 0–1)
BUN BLDV-MCNC: 22 MG/DL (ref 8–23)
CALCIUM SERPL-MCNC: 8.7 MG/DL (ref 8.8–10.2)
CHLORIDE BLD-SCNC: 113 MMOL/L (ref 98–111)
CO2: 23 MMOL/L (ref 22–29)
CREAT SERPL-MCNC: 1 MG/DL (ref 0.5–0.9)
EOSINOPHILS ABSOLUTE: 0.3 K/UL (ref 0–0.6)
EOSINOPHILS RELATIVE PERCENT: 6 % (ref 0–5)
GFR NON-AFRICAN AMERICAN: 54
GLUCOSE BLD-MCNC: 92 MG/DL (ref 74–109)
HCT VFR BLD CALC: 34.1 % (ref 37–47)
HEMOGLOBIN: 11.4 G/DL (ref 12–16)
IMMATURE GRANULOCYTES #: 0 K/UL
LYMPHOCYTES ABSOLUTE: 1 K/UL (ref 1.1–4.5)
LYMPHOCYTES RELATIVE PERCENT: 23.4 % (ref 20–40)
MAGNESIUM: 2.1 MG/DL (ref 1.6–2.4)
MCH RBC QN AUTO: 31.8 PG (ref 27–31)
MCHC RBC AUTO-ENTMCNC: 33.4 G/DL (ref 33–37)
MCV RBC AUTO: 95.3 FL (ref 81–99)
MONOCYTES ABSOLUTE: 0.3 K/UL (ref 0–0.9)
MONOCYTES RELATIVE PERCENT: 7.4 % (ref 0–10)
NEUTROPHILS ABSOLUTE: 2.7 K/UL (ref 1.5–7.5)
NEUTROPHILS RELATIVE PERCENT: 62.5 % (ref 50–65)
PDW BLD-RTO: 12.2 % (ref 11.5–14.5)
PLATELET # BLD: 152 K/UL (ref 130–400)
PMV BLD AUTO: 10.5 FL (ref 9.4–12.3)
POTASSIUM REFLEX MAGNESIUM: 3.2 MMOL/L (ref 3.5–5)
RBC # BLD: 3.58 M/UL (ref 4.2–5.4)
SODIUM BLD-SCNC: 146 MMOL/L (ref 136–145)
WBC # BLD: 4.4 K/UL (ref 4.8–10.8)

## 2019-11-25 PROCEDURE — 97535 SELF CARE MNGMENT TRAINING: CPT

## 2019-11-25 PROCEDURE — 6370000000 HC RX 637 (ALT 250 FOR IP): Performed by: PHYSICIAN ASSISTANT

## 2019-11-25 PROCEDURE — 1180000000 HC REHAB R&B

## 2019-11-25 PROCEDURE — 97530 THERAPEUTIC ACTIVITIES: CPT

## 2019-11-25 PROCEDURE — 36415 COLL VENOUS BLD VENIPUNCTURE: CPT

## 2019-11-25 PROCEDURE — 83735 ASSAY OF MAGNESIUM: CPT

## 2019-11-25 PROCEDURE — 97116 GAIT TRAINING THERAPY: CPT

## 2019-11-25 PROCEDURE — 92526 ORAL FUNCTION THERAPY: CPT

## 2019-11-25 PROCEDURE — 97110 THERAPEUTIC EXERCISES: CPT

## 2019-11-25 PROCEDURE — 97127 HC SP THER IVNTJ W/FOCUS COG FUNCJ: CPT

## 2019-11-25 PROCEDURE — 85025 COMPLETE CBC W/AUTO DIFF WBC: CPT

## 2019-11-25 PROCEDURE — 6370000000 HC RX 637 (ALT 250 FOR IP): Performed by: PSYCHIATRY & NEUROLOGY

## 2019-11-25 PROCEDURE — 80048 BASIC METABOLIC PNL TOTAL CA: CPT

## 2019-11-25 RX ORDER — POTASSIUM CHLORIDE 20 MEQ/1
20 TABLET, EXTENDED RELEASE ORAL 2 TIMES DAILY WITH MEALS
Status: DISCONTINUED | OUTPATIENT
Start: 2019-11-25 | End: 2019-11-29 | Stop reason: HOSPADM

## 2019-11-25 RX ADMIN — LEVOTHYROXINE SODIUM 50 MCG: 50 TABLET ORAL at 05:51

## 2019-11-25 RX ADMIN — CILOSTAZOL 100 MG: 50 TABLET ORAL at 09:05

## 2019-11-25 RX ADMIN — ASPIRIN 325 MG ORAL TABLET 325 MG: 325 PILL ORAL at 09:05

## 2019-11-25 RX ADMIN — TRAZODONE HYDROCHLORIDE 50 MG: 50 TABLET ORAL at 21:01

## 2019-11-25 RX ADMIN — FOLIC ACID 1 MG: 1 TABLET ORAL at 09:05

## 2019-11-25 RX ADMIN — POTASSIUM CHLORIDE 20 MEQ: 20 TABLET, EXTENDED RELEASE ORAL at 10:58

## 2019-11-25 RX ADMIN — ROSUVASTATIN CALCIUM 40 MG: 10 TABLET, FILM COATED ORAL at 21:01

## 2019-11-25 RX ADMIN — CILOSTAZOL 100 MG: 50 TABLET ORAL at 21:01

## 2019-11-25 RX ADMIN — PREDNISONE 5 MG: 10 TABLET ORAL at 09:05

## 2019-11-25 RX ADMIN — PANTOPRAZOLE SODIUM 40 MG: 40 TABLET, DELAYED RELEASE ORAL at 05:51

## 2019-11-25 RX ADMIN — POTASSIUM CHLORIDE 20 MEQ: 20 TABLET, EXTENDED RELEASE ORAL at 16:58

## 2019-11-26 PROCEDURE — 97530 THERAPEUTIC ACTIVITIES: CPT

## 2019-11-26 PROCEDURE — 97110 THERAPEUTIC EXERCISES: CPT

## 2019-11-26 PROCEDURE — 97535 SELF CARE MNGMENT TRAINING: CPT

## 2019-11-26 PROCEDURE — 97127 HC SP THER IVNTJ W/FOCUS COG FUNCJ: CPT

## 2019-11-26 PROCEDURE — 92526 ORAL FUNCTION THERAPY: CPT

## 2019-11-26 PROCEDURE — 1180000000 HC REHAB R&B

## 2019-11-26 PROCEDURE — 6370000000 HC RX 637 (ALT 250 FOR IP): Performed by: PSYCHIATRY & NEUROLOGY

## 2019-11-26 PROCEDURE — 97116 GAIT TRAINING THERAPY: CPT

## 2019-11-26 PROCEDURE — 6370000000 HC RX 637 (ALT 250 FOR IP): Performed by: PHYSICIAN ASSISTANT

## 2019-11-26 PROCEDURE — 99232 SBSQ HOSP IP/OBS MODERATE 35: CPT | Performed by: PSYCHIATRY & NEUROLOGY

## 2019-11-26 RX ADMIN — PREDNISONE 5 MG: 10 TABLET ORAL at 08:30

## 2019-11-26 RX ADMIN — DOCUSATE SODIUM 100 MG: 100 CAPSULE, LIQUID FILLED ORAL at 20:59

## 2019-11-26 RX ADMIN — CILOSTAZOL 100 MG: 50 TABLET ORAL at 20:59

## 2019-11-26 RX ADMIN — TRAZODONE HYDROCHLORIDE 50 MG: 50 TABLET ORAL at 20:59

## 2019-11-26 RX ADMIN — ASPIRIN 325 MG ORAL TABLET 325 MG: 325 PILL ORAL at 08:30

## 2019-11-26 RX ADMIN — LEVOTHYROXINE SODIUM 50 MCG: 50 TABLET ORAL at 05:44

## 2019-11-26 RX ADMIN — CILOSTAZOL 100 MG: 50 TABLET ORAL at 08:30

## 2019-11-26 RX ADMIN — ROSUVASTATIN CALCIUM 40 MG: 10 TABLET, FILM COATED ORAL at 20:59

## 2019-11-26 RX ADMIN — POTASSIUM CHLORIDE 20 MEQ: 20 TABLET, EXTENDED RELEASE ORAL at 17:00

## 2019-11-26 RX ADMIN — PANTOPRAZOLE SODIUM 40 MG: 40 TABLET, DELAYED RELEASE ORAL at 05:44

## 2019-11-26 RX ADMIN — POTASSIUM CHLORIDE 20 MEQ: 20 TABLET, EXTENDED RELEASE ORAL at 08:30

## 2019-11-26 RX ADMIN — FOLIC ACID 1 MG: 1 TABLET ORAL at 08:30

## 2019-11-26 ASSESSMENT — PAIN SCALES - GENERAL: PAINLEVEL_OUTOF10: 0

## 2019-11-27 ENCOUNTER — APPOINTMENT (OUTPATIENT)
Dept: GENERAL RADIOLOGY | Age: 74
DRG: 057 | End: 2019-11-27
Attending: PSYCHIATRY & NEUROLOGY
Payer: MEDICARE

## 2019-11-27 PROCEDURE — 92526 ORAL FUNCTION THERAPY: CPT

## 2019-11-27 PROCEDURE — 97110 THERAPEUTIC EXERCISES: CPT

## 2019-11-27 PROCEDURE — 6370000000 HC RX 637 (ALT 250 FOR IP): Performed by: PSYCHIATRY & NEUROLOGY

## 2019-11-27 PROCEDURE — 99233 SBSQ HOSP IP/OBS HIGH 50: CPT | Performed by: PSYCHIATRY & NEUROLOGY

## 2019-11-27 PROCEDURE — 74230 X-RAY XM SWLNG FUNCJ C+: CPT

## 2019-11-27 PROCEDURE — 6370000000 HC RX 637 (ALT 250 FOR IP): Performed by: PHYSICIAN ASSISTANT

## 2019-11-27 PROCEDURE — 97116 GAIT TRAINING THERAPY: CPT

## 2019-11-27 PROCEDURE — 92611 MOTION FLUOROSCOPY/SWALLOW: CPT

## 2019-11-27 PROCEDURE — 97530 THERAPEUTIC ACTIVITIES: CPT

## 2019-11-27 PROCEDURE — 1180000000 HC REHAB R&B

## 2019-11-27 RX ADMIN — PANTOPRAZOLE SODIUM 40 MG: 40 TABLET, DELAYED RELEASE ORAL at 06:04

## 2019-11-27 RX ADMIN — POTASSIUM CHLORIDE 20 MEQ: 20 TABLET, EXTENDED RELEASE ORAL at 17:00

## 2019-11-27 RX ADMIN — LEVOTHYROXINE SODIUM 50 MCG: 50 TABLET ORAL at 06:04

## 2019-11-27 RX ADMIN — POTASSIUM CHLORIDE 20 MEQ: 20 TABLET, EXTENDED RELEASE ORAL at 08:04

## 2019-11-27 RX ADMIN — PREDNISONE 5 MG: 10 TABLET ORAL at 08:05

## 2019-11-27 RX ADMIN — ROSUVASTATIN CALCIUM 40 MG: 10 TABLET, FILM COATED ORAL at 21:01

## 2019-11-27 RX ADMIN — CARBIDOPA AND LEVODOPA 0.5 TABLET: 25; 100 TABLET ORAL at 21:00

## 2019-11-27 RX ADMIN — TRAZODONE HYDROCHLORIDE 50 MG: 50 TABLET ORAL at 21:00

## 2019-11-27 RX ADMIN — CILOSTAZOL 100 MG: 50 TABLET ORAL at 21:00

## 2019-11-27 RX ADMIN — CARBIDOPA AND LEVODOPA 0.5 TABLET: 25; 100 TABLET ORAL at 11:10

## 2019-11-27 RX ADMIN — ASPIRIN 325 MG ORAL TABLET 325 MG: 325 PILL ORAL at 08:04

## 2019-11-27 RX ADMIN — CILOSTAZOL 100 MG: 50 TABLET ORAL at 08:04

## 2019-11-27 RX ADMIN — FOLIC ACID 1 MG: 1 TABLET ORAL at 08:04

## 2019-11-27 ASSESSMENT — PAIN SCALES - GENERAL
PAINLEVEL_OUTOF10: 0
PAINLEVEL_OUTOF10: 0

## 2019-11-28 LAB
ANION GAP SERPL CALCULATED.3IONS-SCNC: 12 MMOL/L (ref 7–19)
BASOPHILS ABSOLUTE: 0 K/UL (ref 0–0.2)
BASOPHILS RELATIVE PERCENT: 0.5 % (ref 0–1)
BUN BLDV-MCNC: 12 MG/DL (ref 8–23)
CALCIUM SERPL-MCNC: 9 MG/DL (ref 8.8–10.2)
CHLORIDE BLD-SCNC: 110 MMOL/L (ref 98–111)
CO2: 21 MMOL/L (ref 22–29)
CREAT SERPL-MCNC: 1.2 MG/DL (ref 0.5–0.9)
EOSINOPHILS ABSOLUTE: 0.1 K/UL (ref 0–0.6)
EOSINOPHILS RELATIVE PERCENT: 1.2 % (ref 0–5)
GFR NON-AFRICAN AMERICAN: 44
GLUCOSE BLD-MCNC: 125 MG/DL (ref 74–109)
HCT VFR BLD CALC: 39.6 % (ref 37–47)
HEMOGLOBIN: 12.7 G/DL (ref 12–16)
IMMATURE GRANULOCYTES #: 0 K/UL
LYMPHOCYTES ABSOLUTE: 0.6 K/UL (ref 1.1–4.5)
LYMPHOCYTES RELATIVE PERCENT: 14.7 % (ref 20–40)
MCH RBC QN AUTO: 30.9 PG (ref 27–31)
MCHC RBC AUTO-ENTMCNC: 32.1 G/DL (ref 33–37)
MCV RBC AUTO: 96.4 FL (ref 81–99)
MONOCYTES ABSOLUTE: 0.2 K/UL (ref 0–0.9)
MONOCYTES RELATIVE PERCENT: 4.3 % (ref 0–10)
NEUTROPHILS ABSOLUTE: 3.3 K/UL (ref 1.5–7.5)
NEUTROPHILS RELATIVE PERCENT: 79.1 % (ref 50–65)
PDW BLD-RTO: 12.4 % (ref 11.5–14.5)
PLATELET # BLD: 188 K/UL (ref 130–400)
PMV BLD AUTO: 10.4 FL (ref 9.4–12.3)
POTASSIUM REFLEX MAGNESIUM: 4 MMOL/L (ref 3.5–5)
RBC # BLD: 4.11 M/UL (ref 4.2–5.4)
SODIUM BLD-SCNC: 143 MMOL/L (ref 136–145)
WBC # BLD: 4.2 K/UL (ref 4.8–10.8)

## 2019-11-28 PROCEDURE — 1180000000 HC REHAB R&B

## 2019-11-28 PROCEDURE — 85025 COMPLETE CBC W/AUTO DIFF WBC: CPT

## 2019-11-28 PROCEDURE — 6370000000 HC RX 637 (ALT 250 FOR IP): Performed by: PHYSICIAN ASSISTANT

## 2019-11-28 PROCEDURE — 99232 SBSQ HOSP IP/OBS MODERATE 35: CPT | Performed by: PSYCHIATRY & NEUROLOGY

## 2019-11-28 PROCEDURE — 36415 COLL VENOUS BLD VENIPUNCTURE: CPT

## 2019-11-28 PROCEDURE — 80048 BASIC METABOLIC PNL TOTAL CA: CPT

## 2019-11-28 PROCEDURE — 6370000000 HC RX 637 (ALT 250 FOR IP): Performed by: PSYCHIATRY & NEUROLOGY

## 2019-11-28 RX ADMIN — PANTOPRAZOLE SODIUM 40 MG: 40 TABLET, DELAYED RELEASE ORAL at 05:58

## 2019-11-28 RX ADMIN — PREDNISONE 5 MG: 10 TABLET ORAL at 08:32

## 2019-11-28 RX ADMIN — POTASSIUM CHLORIDE 20 MEQ: 20 TABLET, EXTENDED RELEASE ORAL at 08:32

## 2019-11-28 RX ADMIN — ROSUVASTATIN CALCIUM 40 MG: 10 TABLET, FILM COATED ORAL at 21:22

## 2019-11-28 RX ADMIN — CILOSTAZOL 100 MG: 50 TABLET ORAL at 21:22

## 2019-11-28 RX ADMIN — CARBIDOPA AND LEVODOPA 0.5 TABLET: 25; 100 TABLET ORAL at 08:33

## 2019-11-28 RX ADMIN — FOLIC ACID 1 MG: 1 TABLET ORAL at 08:33

## 2019-11-28 RX ADMIN — CARBIDOPA AND LEVODOPA 0.5 TABLET: 25; 100 TABLET ORAL at 21:22

## 2019-11-28 RX ADMIN — CILOSTAZOL 100 MG: 50 TABLET ORAL at 08:32

## 2019-11-28 RX ADMIN — POTASSIUM CHLORIDE 20 MEQ: 20 TABLET, EXTENDED RELEASE ORAL at 16:57

## 2019-11-28 RX ADMIN — TRAZODONE HYDROCHLORIDE 50 MG: 50 TABLET ORAL at 21:22

## 2019-11-28 RX ADMIN — LEVOTHYROXINE SODIUM 50 MCG: 50 TABLET ORAL at 05:58

## 2019-11-28 RX ADMIN — ASPIRIN 325 MG ORAL TABLET 325 MG: 325 PILL ORAL at 08:32

## 2019-11-29 VITALS
TEMPERATURE: 97.6 F | WEIGHT: 129.01 LBS | HEIGHT: 63 IN | DIASTOLIC BLOOD PRESSURE: 83 MMHG | OXYGEN SATURATION: 96 % | HEART RATE: 68 BPM | SYSTOLIC BLOOD PRESSURE: 138 MMHG | BODY MASS INDEX: 22.86 KG/M2 | RESPIRATION RATE: 16 BRPM

## 2019-11-29 PROCEDURE — 6370000000 HC RX 637 (ALT 250 FOR IP): Performed by: PHYSICIAN ASSISTANT

## 2019-11-29 PROCEDURE — 97535 SELF CARE MNGMENT TRAINING: CPT

## 2019-11-29 PROCEDURE — 6370000000 HC RX 637 (ALT 250 FOR IP): Performed by: PSYCHIATRY & NEUROLOGY

## 2019-11-29 PROCEDURE — 99239 HOSP IP/OBS DSCHRG MGMT >30: CPT | Performed by: PSYCHIATRY & NEUROLOGY

## 2019-11-29 RX ORDER — LEVOTHYROXINE SODIUM 0.05 MG/1
50 TABLET ORAL DAILY
Qty: 30 TABLET | Refills: 3 | Status: SHIPPED | OUTPATIENT
Start: 2019-11-30

## 2019-11-29 RX ORDER — POTASSIUM CHLORIDE 20 MEQ/1
20 TABLET, EXTENDED RELEASE ORAL 2 TIMES DAILY WITH MEALS
Qty: 60 TABLET | Refills: 3 | Status: SHIPPED | OUTPATIENT
Start: 2019-11-29

## 2019-11-29 RX ORDER — ATORVASTATIN CALCIUM 80 MG/1
80 TABLET, FILM COATED ORAL NIGHTLY
Qty: 30 TABLET | Refills: 0 | Status: SHIPPED | OUTPATIENT
Start: 2019-11-29

## 2019-11-29 RX ORDER — PREDNISONE 1 MG/1
5 TABLET ORAL DAILY
Qty: 30 TABLET | Refills: 0 | Status: SHIPPED | OUTPATIENT
Start: 2019-11-30 | End: 2019-12-10

## 2019-11-29 RX ORDER — CILOSTAZOL 100 MG/1
100 TABLET ORAL 2 TIMES DAILY
Qty: 60 TABLET | Refills: 3 | Status: SHIPPED | OUTPATIENT
Start: 2019-11-29 | End: 2020-04-06

## 2019-11-29 RX ORDER — PANTOPRAZOLE SODIUM 40 MG/1
40 TABLET, DELAYED RELEASE ORAL
Qty: 30 TABLET | Refills: 3 | Status: SHIPPED | OUTPATIENT
Start: 2019-11-30

## 2019-11-29 RX ORDER — ASPIRIN 325 MG
325 TABLET ORAL DAILY
Qty: 30 TABLET | Refills: 3 | Status: SHIPPED | OUTPATIENT
Start: 2019-11-30 | End: 2020-03-30

## 2019-11-29 RX ADMIN — DOCUSATE SODIUM 100 MG: 100 CAPSULE, LIQUID FILLED ORAL at 08:08

## 2019-11-29 RX ADMIN — ASPIRIN 325 MG ORAL TABLET 325 MG: 325 PILL ORAL at 08:08

## 2019-11-29 RX ADMIN — PREDNISONE 5 MG: 10 TABLET ORAL at 05:00

## 2019-11-29 RX ADMIN — FOLIC ACID 1 MG: 1 TABLET ORAL at 08:08

## 2019-11-29 RX ADMIN — PANTOPRAZOLE SODIUM 40 MG: 40 TABLET, DELAYED RELEASE ORAL at 05:56

## 2019-11-29 RX ADMIN — LEVOTHYROXINE SODIUM 50 MCG: 50 TABLET ORAL at 05:56

## 2019-11-29 RX ADMIN — CARBIDOPA AND LEVODOPA 0.5 TABLET: 25; 100 TABLET ORAL at 08:10

## 2019-11-29 RX ADMIN — CILOSTAZOL 100 MG: 50 TABLET ORAL at 08:08

## 2019-11-29 RX ADMIN — POTASSIUM CHLORIDE 20 MEQ: 20 TABLET, EXTENDED RELEASE ORAL at 08:08

## 2020-01-08 ENCOUNTER — OFFICE VISIT (OUTPATIENT)
Dept: NEUROLOGY | Age: 75
End: 2020-01-08
Payer: MEDICARE

## 2020-01-08 VITALS — HEART RATE: 73 BPM | SYSTOLIC BLOOD PRESSURE: 151 MMHG | DIASTOLIC BLOOD PRESSURE: 88 MMHG

## 2020-01-08 PROCEDURE — 4040F PNEUMOC VAC/ADMIN/RCVD: CPT | Performed by: PSYCHIATRY & NEUROLOGY

## 2020-01-08 PROCEDURE — G8484 FLU IMMUNIZE NO ADMIN: HCPCS | Performed by: PSYCHIATRY & NEUROLOGY

## 2020-01-08 PROCEDURE — 1090F PRES/ABSN URINE INCON ASSESS: CPT | Performed by: PSYCHIATRY & NEUROLOGY

## 2020-01-08 PROCEDURE — G8400 PT W/DXA NO RESULTS DOC: HCPCS | Performed by: PSYCHIATRY & NEUROLOGY

## 2020-01-08 PROCEDURE — 1123F ACP DISCUSS/DSCN MKR DOCD: CPT | Performed by: PSYCHIATRY & NEUROLOGY

## 2020-01-08 PROCEDURE — 3017F COLORECTAL CA SCREEN DOC REV: CPT | Performed by: PSYCHIATRY & NEUROLOGY

## 2020-01-08 PROCEDURE — G8420 CALC BMI NORM PARAMETERS: HCPCS | Performed by: PSYCHIATRY & NEUROLOGY

## 2020-01-08 PROCEDURE — 1036F TOBACCO NON-USER: CPT | Performed by: PSYCHIATRY & NEUROLOGY

## 2020-01-08 PROCEDURE — 99214 OFFICE O/P EST MOD 30 MIN: CPT | Performed by: PSYCHIATRY & NEUROLOGY

## 2020-01-08 PROCEDURE — G8428 CUR MEDS NOT DOCUMENT: HCPCS | Performed by: PSYCHIATRY & NEUROLOGY

## 2020-01-08 RX ORDER — CLONIDINE HYDROCHLORIDE 0.1 MG/1
TABLET ORAL
COMMUNITY
Start: 2019-12-12

## 2020-01-08 NOTE — PROGRESS NOTES
Review of Systems    Constitutional - No fever or chills. yes diaphoresis or significant fatigue. HENT -  No tinnitus or significant hearing loss. Eyes - no sudden vision change or eye pain  Respiratory - no significant shortness of breath or cough  Cardiovascular - no chest pain No palpitations or significant leg swelling  Gastrointestinal - no abdominal swelling or pain. Genitourinary - No difficulty urinating, dysuria  Musculoskeletal - no back pain or myalgia. Skin - no color change or rash  Neurologic - No seizures. No lateralizing weakness. Hematologic - no easy bruising or excessive bleeding. Psychiatric - no severe anxiety or nervousness. All other review of systems are negative.
2. Dysphagia due to recent cerebral infarction I69.391    3. Weakness R53.1        Her neurological examination today was significant for slowness of speaking. She ambulated with a walker. At this time she is to continue her ASA/Pletal and statin. We talked about risk factor reduction. She is getting PT and speech therapy. The patient and  indicated understanding of the management plan. She is to follow up with me on a PRN basis and call with any issues. Plan    No orders of the defined types were placed in this encounter. No orders of the defined types were placed in this encounter. Return if symptoms worsen or fail to improve. EMR Dragon/transcription disclaimer:Significant part of this  encounter note is electronic transcription/translation of spoken language to printed text. The electronic translation of spoken language may be erroneous, or at times, nonsensical words or phrases may be inadvertently transcribed.  Although I have reviewed the note for such errors, some may still exist.

## 2020-02-24 ENCOUNTER — HOSPITAL ENCOUNTER (OUTPATIENT)
Dept: SPEECH THERAPY | Age: 75
Setting detail: THERAPIES SERIES
Discharge: HOME | End: 2020-02-24
Payer: MEDICARE

## 2020-02-24 PROCEDURE — 96116 NUBHVL XM PHYS/QHP 1ST HR: CPT

## 2020-02-25 ENCOUNTER — HOSPITAL ENCOUNTER (OUTPATIENT)
Dept: PHYSICAL THERAPY | Age: 75
Setting detail: THERAPIES SERIES
Discharge: HOME | End: 2020-02-25
Payer: MEDICARE

## 2020-02-25 PROCEDURE — 97162 PT EVAL MOD COMPLEX 30 MIN: CPT

## 2020-02-25 NOTE — PROGRESS NOTES
functional mobility ; Decreased high-level IADLs;Decreased strength;Decreased balance  Assessment: Pt presents after CVA and demonstrates decrease strength, endurance, and functional mobility. Will benefit from skilled PT intervention to address listed impairments. Treatment Diagnosis: CVA  Prognosis: Good  Decision Making: Medium Complexity  History: See above  REQUIRES PT FOLLOW UP: Yes  Discharge Recommendations: Continue to assess pending progress  Activity Tolerance  Activity Tolerance: Patient Tolerated treatment well;Patient limited by fatigue         Plan   Plan  Times per week: 2x  Plan weeks: 6-8 weeks  Current Treatment Recommendations: Strengthening, Gait Training, Equipment Evaluation, Education, & procurement, Neuromuscular Re-education, Balance Training, Functional Mobility Training, Endurance Training, Home Exercise Program, Safety Education & Training      Goals  Short term goals  Time Frame for Short term goals: 3-4 weeks  Short term goal 1: Independent with HEP  Short term goal 2: Improve bilat LE strength to grossly at least 5-/5  Short term goal 3: Perform at least 6 sit to  30\" from chair. Short term goal 4: Ambulate at least 0' in 6MWT  Long term goals  Time Frame for Long term goals : 4-6 weeks  Long term goal 1: No further falls throughout course of therapy.   Long term goal 2: Normalize gait pattern (wider AYALA, improved step length, less shuffling)  Long term goal 3: Improve Wellington score to at least 42/56  Patient Goals   Patient goals : improve strength and balance       Therapy Time   Individual Concurrent Group Co-treatment   Time In 1352         Time Out 1432         Minutes [address] [facility], PT

## 2020-03-02 ENCOUNTER — HOSPITAL ENCOUNTER (OUTPATIENT)
Dept: SPEECH THERAPY | Age: 75
Setting detail: THERAPIES SERIES
Discharge: HOME | End: 2020-03-02
Payer: MEDICARE

## 2020-03-02 ENCOUNTER — HOSPITAL ENCOUNTER (OUTPATIENT)
Dept: PHYSICAL THERAPY | Age: 75
Setting detail: THERAPIES SERIES
Discharge: HOME | End: 2020-03-02
Payer: MEDICARE

## 2020-03-02 PROCEDURE — 97129 THER IVNTJ 1ST 15 MIN: CPT

## 2020-03-02 PROCEDURE — 97130 THER IVNTJ EA ADDL 15 MIN: CPT

## 2020-03-02 PROCEDURE — 97110 THERAPEUTIC EXERCISES: CPT

## 2020-03-02 NOTE — PROGRESS NOTES
Speech Language Pathology  Facility/Department: [facility] SPEECH THERAPY  Daily Treatment Note  NAME: Dylan Parks  : 1945  MRN: [id number]    Date of Treat: 3/2/2020  Evaluating Therapist: Beryl Fonseca     Patient Treat:    Patient completed complex reasoning task this date. Patient required to state 2-3 reasons pictured items were alike and 2-3 reasons the items were different. Patient completed in 5/5 opportunities and required mild-moderate verbal cues in 4/5 of the opportunities. Patient completed a focused attention task this date. Patient required to complete a word search puzzle containing up to 10 words. With no time limit, patient was able to complete with 100% accuracy and required 2 mins 13 sec to complete. Patient demonstrated difficulty as task became more complex (larger word search puzzle requiring more targeted words). Patient was then able to only complete with 85% accuracy. No distracters present during duration of task. Patient completed an immediate recall task. Patient completed a simple visual recall task. Patient required to recall in display when given 3 basic pictured items after no time delay. Patient completed with 63% accuracy with mild-mod verbal cues required to complete at times. Accuracy increased as strategies for recall were provided. Patient also completed a simple delayed recall task. Patient required to recall 3 simple unrelated words following a 2-3 minute time delay. Patient was able to recall 3/3 words independently. Will continue to follow and address cognitive-linguistic function as outlined in goals listed below. RECOMMENDATIONS:  Based on assessment results, speech therapy is recommended at 1x/wk for approximately 10 weeks. Treatment will primarily target stimulation of cognitive linguistic skills and how to increase safety in ADLS.     1.  Patient will improve immediate/delayed recall (with and without distractors) with at least 80% accuracy or better and with minimal cues/prompts. 2. Patient will demonstrate functional application of memory strategies during ADL scenarios on at least 4 out of 5 opportunities. 3. Patient will demonstrate increase in processing and attention skills with 80% accuracy with moderate cueing.   4. Patient will demonstrate an increase in reasoning/problem solving skills with 80% accuracy and minimal clinician cueing. 5. Patient will complete higher level linguistic tasks with 80% accuracy with minimal cues required to complete. 6. Patient will complete breath support exercises to promote increased volume of speech in 4/5 opportunities with minimal cues required to complete. 7. Patient will use tools/strategies to promote increased clarity of speech at sentence and conversation level in 4/5 opportunities with minimal cues required to complete.        Electronically signed by SINDY Morris on 3/2/2020 at 4:26 PM

## 2020-03-02 NOTE — PROGRESS NOTES
Physical Therapy  Daily Treatment Note  Date: 3/2/2020  Patient Name: Dylan Parks  MRN: [id number]     :   1945    Subjective:   General  Chart Reviewed: Yes  Additional Pertinent Hx: 74 y/o F presents after CVA. PMH includes total of 4 CVA, CABG, back sx  Response To Previous Treatment: Patient with no complaints from previous session. Referring Practitioner: PEDRO Patel  PT Visit Information  PT Insurance Information: 2  Plan of Care/Certification Expiration Date: 20  Progress Note Due Date: 20  Subjective  Subjective: Having itching and irritation in R eye today. It is mildly swollen, but no notable discharge. She/ plan to go to her PCP after therapy sessions to have it checked out. Pain Screening  Patient Currently in Pain: No  Vital Signs  Patient Currently in Pain: No       Treatment Activities:        Exercises  Exercise 1: 6MWT 469' w/Rollator  Exercise 2: Repeated sit to stand (start from higher surface, focus on no UE use) 2 x 5  Exercise 3: Sitting marches x 10 ea  Exercise 4: LAQ x 10 ea  Exercise 5: HS Curls x 10 ea (yellow)  Exercise 6: Hip abd/ball squeeze x 10 ea (yellow)  Exercise 7: 4 way ankle with t-band x 10 ea (yellow)  Exercise 8: Mini squats x 10   Exercise 9: Standing marches x 10  Exercise 10: Standing hip abd/ext x 10 ea  Exercise 11: Heel raises x 10  Exercise 12: Stance on green foam EO/EC/Narrow AYALA x 1' ea (CGA for EC/Narrow AYALA)  Exercise 13: Stance with balance perturbations  Exercise 14: Toe taps in 4\" step x 10 ea  Exercise 15: Sidestepping in  bars (progress to on line) x 3  Exercise 16: Fwd/back ambulation in  bars (progress to line) x 3  Exercise 17: Box steps  Exercise 18: Cone weaves  Exercise 19: 360 turns                                   Assessment:   Conditions Requiring Skilled Therapeutic Intervention  Body structures, Functions, Activity limitations: Decreased functional mobility ; Decreased high-level IADLs;Decreased strength;Decreased balance  Assessment: POC initiated per initial evaluation. Pt tolerated well, though requires brief sitting rest breaks after standing exercises. Required CGA for standing on green foam, especially with EC and narrow AYALA. Appropriate level of fatigue at end of session and no other complaints. Treatment Diagnosis: CVA  REQUIRES PT FOLLOW UP: Yes  Discharge Recommendations: Continue to assess pending progress        Goals:  Short term goals  Time Frame for Short term goals: 3-4 weeks  Short term goal 1: Independent with HEP  Short term goal 2: Improve bilat LE strength to grossly at least 5-/5  Short term goal 3: Perform at least 6 sit to  30\" from chair. Short term goal 4: Ambulate at least 0' in 6MWT  Long term goals  Time Frame for Long term goals : 4-6 weeks  Long term goal 1: No further falls throughout course of therapy.   Long term goal 2: Normalize gait pattern (wider AYALA, improved step length, less shuffling)  Long term goal 3: Improve Wellington score to at least 42/56  Patient Goals   Patient goals : improve strength and balance    Plan:    Plan  Times per week: 2x  Plan weeks: 6-8 weeks  Current Treatment Recommendations: Strengthening, Gait Training, Equipment Evaluation, Education, & procurement, Neuromuscular Re-education, Balance Training, Functional Mobility Training, Endurance Training, Home Exercise Program, Safety Education & Training  Timed Code Treatment Minutes: 49 Minutes     Therapy Time   Individual Concurrent Group Co-treatment   Time In 1406         Time Out 1455         Minutes 49         Timed Code Treatment Minutes: 2018 [facility] Jyothi Coleman, LORAINE

## 2020-03-04 ENCOUNTER — HOSPITAL ENCOUNTER (OUTPATIENT)
Dept: PHYSICAL THERAPY | Age: 75
Setting detail: THERAPIES SERIES
Discharge: HOME | End: 2020-03-04
Payer: MEDICARE

## 2020-03-04 PROCEDURE — 97110 THERAPEUTIC EXERCISES: CPT

## 2020-03-04 NOTE — PROGRESS NOTES
Physical Therapy  Daily Treatment Note  Date: 3/4/2020  Patient Name: Earnestine Pelaez  MRN: [id number]     :   1945    Subjective:   General  Additional Pertinent Hx: 76 y/o F presents after CVA. PMH includes total of 4 CVA, CABG, back sx  Referring Practitioner: PEDRO Weston  Total # of Visits Approved: (12-18)  Total # of Visits to Date: 2  Plan of Care/Certification Expiration Date: 20  Progress Note Due Date: 20  Subjective: I have a little ache in my R knee, went to the Dr about my eye and they gave me some drops but i can't tell that it's working  Patient Currently in Pain: No         Treatment Activities:      Exercises  Exercise 1: 6MWT 403' w/Rollator  Exercise 2: Repeated sit to stand (start from higher surface, focus on no UE use) 2 x 5  Exercise 3: Sitting marches x 10 ea  Exercise 4: LAQ x 10 ea  Exercise 5: HS Curls x 10 ea (yellow)  Exercise 6: Hip abd/ball squeeze x 10 ea (yellow)  Exercise 7: 4 way ankle with t-band x 10 ea (yellow)  Francisco  Exercise 8: Mini squats x 10   Exercise 9: Standing marches x 10  Exercise 10: Standing hip abd/ext x 10 ea  Exercise 11: Heel raises x 10  Exercise 12: Stance on green foam EO/EC/Narrow AYALA x 1' ea (CGA for EC/Narrow AYALA)  Exercise 13: Stance with balance perturbations  x 0  Exercise 14: Toe taps in 4\" step x 10 ea  Exercise 15: Sidestepping in // bars (progress to on line) x 3  Exercise 16: Fwd/back ambulation in // bars (progress to line) x 3  Exercise 17: Box steps x 0  Exercise 18: Cone weaves x 0  Exercise 19: 360 turns  x 2 ea  (alternating)         Assessment:   Conditions Requiring Skilled Therapeutic Intervention  Body structures, Functions, Activity limitations: Decreased functional mobility ; Decreased high-level IADLs;Decreased strength;Decreased balance  Assessment: Patient tolerated therapy session well, required cues when sidestepping R to  L foot vs sliding scooting and to take larger steps when walking backwards in // bars. Added 360* turns and has more difficulty turning to the R vs L  Treatment Diagnosis: CVA  REQUIRES PT FOLLOW UP: Yes        Goals:  Short term goals  Time Frame for Short term goals: 3-4 weeks  Short term goal 1: Independent with HEP  Short term goal 2: Improve bilat LE strength to grossly at least 5-/5  Short term goal 3: Perform at least 6 sit to  30\" from chair. Short term goal 4: Ambulate at least 0' in 6MWT  Long term goals  Time Frame for Long term goals : 4-6 weeks  Long term goal 1: No further falls throughout course of therapy.   Long term goal 2: Normalize gait pattern (wider AYALA, improved step length, less shuffling)  Long term goal 3: Improve Wellington score to at least 42/56  Patient Goals   Patient goals : improve strength and balance    Plan:    Times per week: 2x  Plan weeks: 6-8 weeks  Current Treatment Recommendations: Strengthening, Gait Training, Equipment Evaluation, Education, & procurement, Neuromuscular Re-education, Balance Training, Functional Mobility Training, Endurance Training, Home Exercise Program, Safety Education & Training        Therapy Time   Individual Concurrent Group Co-treatment   Time In 3866         Time Out 1024         Minutes 64                 Vasile Toledo PTA    Electronically signed by Vasile Toledo PTA on 3/4/2020 at 10:31 AM

## 2020-03-09 ENCOUNTER — APPOINTMENT (OUTPATIENT)
Dept: SPEECH THERAPY | Age: 75
End: 2020-03-09
Payer: MEDICARE

## 2020-03-11 ENCOUNTER — HOSPITAL ENCOUNTER (OUTPATIENT)
Dept: PHYSICAL THERAPY | Age: 75
Setting detail: THERAPIES SERIES
Discharge: HOME | End: 2020-03-11
Payer: MEDICARE

## 2020-03-11 PROCEDURE — 97110 THERAPEUTIC EXERCISES: CPT

## 2020-03-11 NOTE — PROGRESS NOTES
Physical Therapy  Daily Treatment Note  Date: 3/11/2020  Patient Name: Parveen Castanon  MRN: [id number]     :   1945    Subjective:   General  Additional Pertinent Hx: 76 y/o F presents after CVA. PMH includes total of 4 CVA, CABG, back sx  Referring Practitioner: PEDRO Gloria  Total # of Visits Approved: (12-18)  Total # of Visits to Date: 4  Plan of Care/Certification Expiration Date: 20  Progress Note Due Date: 20  Subjective: i'm a little cold, didn't know it was going to rain today  Patient Currently in Pain: No         Treatment Activities:         Exercises  Exercise 1: 6MWT 441' w/Rollator  Exercise 2: Repeated sit to stand (start from higher surface, focus on no UE use) 2 x 5  Exercise 3: Sitting marches x 15 ea  Exercise 4: LAQ x 15 ea  Exercise 5: HS Curls x 15 ea (yellow)  Exercise 6: Hip abd/ball squeeze x 15 ea (yellow)  Exercise 7: 4 way ankle with t-band x 10 ea (yellow)  Francisco  Exercise 8: Mini squats x 10   Exercise 9: Standing marches x 10  Exercise 10: Standing hip abd/ext x 10 ea  Exercise 11: Heel raises x 10  Exercise 12: Stance on green foam EO/EC/Narrow AYALA x 1' ea (CGA for EC/Narrow AYALA)  Exercise 13: Stance with balance perturbations  x 1'  Exercise 14: Toe taps in 4\" step x 10 ea  Exercise 15: Sidestepping in // bars (progress to on line) x 3  Exercise 16: Fwd/back ambulation in // bars (progress to line) x 3        Assessment:   Conditions Requiring Skilled Therapeutic Intervention  Body structures, Functions, Activity limitations: Decreased functional mobility ; Decreased high-level IADLs;Decreased strength;Decreased balance  Assessment: Patient tolerated therapy session well again but still required cues when sidestepping R to  L foot vs sliding/scooting and to take larger steps when walking backwards in // bars. Increased reps with seated marching, resisted h/s curl LAQ, ball squeeze and resisted hip abd. Turning and pivoting R is harder than going L.   Treatment

## 2020-03-16 ENCOUNTER — HOSPITAL ENCOUNTER (OUTPATIENT)
Dept: SPEECH THERAPY | Age: 75
Setting detail: THERAPIES SERIES
End: 2020-03-16
Payer: MEDICARE

## 2020-03-16 ENCOUNTER — APPOINTMENT (OUTPATIENT)
Dept: PHYSICAL THERAPY | Age: 75
End: 2020-03-16
Payer: MEDICARE

## 2020-03-16 ENCOUNTER — APPOINTMENT (OUTPATIENT)
Dept: SPEECH THERAPY | Age: 75
End: 2020-03-16
Payer: MEDICARE

## 2020-03-18 ENCOUNTER — APPOINTMENT (OUTPATIENT)
Dept: PHYSICAL THERAPY | Age: 75
End: 2020-03-18
Payer: MEDICARE

## 2020-03-23 ENCOUNTER — APPOINTMENT (OUTPATIENT)
Dept: SPEECH THERAPY | Age: 75
End: 2020-03-23
Payer: MEDICARE

## 2020-03-23 ENCOUNTER — APPOINTMENT (OUTPATIENT)
Dept: PHYSICAL THERAPY | Age: 75
End: 2020-03-23
Payer: MEDICARE

## 2020-03-25 ENCOUNTER — APPOINTMENT (OUTPATIENT)
Dept: PHYSICAL THERAPY | Age: 75
End: 2020-03-25
Payer: MEDICARE

## 2020-03-30 ENCOUNTER — APPOINTMENT (OUTPATIENT)
Dept: SPEECH THERAPY | Age: 75
End: 2020-03-30
Payer: MEDICARE

## 2020-03-30 ENCOUNTER — APPOINTMENT (OUTPATIENT)
Dept: PHYSICAL THERAPY | Age: 75
End: 2020-03-30
Payer: MEDICARE

## 2020-03-30 RX ORDER — ASPIRIN 325 MG
TABLET ORAL
Qty: 30 TABLET | Refills: 3 | Status: SHIPPED | OUTPATIENT
Start: 2020-03-30 | End: 2020-07-29

## 2020-03-30 NOTE — TELEPHONE ENCOUNTER
Requested Prescriptions     Pending Prescriptions Disp Refills    aspirin 325 MG tablet [Pharmacy Med Name: ASPIRIN 325MG TABLETS] 30 tablet 3     Sig: TAKE 1 TABLET BY MOUTH EVERY DAY       Last Office Visit: 1/8/2020  Next Office Visit: Visit date not found  Last Medication Refill: 11/30/2019 with 3 refills

## 2020-04-01 ENCOUNTER — APPOINTMENT (OUTPATIENT)
Dept: PHYSICAL THERAPY | Age: 75
End: 2020-04-01
Payer: MEDICARE

## 2020-04-02 ENCOUNTER — HOSPITAL ENCOUNTER (EMERGENCY)
Age: 75
Discharge: HOME | End: 2020-04-02
Payer: MEDICARE

## 2020-04-02 ENCOUNTER — APPOINTMENT (OUTPATIENT)
Dept: CT IMAGING | Age: 75
End: 2020-04-02
Payer: MEDICARE

## 2020-04-02 VITALS
HEART RATE: 85 BPM | SYSTOLIC BLOOD PRESSURE: 154 MMHG | DIASTOLIC BLOOD PRESSURE: 90 MMHG | WEIGHT: 115 LBS | TEMPERATURE: 97.8 F | OXYGEN SATURATION: 96 % | RESPIRATION RATE: 17 BRPM | HEIGHT: 63 IN | BODY MASS INDEX: 20.38 KG/M2

## 2020-04-02 PROCEDURE — 70450 CT HEAD/BRAIN W/O DYE: CPT

## 2020-04-02 PROCEDURE — 6370000000 HC RX 637 (ALT 250 FOR IP)

## 2020-04-02 PROCEDURE — 6370000000 HC RX 637 (ALT 250 FOR IP): Performed by: NURSE PRACTITIONER

## 2020-04-02 PROCEDURE — 99283 EMERGENCY DEPT VISIT LOW MDM: CPT

## 2020-04-02 RX ORDER — TETRACAINE HYDROCHLORIDE 5 MG/ML
1 SOLUTION OPHTHALMIC ONCE
Status: COMPLETED | OUTPATIENT
Start: 2020-04-02 | End: 2020-04-02

## 2020-04-02 RX ADMIN — TETRACAINE HYDROCHLORIDE 1 DROP: 5 SOLUTION OPHTHALMIC at 17:27

## 2020-04-02 RX ADMIN — FLUORESCEIN SODIUM 1 MG: 1 STRIP OPHTHALMIC at 17:27

## 2020-04-02 ASSESSMENT — ENCOUNTER SYMPTOMS
PHOTOPHOBIA: 1
EYE REDNESS: 1
EYE ITCHING: 1

## 2020-04-02 ASSESSMENT — TONOMETRY: OD_IOP_MMHG: 12

## 2020-04-02 NOTE — ED PROVIDER NOTES
CARDIAC SURGERY           CURRENT MEDICATIONS       Discharge Medication List as of 4/2/2020  7:48 PM      CONTINUE these medications which have NOT CHANGED    Details   aspirin 325 MG tablet TAKE 1 TABLET BY MOUTH EVERY DAY, Disp-30 tablet, R-3Normal      cloNIDine (CATAPRES) 0.1 MG tablet Historical Med      atorvastatin (LIPITOR) 80 MG tablet Take 1 tablet by mouth nightly, Disp-30 tablet, R-0Normal      carbidopa-levodopa (SINEMET)  MG per tablet Take 1 tablet by mouth 2 times daily, Disp-90 tablet, R-3Normal      potassium chloride (KLOR-CON M) 20 MEQ extended release tablet Take 1 tablet by mouth 2 times daily (with meals), Disp-60 tablet, R-3Normal      cilostazol (PLETAL) 100 MG tablet Take 1 tablet by mouth 2 times daily, Disp-60 tablet, R-3Normal      levothyroxine (SYNTHROID) 50 MCG tablet Take 1 tablet by mouth Daily, Disp-30 tablet, R-3Normal      pantoprazole (PROTONIX) 40 MG tablet Take 1 tablet by mouth every morning (before breakfast), Disp-30 tablet, R-3Normal      nitroGLYCERIN (NITROSTAT) 0.4 MG SL tablet up to max of 3 total doses. If no relief after 1 dose, call 911., Disp-25 tablet, R-0Normal      traZODone (DESYREL) 50 MG tablet Take 1 tablet by mouth nightly, Disp-30 tablet, [id number]      folic acid (FOLVITE) 1 MG tablet Take 1 tablet by mouth daily, Disp-30 tablet, R-0Normal      alendronate (FOSAMAX) 70 MG tablet Take 1 tablet by mouth every 7 days Every 7 days, Disp-4 tablet, R-0Normal      Vitamin D, Cholecalciferol, 10 MCG (400 UNIT) CHEW 5000 units daily -ok for pharmacy to use whatever dose and number of pills to achieve 5000 units daily, Disp-375 tablet, R-0Normal      Ascorbic Acid (VITAMIN C) 1000 MG tablet Take 1,000 mg by mouth dailyHistorical Med             ALLERGIES     Patient has no known allergies.     FAMILY HISTORY       Family History   Problem Relation Age of Onset    Asthma Mother     Heart Disease Mother     Asthma Father     Heart Attack Father     Heart Right eye: Normal extraocular motion and no nystagmus. Left eye: Normal extraocular motion. Pupils:      Right eye: Pupil is round and reactive. Fluorescein uptake present. Neck:      Musculoskeletal: Normal range of motion and neck supple. Cardiovascular:      Rate and Rhythm: Normal rate. Pulmonary:      Effort: No respiratory distress. Neurological:      Mental Status: She is alert. Psychiatric:         Behavior: Behavior normal.         DIAGNOSTIC RESULTS     EKG: All EKG's are interpreted by the Emergency Department Physician who either signs or Co-signsthis chart in the absence of a cardiologist.        RADIOLOGY:   [employer] such as CT, Ultrasound and MRI are read by the radiologist. [employer] radiographic images are visualized and preliminarily interpreted by the emergency physician with the below findings:      Interpretation per the Radiologist below, if available at the time of this note:    CT Head WO Contrast   Final Result   Impression:   No acute intracranial findings. Sequela of chronic microvascular   ischemia. Signed by Dr Pako Gonzales on 4/2/2020 7:18 PM            ED BEDSIDEULTRASOUND:   Performed by ED Physician -none    LABS:  Labs Reviewed - No data to display    All other labs were within normal range or not returned as of this dictation. EMERGENCY DEPARTMENT COURSE and DIFFERENTIALDIAGNOSIS/MDM:   Vitals:    Vitals:    04/02/20 1610   BP: (!) 154/90   Pulse: 85   Resp: 17   Temp: 97.8 °F (36.6 °C)   TempSrc: Oral   SpO2: 96%   Weight: 115 lb (52.2 kg)   Height: 5' 3\" (1.6 m)           MDM  Possible dendritic lesion to eye. Spoke to pt's  to confirm her history. Spoke to Dr Vazquez Garcia who said he would see her in the office tomorrow. Pt expresses understanding. CONSULTS:  None    PROCEDURES:  Unless otherwise noted below, none     Procedures    FINAL IMPRESSION      1. Redness or discharge of eye    2.  Change in vision

## 2020-04-06 RX ORDER — CILOSTAZOL 100 MG/1
TABLET ORAL
Qty: 60 TABLET | Refills: 3 | Status: SHIPPED | OUTPATIENT
Start: 2020-04-06 | End: 2020-08-04

## 2020-04-06 NOTE — TELEPHONE ENCOUNTER
Requested Prescriptions     Pending Prescriptions Disp Refills    cilostazol (PLETAL) 100 MG tablet [Pharmacy Med Name: CILOSTAZOL 100MG TABLETS] 60 tablet 3     Sig: TAKE 1 TABLET BY MOUTH TWICE DAILY       Last Office Visit: 1/8/2020  Next Office Visit: Visit date not found  Last Medication Refill: 11/29/2019 with 3 refills

## 2020-05-15 ENCOUNTER — HOSPITAL ENCOUNTER (OUTPATIENT)
Dept: PHYSICAL THERAPY | Age: 75
Setting detail: THERAPIES SERIES
Discharge: HOME | End: 2020-05-15
Payer: MEDICARE

## 2020-05-15 PROCEDURE — 97110 THERAPEUTIC EXERCISES: CPT

## 2020-05-15 NOTE — PROGRESS NOTES
Physical Therapy  Daily Treatment Note/Reassessment  Date: 5/15/2020  Patient Name: Kathy Ceja  MRN: [id number]     :   1945    Subjective:   General  Additional Pertinent Hx: 76 y/o F presents after CVA. PMH includes total of 4 CVA, CABG, back sx. She had started PT 20 until 3/11/20, whereupon our facility was shut down for COVID precautions. Referring Practitioner: PEDRO Whitt  PT Visit Information  PT Insurance Information: Medicare, BCBS supplemental  Total # of Visits Approved: 44(46-39)  Total # of Visits to Date: 5  Plan of Care/Certification Expiration Date: 20  Progress Note Due Date: 06/15/20  Subjective  Subjective: Patient states \"I am doing fine, getting around a little better, getting used to it. \" At home she is walking independently, takes a walk outside frequently using cane, about one block, stating she does that independently. Inside house, she uses cane or \" cruises\" furniture and walls. She reports she has had a few falls inside the house. , \"fallen about 4 times\". Pain Screening  Patient Currently in Pain: No  Vital Signs  Patient Currently in Pain: No       Treatment Activities:         Transfers  Sit to Stand: Modified independent(Required use of hands to come to standing and sitting, slow to rise and sit)  Stand to sit: Modified independent  Bed to Chair: Supervision;Stand by assistance        Ambulation  Ambulation?: Yes  Ambulation 1  Surface: level tile  Device: Hand-Held Assist  Assistance: Contact guard assistance  Quality of Gait: Narrow AYALA, occasional scissoring gait, shuffling steps which were more pronounced as ambulation progressed. Gait Deviations: Decreased step length;Decreased step height;Decreased arm swing; Increased AYALA; Slow Merle; Shuffles  Distance: 80' with CGA              Strength RLE  R Hip Flexion: 4-/5  R Hip ABduction: 4+/5;5/5  R Hip ADduction: 4+/5  R Knee Flexion: 4-/5  R Knee Extension: 4+/5;4/5  R Ankle Dorsiflexion: 4+/5  R Ankle Plantar flexion: 4+/5  R Ankle Inversion: 4+/5  R Ankle Eversion: 4+/5  Strength LLE  L Hip Flexion: 4-/5  L Hip ABduction: 4+/5  L Hip ADduction: 4+/5  L Knee Flexion: 4/5;4-/5  L Knee Extension: 4/5  L Ankle Dorsiflexion: 4/5;4-/5  L Ankle Plantar Flexion: 4/5;4+/5  L Ankle Inversion: 4+/5;5/5  L Ankle Eversion: 4/5;4+/5    Exercises  Exercise 1: 6MWT 477' with CGA  Exercise 2: Repeated sit to stand (start from higher surface, focus on no UE use) 2 x 5  Exercise 3: Sitting marches x 15 ea  Exercise 4: LAQ x 15 ea  Exercise 5: HS Curls x 15 ea (yellow)  Exercise 6: Hip abd/ball squeeze x 15 ea (yellow)  Exercise 7: 4 way ankle with t-band x 10 ea (yellow)  Francisco  Exercise 8: Mini squats x 10   Exercise 9: Standing marches x 10  Exercise 10: Standing hip abd/ext x 10 ea  Exercise 11: Heel raises x 10  Exercise 12: Stance on green foam EO/EC/Narrow AYALA x 1' ea (CGA for EC/Narrow AYALA)  Exercise 13: Stance with balance perturbations  x 1'  Exercise 14: Toe taps in 4\" step x 10 ea  Exercise 15: Sidestepping in // bars (progress to on line) x 3  Exercise 16: Fwd/back ambulation in // bars (progress to line) x 3  Exercise 19: 360 turns  x 2 ea  (alternating)  Exercise 20: [place] Balance Testing (reassessment 5/15/20). Other Activities  Other Activities: Re-evaluation performed                                Assessment:   Conditions Requiring Skilled Therapeutic Intervention  Body structures, Functions, Activity limitations: Decreased functional mobility ; Decreased high-level IADLs;Decreased strength;Decreased balance  Assessment: Mrs. Carlos Rascon does not appear much changed since her initial evaluation 2/25/20 as demonstrated by the results of today's reassessment. There has been a large break between her last session and today's reassessment due to clinic being closed for COVID-19 precautions. See goal section for specific comments and scores. She reports she is walking more at home, but concedes she is still is having falls.  We Strengthening, Gait Training, Equipment Evaluation, Education, & procurement, Neuromuscular Re-education, Balance Training, Functional Mobility Training, Endurance Training, Home Exercise Program, Safety Education & Training  Timed Code Treatment Minutes: 61 Minutes     Therapy Time   Individual Concurrent Group Co-treatment   Time In 1034         Time Out 1135         Minutes 61         Timed Code Treatment Minutes: 61 Minutes    Electronically signed by Georges Mc PT on 5/15/2020 at 11:49 AM    Georges Mc PT

## 2020-06-17 DIAGNOSIS — I63.312 CEREBROVASCULAR ACCIDENT (CVA) DUE TO THROMBOSIS OF LEFT MIDDLE CEREBRAL ARTERY (HCC): Primary | ICD-10-CM

## 2020-06-17 RX ORDER — CLOPIDOGREL BISULFATE 75 MG/1
75 TABLET ORAL DAILY
Qty: 90 TABLET | Refills: 0 | OUTPATIENT
Start: 2020-06-17

## 2020-07-29 RX ORDER — ASPIRIN 325 MG
TABLET, DELAYED RELEASE (ENTERIC COATED) ORAL
Qty: 30 TABLET | Refills: 0 | Status: SHIPPED | OUTPATIENT
Start: 2020-07-29 | End: 2020-08-28

## 2020-08-04 RX ORDER — CILOSTAZOL 100 MG/1
TABLET ORAL
Qty: 60 TABLET | Refills: 3 | Status: SHIPPED | OUTPATIENT
Start: 2020-08-04 | End: 2020-11-24

## 2020-08-28 RX ORDER — ASPIRIN 325 MG
TABLET, DELAYED RELEASE (ENTERIC COATED) ORAL
Qty: 30 TABLET | Refills: 0 | Status: SHIPPED | OUTPATIENT
Start: 2020-08-28

## 2020-08-28 NOTE — TELEPHONE ENCOUNTER
Requested Prescriptions     Pending Prescriptions Disp Refills    aspirin 325 MG EC tablet [Pharmacy Med Name: ASPIRIN 325MG EC TABLETS] 30 tablet 0     Sig: TAKE 1 TABLET BY MOUTH EVERY DAY       Last Office Visit: 1/8/2020  Next Office Visit: Visit date not found  Last Medication Refill: 7/29/20

## 2020-09-03 ENCOUNTER — OFFICE VISIT (OUTPATIENT)
Dept: NEUROLOGY | Facility: CLINIC | Age: 75
End: 2020-09-03

## 2020-09-03 VITALS
WEIGHT: 106.6 LBS | OXYGEN SATURATION: 97 % | BODY MASS INDEX: 18.89 KG/M2 | SYSTOLIC BLOOD PRESSURE: 130 MMHG | DIASTOLIC BLOOD PRESSURE: 88 MMHG | HEIGHT: 63 IN | HEART RATE: 70 BPM

## 2020-09-03 DIAGNOSIS — R41.3 IMPAIRED MEMORY: ICD-10-CM

## 2020-09-03 DIAGNOSIS — G47.09 OTHER INSOMNIA: Primary | ICD-10-CM

## 2020-09-03 DIAGNOSIS — I63.312 CEREBROVASCULAR ACCIDENT (CVA) DUE TO THROMBOSIS OF LEFT MIDDLE CEREBRAL ARTERY (HCC): ICD-10-CM

## 2020-09-03 DIAGNOSIS — I10 ESSENTIAL HYPERTENSION: ICD-10-CM

## 2020-09-03 DIAGNOSIS — E78.2 MIXED HYPERLIPIDEMIA: ICD-10-CM

## 2020-09-03 DIAGNOSIS — F33.0 MILD EPISODE OF RECURRENT MAJOR DEPRESSIVE DISORDER (HCC): ICD-10-CM

## 2020-09-03 PROCEDURE — 99214 OFFICE O/P EST MOD 30 MIN: CPT | Performed by: PHYSICIAN ASSISTANT

## 2020-09-03 RX ORDER — CILOSTAZOL 100 MG/1
100 TABLET ORAL 2 TIMES DAILY
COMMUNITY
Start: 2020-09-02

## 2020-09-03 RX ORDER — POTASSIUM CHLORIDE 750 MG/1
TABLET, FILM COATED, EXTENDED RELEASE ORAL
COMMUNITY
Start: 2020-08-27

## 2020-09-03 RX ORDER — CLONIDINE HYDROCHLORIDE 0.1 MG/1
TABLET ORAL
COMMUNITY
Start: 2020-06-17

## 2020-09-03 RX ORDER — ATORVASTATIN CALCIUM 80 MG/1
TABLET, FILM COATED ORAL DAILY
COMMUNITY
Start: 2020-06-17

## 2020-09-03 RX ORDER — MIRTAZAPINE 30 MG/1
30 TABLET, FILM COATED ORAL NIGHTLY
Qty: 30 TABLET | Refills: 2 | Status: SHIPPED | OUTPATIENT
Start: 2020-09-03 | End: 2021-09-03

## 2020-09-03 RX ORDER — PANTOPRAZOLE SODIUM 40 MG/1
TABLET, DELAYED RELEASE ORAL DAILY
COMMUNITY
Start: 2020-06-17

## 2020-09-03 RX ORDER — LISINOPRIL 20 MG/1
TABLET ORAL DAILY
COMMUNITY
Start: 2020-06-29

## 2020-09-03 RX ORDER — VALACYCLOVIR HYDROCHLORIDE 1 G/1
TABLET, FILM COATED ORAL
COMMUNITY
Start: 2020-08-28

## 2020-09-03 RX ORDER — ASPIRIN 325 MG
TABLET, DELAYED RELEASE (ENTERIC COATED) ORAL DAILY
COMMUNITY
Start: 2020-07-29

## 2020-09-03 NOTE — PROGRESS NOTES
Neurology Progress Note      Chief Complaint:    Left periventricular white matter CVA off of Plavix  Short-term memory impairment  History of basal ganglia CVA    Subjective     Subjective:  Patient returns again today for follow-up evaluation of stroke sustained in October 2019.  The patient feels as though she has no residual symptoms from this.  She is accompanied to the office by her .  The patient does continue to demonstrate some mild symptoms of anhedonia, however, never took the Effexor prescribed the previous encounter.  Her main complaint today is that she has difficulty initiating sleep most nights, however, once she is able to go to sleep she sleeps well, however, is only sleeping on average about 3-4 hours nightly.  The patient does demonstrate some mild cognitive impairment, however, she and her  are not interested in particularly addressing this with any medication at this time.  She continues to take Pletal 100 mg twice daily and atorvastatin 80 mg daily for secondary stroke prevention.  She also was treated with Sinemet 25/100 that she takes twice daily prescribed by another provider.  She does not hold a previous diagnosis of parkinsonism.      Past Medical History:   Diagnosis Date   • Basal cell carcinoma 10/28/2017   • CAD (coronary artery disease)    • CVA (cerebral vascular accident) (CMS/HCC) 2015   • Hyperlipidemia    • Hypertension    • Osteopenia    • Rheumatoid arthritis (CMS/[facility])    • TIA (transient ischemic attack)      Past Surgical History:   Procedure Laterality Date   • APPENDECTOMY     • BREAST BIOPSY     • BREAST RECONSTRUCTION      breast reduction , yrs ago   • CARDIAC CATHETERIZATION     • CARDIAC SURGERY      cabg x3  2015 ,done at [facility]   • CATARACT EXTRACTION     • COLONOSCOPY N/A 1/18/2017    Procedure: COLONOSCOPY WITH ANESTHESIA;  Surgeon: Andrea Pizano DO;  Location: [facility] ENDOSCOPY;  Service:    • COLONOSCOPY N/A 2/7/2017    Procedure:  COLONOSCOPY WITH ANESTHESIA;  Surgeon: Andrea Pizano DO;  Location: [facility] ENDOSCOPY;  Service:    • HEAD/NECK LESION/CYST EXCISION Right 10/18/2017    Procedure: Excision of basal cell carcinoma the right neck with complex closure;  Surgeon: Edward Cardona MD;  Location: [facility] OR;  Service:    • HERNIA REPAIR     • REDUCTION MAMMAPLASTY  1995   • RETINAL LASER PROCEDURE     • SPINE SURGERY     • TUBAL ABDOMINAL LIGATION       Family History   Problem Relation Age of Onset   • Emphysema Mother    • Heart disease Father    • Cancer Sister    • Cancer Brother    • Liver cancer Maternal Grandmother      Social History     Tobacco Use   • Smoking status: Never Smoker   • Smokeless tobacco: Never Used   Substance Use Topics   • Alcohol use: No   • Drug use: No       Medications:  Current Outpatient Medications   Medication Sig Dispense Refill   • alendronate (FOSAMAX) 70 MG tablet Take 70 mg by mouth Every 7 (Seven) Days.     • amLODIPine (NORVASC) 2.5 MG tablet Take 1 tablet by mouth daily. 30 tablet 11   • ascorbic acid (VITAMIN C) 1000 MG tablet Take 1,000 mg by mouth.     • aspirin  MG tablet Take  by mouth Daily.     • atorvastatin (LIPITOR) 80 MG tablet Take  by mouth Daily.     • carbidopa-levodopa (SINEMET)  MG per tablet Take 1 tablet by mouth 2 (Two) Times a Day.     • Cholecalciferol (VITAMIN D3) 5000 UNITS capsule capsule Take 2,000 Units by mouth Daily.     • cilostazol (PLETAL) 100 MG tablet Take 100 mg by mouth 2 (Two) Times a Day.     • cloNIDine (CATAPRES) 0.1 MG tablet      • folic acid (FOLVITE) 1 MG tablet Take 1 mg by mouth Daily.     • levothyroxine (SYNTHROID, LEVOTHROID) 50 MCG tablet Take 50 mcg by mouth Daily.     • lisinopril (PRINIVIL,ZESTRIL) 20 MG tablet Take  by mouth Daily.     • nitroglycerin (NITROSTAT) 0.4 MG SL tablet Place 0.4 mg under the tongue every 5 (five) minutes as needed for chest pain. Take no more than 3 doses in 15 minutes.     • pantoprazole  (PROTONIX) 40 MG EC tablet Take  by mouth Daily.     • potassium chloride (K-DUR) 10 MEQ CR tablet TK 1 T PO QOD WF     • traZODone (DESYREL) 50 MG tablet Take 50 mg by mouth Every Night.     • valACYclovir (VALTREX) 1000 MG tablet      • venlafaxine XR (EFFEXOR XR) 75 MG 24 hr capsule Take 1 capsule by mouth Daily. 30 capsule 2     No current facility-administered medications for this visit.        Allergies:    Patient has no known allergies.    Review of Systems:   -A 14 point review of systems is completed and is negative.      Objective      Vital Signs  Heart Rate:  [70] 70  BP: (130)/(88) 130/88    Physical Exam:    General Exam:  Head:  Normocephalic, atraumatic.  HEENT: PERRLA.  Full EOM.  Neck:  No lymphadenopathy, thyromegaly or bruit.  Cardiac:  Regular rate and rhythm.  Normal S1, S2.  No murmur, rub or gallop.  Lungs:  Clear to auscultation bilaterally.  No wheeze, rales or rhonchi.  Abdomen:  Non-tender, Non-distended.  Bowel sounds normoactive.  Extremities: Full peripheral pulses.  No clubbing, cyanosis or edema.  Skin: No ulceration, breakdown or rash.      Neurologic Exam:  Mental Status:    -Awake. Alert. Oriented to person, place & time.  -No word finding difficulties.  -No aphasia.  -No dysarthria.  -Follows simple commands.     CN II:  Full visual fields with confrontation.  Pupils equally reactive to light.  CN III, IV, VI:  Extraocular muscles function intact with no nystagmus.  CN V:  Facial sensory is symmetric.  CN VII:  Facial motor symmetric.  CN VIII:  Gross hearing intact bilaterally.  CN IX/X:  Palate elevates symmetrically.  CN XI:  Shoulder shrug symmetric.  CN XII:  Tongue is midline on protrusion.     Motor: (strength out of 5:  1= minimal movement, 2 = movement in plane of gravity, 3 = movement against gravity, 4 = movement against some resistance, 5 = full strength)     -5/5 in bilateral biceps, triceps, brachioradialis, wrist extensors and intrinsic muscles of the  "hand.    -5/5 in bilateral hip flexors, quadriceps, hamstrings, gastrocsoleus complex, anterior tibialis and extensor hallucis longus.       Deep Tendon Reflexes:  -Right              Biceps: 2+         Triceps: 2+      Brachioradialis: 2+              Patella: 2+       Ankle: 2+           -Left              Biceps: 2+         Triceps: 2+      Brachioradialis: 2+              Patella: 2+       Ankle: 2+             Tone (Modified Cristi Scale):  No appreciable increase in tone or rigidity noted.     Sensory:  -Intact to light touch, pinprick BUE (C5-T1) and BLE (L2-S1).     Coordination:  -Finger to nose intact BUEs  -Heel to shin intact BLEs  -No ataxia     Gait  -No signs of ataxia  -ambulates unassisted       Results Review:    I reviewed the patient's new clinical results and findings.      No components found for: A1C  Lab Results   Component Value Date    HDL 48 (L) 11/14/2019    LDL 57 11/14/2019     No components found for: B12  Lab Results   Component Value Date    TSH 0.828 10/26/2019       Assessment/Plan     Impression:  1.  Left periventricular white matter CVA on 10/21/2019  2.  Remote right basal ganglia CVA  3.  Hypertension, essential  4.  Dyslipidemia, mixed  5.  Chronic left upper extremity weakness  6.  Parafalcine meningioma  7.  Coronary artery disease with previous coronary artery bypass grafting, May 2015      Plan:  1.  Continue Pletal 100 mg twice daily and aspirin 325 mg daily for secondary stroke prevention.  Patient is demonstrating no signs of bleeding, melena, etc.  Risk of bleeding discussed with the patient and her  again.    2.  Continue atorvastatin 80 mg daily with target LDL less than 70.  Will defer to PCP to manage moving forward.    3.  Recommend annual carotid artery ultrasound surveillance.    4.  Patient is counseled on stroke signs and symptoms using FAST and \"Time Saved is Brain Saved.\"    5.  Continue treat hypertension with target of 140/90 or less.    6.  " Discontinue trazodone as this is been ineffective for sleep.  Start mirtazapine 30 mg ODT 45 minutes to an hour prior to bed to see if this will help initiate with sleep and also help with appetite stimulation and mood stabilization.    7.  From a neurology standpoint, patient will follow-up with us on an as have needed basis.    Greater than 25 minutes with 30-minute encounter spent in face-to-face education and present counseling regarding aforementioned measures, medications, mechanism of action, side effects, adherence to the prescribed therapy and the outcomes this has on her overall risk reduction for stroke and overall health status.          Ronnie Haider PA-C  09/03/20  15:16

## 2020-11-24 RX ORDER — CILOSTAZOL 100 MG/1
TABLET ORAL
Qty: 60 TABLET | Refills: 3 | Status: SHIPPED | OUTPATIENT
Start: 2020-11-24

## 2020-12-01 ENCOUNTER — TELEPHONE (OUTPATIENT)
Dept: NEUROSURGERY | Facility: CLINIC | Age: 75
End: 2020-12-01

## 2020-12-01 NOTE — TELEPHONE ENCOUNTER
Called to schedule patients 3 year f/u with Dr. Mota. I left a vm for patient to call back to my direct ext of [phone]. Pt needs to be scheduled around 12/18/2021 with Dr. Mota with MRI the same day.

## 2020-12-10 NOTE — TELEPHONE ENCOUNTER
Attempted to reach patient again to schedule her 3 year appt with Svetlana. Pt did not answer and I left another voicemail for her to return my call.

## 2020-12-28 NOTE — TELEPHONE ENCOUNTER
Attempted to reach patient again to schedule her 3 year f/u. No answer but I left another voicemail for pt to call my direct ext.

## 2021-03-05 ENCOUNTER — IMMUNIZATION (OUTPATIENT)
Age: 76
End: 2021-03-05
Payer: MEDICARE

## 2021-03-05 PROCEDURE — 0001A COVID-19, PFIZER VACCINE 30MCG/0.3ML DOSE: CPT | Performed by: FAMILY MEDICINE

## 2021-03-05 PROCEDURE — 91300 COVID-19, PFIZER VACCINE 30MCG/0.3ML DOSE: CPT | Performed by: FAMILY MEDICINE

## 2021-03-26 ENCOUNTER — IMMUNIZATION (OUTPATIENT)
Age: 76
End: 2021-03-26
Payer: MEDICARE

## 2021-03-26 PROCEDURE — 0001A PR IMM ADMN SARSCOV2 30MCG/0.3ML DIL RECON 1ST DOSE: CPT | Performed by: FAMILY MEDICINE

## 2021-03-26 PROCEDURE — 91300 COVID-19, PFIZER VACCINE 30MCG/0.3ML DOSE: CPT | Performed by: FAMILY MEDICINE

## 2022-07-27 ENCOUNTER — HOSPITAL ENCOUNTER (OUTPATIENT)
Dept: SPEECH THERAPY | Age: 77
Discharge: HOME | End: 2022-07-27

## 2022-07-27 NOTE — PROGRESS NOTES
Speech Language Pathology  Speech & Language Pathology Discharge Report  Date: 2022  Patient: Indiana Powell  : 1945    Date of Last Treatment Session: 20    Pt was initially assessed for speech services 20. Pt last treatment session was 20. Pt missed following five scheduled treatment sessions with the last being due to COVID-19. Pt did not contact op to schedule additional visits following recovery. Pt to be discharged at this time.     Electronically signed by SINDY Hodge on 2022 at 9:28 AM

## 2022-09-04 ENCOUNTER — HOSPITAL ENCOUNTER (EMERGENCY)
Facility: HOSPITAL | Age: 77
Discharge: HOME | End: 2022-09-04
Admitting: EMERGENCY MEDICINE

## 2022-09-04 ENCOUNTER — APPOINTMENT (OUTPATIENT)
Dept: CT IMAGING | Facility: HOSPITAL | Age: 77
End: 2022-09-04

## 2022-09-04 VITALS
TEMPERATURE: 98.1 F | WEIGHT: 110 LBS | RESPIRATION RATE: 18 BRPM | DIASTOLIC BLOOD PRESSURE: 72 MMHG | HEIGHT: 61 IN | OXYGEN SATURATION: 99 % | HEART RATE: 68 BPM | SYSTOLIC BLOOD PRESSURE: 156 MMHG | BODY MASS INDEX: 20.77 KG/M2

## 2022-09-04 DIAGNOSIS — M47.9 OSTEOARTHRITIS OF SPINE, UNSPECIFIED SPINAL OSTEOARTHRITIS COMPLICATION STATUS, UNSPECIFIED SPINAL REGION: ICD-10-CM

## 2022-09-04 DIAGNOSIS — S22.080A COMPRESSION FRACTURE OF T12 VERTEBRA, INITIAL ENCOUNTER: Primary | ICD-10-CM

## 2022-09-04 PROCEDURE — 72128 CT CHEST SPINE W/O DYE: CPT

## 2022-09-04 PROCEDURE — 72125 CT NECK SPINE W/O DYE: CPT

## 2022-09-04 PROCEDURE — 72131 CT LUMBAR SPINE W/O DYE: CPT

## 2022-09-04 PROCEDURE — 99283 EMERGENCY DEPT VISIT LOW MDM: CPT

## 2022-09-04 RX ORDER — LIDOCAINE 50 MG/G
1 PATCH TOPICAL EVERY 24 HOURS
Qty: 6 EACH | Refills: 0 | Status: SHIPPED | OUTPATIENT
Start: 2022-09-04

## 2022-09-04 RX ORDER — LIDOCAINE 50 MG/G
1 PATCH TOPICAL EVERY 24 HOURS
Qty: 6 EACH | Refills: 0 | Status: SHIPPED | OUTPATIENT
Start: 2022-09-04 | End: 2022-09-04 | Stop reason: SDUPTHER

## 2022-09-04 RX ORDER — NAPROXEN 500 MG/1
500 TABLET ORAL 2 TIMES DAILY PRN
Qty: 10 TABLET | Refills: 0 | Status: SHIPPED | OUTPATIENT
Start: 2022-09-04 | End: 2022-09-04 | Stop reason: SDUPTHER

## 2022-09-04 RX ORDER — HYDROCODONE BITARTRATE AND ACETAMINOPHEN 7.5; 325 MG/1; MG/1
1 TABLET ORAL ONCE
Status: COMPLETED | OUTPATIENT
Start: 2022-09-04 | End: 2022-09-04

## 2022-09-04 RX ORDER — NAPROXEN 500 MG/1
500 TABLET ORAL 2 TIMES DAILY PRN
Qty: 10 TABLET | Refills: 0 | Status: SHIPPED | OUTPATIENT
Start: 2022-09-04

## 2022-09-04 RX ADMIN — HYDROCODONE BITARTRATE AND ACETAMINOPHEN 1 TABLET: 7.5; 325 TABLET ORAL at 18:57

## 2022-09-04 NOTE — DISCHARGE INSTRUCTIONS
Today you have a compression fracture of your T12 vertebra.  As we discussed it involves the superior and inferior endplates and you have lost approximately 20% of your height.  However your fracture is in the anterior part of your spinal column and is stable at this time, with no retropulsion.  Please call the neurosurgeons office in the next 48 hours to schedule outpatient follow-up.  Please wear the back brace at all times except when you are in water such as showering.  Please continue to use the topical patches, anti-inflammatories, and other medications for pain and discomfort.  Exam please follow with your primary care provider within the next 48 hours for reevaluation.  Should you develop any new or worsening symptoms please return to the ER for further evaluation.

## 2022-09-21 ENCOUNTER — HOSPITAL ENCOUNTER (OUTPATIENT)
Dept: GENERAL RADIOLOGY | Facility: HOSPITAL | Age: 77
Discharge: HOME | End: 2022-09-21
Admitting: NURSE PRACTITIONER

## 2022-09-21 ENCOUNTER — OFFICE VISIT (OUTPATIENT)
Dept: NEUROSURGERY | Facility: CLINIC | Age: 77
End: 2022-09-21

## 2022-09-21 VITALS — WEIGHT: 110 LBS | BODY MASS INDEX: 20.77 KG/M2 | HEIGHT: 61 IN

## 2022-09-21 DIAGNOSIS — S22.089A CLOSED FRACTURE OF TWELFTH THORACIC VERTEBRA, UNSPECIFIED FRACTURE MORPHOLOGY, INITIAL ENCOUNTER: Primary | ICD-10-CM

## 2022-09-21 DIAGNOSIS — Z78.9 NON-SMOKER: ICD-10-CM

## 2022-09-21 PROCEDURE — 72072 X-RAY EXAM THORAC SPINE 3VWS: CPT

## 2022-09-21 PROCEDURE — 99213 OFFICE O/P EST LOW 20 MIN: CPT | Performed by: NURSE PRACTITIONER

## 2022-09-21 RX ORDER — ZOLPIDEM TARTRATE 5 MG/1
5 TABLET ORAL
COMMUNITY
Start: 2022-09-06

## 2022-09-21 NOTE — PROGRESS NOTES
Chief complaint:   Chief Complaint   Patient presents with   • Back Pain     Pt is here for evaluation for back pain. She fell on August 18 th at home. She was having pain then but now it is gone.        Subjective     HPI: This is a 77-year-old female patient who was referred to us by [facility] emergency room for compression fracture.  She is here to be evaluated today.  The patient is accompanied by her son and .  According to the son the patient did sustain a fall on August 18 and did start having back pain.  She does have baseline difficulty with mobilization due to previous stroke but they did notice that she was having even more trouble with ambulation after the fall.  They did go to the emergency room and is found that she did have a compression fracture.  I did place her in a brace.  Over the last couple of weeks the patient says that her pain has improved and I did like her mobilization is improving.  They do have therapy is working with the patient as well.  She is not complaining of any back pain or lower extremity pain or numbness and tingling.  Denies any bowel or bladder incontinence.  She is right-hand dominant.  She is .  Denies any tobacco, alcohol, or illicit drug use.    Review of Systems   Constitutional: Positive for activity change.   Musculoskeletal: Negative for back pain.   Neurological: Positive for weakness. Negative for numbness.   Psychiatric/Behavioral: Negative.    All other systems reviewed and are negative.       Past Medical History:   Diagnosis Date   • Basal cell carcinoma 10/28/2017   • CAD (coronary artery disease)    • CVA (cerebral vascular accident) (HCC) 2015   • Hyperlipidemia    • Hypertension    • Osteopenia    • Rheumatoid arthritis (HCC)    • TIA (transient ischemic attack)      Past Surgical History:   Procedure Laterality Date   • APPENDECTOMY     • BREAST BIOPSY     • BREAST RECONSTRUCTION      breast reduction , yrs ago   • CARDIAC CATHETERIZATION    "  • CARDIAC SURGERY      cabg x3  2015 ,done at [facility]   • CATARACT EXTRACTION     • COLONOSCOPY N/A 1/18/2017    Procedure: COLONOSCOPY WITH ANESTHESIA;  Surgeon: Andrea Pizano DO;  Location: [facility] ENDOSCOPY;  Service:    • COLONOSCOPY N/A 2/7/2017    Procedure: COLONOSCOPY WITH ANESTHESIA;  Surgeon: Andrea Pizano DO;  Location: [facility] ENDOSCOPY;  Service:    • HEAD/NECK LESION/CYST EXCISION Right 10/18/2017    Procedure: Excision of basal cell carcinoma the right neck with complex closure;  Surgeon: Edward Cardona MD;  Location: [facility] OR;  Service:    • HERNIA REPAIR     • REDUCTION MAMMAPLASTY  1995   • RETINAL LASER PROCEDURE     • SPINE SURGERY     • TUBAL ABDOMINAL LIGATION       Family History   Problem Relation Age of Onset   • Emphysema Mother    • Heart disease Father    • Cancer Sister    • Cancer Brother    • Liver cancer Maternal Grandmother      Social History     Tobacco Use   • Smoking status: Never Smoker   • Smokeless tobacco: Never Used   Vaping Use   • Vaping Use: Never used   Substance Use Topics   • Alcohol use: No   • Drug use: No     (Not in a hospital admission)    Allergies:  Patient has no known allergies.    Objective      Vital Signs  Ht 153.7 cm (60.5\")   Wt 49.9 kg (110 lb)   LMP  (LMP Unknown)   BMI 21.13 kg/m²     Physical Exam  Constitutional:       Appearance: Normal appearance. She is well-developed.   HENT:      Head: Normocephalic.   Eyes:      General: Lids are normal.      Extraocular Movements: EOM normal.      Conjunctiva/sclera: Conjunctivae normal.      Pupils: Pupils are equal, round, and reactive to light.   Cardiovascular:      Rate and Rhythm: Normal rate and regular rhythm.   Pulmonary:      Effort: Pulmonary effort is normal.      Breath sounds: Normal breath sounds.   Musculoskeletal:         General: Normal range of motion.      Cervical back: Normal range of motion.   Skin:     General: Skin is warm.   Neurological:      Mental Status: " She is alert and oriented to person, place, and time.      GCS: GCS eye subscore is 4. GCS verbal subscore is 5. GCS motor subscore is 6.      Cranial Nerves: No cranial nerve deficit.      Sensory: No sensory deficit.      Gait: Gait abnormal.      Deep Tendon Reflexes: Strength normal and reflexes are normal and symmetric. Reflexes normal.      Comments: Assistance required with mobilization   Psychiatric:         Speech: Speech normal.         Behavior: Behavior normal.         Thought Content: Thought content normal.         Neurologic Exam     Mental Status   Oriented to person, place, and time.   Attention: normal. Concentration: normal.   Speech: speech is normal   Level of consciousness: alert  Normal comprehension.     Cranial Nerves     CN II   Visual fields full to confrontation.     CN III, IV, VI   Pupils are equal, round, and reactive to light.  Extraocular motions are normal.     CN V   Facial sensation intact.     CN VII   Facial expression full, symmetric.     CN VIII   CN VIII normal.     CN IX, X   CN IX normal.   CN X normal.     CN XI   CN XI normal.     CN XII   CN XII normal.     Motor Exam   Muscle bulk: normal    Strength   Strength 5/5 throughout.     Sensory Exam   Light touch normal.     Gait, Coordination, and Reflexes     Reflexes   Reflexes 2+ except as noted.       Imaging review: CT scan of the thoracic spine that was done on September 4, 2022 here at [facility] does show a compression deformity of T12.  No retropulsion noted.  No posterior element involvement.        Assessment/Plan: The patient did have a compression fracture of T12.  Her pain is significantly improved at this time.  I am going to send her for set of x-rays to make sure that the fracture is stable and if everything looks good on the x-rays we can to see her on an as-needed basis.  They were told to call us with any further problems or concerns  Patient is a nonsmoker  The patient's Body mass index is 21.13  kg/m².. BMI is within normal parameters. No follow-up required.  Advance Care Planning   ACP discussion was held with the patient during this visit. Patient has an advance directive in EMR which is still valid.   STEADI Fall Risk Assessment has not been completed.       Diagnoses and all orders for this visit:    1. Closed fracture of twelfth thoracic vertebra, unspecified fracture morphology, initial encounter (HCC) (Primary)  -     XR Spine Thoracic 3 View    2. Non-smoker    3. BMI 21.0-21.9, adult          I discussed the patients findings and my recommendations with patient    Priyank Deras, APRN  09/21/22  15:20 CDT

## 2022-09-22 ENCOUNTER — TELEPHONE (OUTPATIENT)
Dept: NEUROSURGERY | Facility: CLINIC | Age: 77
End: 2022-09-22

## 2022-09-22 DIAGNOSIS — S22.089A CLOSED FRACTURE OF TWELFTH THORACIC VERTEBRA, UNSPECIFIED FRACTURE MORPHOLOGY, INITIAL ENCOUNTER: Primary | ICD-10-CM

## 2022-09-22 NOTE — TELEPHONE ENCOUNTER
Left message for patient's son to call us back for x-ray results.  The compression fractures are slightly more compressed.  We will plan to have the patient go for repeat x-rays in 2 weeks to make sure that there is no changes in the fracture as long as the patient is not having any pain

## 2022-09-22 NOTE — TELEPHONE ENCOUNTER
Yoandy called back, I gave him the information you put in your encounter. He voiced understanding and will bring patient to  in 2 weeks for repeat xrays, he understands this is a walk in appt, not scheduled and orders will be in the chart and ready when they come.

## 2022-10-05 ENCOUNTER — HOSPITAL ENCOUNTER (OUTPATIENT)
Dept: GENERAL RADIOLOGY | Facility: HOSPITAL | Age: 77
Discharge: HOME | End: 2022-10-05
Admitting: NURSE PRACTITIONER

## 2022-10-05 DIAGNOSIS — S22.089A CLOSED FRACTURE OF TWELFTH THORACIC VERTEBRA, UNSPECIFIED FRACTURE MORPHOLOGY, INITIAL ENCOUNTER: ICD-10-CM

## 2022-10-05 PROCEDURE — 72072 X-RAY EXAM THORAC SPINE 3VWS: CPT

## 2023-02-10 ENCOUNTER — HOSPITAL ENCOUNTER (INPATIENT)
Age: 78
LOS: 3 days | Discharge: HOME | DRG: 057 | End: 2023-02-14
Attending: HOSPITALIST | Admitting: INTERNAL MEDICINE
Payer: MEDICARE

## 2023-02-10 ENCOUNTER — APPOINTMENT (OUTPATIENT)
Dept: CT IMAGING | Age: 78
DRG: 057 | End: 2023-02-10
Payer: MEDICARE

## 2023-02-10 ENCOUNTER — APPOINTMENT (OUTPATIENT)
Dept: GENERAL RADIOLOGY | Age: 78
DRG: 057 | End: 2023-02-10
Payer: MEDICARE

## 2023-02-10 DIAGNOSIS — R41.82 ALTERED MENTAL STATUS, UNSPECIFIED ALTERED MENTAL STATUS TYPE: ICD-10-CM

## 2023-02-10 DIAGNOSIS — R53.1 GENERALIZED WEAKNESS: Primary | ICD-10-CM

## 2023-02-10 LAB
ADENOVIRUS BY PCR: NOT DETECTED
ALBUMIN SERPL-MCNC: 4.1 G/DL (ref 3.5–5.2)
ALP BLD-CCNC: 84 U/L (ref 35–104)
ALT SERPL-CCNC: 10 U/L (ref 5–33)
ANION GAP SERPL CALCULATED.3IONS-SCNC: 12 MMOL/L (ref 7–19)
AST SERPL-CCNC: 24 U/L (ref 5–32)
BASOPHILS ABSOLUTE: 0 K/UL (ref 0–0.2)
BASOPHILS RELATIVE PERCENT: 0.5 % (ref 0–1)
BILIRUB SERPL-MCNC: 0.9 MG/DL (ref 0.2–1.2)
BILIRUBIN URINE: NEGATIVE
BLOOD, URINE: NEGATIVE
BORDETELLA PARAPERTUSSIS BY PCR: NOT DETECTED
BORDETELLA PERTUSSIS BY PCR: NOT DETECTED
BUN BLDV-MCNC: 23 MG/DL (ref 8–23)
CALCIUM SERPL-MCNC: 9.2 MG/DL (ref 8.8–10.2)
CHLAMYDOPHILIA PNEUMONIAE BY PCR: NOT DETECTED
CHLORIDE BLD-SCNC: 102 MMOL/L (ref 98–111)
CLARITY: ABNORMAL
CO2: 23 MMOL/L (ref 22–29)
COLOR: YELLOW
CORONAVIRUS 229E BY PCR: NOT DETECTED
CORONAVIRUS HKU1 BY PCR: NOT DETECTED
CORONAVIRUS NL63 BY PCR: NOT DETECTED
CORONAVIRUS OC43 BY PCR: NOT DETECTED
CREAT SERPL-MCNC: 0.9 MG/DL (ref 0.5–0.9)
EOSINOPHILS ABSOLUTE: 0.1 K/UL (ref 0–0.6)
EOSINOPHILS RELATIVE PERCENT: 1.1 % (ref 0–5)
GFR SERPL CREATININE-BSD FRML MDRD: >60 ML/MIN/{1.73_M2}
GLUCOSE BLD-MCNC: 100 MG/DL (ref 74–109)
GLUCOSE URINE: NEGATIVE MG/DL
HCT VFR BLD CALC: 39.4 % (ref 37–47)
HEMOGLOBIN: 12.4 G/DL (ref 12–16)
HUMAN METAPNEUMOVIRUS BY PCR: NOT DETECTED
HUMAN RHINOVIRUS/ENTEROVIRUS BY PCR: NOT DETECTED
IMMATURE GRANULOCYTES #: 0 K/UL
INFLUENZA A BY PCR: NOT DETECTED
INFLUENZA B BY PCR: NOT DETECTED
KETONES, URINE: ABNORMAL MG/DL
LEUKOCYTE ESTERASE, URINE: NEGATIVE
LYMPHOCYTES ABSOLUTE: 1.1 K/UL (ref 1.1–4.5)
LYMPHOCYTES RELATIVE PERCENT: 17.3 % (ref 20–40)
MCH RBC QN AUTO: 30.3 PG (ref 27–31)
MCHC RBC AUTO-ENTMCNC: 31.5 G/DL (ref 33–37)
MCV RBC AUTO: 96.3 FL (ref 81–99)
MONOCYTES ABSOLUTE: 0.5 K/UL (ref 0–0.9)
MONOCYTES RELATIVE PERCENT: 7.8 % (ref 0–10)
MYCOPLASMA PNEUMONIAE BY PCR: NOT DETECTED
NEUTROPHILS ABSOLUTE: 4.8 K/UL (ref 1.5–7.5)
NEUTROPHILS RELATIVE PERCENT: 73 % (ref 50–65)
NITRITE, URINE: NEGATIVE
PARAINFLUENZA VIRUS 1 BY PCR: NOT DETECTED
PARAINFLUENZA VIRUS 2 BY PCR: NOT DETECTED
PARAINFLUENZA VIRUS 3 BY PCR: NOT DETECTED
PARAINFLUENZA VIRUS 4 BY PCR: NOT DETECTED
PDW BLD-RTO: 13.6 % (ref 11.5–14.5)
PH UA: 5.5 (ref 5–8)
PLATELET # BLD: 196 K/UL (ref 130–400)
PMV BLD AUTO: 10.7 FL (ref 9.4–12.3)
POTASSIUM SERPL-SCNC: 4.1 MMOL/L (ref 3.5–5)
PROTEIN UA: ABNORMAL MG/DL
RBC # BLD: 4.09 M/UL (ref 4.2–5.4)
REASON FOR REJECTION: NORMAL
REJECTED TEST: NORMAL
RESPIRATORY SYNCYTIAL VIRUS BY PCR: NOT DETECTED
SARS-COV-2, PCR: NOT DETECTED
SODIUM BLD-SCNC: 137 MMOL/L (ref 136–145)
SPECIFIC GRAVITY UA: 1.03 (ref 1–1.03)
TOTAL CK: 134 U/L (ref 26–192)
TOTAL PROTEIN: 7 G/DL (ref 6.6–8.7)
TROPONIN: <0.01 NG/ML (ref 0–0.03)
UROBILINOGEN, URINE: 0.2 E.U./DL
WBC # BLD: 6.6 K/UL (ref 4.8–10.8)

## 2023-02-10 PROCEDURE — 93005 ELECTROCARDIOGRAM TRACING: CPT | Performed by: NURSE PRACTITIONER

## 2023-02-10 PROCEDURE — 82746 ASSAY OF FOLIC ACID SERUM: CPT

## 2023-02-10 PROCEDURE — 82607 VITAMIN B-12: CPT

## 2023-02-10 PROCEDURE — 84443 ASSAY THYROID STIM HORMONE: CPT

## 2023-02-10 PROCEDURE — 85025 COMPLETE CBC W/AUTO DIFF WBC: CPT

## 2023-02-10 PROCEDURE — 83036 HEMOGLOBIN GLYCOSYLATED A1C: CPT

## 2023-02-10 PROCEDURE — 99285 EMERGENCY DEPT VISIT HI MDM: CPT

## 2023-02-10 PROCEDURE — 71045 X-RAY EXAM CHEST 1 VIEW: CPT

## 2023-02-10 PROCEDURE — 84484 ASSAY OF TROPONIN QUANT: CPT

## 2023-02-10 PROCEDURE — 70450 CT HEAD/BRAIN W/O DYE: CPT

## 2023-02-10 PROCEDURE — 0202U NFCT DS 22 TRGT SARS-COV-2: CPT

## 2023-02-10 PROCEDURE — 80053 COMPREHEN METABOLIC PANEL: CPT

## 2023-02-10 PROCEDURE — 80061 LIPID PANEL: CPT

## 2023-02-10 PROCEDURE — 82550 ASSAY OF CK (CPK): CPT

## 2023-02-10 PROCEDURE — 36415 COLL VENOUS BLD VENIPUNCTURE: CPT

## 2023-02-10 PROCEDURE — 73560 X-RAY EXAM OF KNEE 1 OR 2: CPT

## 2023-02-10 PROCEDURE — 81003 URINALYSIS AUTO W/O SCOPE: CPT

## 2023-02-10 RX ORDER — ONDANSETRON 4 MG/1
4 TABLET, ORALLY DISINTEGRATING ORAL EVERY 8 HOURS PRN
Status: DISCONTINUED | OUTPATIENT
Start: 2023-02-10 | End: 2023-02-14 | Stop reason: HOSPADM

## 2023-02-10 RX ORDER — LORAZEPAM 2 MG/ML
1 INJECTION INTRAMUSCULAR EVERY 5 MIN PRN
Status: DISCONTINUED | OUTPATIENT
Start: 2023-02-10 | End: 2023-02-14 | Stop reason: HOSPADM

## 2023-02-10 RX ORDER — SODIUM CHLORIDE 0.9 % (FLUSH) 0.9 %
5-40 SYRINGE (ML) INJECTION PRN
Status: DISCONTINUED | OUTPATIENT
Start: 2023-02-10 | End: 2023-02-14 | Stop reason: HOSPADM

## 2023-02-10 RX ORDER — SODIUM CHLORIDE 9 MG/ML
INJECTION, SOLUTION INTRAVENOUS PRN
Status: DISCONTINUED | OUTPATIENT
Start: 2023-02-10 | End: 2023-02-14 | Stop reason: HOSPADM

## 2023-02-10 RX ORDER — ACETAMINOPHEN 325 MG/1
650 TABLET ORAL EVERY 4 HOURS PRN
Status: DISCONTINUED | OUTPATIENT
Start: 2023-02-10 | End: 2023-02-14 | Stop reason: HOSPADM

## 2023-02-10 RX ORDER — POTASSIUM CHLORIDE 750 MG/1
TABLET, FILM COATED, EXTENDED RELEASE ORAL
Status: ON HOLD | COMMUNITY
Start: 2020-08-27 | End: 2023-02-14 | Stop reason: HOSPADM

## 2023-02-10 RX ORDER — LISINOPRIL 20 MG/1
TABLET ORAL DAILY
COMMUNITY
Start: 2020-06-29

## 2023-02-10 RX ORDER — ENOXAPARIN SODIUM 100 MG/ML
30 INJECTION SUBCUTANEOUS DAILY
Status: DISCONTINUED | OUTPATIENT
Start: 2023-02-11 | End: 2023-02-14 | Stop reason: HOSPADM

## 2023-02-10 RX ORDER — ONDANSETRON 2 MG/ML
4 INJECTION INTRAMUSCULAR; INTRAVENOUS EVERY 6 HOURS PRN
Status: DISCONTINUED | OUTPATIENT
Start: 2023-02-10 | End: 2023-02-14 | Stop reason: HOSPADM

## 2023-02-10 RX ORDER — SODIUM CHLORIDE 0.9 % (FLUSH) 0.9 %
5-40 SYRINGE (ML) INJECTION EVERY 12 HOURS SCHEDULED
Status: DISCONTINUED | OUTPATIENT
Start: 2023-02-10 | End: 2023-02-14 | Stop reason: HOSPADM

## 2023-02-10 RX ORDER — AMLODIPINE BESYLATE 10 MG/1
10 TABLET ORAL DAILY
COMMUNITY

## 2023-02-10 RX ORDER — ZOLPIDEM TARTRATE 10 MG/1
TABLET ORAL NIGHTLY PRN
Status: ON HOLD | COMMUNITY
End: 2023-02-14 | Stop reason: HOSPADM

## 2023-02-10 RX ORDER — ACETAMINOPHEN 650 MG/1
650 SUPPOSITORY RECTAL EVERY 4 HOURS PRN
Status: DISCONTINUED | OUTPATIENT
Start: 2023-02-10 | End: 2023-02-14 | Stop reason: HOSPADM

## 2023-02-10 ASSESSMENT — ENCOUNTER SYMPTOMS
SHORTNESS OF BREATH: 0
VOMITING: 0
COUGH: 0
DIARRHEA: 1
BACK PAIN: 0
ABDOMINAL PAIN: 0
NAUSEA: 0

## 2023-02-10 NOTE — ED PROVIDER NOTES
[facility] EMERGENCY DEPT  EMERGENCY DEPARTMENT ENCOUNTER      Pt Name: Gabe Oviedo  MRN: [id number]  [place] 1945  Date of evaluation: 2/10/2023  Provider: Saint Snowman, APRN - [place] [id number]       Chief Complaint   Patient presents with    Fatigue     Progressive over 2 days    Fall     Had a fall at home on Wednesday night, per son, patient laid in floor for approximately 6 hours          HISTORY OF PRESENT ILLNESS   (Location/Symptom, Timing/Onset, Context/Setting, Quality, Duration, Modifying Factors, Severity)  Note limiting factors. Gabe Oviedo is a 65 y.o. female who presents to the emergency department accompanied by her two adult sons. They provide most of the history. They were called around 5 am when patient's spouse found her on the ground. She must have fallen sometime between 11pm Wednesday night and 5 am Thursday morning. She cannot really recall the events of the fall. She does have a hx of CVA in past, which has limited her mobility some, but she is normally ambulatory throughout her home with the assistance of a cane. Since her fall, they have started using a wheelchair because she is so weak she hasn't been able to get up and down herself. No fever or chills. No cough or congestion. No chest pain or short of breath. No n/v or abd pain. She does have a CVA that comes 8-11 every morning to help her get up and started for the day and she noted that she has had some \"bathroom accidents\" with diarrhea the past 2 days, but none noted throughout the rest of the days. HPI    Nursing Notes were reviewed. REVIEW OF SYSTEMS    (2-9 systems for level 4, 10 or more for level 5)     Review of Systems   Constitutional:  Negative for chills and fever. HENT:  Negative for congestion. Respiratory:  Negative for cough and shortness of breath. Cardiovascular:  Negative for chest pain and palpitations. Gastrointestinal:  Positive for diarrhea.  Negative for abdominal pain, nausea and vomiting. Genitourinary:  Negative for dysuria, flank pain, frequency and urgency. Musculoskeletal:  Positive for joint swelling (left knee). Negative for back pain and neck pain. Neurological:  Negative for dizziness, syncope, weakness, light-headedness and headaches. Except as noted above the remainder of the review of systems was reviewed and negative. PAST MEDICAL HISTORY     Past Medical History:   Diagnosis Date    CAD (coronary artery disease)     Cerebral artery occlusion with cerebral infarction ([facility] [address]     Hypertension     Thyroid disease          SURGICAL HISTORY       Past Surgical History:   Procedure Laterality Date    BACK SURGERY      CARDIAC SURGERY           CURRENT MEDICATIONS       Previous Medications    AMLODIPINE (NORVASC) 10 MG TABLET    Take 10 mg by mouth daily    ASCORBIC ACID (VITAMIN C) 1000 MG TABLET    Take 1,000 mg by mouth daily    ASPIRIN 325 MG EC TABLET    TAKE 1 TABLET BY MOUTH EVERY DAY    ATORVASTATIN (LIPITOR) 80 MG TABLET    Take 1 tablet by mouth nightly    CARBIDOPA-LEVODOPA (SINEMET)  MG PER TABLET    TAKE 1 TABLET BY MOUTH TWICE DAILY    CILOSTAZOL (PLETAL) 100 MG TABLET    TAKE 1 TABLET BY MOUTH TWICE DAILY    CLONIDINE (CATAPRES) 0.1 MG TABLET        FOLIC ACID (FOLVITE) 1 MG TABLET    Take 1 tablet by mouth daily    LEVOTHYROXINE (SYNTHROID) 50 MCG TABLET    Take 1 tablet by mouth Daily    LISINOPRIL (PRINIVIL;ZESTRIL) 20 MG TABLET    Take by mouth daily    NITROGLYCERIN (NITROSTAT) 0.4 MG SL TABLET    up to max of 3 total doses. If no relief after 1 dose, call 911.     PANTOPRAZOLE (PROTONIX) 40 MG TABLET    Take 1 tablet by mouth every morning (before breakfast)    POTASSIUM CHLORIDE (KLOR-CON) 10 MEQ EXTENDED RELEASE TABLET    TK 1 T PO QOD WF    VITAMIN D, CHOLECALCIFEROL, 10 MCG (400 UNIT) CHEW    5000 units daily -ok for pharmacy to use whatever dose and number of pills to achieve 5000 units daily    ZOLPIDEM (AMBIEN) 10 MG TABLET    Take by mouth nightly as needed for Sleep. ALLERGIES     Patient has no known allergies. FAMILY HISTORY       Family History   Problem Relation Age of Onset    Asthma Mother     Heart Disease Mother     Asthma Father     Heart Attack Father     Heart Disease Father     Heart Attack Brother     Cancer Brother           SOCIAL HISTORY       Social History     Socioeconomic History    Marital status:      Spouse name: None    Number of children: None    Years of education: None    Highest education level: None   Occupational History    Occupation: retired   Tobacco Use    Smoking status: Never    Smokeless tobacco: Never   Vaping Use    Vaping Use: Never used   Substance and Sexual Activity    Alcohol use: Never    Drug use: Never       SCREENINGS         [place] Coma Scale  Eye Opening: Spontaneous  Best Verbal Response: Oriented  Best Motor Response: Obeys commands  [place] Coma Scale Score: 15                     CIWA Assessment  BP: 120/73  Heart Rate: 83                 PHYSICAL EXAM    (up to 7 for level 4, 8 or more for level 5)     ED Triage Vitals [02/10/23 1436]   BP Temp Temp src Heart Rate Resp SpO2 Height Weight   120/73 98.3 °F (36.8 °C) -- 83 18 93 % 5' 3\" (1.6 m) 110 lb (49.9 kg)       Physical Exam  Vitals reviewed. Constitutional:       General: She is not in acute distress. Appearance: Normal appearance. She is not ill-appearing, toxic-appearing or diaphoretic. HENT:      Head: Normocephalic and atraumatic. Right Ear: Tympanic membrane, ear canal and external ear normal.      Left Ear: Tympanic membrane, ear canal and external ear normal.      Nose: Nose normal.      Mouth/Throat:      Mouth: Mucous membranes are moist.      Pharynx: Oropharynx is clear. Eyes:      Extraocular Movements: Extraocular movements intact. Conjunctiva/sclera: Conjunctivae normal.      Pupils: Pupils are equal, round, and reactive to light.    Cardiovascular:      Rate and Rhythm: Normal rate and regular rhythm. Pulses: Normal pulses. Heart sounds: Normal heart sounds. Pulmonary:      Effort: Pulmonary effort is normal.      Breath sounds: Normal breath sounds. Abdominal:      General: Bowel sounds are normal. There is no distension. Palpations: Abdomen is soft. Tenderness: There is no abdominal tenderness. There is no right CVA tenderness, left CVA tenderness or guarding. Musculoskeletal:      Cervical back: Neck supple. No tenderness. Left knee: Swelling present. No deformity, effusion, erythema, ecchymosis, lacerations or bony tenderness. Normal range of motion. Tenderness present. Comments: Joint above and below normal. Distal NVI   Skin:     General: Skin is warm and dry. Capillary Refill: Capillary refill takes less than 2 seconds. Neurological:      General: No focal deficit present. Mental Status: She is alert and oriented to person, place, and time. Cranial Nerves: No cranial nerve deficit. Sensory: No sensory deficit. Motor: No weakness. Coordination: Coordination normal.       DIAGNOSTIC RESULTS     EKG: All EKG's are interpreted by the Emergency Department Physician who either signs or Co-signs this chart in the absence of a cardiologist.        RADIOLOGY:   Non-plain film images such as CT, Ultrasound and MRI are read by the radiologist. Plain radiographic images are visualized and preliminarily interpreted by the emergency physician with the below findings:        Interpretation per the Radiologist below, if available at the time of this note:    CT Head W/O Contrast   Final Result   No acute intracranial process evident. Age-related involutional changes and changes of chronic microvascular ischemia. Calcified falcine meningioma measuring 2.1 x 2.1 x 2.5 cm. XR CHEST PORTABLE   Final Result   No radiographic evidence of acute cardiopulmonary process.       XR KNEE LEFT (1-2 VIEWS)   Final Result   No radiographic evidence of acute fracture or dislocation. Osteopenia. Advanced   patellofemoral osteophytic changes. Moderate left suprapatellar effusion. ED BEDSIDE ULTRASOUND:   Performed by ED Physician - none    LABS:  Labs Reviewed   URINALYSIS WITH REFLEX TO CULTURE - Abnormal; Notable for the following components:       Result Value    Clarity, UA CLOUDY (*)     Ketones, Urine TRACE (*)     Protein, UA TRACE (*)     All other components within normal limits   CBC WITH AUTO DIFFERENTIAL - Abnormal; Notable for the following components:    RBC 4.09 (*)     MCHC 31.5 (*)     Neutrophils % 73.0 (*)     Lymphocytes % 17.3 (*)     All other components within normal limits    Narrative:     RECOLLECT   RESPIRATORY PANEL, MOLECULAR, WITH COVID-19   COMPREHENSIVE METABOLIC PANEL   TROPONIN   CK   SPECIMEN REJECTION       All other labs were within normal range or not returned as of this dictation. EMERGENCY DEPARTMENT COURSE and DIFFERENTIAL DIAGNOSIS/MDM:   Vitals:    Vitals:    02/10/23 1436   BP: 120/73   Pulse: 83   Resp: 18   Temp: 98.3 °F (36.8 °C)   SpO2: 93%   Weight: 110 lb (49.9 kg)   Height: 5' 3\" (1.6 m)           Medical Decision Making  Amount and/or Complexity of Data Reviewed  Labs: ordered. Radiology: ordered. ECG/medicine tests: ordered. REASSESSMENT     ED Course as of 02/10/23 1914   Fernando Villa Feb 10, 2023   1910 Signed out to [place] at my shift end for final disposition. Currently pending urine and respiratory panel.  [BW]      ED Course User Index  [BW] Kit 12, APRN - CNP         CRITICAL CARE TIME       CONSULTS:  None    PROCEDURES:  Unless otherwise noted below, none     Procedures         FINAL IMPRESSION      1. Generalized weakness          DISPOSITION/PLAN   DISPOSITION        PATIENT REFERRED TO:  No follow-up provider specified.     DISCHARGE MEDICATIONS:  New Prescriptions    No medications on file     Controlled Substances Monitoring:     No flowsheet data found.    (Please note that portions of this note were completed with a voice recognition program.  Efforts were made to edit the dictations but occasionally words are mis-transcribed.)    PEDRO Beard CNP (electronically signed)  Attending Emergency Physician          PEDRO Beard CNP  02/10/23 1914

## 2023-02-10 NOTE — LETTER
Janny Combs,  at [facility]  [phone] phone  [phone] fax  [phone] CELL ([address])        Janny Combs  at [facility]  [phone] phone  [phone] fax  [phone] CELL ([address])

## 2023-02-11 PROBLEM — R41.3 MEMORY CHANGES: Status: ACTIVE | Noted: 2023-02-11

## 2023-02-11 PROBLEM — R53.83 FATIGUE: Status: ACTIVE | Noted: 2023-02-10

## 2023-02-11 PROBLEM — R41.3 MEMORY CHANGES: Status: ACTIVE | Noted: 2023-02-10

## 2023-02-11 PROBLEM — R53.83 FATIGUE: Status: ACTIVE | Noted: 2023-02-11

## 2023-02-11 LAB
ANION GAP SERPL CALCULATED.3IONS-SCNC: 12 MMOL/L (ref 7–19)
BASOPHILS ABSOLUTE: 0 K/UL (ref 0–0.2)
BASOPHILS RELATIVE PERCENT: 0.4 % (ref 0–1)
BUN BLDV-MCNC: 23 MG/DL (ref 8–23)
CALCIUM SERPL-MCNC: 8.8 MG/DL (ref 8.8–10.2)
CHLORIDE BLD-SCNC: 106 MMOL/L (ref 98–111)
CHOLESTEROL, TOTAL: 158 MG/DL (ref 160–199)
CO2: 21 MMOL/L (ref 22–29)
CREAT SERPL-MCNC: 0.8 MG/DL (ref 0.5–0.9)
EOSINOPHILS ABSOLUTE: 0.1 K/UL (ref 0–0.6)
EOSINOPHILS RELATIVE PERCENT: 2.6 % (ref 0–5)
FOLATE: >20 NG/ML (ref 4.8–37.3)
GFR SERPL CREATININE-BSD FRML MDRD: >60 ML/MIN/{1.73_M2}
GLUCOSE BLD-MCNC: 108 MG/DL (ref 74–109)
HBA1C MFR BLD: 5.3 % (ref 4–6)
HCT VFR BLD CALC: 36.1 % (ref 37–47)
HDLC SERPL-MCNC: 60 MG/DL (ref 65–121)
HEMOGLOBIN: 11.9 G/DL (ref 12–16)
IMMATURE GRANULOCYTES #: 0 K/UL
LDL CHOLESTEROL CALCULATED: 86 MG/DL
LYMPHOCYTES ABSOLUTE: 1.2 K/UL (ref 1.1–4.5)
LYMPHOCYTES RELATIVE PERCENT: 21.9 % (ref 20–40)
MAGNESIUM: 2.2 MG/DL (ref 1.6–2.4)
MCH RBC QN AUTO: 30.3 PG (ref 27–31)
MCHC RBC AUTO-ENTMCNC: 33 G/DL (ref 33–37)
MCV RBC AUTO: 91.9 FL (ref 81–99)
MONOCYTES ABSOLUTE: 0.5 K/UL (ref 0–0.9)
MONOCYTES RELATIVE PERCENT: 9.3 % (ref 0–10)
NEUTROPHILS ABSOLUTE: 3.5 K/UL (ref 1.5–7.5)
NEUTROPHILS RELATIVE PERCENT: 65.6 % (ref 50–65)
PDW BLD-RTO: 13.4 % (ref 11.5–14.5)
PLATELET # BLD: 183 K/UL (ref 130–400)
PMV BLD AUTO: 10.6 FL (ref 9.4–12.3)
POTASSIUM REFLEX MAGNESIUM: 3.2 MMOL/L (ref 3.5–5)
RBC # BLD: 3.93 M/UL (ref 4.2–5.4)
SODIUM BLD-SCNC: 139 MMOL/L (ref 136–145)
TRIGL SERPL-MCNC: 59 MG/DL (ref 0–149)
TSH REFLEX FT4: 1.13 UIU/ML (ref 0.35–5.5)
VITAMIN B-12: 324 PG/ML (ref 211–946)
WBC # BLD: 5.3 K/UL (ref 4.8–10.8)

## 2023-02-11 PROCEDURE — 6370000000 HC RX 637 (ALT 250 FOR IP)

## 2023-02-11 PROCEDURE — 36415 COLL VENOUS BLD VENIPUNCTURE: CPT

## 2023-02-11 PROCEDURE — 99223 1ST HOSP IP/OBS HIGH 75: CPT | Performed by: PSYCHIATRY & NEUROLOGY

## 2023-02-11 PROCEDURE — 92523 SPEECH SOUND LANG COMPREHEN: CPT

## 2023-02-11 PROCEDURE — 97161 PT EVAL LOW COMPLEX 20 MIN: CPT

## 2023-02-11 PROCEDURE — G0378 HOSPITAL OBSERVATION PER HR: HCPCS

## 2023-02-11 PROCEDURE — 97530 THERAPEUTIC ACTIVITIES: CPT

## 2023-02-11 PROCEDURE — 92610 EVALUATE SWALLOWING FUNCTION: CPT

## 2023-02-11 PROCEDURE — 6370000000 HC RX 637 (ALT 250 FOR IP): Performed by: HOSPITALIST

## 2023-02-11 PROCEDURE — 2580000003 HC RX 258: Performed by: HOSPITALIST

## 2023-02-11 PROCEDURE — 80048 BASIC METABOLIC PNL TOTAL CA: CPT

## 2023-02-11 PROCEDURE — 6360000002 HC RX W HCPCS: Performed by: HOSPITALIST

## 2023-02-11 PROCEDURE — 85025 COMPLETE CBC W/AUTO DIFF WBC: CPT

## 2023-02-11 PROCEDURE — 1210000000 HC MED SURG R&B

## 2023-02-11 PROCEDURE — 83735 ASSAY OF MAGNESIUM: CPT

## 2023-02-11 PROCEDURE — 94760 N-INVAS EAR/PLS OXIMETRY 1: CPT

## 2023-02-11 PROCEDURE — 97116 GAIT TRAINING THERAPY: CPT

## 2023-02-11 RX ORDER — CALCIUM CARBONATE 200(500)MG
500 TABLET,CHEWABLE ORAL 3 TIMES DAILY PRN
Status: DISCONTINUED | OUTPATIENT
Start: 2023-02-11 | End: 2023-02-14 | Stop reason: HOSPADM

## 2023-02-11 RX ORDER — POLYETHYLENE GLYCOL 3350 17 G/17G
17 POWDER, FOR SOLUTION ORAL DAILY PRN
Status: DISCONTINUED | OUTPATIENT
Start: 2023-02-11 | End: 2023-02-14 | Stop reason: HOSPADM

## 2023-02-11 RX ORDER — MECOBALAMIN 5000 MCG
10 TABLET,DISINTEGRATING ORAL NIGHTLY PRN
Status: DISCONTINUED | OUTPATIENT
Start: 2023-02-11 | End: 2023-02-14 | Stop reason: HOSPADM

## 2023-02-11 RX ORDER — CILOSTAZOL 100 MG/1
100 TABLET ORAL 2 TIMES DAILY
Status: DISCONTINUED | OUTPATIENT
Start: 2023-02-11 | End: 2023-02-14 | Stop reason: HOSPADM

## 2023-02-11 RX ORDER — ATORVASTATIN CALCIUM 80 MG/1
80 TABLET, FILM COATED ORAL NIGHTLY
Status: DISCONTINUED | OUTPATIENT
Start: 2023-02-11 | End: 2023-02-14 | Stop reason: HOSPADM

## 2023-02-11 RX ORDER — FOLIC ACID 1 MG/1
1 TABLET ORAL DAILY
Status: DISCONTINUED | OUTPATIENT
Start: 2023-02-11 | End: 2023-02-14 | Stop reason: HOSPADM

## 2023-02-11 RX ORDER — POTASSIUM CHLORIDE 20 MEQ/1
40 TABLET, EXTENDED RELEASE ORAL PRN
Status: DISCONTINUED | OUTPATIENT
Start: 2023-02-11 | End: 2023-02-14 | Stop reason: HOSPADM

## 2023-02-11 RX ORDER — MECOBALAMIN 5000 MCG
10 TABLET,DISINTEGRATING ORAL NIGHTLY
Status: DISCONTINUED | OUTPATIENT
Start: 2023-02-11 | End: 2023-02-14 | Stop reason: HOSPADM

## 2023-02-11 RX ORDER — PANTOPRAZOLE SODIUM 40 MG/1
40 TABLET, DELAYED RELEASE ORAL
Status: DISCONTINUED | OUTPATIENT
Start: 2023-02-11 | End: 2023-02-14 | Stop reason: HOSPADM

## 2023-02-11 RX ORDER — POTASSIUM CHLORIDE 7.45 MG/ML
10 INJECTION INTRAVENOUS PRN
Status: DISCONTINUED | OUTPATIENT
Start: 2023-02-11 | End: 2023-02-14 | Stop reason: HOSPADM

## 2023-02-11 RX ORDER — NITROGLYCERIN 0.4 MG/1
0.4 TABLET SUBLINGUAL EVERY 5 MIN PRN
Status: DISCONTINUED | OUTPATIENT
Start: 2023-02-11 | End: 2023-02-14 | Stop reason: HOSPADM

## 2023-02-11 RX ORDER — ASCORBIC ACID 500 MG
1000 TABLET ORAL DAILY
Status: DISCONTINUED | OUTPATIENT
Start: 2023-02-11 | End: 2023-02-14 | Stop reason: HOSPADM

## 2023-02-11 RX ORDER — LABETALOL HYDROCHLORIDE 5 MG/ML
10 INJECTION, SOLUTION INTRAVENOUS EVERY 10 MIN PRN
Status: DISCONTINUED | OUTPATIENT
Start: 2023-02-11 | End: 2023-02-14 | Stop reason: HOSPADM

## 2023-02-11 RX ORDER — LEVOTHYROXINE SODIUM 0.05 MG/1
50 TABLET ORAL
Status: DISCONTINUED | OUTPATIENT
Start: 2023-02-11 | End: 2023-02-14 | Stop reason: HOSPADM

## 2023-02-11 RX ADMIN — SODIUM CHLORIDE, PRESERVATIVE FREE 10 ML: 5 INJECTION INTRAVENOUS at 03:13

## 2023-02-11 RX ADMIN — LEVOTHYROXINE SODIUM 50 MCG: 50 TABLET ORAL at 05:44

## 2023-02-11 RX ADMIN — Medication 10 MG: at 20:21

## 2023-02-11 RX ADMIN — Medication 10 MG: at 03:10

## 2023-02-11 RX ADMIN — CILOSTAZOL 100 MG: 100 TABLET ORAL at 20:21

## 2023-02-11 RX ADMIN — ENOXAPARIN SODIUM 30 MG: 100 INJECTION SUBCUTANEOUS at 08:41

## 2023-02-11 RX ADMIN — ATORVASTATIN CALCIUM 80 MG: 80 TABLET, FILM COATED ORAL at 20:21

## 2023-02-11 RX ADMIN — ASPIRIN 325 MG: 325 TABLET, COATED ORAL at 08:38

## 2023-02-11 RX ADMIN — PANTOPRAZOLE SODIUM 40 MG: 40 TABLET, DELAYED RELEASE ORAL at 05:44

## 2023-02-11 RX ADMIN — OXYCODONE HYDROCHLORIDE AND ACETAMINOPHEN 1000 MG: 500 TABLET ORAL at 08:40

## 2023-02-11 RX ADMIN — CARBIDOPA AND LEVODOPA 1 TABLET: 25; 100 TABLET ORAL at 20:21

## 2023-02-11 RX ADMIN — SODIUM CHLORIDE, PRESERVATIVE FREE 10 ML: 5 INJECTION INTRAVENOUS at 20:21

## 2023-02-11 RX ADMIN — CILOSTAZOL 100 MG: 100 TABLET ORAL at 08:40

## 2023-02-11 RX ADMIN — Medication 5000 UNITS: at 08:40

## 2023-02-11 RX ADMIN — POTASSIUM CHLORIDE 40 MEQ: 1500 TABLET, EXTENDED RELEASE ORAL at 08:38

## 2023-02-11 RX ADMIN — ATORVASTATIN CALCIUM 80 MG: 80 TABLET, FILM COATED ORAL at 03:10

## 2023-02-11 RX ADMIN — CARBIDOPA AND LEVODOPA 1 TABLET: 25; 100 TABLET ORAL at 08:40

## 2023-02-11 RX ADMIN — SODIUM CHLORIDE, PRESERVATIVE FREE 10 ML: 5 INJECTION INTRAVENOUS at 08:41

## 2023-02-11 RX ADMIN — FOLIC ACID 1 MG: 1 TABLET ORAL at 08:40

## 2023-02-11 ASSESSMENT — PAIN SCALES - GENERAL
PAINLEVEL_OUTOF10: 3
PAINLEVEL_OUTOF10: 0

## 2023-02-11 ASSESSMENT — PAIN DESCRIPTION - DESCRIPTORS: DESCRIPTORS: ACHING

## 2023-02-11 ASSESSMENT — ENCOUNTER SYMPTOMS
VOMITING: 0
DIARRHEA: 0
NAUSEA: 0
PHOTOPHOBIA: 0
SHORTNESS OF BREATH: 0
BACK PAIN: 0
GASTROINTESTINAL NEGATIVE: 1
COUGH: 0
ABDOMINAL PAIN: 0
RESPIRATORY NEGATIVE: 1
DIARRHEA: 1
BACK PAIN: 1

## 2023-02-11 ASSESSMENT — PAIN DESCRIPTION - ORIENTATION: ORIENTATION: LEFT

## 2023-02-11 ASSESSMENT — PAIN DESCRIPTION - LOCATION: LOCATION: KNEE

## 2023-02-11 NOTE — ED NOTES
Report called to Nahomi Wheeler RN. PT to go to [phone] after Dr. Lexi Che finishes his assessment.       iBnta Vásquez RN  02/11/23 2612

## 2023-02-11 NOTE — CONSULTS
[facility]  [address] [place] [address] Drive, [address]  [place], [address]  Phone [phone]     Neurology Consultation     Date of Admission: 2/10/2023  3:32 PM  Date of Consultation: 23    Attending Provider: Katya Staton MD  Consulting Provider: Catrachito Hannah M.D. Patient: Radha Garza  :  1945  Age:  65 y.o. MRN:  [id number]    CHIEF COMPLAINT:  Weakness    History Source: History obtained from chart review and the patient. PCP: PEDRO Ochoa    HISTORY OF PRESENT ILLNESS:   Radha Garza is a 65y.o. year old woman with a history of strokes who was admitted with weakness after a fall. She has had several small strokes over the years. She also has chronic back pain. Her gait and balance have deteriorated over the years. She has had some mental decline as well. At her recent baseline, she is able to ambulate a short distance with a wheeled walker. She has been independent with personal care. One of her sons lives next door and does help her and his father with care when needed. She fell Wednesday night and could not get up. Her  had already gone to bed and did not discoer her on the bathroom floor for about 6 hours. He required the help of his son to get her up to a chair. She could not get up even with assistance on Thursday and she was brought to the ER on Friday and admitted. She is generally weak. She is not very interactive. She will answer when asked a question but otherwise talks none.       PAST MEDICAL HISTORY:    Medical History:      Diagnosis Date    CAD (coronary artery disease)     Cerebral artery occlusion with cerebral infarction ([facility] [address]     ,  (worst)    Hypertension     Thyroid disease     TIA (transient ischemic attack)        Surgical History:      Procedure Laterality Date    APPENDECTOMY      taken out while in [place]    BACK SURGERY      Lower Back midline, was a disk surgery    BREAST REDUCTION SURGERY      was done for back reasons    CARDIAC SURGERY      ~2013       Medications Prior to Admission:    Prior to Admission medications    Medication Sig Start Date End Date Taking? Authorizing Provider   zolpidem (AMBIEN) 10 MG tablet Take by mouth nightly as needed for Sleep. Yes Historical Provider, MD   lisinopril (PRINIVIL;ZESTRIL) 20 MG tablet Take by mouth daily 6/29/20  Yes Historical Provider, MD   potassium chloride (KLOR-CON) 10 MEQ extended release tablet TK 1 T PO QOD WF 8/27/20  Yes Historical Provider, MD   amLODIPine (NORVASC) 10 MG tablet Take 10 mg by mouth daily   Yes Historical Provider, MD   cilostazol (PLETAL) 100 MG tablet TAKE 1 TABLET BY MOUTH TWICE DAILY 11/24/20   Brant Dinh MD   aspirin 325 MG EC tablet TAKE 1 TABLET BY MOUTH EVERY DAY 8/28/20   Brant Dinh MD   carbidopa-levodopa (SINEMET)  MG per tablet TAKE 1 TABLET BY MOUTH TWICE DAILY 6/16/20   Brant Dinh MD   cloNIDine (CATAPRES) 0.1 MG tablet  12/12/19   Historical Provider, MD   atorvastatin (LIPITOR) 80 MG tablet Take 1 tablet by mouth nightly 11/29/19   Renetta Jonas MD   levothyroxine (SYNTHROID) 50 MCG tablet Take 1 tablet by mouth Daily 11/30/19   Renetta Jonas MD   pantoprazole (PROTONIX) 40 MG tablet Take 1 tablet by mouth every morning (before breakfast) 11/30/19   Renetta Jonas MD   nitroGLYCERIN (NITROSTAT) 0.4 MG SL tablet up to max of 3 total doses.  If no relief after 1 dose, call 911. 11/6/19   Brant Dinh MD   folic acid (FOLVITE) 1 MG tablet Take 1 tablet by mouth daily 11/6/19   Brant Dinh MD   Vitamin D, Cholecalciferol, 10 MCG (400 UNIT) CHEW 5000 units daily -ok for pharmacy to use whatever dose and number of pills to achieve 5000 units daily 11/6/19   Brant Dinh MD   Ascorbic Acid (VITAMIN C) 1000 MG tablet Take 1,000 mg by mouth daily    Historical Provider, MD       Current Medications:  Current Facility-Administered Medications: ascorbic acid (VITAMIN C) tablet 1,000 mg, 1,000 mg, Oral, Daily  aspirin EC tablet 325 mg, 325 mg, Oral, Daily  atorvastatin (LIPITOR) tablet 80 mg, 80 mg, Oral, Nightly  carbidopa-levodopa (SINEMET)  MG per tablet 1 tablet, 1 tablet, Oral, BID  cilostazol (PLETAL) tablet 100 mg, 100 mg, Oral, BID  folic acid (FOLVITE) tablet 1 mg, 1 mg, Oral, Daily  levothyroxine (SYNTHROID) tablet 50 mcg, 50 mcg, Oral, QAM AC  vitamin D (CHOLECALCIFEROL) capsule 5,000 Units, 5,000 Units, Oral, Daily  melatonin disintegrating tablet 10 mg, 10 mg, Oral, Nightly  melatonin disintegrating tablet 10 mg, 10 mg, Oral, Nightly PRN  polyethylene glycol (GLYCOLAX) packet 17 g, 17 g, Oral, Daily PRN  calcium carbonate (TUMS) chewable tablet 500 mg, 500 mg, Oral, TID PRN  pantoprazole (PROTONIX) tablet 40 mg, 40 mg, Oral, QAM AC  nitroGLYCERIN (NITROSTAT) SL tablet 0.4 mg, 0.4 mg, SubLINGual, Q5 Min PRN  labetalol (NORMODYNE;TRANDATE) injection 10 mg, 10 mg, IntraVENous, Q10 Min PRN  potassium chloride (KLOR-CON M) extended release tablet 40 mEq, 40 mEq, Oral, PRN **OR** potassium bicarb-citric acid (EFFER-K) effervescent tablet 40 mEq, 40 mEq, Oral, PRN **OR** potassium chloride 10 mEq/100 mL IVPB (Peripheral Line), 10 mEq, IntraVENous, PRN  sodium chloride flush 0.9 % injection 5-40 mL, 5-40 mL, IntraVENous, 2 times per day  sodium chloride flush 0.9 % injection 5-40 mL, 5-40 mL, IntraVENous, PRN  0.9 % sodium chloride infusion, , IntraVENous, PRN  LORazepam (ATIVAN) injection 1 mg, 1 mg, IntraVENous, Q5 Min PRN  enoxaparin Sodium (LOVENOX) injection 30 mg, 30 mg, SubCUTAneous, Daily  ondansetron (ZOFRAN-ODT) disintegrating tablet 4 mg, 4 mg, Oral, Q8H PRN **OR** ondansetron (ZOFRAN) injection 4 mg, 4 mg, IntraVENous, Q6H PRN  acetaminophen (TYLENOL) tablet 650 mg, 650 mg, Oral, Q4H PRN **OR** acetaminophen (TYLENOL) suppository 650 mg, 650 mg, Rectal, Q4H PRN    Allergies:  Patient has no known allergies. Habits:  TOBACCO:   reports that she has never smoked.  She has never used smokeless tobacco.  ETOH:   reports current alcohol use. DRUGS:    Social History     Substance and Sexual Activity   Drug Use Never       SOCIAL HISTORY:   Fanny Thacker is , lives in [place], [place] with her  , and is retired. FAMILY HISTORY:       Problem Relation Age of Onset    Asthma Mother     Heart Disease Mother     Asthma Father     Heart Attack Father     Heart Disease Father     No Known Problems Sister     No Known Problems Sister     No Known Problems Sister     Heart Attack Brother     Cancer Brother         thyroid    Thyroid Disease Brother     No Known Problems Brother     Hypothyroidism Son     No Known Problems Son     Gout Son     No Known Problems Son     No Known Problems Son     No Known Problems Son        REVIEW OF SYSTEMS:  Review of Systems   Constitutional:  Negative for chills and fever. HENT:  Positive for hearing loss. Negative for tinnitus. Eyes:  Negative for photophobia and visual disturbance. Respiratory:  Negative for cough and shortness of breath. Cardiovascular:  Negative for chest pain and palpitations. Gastrointestinal:  Negative for nausea and vomiting. Endocrine: Negative for polydipsia and polyuria. Genitourinary:  Positive for frequency and urgency. Musculoskeletal:  Positive for arthralgias and back pain. Skin:  Negative for rash and wound. Allergic/Immunologic: Negative for environmental allergies and food allergies. Neurological:  Positive for weakness and numbness. Negative for headaches. Hematological:  Negative for adenopathy. Does not bruise/bleed easily. Psychiatric/Behavioral:  Negative for dysphoric mood. The patient is not nervous/anxious.       PHYSICAL EXAMINATION:  Vitals: /82   Pulse 77   Temp 97.7 °F (36.5 °C) (Temporal)   Resp 16   Ht 5' 3\" (1.6 m)   Wt 110 lb (49.9 kg)   SpO2 97%   BMI 19.49 kg/m²   General appearance:  She is an elderly thin woman who is cooperative and in no distress but does have an odd affect  Skin: Skin color, texture, turgor normal. No rashes or lesions  HEENT: Head: Normal, normocephalic, atraumatic. Neck:no adenopathy, no carotid bruit, no JVD, supple, symmetrical, trachea midline, and thyroid not enlarged, symmetric, no tenderness/mass/nodules  Lungs: clear to auscultation bilaterally  Heart: regular rate and rhythm, S1, S2 normal, no murmur, click, rub or gallop  Extremities: extremities normal, atraumatic, no cyanosis or edema      NEUROLOGIC EXAMINATION:  Neurologic Exam     Mental Status   Oriented to person, place, and time. Speech: speech is normal   Level of consciousness: alert  Able to name object. Able to repeat. Speech is fluent. She is oriented X 3  She has no spontaneous talk or elaboration when asked a question. Cranial Nerves     CN II   Visual fields full to confrontation. CN III, IV, VI   Pupils are equal, round, and reactive to light.   Extraocular motions are normal.     CN VII   Right facial weakness: none  Left facial weakness: none    CN VIII   Hearing: intact    CN IX, X   Palate: symmetric    CN XI   Right sternocleidomastoid strength: normal  Left sternocleidomastoid strength: weak  Right trapezius strength: normal  Left trapezius strength: weak    CN XII   Tongue: not atrophic  Fasciculations: absent  Tongue deviation: none    Motor Exam   Muscle bulk: normal  Overall muscle tone: normal  Right arm pronator drift: absent  Left arm pronator drift: absent    Strength   Right neck flexion: 5/5  Left neck flexion: 5/5  Right neck extension: 5/5  Left neck extension: 5/5  Right deltoid: 5/5  Left deltoid: 5/5  Right biceps: 5/5  Left biceps: 5/5  Right triceps: 5/5  Left triceps: 5/5  Right wrist flexion: 5/5  Left wrist flexion: 5/5  Right wrist extension: 5/5  Left wrist extension: 5/5  Right interossei: 5/5  Left interossei: 5/5  Right iliopsoas: 4/5  Left iliopsoas: 4/5  Right quadriceps: 5/5  Left quadriceps: 5/5  Right glutei: 4/5  Left glutei: 4/5  Right anterior tibial: 5/5  Left anterior tibial: 5/5  Right posterior tibial: 5/5  Left posterior tibial: 5/5  Right peroneal: 5/5  Left peroneal: 5/5  Right gastroc: 5/5  Left gastroc: 5/5    Sensory Exam   Light touch normal.   Vibration normal.     Gait, Coordination, and Reflexes     Coordination   Finger to nose coordination: normal    Tremor   Resting tremor: absent  Intention tremor: absent  Action tremor: absent    Reflexes   Right brachioradialis: 1+  Left brachioradialis: 1+  Right biceps: 1+  Left biceps: 1+  Right triceps: 1+  Left triceps: 1+  Right patellar: 1+  Left patellar: 1+  Right achilles: 0  Left achilles: 0  Right plantar: normal  Left plantar: normal  Right Jose: absent  Left Jose: absent  Right ankle clonus: absent  Left ankle clonus: absent  Rapid alternating movements were normal.     ADDITIONAL REVIEW:  CBC:    Recent Labs     02/10/23  1818 02/11/23  0357   WBC 6.6 5.3   HGB 12.4 11.9*    183     BMP:     Recent Labs     02/10/23  1620 02/11/23  0357    139   K 4.1 3.2*    106   CO2 23 21*   BUN 23 23   CREATININE 0.9 0.8   GLUCOSE 100 108     Hepatic:  Recent Labs     02/10/23  1620   AST 24   ALT 10   BILITOT 0.9   ALKPHOS 84     Troponin T:    Recent Labs     02/10/23  1620   TROPONINI <0.01     Lipids:    Recent Labs     02/10/23  1620   CHOL 158*   HDL 60*     Narrative   Exam:   Noncontrast CT of the brain   Technique:   Axial noncontrast images were obtained from the skull base to the vertex. Coronal and sagittal reconstructions were performed. Radiation dose optimization   technique was utilized. Clinical information:   Weak, fall   Comparison:   Noncontrast CT of the brain performed on April 2, 2022   Findings:   No evidence of acute hemorrhage, intra-axial mass, mass effect, or midline   shift. Calcified falcine lesion is visualized at the vertex measuring 2.1 x 2.1   x 2.5 cm in maximal AP, transverse, and craniocaudal dimension is compatible with meningioma. No adjacent edema is visualized. The ventricular system and   basal cisterns are within normal limits for the patients age. Age-related   involutional changes are visualized. Bilateral periventricular white matter   hypodensities are visualized compatible with changes of chronic microvessel   ischemia. Gray-white differentiation is maintained. There is no evidence of an   intra-axial or extra-axial fluid collection. Bone windows reveal no evidence of acute fracture. The visualized paranasal   sinuses are within normal limits. The orbits are grossly unremarkable. Impression   No acute intracranial process evident. Age-related involutional changes and changes of chronic microvascular ischemia. Calcified falcine meningioma measuring 2.1 x 2.1 x 2.5 cm. CT Lumbar Spine Without Contrast    Anatomical Region Laterality Modality   L-spine -- Computed Tomography     Impression    . No fracture, no acute osseous lumbar spine abnormality. Compression fracture of T12 is described in detail in a separate report   today. Multilevel degenerative changes as described above. This report was finalized on 09/04/2022 18:02 by Dr. Karol Ruiz MD.  Narrative    EXAMINATION: [facility]- 9/4/2022 5:59 PM CDT     HISTORY: multiple falls, right sided pain     DOSE: 265 mGycm (Automatic exposure control technique was implemented in   an effort to keep the radiation dose as low as possible without   compromising image quality)     REPORT: Spiral CT of the lumbar spine was performed without contrast,   reconstructed coronal and sagittal images are also reviewed. COMPARISON: There are no correlative imaging studies for comparison. Sagittal images demonstrate diffuse osteopenia, normal lumbar vertebral   body alignment. There is an acute appearing compression fracture   involving the superior and inferior endplates of Q80, described in   detail in a separate report today.  No acute lumbar fracture is   identified. Mild hypertrophic endplate spurring is present at each   lumbar level. The disc intervals are preserved. No osseous spinal canal   stenosis is identified. Review of soft tissue windows straight is disc   bulging and disc osteophyte complex at L5-S1 with no focal disc   herniation. There is no high-grade neuroforaminal narrowing. There is   bilateral facet overgrowth at L5-S1. There is mild facet overgrowth at   L4-5, greater on the left. There is lobular bulging of the L4-5 disc,   asymmetric to the right. There is asymmetric edema and flavum thickening   and L4-5, greater on the left. There is broad-based bulging of the disc   at L3-4, asymmetric to the right. There is mild flattening of the   ventral thecal sac at L3-4, with no obvious focal disc herniation. Mild   bulging of the disc at L2-3 to the right lateral recess is noted. The   paraspinal soft tissues are within normal limits. There is heavy   calcified plaque within the abdominal aorta and its branches with normal   aortic caliber. There is mild ectasia of the iliac arteries. Procedure Note    Johnny Moe MD - 09/04/2022     CT Thoracic Spine Without Contrast    Anatomical Region Laterality Modality   T-spine -- Computed Tomography     Impression    . Acute appearing compression fracture of T12 involving the   superior and inferior endplates, with approximately 20% loss of   vertebral body height, the fracture appears confined to the anterior   column and appears stable. No retropulsion is identified and there is no   spinal canal stenosis. This report was finalized on 09/04/2022 17:56 by Dr. Baltazar Michelle MD.  Narrative    EXAMINATION: [address]- 9/4/2022 5:50 PM CDT     HISTORY: multiple falls, right sided pain.      DOSE: 393 mGycm (Automatic exposure control technique was implemented in   an effort to keep the radiation dose as low as possible without   compromising image quality)     REPORT: Spiral CT of the thoracic spine was performed without contrast,   reconstructed coronal and sagittal images are also reviewed. COMPARISON: Thoracic spine x-rays 04/27/2016. Sagittal images demonstrate diffuse osteopenia, there is an acute   appearing compression fracture involving the superior and inferior   endplates of R00, with an estimated 20% loss of vertebral body height,   this appears confined to the anterior column. No retropulsion is   identified and there is no significant osseous spinal canal stenosis. Mild degenerative endplate spurring is present at several contiguous   levels in the midthoracic spine. Review of soft tissue windows shows no   gross evidence of a traumatic thoracic disc herniation. The neural   foramina appear patent. The paraspinal soft tissues are within normal   limits. Procedure Note    Ibrahima Cheema MD - 09/04/2022     CT Cervical Spine Without Contrast    Anatomical Region Laterality Modality   C-spine -- Computed Tomography     Impression    No fracture, no acute osseous cervical spine abnormality. Degenerative changes, most advanced at C5-6 and C6-7. This report was finalized on 09/04/2022 17:50 by Dr. Raquel Larson MD.  Narrative    EXAMINATION: [address]- 9/4/2022 5:47 PM CDT     HISTORY: multiple falls, right sided cervical spine pain     DOSE: 293 mGycm (Automatic exposure control technique was implemented in   an effort to keep the radiation dose as low as possible without   compromising image quality)     REPORT: Spiral CT of the cervical spine was performed without contrast,   reconstructed coronal and sagittal images are also reviewed. COMPARISON: Cervical spine x-rays 08/21/2019. Sagittal images demonstrate normal alignment of the vertebral bodies,   there is diffuse osteopenia. Degenerative narrowing is present at C5-C6   and C6-7 with small endplate spurs. No fracture is identified.  The prevertebral soft tissues are within normal limits. There is mild to   moderate spinal stenosis at C5-C6. This is related to endplate spurs and   there is uncinate spurring and facet overgrowth at that level. There is   mild left-sided neuroforaminal narrowing at C6-7 related to uncinate   spurs and facet overgrowth. Soft tissue windows demonstrate no gross   evidence of a traumatic cervical disc herniation. The airway is patent. The visualized lungs are clear. The thyroid gland appears homogeneous. Procedure Note    Larisa Chau MD - 09/04/2022     B12, Folate, TSH were all normal.    IMPRESSION:  1. Chronic progressive gait impairment with recurrent falls  2. Cognitive impairment  3. Meningioma. This appears unchanged from previous CTs and MRI  Her course, history, symptoms, examination are all strongly suggestive of dementia which is the most common cause of gait deterioration and falls in the elderly. It is possible she could have vascular dementia and a recent stroke. PLAN:  1. MRI head  2. PT/OT. She will likely needs subacute rehab at a SNF. I discussed the above with one of her sons    Gladys Atkinson M.D. I spent 75 total minutes in face to face interaction with patient and coordination of care.    I spent > 50% of the total time discussing the diagnoses, evaluation, test results, treatment options, plans; counseling and advising with the patient and/or family and/or caregiver as well as with the care team.    Electronically signed by Gladys Atkinson M.D.

## 2023-02-11 NOTE — CARE COORDINATION
SW met with Pt. At beside along with her son discussed IMM letter, answered all questions and provided a copy to the Pt. And signed copy filed in  Pt. Soft chart.     02/11/23 0409   IMM Letter   IMM Letter given to Patient/Family/Significant other/Guardian/POA/by: Naomia Lefort   IMM Letter date given: 02/11/23   IMM Letter time given: [id number]

## 2023-02-11 NOTE — ED NOTES
PT passed swallow screening.  Per Dr. Terressa Duverney, pt was given a dinner tray      Italia Tuttle RN  02/11/23 8459

## 2023-02-11 NOTE — PROGRESS NOTES
Priscila Branham arrived to room # 511. Presented with: generalized weakness and memory changes  Mental Status: Patient is oriented, alert, coherent, logical, thought processes intact, and able to concentrate and follow conversation. Vitals:    02/10/23 2115   BP: 116/81   Pulse: 89   Resp: 19   Temp:    SpO2: 96%     Patient safety contract and falls prevention contract reviewed with patient Yes. Oriented Patient to room. Call light within reach. Yes.   Needs, issues or concerns expressed at this time: no.      Electronically signed by Rich Lockhart RN on 2/11/2023 at 3:00 AM

## 2023-02-11 NOTE — PROGRESS NOTES
Physical Therapy  Facility/Department: [facility] SURG SERVICES  Physical Therapy Initial Assessment    Name: Kelsy Avilez  : 1945  MRN: [id number]  Date of Service: 2023    Discharge Recommendations:  Continue to assess pending progress (24 HR CARE vs SHORT TERM REHAB)          Patient Diagnosis(es): The primary encounter diagnosis was Generalized weakness. A diagnosis of Altered mental status, unspecified altered mental status type was also pertinent to this visit.  Past Medical History:  has a past medical history of CAD (coronary artery disease), Cerebral artery occlusion with cerebral infarction (HCC), Hypertension, Thyroid disease, and TIA (transient ischemic attack).  Past Surgical History:  has a past surgical history that includes Cardiac surgery; back surgery (); Appendectomy; and Breast reduction surgery.    Assessment   Body Structures, Functions, Activity Limitations Requiring Skilled Therapeutic Intervention: Decreased functional mobility ;Decreased ADL status;Decreased strength;Decreased balance  Assessment: Pt ABLE TO AMB TO BR WITH ASSIST THEN UP TO CHAIR. WILL PROGRESS AS TOLERATED.  Requires PT Follow-Up: Yes  Activity Tolerance  Activity Tolerance: Patient tolerated evaluation without incident     Plan   Physcial Therapy Plan  General Plan: 5-7 times per week  Current Treatment Recommendations: Strengthening, Balance training, Transfer training, Functional mobility training, Gait training, Safety education & training, Patient/Caregiver education & training  Safety Devices  Type of Devices: Call light within reach, Nurse notified     Restrictions  Restrictions/Precautions  Restrictions/Precautions: Fall Risk     Subjective   Pain: DENIES  General  Diagnosis: GENERALIZED WEAKNESS, HYPOKALEMIA  Subjective  Subjective: Pt WILLING TO PARTICIPATE. NEEDING TO WALK TO BR.         Social/Functional History  Social/Functional History  Lives With: Spouse (ALSO HAS A CNA EACH DAY FOR A FEW HOURS IN  THE HOME)  Type of Home: House  Bathroom Shower/Tub: Walk-in shower  Bathroom Equipment: Shower chair  Home Equipment: Rollator, Electric scooter, [address]  Has the patient had two or more falls in the past year or any fall with injury in the past year?: No  Receives Help From: Personal care attendant  ADL Assistance: Independent  Homemaking Assistance: Needs assistance  Homemaking Responsibilities: No  Ambulation Assistance: Independent (RW)  Transfer Assistance: Independent  Additional Comments: USED RW HOUSEHOLD DISTANCES AND ELECTRIC SCOOTER AS NEEDED, DECLINE IN LAST WEEK  Vision/Hearing  Vision  Vision: Within Functional Limits  Hearing  Hearing: Within functional limits    Cognition   Orientation  Overall Orientation Status: Within Functional Limits  Cognition  Overall Cognitive Status: WFL  Cognition Comment: ALERT, FOLLOWS COMMANDS, SOMEWHAT SLOW RESPONSES AT TIMES     Objective   Heart Rate: 77  Heart Rate Source: Monitor  BP: 127/82  BP Location: Left upper arm  Patient Position: Lying right side  MAP (Calculated): 97  Resp: 16  SpO2: 97 %  O2 Device: None (Room air)     Observation/Palpation  Posture: Fair  Gross Assessment  AROM: Generally decreased, functional  Strength: Generally decreased, functional                    Bed mobility  Supine to Sit: Minimal assistance  Transfers  Sit to Stand: Minimal Assistance; Moderate Assistance (WEIGHT ON HEELS INITIALLY, LEANS BACK)  Stand to Sit: Minimal Assistance; Moderate Assistance  Ambulation  Device: Rolling Walker  Assistance: Minimal assistance  Quality of Gait: VC's THROUGHOUT TO INC STEP LENGTH/SAFETY WITH RW (pt is used to a rollator, may have some influence)  Gait Deviations: Slow Merle;Decreased step length;Shuffles;Decreased step height  Distance: 10' + 10'     Balance  Sitting - Dynamic: Fair;+  Standing - Dynamic: Fair;-           OutComes Score                                                  AM-PAC Score             Tinneti Score Goals  Short Term Goals  Time Frame for Short Term Goals: 14 days  Short Term Goal 1: BED MOB MOD IND  Short Term Goal 2: TRANSFERS SUPERVISION  Short Term Goal 3: ' RW SUPERVISION       Education  Patient Education  Education Given To: Patient; Family  Education Provided: Role of Therapy;Plan of Care      Therapy Time   Individual Concurrent Group Co-treatment   Time In           Time Out           Minutes                   Esthela Reynolds, PT

## 2023-02-11 NOTE — PROGRESS NOTES
[facility]    Progress Note    Patient:  Miguel Angel Davis  YOB: 1945  Date of Service: 2/11/2023  MRN: [id number]   Acct: [de-identified]   Primary Care Physician: PEDRO Nguyen  Advance Directive: Full Code  Admit Date: 2/10/2023       Hospital Day: 0    Portions of this note have been copied forward, however, updated to reflect the most current clinical status of this patient. CHIEF COMPLAINT: weakness, confusion    CUMULATIVE HOSPITAL COURSE:     Per previous provider: [Mrs. Miguel Angel Davis is a pleasant 65year old  [place] lady from [place]. She has had weakness for a long time having never fully recovered from her last stroke which was in 2019. The weakness has been much worse then last 3 days. She had fallen Wednesday night and she was found hours later and she had refused to let her  get her checked out at that time. She has also had worsening problems with her memory and is having problems. She has had gradual progressive worsening of memory for the last few months. It has been  worse the last few days. She has a UA which was reassuring for no UTI present. She has had workup for dementia and memory problems begun with a CT head. The TSH and B12 studies were also sent by the ED already.]    Neurology has been consulted. PT/OT/ST and cognitive eval.  She would benefit from SNF placement after discharge. Does appear to be weak. Patient is alert and oriented x4 but is somewhat slow to respond. There was note of diarrhea 2 days ago, however patient is denying this. K this morning 3.2; to be replaced per protocol. Questionable underlying dementia. Review of Systems   Constitutional:  Positive for fatigue. Negative for activity change, appetite change and unexpected weight change. HENT: Negative. Respiratory: Negative. Negative for cough and shortness of breath. Cardiovascular: Negative. Negative for chest pain. Gastrointestinal: Negative.   Negative for diarrhea, nausea and vomiting. Genitourinary: Negative. Skin: Negative. Neurological:  Positive for weakness. Psychiatric/Behavioral: Negative. Objective   VITALS:  /82   Pulse 77   Temp 97.7 °F (36.5 °C) (Temporal)   Resp 16   Ht 5' 3\" (1.6 m)   Wt 110 lb (49.9 kg)   SpO2 97%   BMI 19.49 kg/m²     24HR INTAKE/OUTPUT:    Intake/Output Summary (Last 24 hours) at 2/11/2023 1129  Last data filed at 2/11/2023 1026  Gross per 24 hour   Intake 250 ml   Output --   Net 250 ml       Physical Exam  Constitutional:       General: She is not in acute distress. Appearance: Normal appearance. She is normal weight. Cardiovascular:      Rate and Rhythm: Normal rate and regular rhythm. Pulses: Normal pulses. Heart sounds: Normal heart sounds. Pulmonary:      Effort: Pulmonary effort is normal. No respiratory distress. Breath sounds: Normal breath sounds. Abdominal:      General: Abdomen is flat. There is no distension. Palpations: Abdomen is soft. Musculoskeletal:      Left knee: Swelling present. Tenderness present. Right lower leg: No edema. Left lower leg: No edema. Skin:     General: Skin is warm and dry. Capillary Refill: Capillary refill takes less than 2 seconds. Neurological:      General: No focal deficit present. Mental Status: She is alert and oriented to person, place, and time. GCS: GCS eye subscore is 4. GCS verbal subscore is 5. GCS motor subscore is 6. Motor: Weakness present. No tremor. Psychiatric:         Behavior: Behavior is slowed.         Medications:      sodium chloride        vitamin C  1,000 mg Oral Daily    aspirin  325 mg Oral Daily    atorvastatin  80 mg Oral Nightly    carbidopa-levodopa  1 tablet Oral BID    cilostazol  100 mg Oral BID    folic acid  1 mg Oral Daily    levothyroxine  50 mcg Oral QAM AC    vitamin D  5,000 Units Oral Daily    melatonin  10 mg Oral Nightly    pantoprazole  40 mg Oral QAM AC    sodium chloride flush  5-40 mL IntraVENous 2 times per day    enoxaparin  30 mg SubCUTAneous Daily     melatonin, polyethylene glycol, calcium carbonate, nitroGLYCERIN, labetalol, potassium chloride **OR** potassium alternative oral replacement **OR** potassium chloride, sodium chloride flush, sodium chloride, LORazepam, ondansetron **OR** ondansetron, acetaminophen **OR** acetaminophen  ADULT DIET; Dysphagia - Soft and Bite Sized; 4 carb choices (60 gm/meal); Low Fat/Low Chol/High Fiber/2 gm Na; Low Sodium (2 gm)     Labs and Other Data:     Recent Labs     02/10/23  1818 02/11/23  0357   WBC 6.6 5.3   HGB 12.4 11.9*    183     Recent Labs     02/10/23  1620 02/11/23  0357    139   K 4.1 3.2*    106   CO2 23 21*   BUN 23 23   CREATININE 0.9 0.8   GLUCOSE 100 108     Recent Labs     02/10/23  1620   AST 24   ALT 10   BILITOT 0.9   ALKPHOS 84       Troponin T:   Recent Labs     02/10/23  1620   TROPONINI <0.01       Pro-BNP: No results for input(s): BNP in the last 72 hours. INR: No results for input(s): INR in the last 72 hours. UA:  Recent Labs     02/10/23  1858   COLORU YELLOW   PHUR 5.5   CLARITYU CLOUDY*   SPECGRAV 1.028   LEUKOCYTESUR Negative   UROBILINOGEN 0.2   BILIRUBINUR Negative   BLOODU Negative   GLUCOSEU Negative       A1C:   Recent Labs     02/10/23  1818   LABA1C 5.3       ABG:No results for input(s): PHART, SSV9GXS, PO2ART, SLQ4SGX, BEART, HGBAE, Q1MWNZAV, CARBOXHGBART in the last 72 hours. Rad:   XR KNEE LEFT (1-2 VIEWS)    Result Date: 2/10/2023  Exam: Fall, swelling Technique: 2 views of the left knee were obtained. Clinical information: Fall, swelling Comparison: None available. Findings: There is no radiographic evidence of acute fracture or dislocation. Advanced patellofemoral osteoarthritic changes are visualized. There is mild narrowing of the medial and lateral compartments. Moderate-sized left suprapatellar effusion is visualized. Osseous structures are osteopenic. Soft tissues appear within normal limits. No radiographic evidence of acute fracture or dislocation. Osteopenia. Advanced patellofemoral osteophytic changes. Moderate left suprapatellar effusion. CT Head W/O Contrast    Result Date: 2/10/2023  Exam: Noncontrast CT of the brain Technique: Axial noncontrast images were obtained from the skull base to the vertex. Coronal and sagittal reconstructions were performed. Radiation dose optimization technique was utilized. Clinical information: Weak, fall Comparison: Noncontrast CT of the brain performed on April 2, 2022 Findings: No evidence of acute hemorrhage, intra-axial mass, mass effect, or midline shift. Calcified falcine lesion is visualized at the vertex measuring 2.1 x 2.1 x 2.5 cm in maximal AP, transverse, and craniocaudal dimension is compatible with meningioma. No adjacent edema is visualized. The ventricular system and basal cisterns are within normal limits for the patients age. Age-related involutional changes are visualized. Bilateral periventricular white matter hypodensities are visualized compatible with changes of chronic microvessel ischemia. Gray-white differentiation is maintained. There is no evidence of an intra-axial or extra-axial fluid collection. Bone windows reveal no evidence of acute fracture. The visualized paranasal sinuses are within normal limits. The orbits are grossly unremarkable. No acute intracranial process evident. Age-related involutional changes and changes of chronic microvascular ischemia. Calcified falcine meningioma measuring 2.1 x 2.1 x 2.5 cm. XR CHEST PORTABLE    Result Date: 2/10/2023  Exam: Portable chest radiograph. Technique: Single AP view of the chest was obtained. Clinical information: Fall, weakness Comparison: None available. Findings: The lungs are clear without focal infiltrate or effusion. The patients Po sternotomy and CABG. Atheromatous calcifications of the thoracic aorta are visualized. The heart is normal in size.There are no acute osseous abnormalities. No radiographic evidence of acute cardiopulmonary process. Culture Results:    No results for input(s): CXSURG in the last 720 hours. Blood Culture Recent: No results for input(s): BC in the last 720 hours. No results for input(s): BC, BLOODCULT2, ORG in the last 72 hours. Assessment/Plan:   Principal Problem:    Generalized weakness/fatigue/memory changes   -Neurology consulted   -Questionable underlying dementia   -CT head negative   -PT/OT/ST/cognitive evaluation   -Labs unremarkable, vitals stable      Hypokalemia   -K 3.2   -Replacement per protocol   -BMP daily    DVT Prophylaxis: Lovenox    GI prophylaxis:  Protonix    Discharge planning:  TBD/SNF    Advance Directive: Full Code    Diet: ADULT DIET; Dysphagia - Soft and Bite Sized; 4 carb choices (60 gm/meal); Low Fat/Low Chol/High Fiber/2 gm Na; Low Sodium (2 gm)     Consults Made:   IP CONSULT TO NEUROLOGY  IP CONSULT TO CASE MANAGEMENT    Further Orders per Clinical course/attending. EMR Dragon/Transcription disclaimer:   Much of this encounter note is an electronic transcription/translation of spoken language to printed text.  The electronic translation of spoken language may permit erroneous, or at times, nonsensical words or phrases to be inadvertently transcribed; although attempts have made to review the note for such errors, some may still exist.

## 2023-02-11 NOTE — ED NOTES
Dr. Fulton Dials at [facility] Chelsey Oleary, [facility]  02/11/23 9974

## 2023-02-11 NOTE — PROGRESS NOTES
4 Eyes Skin Assessment    Jerzy Bhakta is being assessed upon: Admission    I agree that I, Tsering Jacob, RN, along with Adriana Alexandra, RN (either 2 RN's or 1 LPN and 1 RN) have performed a thorough Head to Toe Skin Assessment on the patient. ALL assessment sites listed below have been assessed. Areas assessed by both nurses:     [x]   Head, Face, and Ears   [x]   Shoulders, Back, and Chest  [x]   Arms, Elbows, and Hands   [x]   Coccyx, Sacrum, and Ischium  [x]   Legs, Feet, and Heels    Does the Patient have Skin Breakdown?  No    [place] Prevention initiated: No  Wound Care Orders initiated: No    WOC nurse consulted for Pressure Injury (Stage 3,4, Unstageable, DTI, NWPT, and Complex wounds) and New or Established Ostomies: No        Primary Nurse eSignature: Tsering Jacob RN on 2/11/2023 at 3:02 AM      Co-Signer eSignature: Electronically signed by Adriana Alexandra RN on 2/11/2023 at 3:03 AM

## 2023-02-11 NOTE — ED PROVIDER NOTES
[facility] [place] [facility] EMERGENCY DEPT  eMERGENCYdEPARTMENT eNCOUnter      Pt Name: Mel Shaver  MRN: [id number]  [place] [birth year]25JP evaluation: 2/10/2023  [address] [facility], [address] [place] [address]    Emergency Department care of this patient was assumed at 1900 from PEDRO Richardson. We have discussed the case and the plan of care. I have seen and evaluated patient and reviewed ED course. CHIEF COMPLAINT       Chief Complaint   Patient presents with    Fatigue     Progressive over 2 days    Fall     Had a fall at home on Wednesday night, per son, patient laid in floor for approximately 6 hours          PHYSICAL EXAM    (up to 7 for level 4, 8 or more for level 5)     ED Triage Vitals [02/10/23 1436]   BP Temp Temp src Heart Rate Resp SpO2 Height Weight   120/73 98.3 °F (36.8 °C) -- 83 18 93 % 5' 3\" (1.6 m) 110 lb (49.9 kg)       Physical Exam  Vitals and nursing note reviewed. Constitutional:       General: She is not in acute distress. Appearance: Normal appearance. She is normal weight. She is not ill-appearing, toxic-appearing or diaphoretic. HENT:      Mouth/Throat:      Mouth: Mucous membranes are moist.   Cardiovascular:      Rate and Rhythm: Normal rate and regular rhythm. Pulses: Normal pulses. Pulmonary:      Effort: Pulmonary effort is normal. No respiratory distress. Musculoskeletal:         General: Normal range of motion. Cervical back: Normal range of motion and neck supple. Skin:     General: Skin is warm and dry. Neurological:      General: No focal deficit present. Mental Status: She is alert and oriented to person, place, and time. Motor: Weakness present.       Comments: Memory changes, generalized weakness       DIAGNOSTIC RESULTS     RADIOLOGY:   Non-plain film imagessuch as CT, Ultrasound and MRI are read by the radiologist. Plain radiographic images are visualized and preliminarily interpreted by the emergency physician with the below findings:    CT Head W/O Contrast   Final Result   No acute intracranial process evident. Age-related involutional changes and changes of chronic microvascular ischemia. Calcified falcine meningioma measuring 2.1 x 2.1 x 2.5 cm. XR CHEST PORTABLE   Final Result   No radiographic evidence of acute cardiopulmonary process. XR KNEE LEFT (1-2 VIEWS)   Final Result   No radiographic evidence of acute fracture or dislocation. Osteopenia. Advanced   patellofemoral osteophytic changes. Moderate left suprapatellar effusion. LABS:  Labs Reviewed   URINALYSIS WITH REFLEX TO CULTURE - Abnormal; Notable for the following components:       Result Value    Clarity, UA CLOUDY (*)     Ketones, Urine TRACE (*)     Protein, UA TRACE (*)     All other components within normal limits   CBC WITH AUTO DIFFERENTIAL - Abnormal; Notable for the following components:    RBC 4.09 (*)     MCHC 31.5 (*)     Neutrophils % 73.0 (*)     Lymphocytes % 17.3 (*)     All other components within normal limits    Narrative:     RECOLLECT   RESPIRATORY PANEL, MOLECULAR, WITH COVID-19   COMPREHENSIVE METABOLIC PANEL   TROPONIN   CK   SPECIMEN REJECTION   VITAMIN B12 & FOLATE   TSH WITH REFLEX TO FT4       All other labs were within normal range or not returned as of this dictation. EMERGENCY DEPARTMENT COURSE and DIFFERENTIAL DIAGNOSIS/MDM:   :    Vitals:    02/10/23 1436 02/10/23 2115   BP: 120/73 116/81   Pulse: 83 89   Resp: 18 19   Temp: 98.3 °F (36.8 °C)    SpO2: 93% 96%   Weight: 110 lb (49.9 kg)    Height: 5' 3\" (1.6 m)        MDM  Patient is a 42-year-old female presents to the ER with complaint of fatigue with episodes of altered mental status and falls over the last 2 to 3 days. Patient's 3 sons were in the room and also help with my HPI. The patient was originally seen in work-up started by PEDRO Rowland. I took over the patient's care due to shift change. The patient is unable to recall some of the events of the last few days.   CBC does not show leukocytosis or anemia. CMP is negative for electrolyte disturbance, RONALD, or LFT elevation. CK normal.  Urinalysis does not show infection or hematuria. Respiratory panel is negative. CT of the head did show meningioma which actually appeared on previous CTs however was not measured on those. Chest x-ray negative for acute cardiopulmonary process including pneumonia or other consolidation. She also is having effusion of the left knee so plain films were obtained. This was negative for acute fracture or bony malalignment. I did discuss the plan with the patient's family as far as admission versus discharge. They do feel comfortable to have the patient admitted at this time for evaluation by OT and PT as well as for further monitoring. Patient is agreeable to admission. I spoke with Dr. Maryjane Fuchs for report. CONSULTS:  Dr. Maryjane Fuchs, hospitalist    FINAL IMPRESSION      1. Generalized weakness    2.  Altered mental status, unspecified altered mental status type          DISPOSITION/PLAN   DISPOSITION Decision To Admit      (Please note that portions ofthis note were completed with a voice recognition program.  Efforts were made to edit the dictations but occasionally words are mis-transcribed.)    LAUREANO Daniel(electronically signed)     Tawana Ayala, [place]  02/10/23 1813

## 2023-02-11 NOTE — H&P
[facility] History and Physical    Patient Information:  Patient: Priyanka Toth  MRN: [id number]   Acct: [de-identified]  YOB: 1945  Admit Date: 2/10/2023      Primary Care Physician: PEDRO Hernadez  Advance Directive: Full Code  Health Care Proxy:   1.) Mr. Mira Lane, her oldest son, [phone]  2.) Mr. Magdi Patton, one of her sons, [phone]  3.) Mr. Maria E Mc, her youngest sons, [phone]        SUBJECTIVE:    Chief Complaint   Patient presents with    Fatigue     Progressive over 2 days    Fall     Had a fall at home on Wednesday night, per son, patient laid in floor for approximately 6 hours      EP Sign Out:  Generalized Weakness & Fatigue  Memory Changes, especially with short term memory    HPI:  Mrs. Priyanka Toth is a pleasant 65year old  [place] lady from [place]. She has had weakness for a long time having never fully recovered from her last stroke which was in 2019. The weakness has been much worse then last 3 days. She had fallen Wednesday night and she was found hours later and she had refused to let her  get her checked out at that time. She has also had worsening problems with her memory and is having problems. She has had gradual progressive worsening of memory for the last few months. It has been  worse the last few days. She has a UA which was reassuring for no UTI present. She has had workup for dementia and memory problems begun with a CT head. The TSH and B12 studies were also sent by the ED already. Review of Systems:   Review of Systems   Constitutional:  Positive for fatigue. Negative for chills, diaphoresis and fever. HENT:  Positive for sneezing (\"every day, once or twice\"). Respiratory:  Negative for cough and shortness of breath. Cardiovascular:  Negative for chest pain. Gastrointestinal:  Positive for diarrhea. Negative for abdominal pain. Endocrine: Positive for cold intolerance (chronic). Genitourinary:  Negative for dysuria, frequency and urgency. Musculoskeletal:  Negative for back pain and neck pain. Neurological:  Positive for weakness. Psychiatric/Behavioral:  Positive for confusion. HAS memory problems     Past Medical History:   Diagnosis Date    CAD (coronary artery disease)     Cerebral artery occlusion with cerebral infarction ([facility] [address]     2014, 2019 (worst)    Hypertension     Thyroid disease     TIA (transient ischemic attack)      Past Psychiatric History:  Denies any    Past Surgical History:   Procedure Laterality Date    APPENDECTOMY      taken out while in [employer]    BACK SURGERY  2008    Lower Back midline, was a disk surgery    BREAST REDUCTION SURGERY      was done for back reasons    CARDIAC SURGERY      ~2013     Social History       Tobacco History       Smoking Status  Never      Smokeless Tobacco Use  Never              Alcohol History       Alcohol Use Status  Yes Comment  has a single drink on special occasions, never drank more than that              Drug Use       Drug Use Status  Never              Sexual Activity       Sexually Active  Not Asked Comment  had six boys             CODE STATUS: Full Code  HEALTH CARE PROXY:   1.) Mr. Addy Jensen, her oldest son, [phone]  2.) Mr. Jacoby Toledo, one of her sons, [phone]  3.) Mr. Eligio Baker, her youngest sons, [phone]  AMBULATES: uses a walker at home, uses a wheelchair when out  DOMICILED: has stairs to enter her home and a ramp, no stairs inside, lives alone with her  (he is not strong enough to help her), has a dog     Family History   Problem Relation Age of Onset    Asthma Mother     Heart Disease Mother     Asthma Father     Heart Attack Father     Heart Disease Father     No Known Problems Sister     No Known Problems Sister     No Known Problems Sister     Heart Attack Brother     Cancer Brother         thyroid    Thyroid Disease Brother     No Known Problems Brother Hypothyroidism Son     No Known Problems Son     Gout Son     No Known Problems Son     No Known Problems Son     No Known Problems Son      Allergies:   No Known Allergies    Home Medications:  Prior to Admission medications    Medication Sig Start Date End Date Taking? Authorizing Provider   zolpidem (AMBIEN) 10 MG tablet Take by mouth nightly as needed for Sleep. Yes Historical Provider, MD   lisinopril (PRINIVIL;ZESTRIL) 20 MG tablet Take by mouth daily 6/29/20  Yes Historical Provider, MD   potassium chloride (KLOR-CON) 10 MEQ extended release tablet TK 1 T PO QOD WF 8/27/20  Yes Historical Provider, MD   amLODIPine (NORVASC) 10 MG tablet Take 10 mg by mouth daily   Yes Historical Provider, MD   cilostazol (PLETAL) 100 MG tablet TAKE 1 TABLET BY MOUTH TWICE DAILY 11/24/20   Misti Cloud MD   aspirin 325 MG EC tablet TAKE 1 TABLET BY MOUTH EVERY DAY 8/28/20   Misti Cloud MD   carbidopa-levodopa (SINEMET)  MG per tablet TAKE 1 TABLET BY MOUTH TWICE DAILY 6/16/20   Misti Cloud MD   cloNIDine (CATAPRES) 0.1 MG tablet  12/12/19   Historical Provider, MD   atorvastatin (LIPITOR) 80 MG tablet Take 1 tablet by mouth nightly 11/29/19   Sharmila Davis MD   levothyroxine (SYNTHROID) 50 MCG tablet Take 1 tablet by mouth Daily 11/30/19   Sharmila Davis MD   pantoprazole (PROTONIX) 40 MG tablet Take 1 tablet by mouth every morning (before breakfast) 11/30/19   Sharmila Davis MD   nitroGLYCERIN (NITROSTAT) 0.4 MG SL tablet up to max of 3 total doses.  If no relief after 1 dose, call 911. 11/6/19   Misti Cloud MD   folic acid (FOLVITE) 1 MG tablet Take 1 tablet by mouth daily 11/6/19   Misti Cloud MD   Vitamin D, Cholecalciferol, 10 MCG (400 UNIT) CHEW 5000 units daily -ok for pharmacy to use whatever dose and number of pills to achieve 5000 units daily 11/6/19   Misti Cloud MD   Ascorbic Acid (VITAMIN C) 1000 MG tablet Take 1,000 mg by mouth daily    Historical Provider, MD         OBJECTIVE:    Ravi Riggs: 02/10/23 2115   BP: 116/81   Pulse: 89   Resp: 19   Temp:    SpO2: 96%   Breathing room air    /81   Pulse 89   Temp 98.3 °F (36.8 °C)   Resp 19   Ht 5' 3\" (1.6 m)   Wt 110 lb (49.9 kg)   SpO2 96%   BMI 19.49 kg/m²     No intake or output data in the 24 hours ending 02/11/23 0140    Physical Exam  Vitals reviewed. Constitutional:       General: She is not in acute distress. Appearance: Normal appearance. She is not ill-appearing or toxic-appearing. Comments: Frail appearing   HENT:      Head: Normocephalic and atraumatic. Nose: No congestion or rhinorrhea. Eyes:      General:         Right eye: No discharge. Left eye: No discharge. Neck:      Comments: Trachea appears midline  Cardiovascular:      Rate and Rhythm: Normal rate and regular rhythm. Heart sounds: No murmur heard. No friction rub. No gallop. Pulmonary:      Effort: Pulmonary effort is normal. No respiratory distress. Breath sounds: No stridor. No wheezing, rhonchi or rales. Chest:      Chest wall: No tenderness. Abdominal:      General: Bowel sounds are normal.      Palpations: Abdomen is soft. Tenderness: There is no abdominal tenderness. There is no guarding or rebound. Musculoskeletal:         General: No tenderness or signs of injury. Cervical back: Neck supple. Right lower leg: No edema. Left lower leg: No edema. Comments: Decreased fat and muscle stores   Skin:     General: Skin is warm. Comments: nondiaphoretic   Neurological:      Mental Status: She is alert. Motor: No tremor or seizure activity.    Psychiatric:         Mood and Affect: Mood normal.         Behavior: Behavior normal.        LABORATORY DATA:    CBC:   Recent Labs     02/10/23  1818   WBC 6.6   HGB 12.4   HCT 39.4        BMP:   Recent Labs     02/10/23  1620      K 4.1      CO2 23   BUN 23   CREATININE 0.9   CALCIUM 9.2     Hepatic Profile:   Recent Labs 02/10/23  1620   AST 24   ALT 10   BILITOT 0.9   ALKPHOS 84     Cardiac Enzymes:   Recent Labs     02/10/23  1620   CKTOTAL 134   TROPONINI <0.01     Urinalysis:   Lab Results   Component Value Date/Time    NITRU Negative 02/10/2023 06:58 PM    BLOODU Negative 02/10/2023 06:58 PM    SPECGRAV 1.028 02/10/2023 06:58 PM    GLUCOSEU Negative 02/10/2023 06:58 PM     IMAGING:  XR KNEE LEFT (1-2 VIEWS)  Result Date: 2/10/2023  No radiographic evidence of acute fracture or dislocation. Osteopenia. Advanced patellofemoral osteophytic changes. Moderate left suprapatellar effusion. CT Head W/O Contrast  Result Date: 2/10/2023  No acute intracranial process evident. Age-related involutional changes and changes of chronic microvascular ischemia. Calcified falcine meningioma measuring 2.1 x 2.1 x 2.5 cm. XR CHEST PORTABLE  Result Date: 2/10/2023  No radiographic evidence of acute cardiopulmonary process.         ASESSMENTS & PLANS:    Generalized Weakness & Fatigue:  \"Place\" to OBServation  PT & OT Evaluate & Tx  Fall Precautions  Bed Alarm  TSH and T05 and Folic Acid all ordered by her EP while we were discussing the case    Memory Changes: possible onset of Dementia  CT Head already done  TSH and T43 and Folic Acid all ordered by her EP while we were discussing the case  Neuro consult in AM  Bed Alarm    Unlikely but possibly a TIA:  consult neuro in AM  PT/OT/SpeechTherapy as per protocol  NPO until/unless passes RN swallow screen, then Cardiac Diet with diabetic modifier for now   HbA1c, TSH, Lipid Panel all to be added on  Fall Precuations  Bed Alarm  ASA and Statin as per protocol  Defer on MRI to Neuro  Labetalol PRN for now, will allow permissive HTN  Defer on TTE to look for thromboembolic causes to Neuro as non-focal  Defer on Carotid US to look for stenotic disease to Neuro as non-focal    Chronic Medical Problems:  Continue home regimen as indicated   vitamin C  1,000 mg Oral Daily    aspirin  325 mg Oral Daily atorvastatin  80 mg Oral Nightly    carbidopa-levodopa  1 tablet Oral BID    cilostazol  1 tablet Oral BID    folic acid  1 mg Oral Daily    levothyroxine  50 mcg Oral QAM AC    vitamin D  5,000 Units Oral Daily    melatonin  10 mg Oral Nightly    pantoprazole  40 mg Oral QAM AC     Supoportive and Prophylactic Txx:  DVT Prophylaxis: Lovenox SQ  GI (PUD) Ppx: not indicated as such  PT consult for evalutation and Txx as indicated: as per above  Diet NPO  melatonin, polyethylene glycol, calcium carbonate, nitroGLYCERIN, labetalol, sodium chloride flush, sodium chloride, LORazepam, ondansetron **OR** ondansetron, acetaminophen **OR** acetaminophen      Care time of ~55 minutes  Pt seen/examined and \"placed\" to OBServartion status. Inpatient status is used for patients with an expected LOS extending past two midnights due to medical therapy and or critical care needs, otherwise patients are placed to OBServation status. Signed:  Electronically signed by Efraín Jimenez MD on 2/11/23 at 02:00 AM CST.

## 2023-02-11 NOTE — PROGRESS NOTES
Speech Language Pathology  Facility/Department: [facility] SURG SERVICES   CLINICAL BEDSIDE SWALLOW EVALUATION  INITIAL SPEECH/LANGUAGE/COGNITIVE EVALUATION    NAME: Juancarlos Gilman  : 1945  MRN: [id number]    ADMISSION DATE: 2/10/2023  ADMITTING DIAGNOSIS: has Acute ischemic stroke ([facility] [address] Weakness; Essential hypertension; Other hyperlipidemia; Other osteoporosis without current pathological fracture; Other specified hypothyroidism; Other insomnia; Vitamin D deficiency; Rheumatoid arthritis ([facility] [address] Dysarthria; Dysphagia due to recent cerebral infarction; Gait abnormality; Generalized weakness; Fatigue; and Memory changes on their problem list.    Date of Eval: 2023  Evaluating Therapist: SINDY Thakur    Recent Chest Xray PORTABLE : ( Date 2/10/23 )    Impression   No radiographic evidence of acute cardiopulmonary process. CT HEAD W/O Contrast : ( Date 2/10/23 )     Impression   No acute intracranial process evident. Age-related involutional changes and changes of chronic microvascular ischemia. Calcified falcine meningioma measuring 2.1 x 2.1 x 2.5 cm. Clinical Impression:    Speech assessment completed on this date. Patient exhibits dysarthria characterized by slowed rate of speech, decreased vocal intensity, and decreased lingual and labial strength, ROM, and coordination. Portions of the RIPA/WAB completed on this date for speech/language/cognitive evaluation. Deficits within the following areas were noted: auditory comprehension, attention/processing, orientation, problem solving, and executive functioning. Would benefit from full speech/language/cognitive evaluation, as well as further speech/language/cognitive therapy services. Clinical Bedside Swallow Evaluation completed. Oral prep reveals decreased vertical jaw movement with regular solids and ice chips. Mildly decreased labial seal observed around teaspoon.  Oral transit timing ranges from 1-2 seconds with ice chips, puree consistencies, and thin liquids via cup and straw. Oral transit timing ranges from 2-3 seconds with regular solids. Moderate residue with regular solids. Pt required verbal cues for liquid wash and additional dry swallows. This was effective in clearing residue. Laryngeal movement is consistently sluggish and mild-moderately decreased for swallow airway protection. Even so, no overt s/s of aspiration/penetration present with ice chips, puree consistencies, regular solids, thin liquids via cup and straw. Recommend trial soft and bite sized consistencies with thin liquids. Medications administered whole with apple sauce/pudding. Ensure adequate level of alertness prior to meals. Please monitor/notify SLP for the following: changes in lung sounds, spikes in temperature, and/or difficulties swallowing. SLP will continue to follow and treat. Current Diet level:  Current Diet : Soft and Bite-Sized    Pain:  Pain Assessment  Pain Assessment: Velázquez-Baker FACES  Pain Level: 0  Patient's Stated Pain Goal: 0 - No pain  Pain Location: Knee  Pain Orientation: Left  Pain Descriptors: Aching    Reason for Referral  Jamil Renner was referred for a bedside swallow evaluation to assess the efficiency of her swallow function, identify signs and symptoms of aspiration and make recommendations regarding safe dietary consistencies, effective compensatory strategies, and safe eating environment. Treatment Plan  Requires SLP Intervention: Yes     D/C Recommendations: Ongoing speech therapy is recommended during this hospitalization       Recommended Diet and Intervention  Soft and bite sized  Thin liquids  Recommended Form of Meds: Crushed in puree as able  Recommendations: Dysphagia treatment  Therapeutic Interventions: Pharyngeal exercises;Diet tolerance monitoring; Therapeutic PO trials with SLP;Oral care;Oral motor exercises; Patient/Family education    Compensatory Swallowing Strategies  Compensatory Swallowing Strategies : Alternate solids and liquids;Eat/Feed slowly; No straws;Upright as possible for all oral intake;Remain upright for 30-45 minutes after meals;Small bites/sips    Treatment/Goals  Short-term Goals  Goal 1: Pt will tolerate soft and bite sized consistencies with thin liquids with minimal overt s/s of aspiration/penetration during hospitalization. Goal 2: Pt will demonstrate awareness of general aspiration precautions. Goal 3: Pt will participate in swallowing reassessments to ensure safest diet consistencies. Goal 4: Pt will complete pharyngeal strengthening exercises (Effortful,  Yesy). Goal 5: Frequent reassessment of speech/language/cognitive functioning. Goal 6: Full speech/language/cognitive evaluation. Goal 7: Pt will complete orientation tasks with 90% accuracy and minimal cues. Long-term Goals  Goal 1: Pt will tolerate least restrictive diet with minimal overt s/s of aspiration/penetration during hospitalization. General  Chart Reviewed: Yes  Behavior/Cognition: Cooperative  Temperature Spikes Noted: No  Respiratory Status: Room air  O2 Device: None (Room air)  Communication Observation: Dysarthria;Aphasia  Follows Directions: Simple  Dentition: Adequate  Patient Positioning: Upright in chair  Baseline Vocal Quality: Weak  Volitional Cough: Weak  Volitional Swallow: Delayed  Prior Dysphagia History: Denies hx dysphagia. Son reports he does notice that pt does demonstrate difficulites when swallowing liquids. RN Oneda Charlevoix reports pt has been taking medications whole with applesauce. Consistencies Administered: Regular;Pureed; Thin - cup; Ice Chips    Vision/Hearing  Vision  Vision: Within Functional Limits  Hearing  Hearing: Within functional limits    Oral Motor Deficits  Oral/Motor  Oral Hygiene: Clean    Education  Patient Education: Son educated on diet recommendations, general aspiration precautions, and suspected cognitive deficits.  Son states that pt has hx of CVA and most deficits are now baseline from hx CVA. Patient Education Response: Needs reinforcement      Portions of the RIPA/WAB completed on this date. Receptive: Auditory Comprehension:  Simple Yes/No Questions: WNL- 100% accuracy answers were timely   Complex Yes/No questions- 60% accuracy; answers elicited mildly delayed  Simple 2 Step Directions: WNL-100% accuracy; however, timing of completion of direction was mildly delayed      Expressive Skills/ Verbal Expression:  Confrontation Naming/Labeling: WNL-100% accuracy  Responsive Speech: WNL-100% accuracy; however, answers elicited mildly delayed     Attention/Orientation:  Attention/concentration:  mildly decreased selective and sustained attention  Orientation: 60% oriented  Pt is able to elicit name, , current location, current city/state, year and month. Pt is unable to elicit age, address, phone number, and date. Memory:  Immediate Memory: 100% accuracy up to 5 components  Short-term Memory: not assessed on this date    Problem Solving:  Pt unable to identify current MD, pharmacy, and conditions to take medications. Prior level of functioning:   Pt reports she was living at home with her  prior to recent hospitalization. Son reports there is a caregiver that comes to their house for 21 hours a week. Pt reports son was managing her medications and finances. She states that she was independently. cooking and doing laundry; however, someone does assist with cleaning. Dysarthria:   Patient exhibits dysarthria characterized by slowed rate of speech, decreased vocal intensity, and decreased lingual and labial strength, ROM, and coordination. Clinician ranked functional intelligibility of speech for unfamiliar listeners at 80-90%, with background noise present and context known. Delayed processing, anomia, perseveration, and paraphasia present at the conversational level.      Oral Mechanism Examination:  Labial retraction: decreased to L  Labial protrusion: decreased to L  Labial compression: decreased   Labial coordination: slow  Lingual extension: decreased  Lingual elevation: decreased  Bilateral lingual movement: decreased  Lingual symmetry: deviated to L  Lingual strength: weak  Lingual coordination: slow      Swallowing:   Clinical bedside swallowing evaluation completed on this date. Pt cooperative and upright in chair. Son present during evaluation. Son reports hx many CVAs. Son reports dysphagia with thin liquids. Consistencies presented include: ice chips, puree consistencies, regular solids, thin liquids via cup and straw. Oral Prep: Decreased vertical jaw movement with regular solids and ice chips. Mildly decreased labial seal observed around teaspoon. Oral Transit: Oral transit timing ranges from 1-2 seconds with ice chips, puree consistencies, and thin liquids via cup and straw. Oral transit timing ranges from 2-3 seconds with regular solids. Moderate residue with regular solids. Pt required verbal cues for liquid wash and additional dry swallows. This was effective in clearing residue. Laryngeal movement: consistently sluggish and mild-moderately decreased for swallow airway protection with ice chips, puree consistencies, regular solids, thin liquids via cup and straw. S/S of aspiration: Even so, no overt s/s of aspiration/penetration present with ice chips, puree consistencies, regular solids, thin liquids via cup and straw. Recommend trial soft and bite sized consistencies with thin liquids. Medications administered whole with apple sauce/pudding. Ensure adequate level of alertness prior to meals. Please monitor/notify SLP for the following: changes in lung sounds, spikes in temperature, and/or difficulties swallowing. SLP will continue to follow and treat.         Electronically signed by SINDY Cruz on 2/11/2023 at 3:04 PM

## 2023-02-12 ENCOUNTER — APPOINTMENT (OUTPATIENT)
Dept: MRI IMAGING | Age: 78
DRG: 057 | End: 2023-02-12
Payer: MEDICARE

## 2023-02-12 LAB
ANION GAP SERPL CALCULATED.3IONS-SCNC: 9 MMOL/L (ref 7–19)
BASOPHILS ABSOLUTE: 0 K/UL (ref 0–0.2)
BASOPHILS RELATIVE PERCENT: 0.9 % (ref 0–1)
BUN BLDV-MCNC: 22 MG/DL (ref 8–23)
CALCIUM SERPL-MCNC: 8.5 MG/DL (ref 8.8–10.2)
CHLORIDE BLD-SCNC: 108 MMOL/L (ref 98–111)
CO2: 22 MMOL/L (ref 22–29)
CREAT SERPL-MCNC: 0.9 MG/DL (ref 0.5–0.9)
EOSINOPHILS ABSOLUTE: 0.1 K/UL (ref 0–0.6)
EOSINOPHILS RELATIVE PERCENT: 4 % (ref 0–5)
GFR SERPL CREATININE-BSD FRML MDRD: >60 ML/MIN/{1.73_M2}
GLUCOSE BLD-MCNC: 105 MG/DL (ref 74–109)
HCT VFR BLD CALC: 34.9 % (ref 37–47)
HEMOGLOBIN: 11.4 G/DL (ref 12–16)
IMMATURE GRANULOCYTES #: 0 K/UL
LYMPHOCYTES ABSOLUTE: 1.1 K/UL (ref 1.1–4.5)
LYMPHOCYTES RELATIVE PERCENT: 30.3 % (ref 20–40)
MCH RBC QN AUTO: 30.2 PG (ref 27–31)
MCHC RBC AUTO-ENTMCNC: 32.7 G/DL (ref 33–37)
MCV RBC AUTO: 92.6 FL (ref 81–99)
MONOCYTES ABSOLUTE: 0.3 K/UL (ref 0–0.9)
MONOCYTES RELATIVE PERCENT: 8.9 % (ref 0–10)
NEUTROPHILS ABSOLUTE: 1.9 K/UL (ref 1.5–7.5)
NEUTROPHILS RELATIVE PERCENT: 55.6 % (ref 50–65)
PDW BLD-RTO: 13.3 % (ref 11.5–14.5)
PLATELET # BLD: 190 K/UL (ref 130–400)
PMV BLD AUTO: 10.2 FL (ref 9.4–12.3)
POTASSIUM REFLEX MAGNESIUM: 3.7 MMOL/L (ref 3.5–5)
RBC # BLD: 3.77 M/UL (ref 4.2–5.4)
SODIUM BLD-SCNC: 139 MMOL/L (ref 136–145)
WBC # BLD: 3.5 K/UL (ref 4.8–10.8)

## 2023-02-12 PROCEDURE — 70553 MRI BRAIN STEM W/O & W/DYE: CPT

## 2023-02-12 PROCEDURE — A9577 INJ MULTIHANCE: HCPCS | Performed by: PSYCHIATRY & NEUROLOGY

## 2023-02-12 PROCEDURE — 99232 SBSQ HOSP IP/OBS MODERATE 35: CPT | Performed by: PSYCHIATRY & NEUROLOGY

## 2023-02-12 PROCEDURE — 85025 COMPLETE CBC W/AUTO DIFF WBC: CPT

## 2023-02-12 PROCEDURE — 1210000000 HC MED SURG R&B

## 2023-02-12 PROCEDURE — 6370000000 HC RX 637 (ALT 250 FOR IP): Performed by: HOSPITALIST

## 2023-02-12 PROCEDURE — 6360000004 HC RX CONTRAST MEDICATION: Performed by: PSYCHIATRY & NEUROLOGY

## 2023-02-12 PROCEDURE — 6360000002 HC RX W HCPCS: Performed by: HOSPITALIST

## 2023-02-12 PROCEDURE — 94760 N-INVAS EAR/PLS OXIMETRY 1: CPT

## 2023-02-12 PROCEDURE — 80048 BASIC METABOLIC PNL TOTAL CA: CPT

## 2023-02-12 PROCEDURE — 36415 COLL VENOUS BLD VENIPUNCTURE: CPT

## 2023-02-12 PROCEDURE — 2580000003 HC RX 258: Performed by: HOSPITALIST

## 2023-02-12 RX ADMIN — CARBIDOPA AND LEVODOPA 1 TABLET: 25; 100 TABLET ORAL at 20:58

## 2023-02-12 RX ADMIN — ENOXAPARIN SODIUM 30 MG: 100 INJECTION SUBCUTANEOUS at 09:20

## 2023-02-12 RX ADMIN — SODIUM CHLORIDE, PRESERVATIVE FREE 10 ML: 5 INJECTION INTRAVENOUS at 09:21

## 2023-02-12 RX ADMIN — PANTOPRAZOLE SODIUM 40 MG: 40 TABLET, DELAYED RELEASE ORAL at 05:37

## 2023-02-12 RX ADMIN — FOLIC ACID 1 MG: 1 TABLET ORAL at 09:20

## 2023-02-12 RX ADMIN — OXYCODONE HYDROCHLORIDE AND ACETAMINOPHEN 1000 MG: 500 TABLET ORAL at 09:20

## 2023-02-12 RX ADMIN — ATORVASTATIN CALCIUM 80 MG: 80 TABLET, FILM COATED ORAL at 20:58

## 2023-02-12 RX ADMIN — CILOSTAZOL 100 MG: 100 TABLET ORAL at 20:58

## 2023-02-12 RX ADMIN — ASPIRIN 325 MG: 325 TABLET, COATED ORAL at 09:20

## 2023-02-12 RX ADMIN — CILOSTAZOL 100 MG: 100 TABLET ORAL at 09:20

## 2023-02-12 RX ADMIN — Medication 10 MG: at 20:58

## 2023-02-12 RX ADMIN — CARBIDOPA AND LEVODOPA 1 TABLET: 25; 100 TABLET ORAL at 09:20

## 2023-02-12 RX ADMIN — SODIUM CHLORIDE, PRESERVATIVE FREE 10 ML: 5 INJECTION INTRAVENOUS at 20:59

## 2023-02-12 RX ADMIN — LEVOTHYROXINE SODIUM 50 MCG: 50 TABLET ORAL at 05:37

## 2023-02-12 RX ADMIN — GADOBENATE DIMEGLUMINE 10 ML: 529 INJECTION, SOLUTION INTRAVENOUS at 13:16

## 2023-02-12 RX ADMIN — Medication 5000 UNITS: at 09:20

## 2023-02-12 ASSESSMENT — ENCOUNTER SYMPTOMS
COUGH: 0
DIARRHEA: 0
GASTROINTESTINAL NEGATIVE: 1
RESPIRATORY NEGATIVE: 1
SHORTNESS OF BREATH: 0
NAUSEA: 0
VOMITING: 0

## 2023-02-12 ASSESSMENT — PAIN SCALES - GENERAL: PAINLEVEL_OUTOF10: 0

## 2023-02-12 NOTE — PROGRESS NOTES
[address], [address]  [facility], [facility]  Phone [phone]     Neurology Progress Note  2023 12:35 PM  Information:   Patient Name: Gladys Doll  :   1945  Age:   65 y.o. MRN:   [id number]  Account #:  [de-identified]  Admit Date:   2/10/2023  Today:  23     ADMIT DX:   Generalized weakness    Subjective:     Gladys Doll is a 65y.o. year old woman with a history of strokes who was admitted 2/10 with weakness after a fall. Interval History:   She is chronically debilitated. She says she has not been able to walk unassisted for some time. She says that her legs do not do what she wants them to do.       Objective:     Past Medical History:  Past Medical History:   Diagnosis Date    CAD (coronary artery disease)     Cerebral artery occlusion with cerebral infarction ([facility] [address]     ,  (worst)    Hypertension     Thyroid disease     TIA (transient ischemic attack)        Past Surgical History:   Procedure Laterality Date    APPENDECTOMY      taken out while in [employer]    BACK SURGERY      Lower Back midline, was a disk surgery    BREAST REDUCTION SURGERY      was done for back reasons    CARDIAC SURGERY      ~       Family History   Problem Relation Age of Onset    Asthma Mother     Heart Disease Mother     Asthma Father     Heart Attack Father     Heart Disease Father     No Known Problems Sister     No Known Problems Sister     No Known Problems Sister     Heart Attack Brother     Cancer Brother         thyroid    Thyroid Disease Brother     No Known Problems Brother     Hypothyroidism Son     No Known Problems Son     Gout Son     No Known Problems Son     No Known Problems Son     No Known Problems Son        Social History     Socioeconomic History    Marital status:      Spouse name: Mr. 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 Salena Persaud, her oldest son, [phone]    2.) Mr. Genna Sanderson, one of her sons, [phone]    3.) Mr. Altagracia Smith, her youngest sons, [phone]    AMBULATES: uses a walker at home, uses a wheelchair when out    DOMICILED: has stairs to enter her home and a ramp, no stairs inside, lives alone with her  (he is not strong enough to help her), has a dog     Social Determinants of Health     Financial Resource Strain: Not on file   Food Insecurity: Not on file   Transportation Needs: Not on file   Physical Activity: Not on file   Stress: Not on file   Social Connections: Not on file   Intimate Partner Violence: Not on file   Housing Stability: Not on file       Medications:   sodium chloride        vitamin C  1,000 mg Oral Daily    aspirin  325 mg Oral Daily    atorvastatin  80 mg Oral Nightly    carbidopa-levodopa  1 tablet Oral BID    cilostazol  100 mg Oral BID    folic acid  1 mg Oral Daily    levothyroxine  50 mcg Oral QAM AC    vitamin D  5,000 Units Oral Daily    melatonin  10 mg Oral Nightly    pantoprazole  40 mg Oral QAM AC    sodium chloride flush  5-40 mL IntraVENous 2 times per day    enoxaparin  30 mg SubCUTAneous Daily       Diagnostic Studies:  Reviewed all labs/diagnositcs since last 24hrs:  Recent Results (from the past 24 hour(s))   Basic Metabolic Panel w/ Reflex to MG    Collection Time: 02/12/23  3:09 AM   Result Value Ref Range    Sodium 139 136 - 145 mmol/L    Potassium reflex Magnesium 3.7 3.5 - 5.0 mmol/L Chloride 108 98 - 111 mmol/L    CO2 22 22 - 29 mmol/L    Anion Gap 9 7 - 19 mmol/L    Glucose 105 74 - 109 mg/dL    BUN 22 8 - 23 mg/dL    Creatinine 0.9 0.5 - 0.9 mg/dL    Est, Glom Filt Rate >60 >60    Calcium 8.5 (L) 8.8 - 10.2 mg/dL   CBC with Auto Differential    Collection Time: 02/12/23  3:09 AM   Result Value Ref Range    WBC 3.5 (L) 4.8 - 10.8 K/uL    RBC 3.77 (L) 4.20 - 5.40 M/uL    Hemoglobin 11.4 (L) 12.0 - 16.0 g/dL    Hematocrit 34.9 (L) 37.0 - 47.0 %    MCV 92.6 81.0 - 99.0 fL    MCH 30.2 27.0 - 31.0 pg    MCHC 32.7 (L) 33.0 - 37.0 g/dL    RDW 13.3 11.5 - 14.5 %    Platelets 294 677 - 919 K/uL    MPV 10.2 9.4 - 12.3 fL    Neutrophils % 55.6 50.0 - 65.0 %    Lymphocytes % 30.3 20.0 - 40.0 %    Monocytes % 8.9 0.0 - 10.0 %    Eosinophils % 4.0 0.0 - 5.0 %    Basophils % 0.9 0.0 - 1.0 %    Neutrophils Absolute 1.9 1.5 - 7.5 K/uL    Immature Granulocytes # 0.0 K/uL    Lymphocytes Absolute 1.1 1.1 - 4.5 K/uL    Monocytes Absolute 0.30 0.00 - 0.90 K/uL    Eosinophils Absolute 0.10 0.00 - 0.60 K/uL    Basophils Absolute 0.00 0.00 - 0.20 K/uL       Diet:  ADULT DIET; Dysphagia - Soft and Bite Sized; 4 carb choices (60 gm/meal); Low Fat/Low Chol/High Fiber/2 gm Na; Low Sodium (2 gm)    Examination:  Vitals: /87   Pulse 69   Temp 97.3 °F (36.3 °C) (Temporal)   Resp 16   Ht 5' 3\" (1.6 m)   Wt 110 lb (49.9 kg)   SpO2 96%   BMI 19.49 kg/m²    Intake/Output Summary (Last 24 hours) at 2/12/2023 1235  Last data filed at 2/12/2023 7578  Gross per 24 hour   Intake 485 ml   Output --   Net 485 ml     General appearance:  She is an elderly thin woman who is cooperative and in no distress but does have an odd affect  Skin: Skin color, texture, turgor normal. No rashes or lesions  HEENT: Head: Normal, normocephalic, atraumatic.   Neck:no adenopathy, no carotid bruit, no JVD, supple, symmetrical, trachea midline, and thyroid not enlarged, symmetric, no tenderness/mass/nodules  Lungs: clear to auscultation bilaterally  Heart: regular rate and rhythm, S1, S2 normal, no murmur, click, rub or gallop  Extremities: extremities normal, atraumatic, no cyanosis or edema  Neurologic:  Alert, oriented. No dysarthria. Speech is fluent. EOMI, PERRL, VFF. No facial weakness. Limb strength is grossly normal.  No pronator drift. Rapid alternating movements are normal.  Finger to nose testing shows no dysmetria. Assessment:   1. Chronic progressive gait impairment with recurrent falls. Her gait and physical therapy assessment is apraxic, c/w a neurodegenerative process. Perhaps vascular dementia. 2.         Cognitive impairment  3. Meningioma. This appears unchanged from previous CTs and MRI  Her course, history, symptoms, examination are all strongly suggestive of dementia which is the most common cause of gait deterioration and falls in the elderly. It is possible she could have vascular dementia and a recent stroke. Her son says she went down for MRI head last night. She denies. There is no report available and no MRI images available and no explanation indicating it was attempted but not done. Plan:   1. MRI head  2. PT/OT. She will likely needs subacute rehab at a SNF. I discussed the above with one of her sons     Discharge planning:   Skilled nursing facility    I spent 35 total minutes in face to face interaction with patient and coordination of care.    I spent > 50% of the total time discussing the diagnoses, evaluation, test results, treatment options, plans; counseling and advising with the patient and/or family and/or caregiver as well as with the care team.    Electronically signed by Nidia Skaggs M.D.

## 2023-02-12 NOTE — CARE COORDINATION
Pt would like a referral to [address] [place] [address].  SW will send the referral in the [facility] [phone] f  Electronically signed by Sandy Mosqueda on 2/12/2023 at 4:12 PM

## 2023-02-12 NOTE — PROGRESS NOTES
[facility]    [facility]    Patient:  Zack Nair  YOB: 1945  Date of Service: 2/12/2023  MRN: [id number]   Acct: [de-identified]   Primary Care Physician: PEDRO Miller  Advance Directive: Full Code  Admit Date: 2/10/2023       Hospital Day: 1    Portions of this note have been copied forward, however, updated to reflect the most current clinical status of this patient. CHIEF COMPLAINT: weakness, confusion    CUMULATIVE HOSPITAL COURSE:     Per previous provider: [Mrs. Zack Nair is a pleasant 65year old  [place] lady from [place]. She has had weakness for a long time having never fully recovered from her last stroke which was in 2019. The weakness has been much worse then last 3 days. She had fallen Wednesday night and she was found hours later and she had refused to let her  get her checked out at that time. She has also had worsening problems with her memory and is having problems. She has had gradual progressive worsening of memory for the last few months. It has been  worse the last few days. She has a UA which was reassuring for no UTI present. She has had workup for dementia and memory problems begun with a CT head. The TSH and B12 studies were also sent by the ED already.]    Neurology has been consulted; see note. Her course, history, symptoms and exam are all suggestive of dementia. MRI ordered and pending. PT/OT/ST and cognitive eval.  She would benefit from SNF placement after discharge. Does appear to be weak. Patient is alert and oriented x4. She reports ongoing fatigue. Review of Systems   Constitutional:  Positive for fatigue. Negative for activity change, appetite change and unexpected weight change. HENT: Negative. Respiratory: Negative. Negative for cough and shortness of breath. Cardiovascular: Negative. Negative for chest pain. Gastrointestinal: Negative. Negative for diarrhea, nausea and vomiting. Genitourinary: Negative. Skin: Negative. Neurological:  Positive for weakness. Psychiatric/Behavioral: Negative. Objective   VITALS:  /87   Pulse 69   Temp 97.3 °F (36.3 °C) (Temporal)   Resp 16   Ht 5' 3\" (1.6 m)   Wt 110 lb (49.9 kg)   SpO2 96%   BMI 19.49 kg/m²     24HR INTAKE/OUTPUT:    Intake/Output Summary (Last 24 hours) at 2/12/2023 8396  Last data filed at 2/12/2023 3269  Gross per 24 hour   Intake 735 ml   Output --   Net 735 ml         Physical Exam  Constitutional:       General: She is not in acute distress. Appearance: Normal appearance. She is normal weight. Cardiovascular:      Rate and Rhythm: Normal rate and regular rhythm. Pulses: Normal pulses. Heart sounds: Normal heart sounds. Pulmonary:      Effort: Pulmonary effort is normal. No respiratory distress. Breath sounds: Normal breath sounds. Abdominal:      General: Abdomen is flat. There is no distension. Palpations: Abdomen is soft. Musculoskeletal:      Left knee: Swelling present. Tenderness present. Right lower leg: No edema. Left lower leg: No edema. Skin:     General: Skin is warm and dry. Capillary Refill: Capillary refill takes less than 2 seconds. Neurological:      General: No focal deficit present. Mental Status: She is alert and oriented to person, place, and time. GCS: GCS eye subscore is 4. GCS verbal subscore is 5. GCS motor subscore is 6. Motor: Weakness present. No tremor. Psychiatric:         Behavior: Behavior is slowed.         Medications:      sodium chloride        vitamin C  1,000 mg Oral Daily    aspirin  325 mg Oral Daily    atorvastatin  80 mg Oral Nightly    carbidopa-levodopa  1 tablet Oral BID    cilostazol  100 mg Oral BID    folic acid  1 mg Oral Daily    levothyroxine  50 mcg Oral QAM AC    vitamin D  5,000 Units Oral Daily    melatonin  10 mg Oral Nightly    pantoprazole  40 mg Oral QAM AC    sodium chloride flush  5-40 mL IntraVENous 2 times per day    enoxaparin  30 mg SubCUTAneous Daily     melatonin, polyethylene glycol, calcium carbonate, nitroGLYCERIN, labetalol, potassium chloride **OR** potassium alternative oral replacement **OR** potassium chloride, sodium chloride flush, sodium chloride, LORazepam, ondansetron **OR** ondansetron, acetaminophen **OR** acetaminophen  ADULT DIET; Dysphagia - Soft and Bite Sized; 4 carb choices (60 gm/meal); Low Fat/Low Chol/High Fiber/2 gm Na; Low Sodium (2 gm)     Labs and Other Data:     Recent Labs     02/10/23  1818 02/11/23  0357 02/12/23  0309   WBC 6.6 5.3 3.5*   HGB 12.4 11.9* 11.4*    183 190       Recent Labs     02/10/23  1620 02/11/23  0357 02/12/23  0309    139 139   K 4.1 3.2* 3.7    106 108   CO2 23 21* 22   BUN 23 23 22   CREATININE 0.9 0.8 0.9   GLUCOSE 100 108 105       Recent Labs     02/10/23  1620   AST 24   ALT 10   BILITOT 0.9   ALKPHOS 84         Troponin T:   Recent Labs     02/10/23  1620   TROPONINI <0.01         Pro-BNP: No results for input(s): BNP in the last 72 hours. INR: No results for input(s): INR in the last 72 hours. UA:  Recent Labs     02/10/23  1858   COLORU YELLOW   PHUR 5.5   CLARITYU CLOUDY*   SPECGRAV 1.028   LEUKOCYTESUR Negative   UROBILINOGEN 0.2   BILIRUBINUR Negative   BLOODU Negative   GLUCOSEU Negative         A1C:   Recent Labs     02/10/23  1818   LABA1C 5.3         ABG:No results for input(s): PHART, KZY8CEI, PO2ART, BRD8KGW, BEART, HGBAE, I4CJIQVC, CARBOXHGBART in the last 72 hours. Rad:   XR KNEE LEFT (1-2 VIEWS)    Result Date: 2/10/2023  Exam: Fall, swelling Technique: 2 views of the left knee were obtained. Clinical information: Fall, swelling Comparison: None available. Findings: There is no radiographic evidence of acute fracture or dislocation. Advanced patellofemoral osteoarthritic changes are visualized. There is mild narrowing of the medial and lateral compartments. Moderate-sized left suprapatellar effusion is visualized. Osseous structures are osteopenic. Soft tissues appear within normal limits. No radiographic evidence of acute fracture or dislocation. Osteopenia. Advanced patellofemoral osteophytic changes. Moderate left suprapatellar effusion. CT Head W/O Contrast    Result Date: 2/10/2023  Exam: Noncontrast CT of the brain Technique: Axial noncontrast images were obtained from the skull base to the vertex. Coronal and sagittal reconstructions were performed. Radiation dose optimization technique was utilized. Clinical information: Weak, fall Comparison: Noncontrast CT of the brain performed on April 2, 2022 Findings: No evidence of acute hemorrhage, intra-axial mass, mass effect, or midline shift. Calcified falcine lesion is visualized at the vertex measuring 2.1 x 2.1 x 2.5 cm in maximal AP, transverse, and craniocaudal dimension is compatible with meningioma. No adjacent edema is visualized. The ventricular system and basal cisterns are within normal limits for the patients age. Age-related involutional changes are visualized. Bilateral periventricular white matter hypodensities are visualized compatible with changes of chronic microvessel ischemia. Gray-white differentiation is maintained. There is no evidence of an intra-axial or extra-axial fluid collection. Bone windows reveal no evidence of acute fracture. The visualized paranasal sinuses are within normal limits. The orbits are grossly unremarkable. No acute intracranial process evident. Age-related involutional changes and changes of chronic microvascular ischemia. Calcified falcine meningioma measuring 2.1 x 2.1 x 2.5 cm. XR CHEST PORTABLE    Result Date: 2/10/2023  Exam: Portable chest radiograph. Technique: Single AP view of the chest was obtained. Clinical information: Fall, weakness Comparison: None available. Findings: The lungs are clear without focal infiltrate or effusion. The patients Po sternotomy and CABG. Atheromatous calcifications of the thoracic aorta are visualized. The heart is normal in size. There are no acute osseous abnormalities. No radiographic evidence of acute cardiopulmonary process. Culture Results:    No results for input(s): CXSURG in the last 720 hours. Blood Culture Recent: No results for input(s): BC in the last 720 hours. No results for input(s): BC, BLOODCULT2, ORG in the last 72 hours. Assessment/Plan:   Principal Problem:    Generalized weakness/fatigue/memory changes   -Neurology consulted    -MRI pending    -Symptoms/history suggestive of dementia   -CT head negative   -PT/OT/ST/cognitive evaluation   -Labs unremarkable, vitals stable      Hypokalemia   -K 3.7 today   -Replacement per protocol prn   -BMP daily    DVT Prophylaxis: Lovenox    GI prophylaxis:  Protonix    Discharge planning:  TBD/SNF    Advance Directive: Full Code    Diet: ADULT DIET; Dysphagia - Soft and Bite Sized; 4 carb choices (60 gm/meal); Low Fat/Low Chol/High Fiber/2 gm Na; Low Sodium (2 gm)     Consults Made:   IP CONSULT TO NEUROLOGY  IP CONSULT TO CASE MANAGEMENT    Further Orders per Clinical course/attending. EMR Dragon/Transcription disclaimer:   Much of this encounter note is an electronic transcription/translation of spoken language to printed text.  The electronic translation of spoken language may permit erroneous, or at times, nonsensical words or phrases to be inadvertently transcribed; although attempts have made to review the note for such errors, some may still exist.

## 2023-02-13 LAB
ANION GAP SERPL CALCULATED.3IONS-SCNC: 10 MMOL/L (ref 7–19)
BASOPHILS ABSOLUTE: 0 K/UL (ref 0–0.2)
BASOPHILS RELATIVE PERCENT: 0.9 % (ref 0–1)
BUN BLDV-MCNC: 18 MG/DL (ref 8–23)
CALCIUM SERPL-MCNC: 8.7 MG/DL (ref 8.8–10.2)
CHLORIDE BLD-SCNC: 111 MMOL/L (ref 98–111)
CO2: 20 MMOL/L (ref 22–29)
CREAT SERPL-MCNC: 0.8 MG/DL (ref 0.5–0.9)
EKG P AXIS: 81 DEGREES
EKG P-R INTERVAL: 172 MS
EKG Q-T INTERVAL: 424 MS
EKG QRS DURATION: 86 MS
EKG QTC CALCULATION (BAZETT): 441 MS
EKG T AXIS: 71 DEGREES
EOSINOPHILS ABSOLUTE: 0.1 K/UL (ref 0–0.6)
EOSINOPHILS RELATIVE PERCENT: 3.9 % (ref 0–5)
GFR SERPL CREATININE-BSD FRML MDRD: >60 ML/MIN/{1.73_M2}
GLUCOSE BLD-MCNC: 113 MG/DL (ref 74–109)
HCT VFR BLD CALC: 32.7 % (ref 37–47)
HEMOGLOBIN: 10.9 G/DL (ref 12–16)
IMMATURE GRANULOCYTES #: 0 K/UL
LYMPHOCYTES ABSOLUTE: 1.1 K/UL (ref 1.1–4.5)
LYMPHOCYTES RELATIVE PERCENT: 32.3 % (ref 20–40)
MAGNESIUM: 2.2 MG/DL (ref 1.6–2.4)
MCH RBC QN AUTO: 30.6 PG (ref 27–31)
MCHC RBC AUTO-ENTMCNC: 33.3 G/DL (ref 33–37)
MCV RBC AUTO: 91.9 FL (ref 81–99)
MONOCYTES ABSOLUTE: 0.3 K/UL (ref 0–0.9)
MONOCYTES RELATIVE PERCENT: 8.7 % (ref 0–10)
NEUTROPHILS ABSOLUTE: 1.8 K/UL (ref 1.5–7.5)
NEUTROPHILS RELATIVE PERCENT: 54.2 % (ref 50–65)
PDW BLD-RTO: 13.2 % (ref 11.5–14.5)
PLATELET # BLD: 190 K/UL (ref 130–400)
PMV BLD AUTO: 10.4 FL (ref 9.4–12.3)
POTASSIUM REFLEX MAGNESIUM: 3.5 MMOL/L (ref 3.5–5)
RBC # BLD: 3.56 M/UL (ref 4.2–5.4)
SODIUM BLD-SCNC: 141 MMOL/L (ref 136–145)
WBC # BLD: 3.3 K/UL (ref 4.8–10.8)

## 2023-02-13 PROCEDURE — 85025 COMPLETE CBC W/AUTO DIFF WBC: CPT

## 2023-02-13 PROCEDURE — 6370000000 HC RX 637 (ALT 250 FOR IP): Performed by: HOSPITALIST

## 2023-02-13 PROCEDURE — 1210000000 HC MED SURG R&B

## 2023-02-13 PROCEDURE — 6360000002 HC RX W HCPCS: Performed by: HOSPITALIST

## 2023-02-13 PROCEDURE — 99232 SBSQ HOSP IP/OBS MODERATE 35: CPT | Performed by: PSYCHIATRY & NEUROLOGY

## 2023-02-13 PROCEDURE — 80048 BASIC METABOLIC PNL TOTAL CA: CPT

## 2023-02-13 PROCEDURE — 97530 THERAPEUTIC ACTIVITIES: CPT

## 2023-02-13 PROCEDURE — 2580000003 HC RX 258: Performed by: HOSPITALIST

## 2023-02-13 PROCEDURE — 97116 GAIT TRAINING THERAPY: CPT

## 2023-02-13 PROCEDURE — 97165 OT EVAL LOW COMPLEX 30 MIN: CPT

## 2023-02-13 PROCEDURE — 36415 COLL VENOUS BLD VENIPUNCTURE: CPT

## 2023-02-13 PROCEDURE — 83735 ASSAY OF MAGNESIUM: CPT

## 2023-02-13 PROCEDURE — 94760 N-INVAS EAR/PLS OXIMETRY 1: CPT

## 2023-02-13 PROCEDURE — 93010 ELECTROCARDIOGRAM REPORT: CPT | Performed by: INTERNAL MEDICINE

## 2023-02-13 RX ADMIN — ASPIRIN 325 MG: 325 TABLET, COATED ORAL at 08:58

## 2023-02-13 RX ADMIN — ENOXAPARIN SODIUM 30 MG: 100 INJECTION SUBCUTANEOUS at 08:57

## 2023-02-13 RX ADMIN — ATORVASTATIN CALCIUM 80 MG: 80 TABLET, FILM COATED ORAL at 21:39

## 2023-02-13 RX ADMIN — CILOSTAZOL 100 MG: 100 TABLET ORAL at 08:57

## 2023-02-13 RX ADMIN — PANTOPRAZOLE SODIUM 40 MG: 40 TABLET, DELAYED RELEASE ORAL at 05:26

## 2023-02-13 RX ADMIN — Medication 5000 UNITS: at 08:57

## 2023-02-13 RX ADMIN — CARBIDOPA AND LEVODOPA 1 TABLET: 25; 100 TABLET ORAL at 08:58

## 2023-02-13 RX ADMIN — LEVOTHYROXINE SODIUM 50 MCG: 50 TABLET ORAL at 05:26

## 2023-02-13 RX ADMIN — CARBIDOPA AND LEVODOPA 1 TABLET: 25; 100 TABLET ORAL at 21:38

## 2023-02-13 RX ADMIN — CILOSTAZOL 100 MG: 100 TABLET ORAL at 21:38

## 2023-02-13 RX ADMIN — SODIUM CHLORIDE, PRESERVATIVE FREE 10 ML: 5 INJECTION INTRAVENOUS at 21:39

## 2023-02-13 RX ADMIN — OXYCODONE HYDROCHLORIDE AND ACETAMINOPHEN 1000 MG: 500 TABLET ORAL at 08:58

## 2023-02-13 RX ADMIN — Medication 10 MG: at 21:39

## 2023-02-13 RX ADMIN — FOLIC ACID 1 MG: 1 TABLET ORAL at 08:58

## 2023-02-13 RX ADMIN — SODIUM CHLORIDE, PRESERVATIVE FREE 10 ML: 5 INJECTION INTRAVENOUS at 08:58

## 2023-02-13 ASSESSMENT — ENCOUNTER SYMPTOMS
CHEST TIGHTNESS: 0
BACK PAIN: 0
NAUSEA: 0
VOMITING: 0
SHORTNESS OF BREATH: 0
TROUBLE SWALLOWING: 0
ABDOMINAL PAIN: 0
CONSTIPATION: 0
SORE THROAT: 0
COLOR CHANGE: 0
DIARRHEA: 0

## 2023-02-13 NOTE — CARE COORDINATION
Pt is able to DC to [facility] on Tuesday February 14th if medically cleared. Pt will need an updated negative covid test before DC. SW updated Pt pharmacy within the system but the facility needs HARD COPIES for any narcotics.   [place]   p   f  Electronically signed by Erlin Case on 2/13/2023 at 4:36 PM

## 2023-02-13 NOTE — PROGRESS NOTES
Physical Therapy  Name: Lauryn Rodríguez  MRN:  [id number]  Date of service:  2/13/2023 02/13/23 1206   Restrictions/Precautions   Restrictions/Precautions Fall Risk   Subjective   Subjective Pt sitting EOB, would like to try to walk. Pain Assessment   Pain Assessment None - Denies Pain   Transfers   Sit to Stand Minimal Assistance   Stand to Sit Minimal Assistance   Ambulation   Device Rolling Walker   Assistance Minimal assistance   Quality of Gait vc's for increased step length and for safety awareness   Gait Deviations Slow Merle;Decreased step length;Shuffles;Decreased step height   Distance 15'   Short Term Goals   Time Frame for Short Term Goals 14 days   Short Term Goal 1 BED MOB MOD IND   Short Term Goal 2 TRANSFERS SUPERVISION   Short Term Goal 3 ' RW SUPERVISION   Conditions Requiring Skilled Therapeutic Intervention   Body Structures, Functions, Activity Limitations Requiring Skilled Therapeutic Intervention Decreased functional mobility ; Decreased ADL status; Decreased strength;Decreased balance   Assessment Pt able to amb short distance in room with rwx, unsteady overall and requires vc's for increased step length d/t shuffling gait. Pt up to recliner with all needs in reach. Activity Tolerance   Activity Tolerance Patient limited by endurance   PT Plan of Care   Monday X   Safety Devices   Type of Devices Call light within reach; Chair alarm in place;Gait belt;Left in chair         Electronically signed by Lloyd Falk PTA on 2/13/2023 at 12:08 PM

## 2023-02-13 NOTE — PROGRESS NOTES
Occupational Therapy  Facility/Department: [facility]  Occupational Therapy Initial Assessment    Name: Roberta Lopez  : 1945  MRN: [id number]  Date of Service: 2023    Discharge Recommendations:             Patient Diagnosis(es): The primary encounter diagnosis was Generalized weakness. A diagnosis of Altered mental status, unspecified altered mental status type was also pertinent to this visit. Past Medical History:  has a past medical history of CAD (coronary artery disease), Cerebral artery occlusion with cerebral infarction ([facility] [address] Hypertension, Thyroid disease, and TIA (transient ischemic attack). Past Surgical History:  has a past surgical history that includes Cardiac surgery; back surgery (); Appendectomy; and Breast reduction surgery. Treatment Diagnosis: Generalized weakness, AMS      Assessment   Performance deficits / Impairments: Decreased functional mobility ; Decreased ADL status; Decreased strength;Decreased endurance  Assessment: Will progress as tolerated  Treatment Diagnosis: Generalized weakness, AMS  Prognosis: Good  Decision Making: Low Complexity  Activity Tolerance  Activity Tolerance: Patient Tolerated treatment well        Plan         Restrictions  Restrictions/Precautions  Restrictions/Precautions: Fall Risk    Subjective   General  Chart Reviewed: Yes  Patient assessed for rehabilitation services?: Yes  Family / Caregiver Present: Yes  Diagnosis: Generalized weakness, AMS  General Comment  Comments: Son states pt. had been using walker at home until the middle of last week when she declined     Social/Functional History  Social/Functional History  Lives With: Spouse  Type of Home: House  Bathroom Shower/Tub: Walk-in shower  Bathroom Equipment: Shower chair  Home Equipment: Rollator, Electric scooter, [employer]  Has the patient had two or more falls in the past year or any fall with injury in the past year?: No  Receives Help From: Personal care attendant  ADL Assistance: Independent  Homemaking Assistance: Needs assistance  Homemaking Responsibilities: No  Ambulation Assistance: Independent  Transfer Assistance: Independent  Additional Comments: USED RW HOUSEHOLD DISTANCES AND ELECTRIC SCOOTER AS NEEDED, DECLINE IN LAST WEEK       Objective   Heart Rate: 75  BP: (!) 143/85  MAP (Calculated): 104  Resp: 18  SpO2: 95 %  O2 Device: None (Room air)             Safety Devices  Type of Devices: Call light within reach; Chair alarm in place;Gait belt;Left in chair        AROM: Within functional limits  Strength:  Within functional limits  ADL  Feeding: Independent  Grooming: Supervision  UE Bathing: Supervision  LE Bathing: Contact guard assistance  UE Dressing: Supervision  LE Dressing: Contact guard assistance  Toileting: Contact guard assistance  Additional Comments: CGA with standing ADLS     Activity Tolerance  Activity Tolerance: Patient limited by endurance     Transfers  Stand Step Transfers: Contact guard assistance  Sit to stand: Contact guard assistance  Vision  Vision: Within Functional Limits  Hearing  Hearing: Within functional limits  Cognition  Overall Cognitive Status: Exceptions  Memory: Decreased short term memory  Cognition Comment: ALERT, FOLLOWS COMMANDS, SOMEWHAT SLOW RESPONSES AT TIMES  Orientation  Overall Orientation Status: Impaired  Orientation Level: Oriented to person;Disoriented to time;Disoriented to place                                           G-Code     OutComes Score                                                  AM-PAC Score             Tinneti Score       Goals  Short Term Goals  Time Frame for Short Term Goals: 1 week  Short Term Goal 1: Supervision with toilet tfers  Short Term Goal 2: Supervision with clothing mgmt in standing  Short Term Goal 3: Supervision with light ambulatory ADLS with RW  Long Term Goals  Long Term Goal 1: Return to PLOF       Therapy Time   Individual Concurrent Group Co-treatment   Time In Time Out           Minutes                   Megan Saba, OT

## 2023-02-13 NOTE — PROGRESS NOTES
[address], [address]  [place], [place]  Phone [phone]     Neurology Progress Note  2023  Information:   Patient Name: María Ibrahim  :   1945  Age:   65 y.o. MRN:   [id number]  Account #:  [de-identified]  Admit Date:   2/10/2023  Today:  23     ADMIT DX:   Generalized weakness    Subjective:     María Ibrahim is a 65y.o. year old woman with a history of strokes who was admitted 2/10 with weakness after a fall. Interval History:   She has been up to the bathroom with assistance but no therapy since . She asks when she can go home. She has been listed at [facility].       Objective:     Past Medical History:  Past Medical History:   Diagnosis Date    CAD (coronary artery disease)     Cerebral artery occlusion with cerebral infarction ([address]     ,  (worst)    Hypertension     Thyroid disease     TIA (transient ischemic attack)        Past Surgical History:   Procedure Laterality Date    APPENDECTOMY      taken out while in [place]    BACK SURGERY      Lower Back midline, was a disk surgery    BREAST REDUCTION SURGERY      was done for back reasons    CARDIAC SURGERY      ~       Family History   Problem Relation Age of Onset    Asthma Mother     Heart Disease Mother     Asthma Father     Heart Attack Father     Heart Disease Father     No Known Problems Sister     No Known Problems Sister     No Known Problems Sister     Heart Attack Brother     Cancer Brother         thyroid    Thyroid Disease Brother     No Known Problems Brother     Hypothyroidism Son     No Known Problems Son     Gout Son     No Known Problems Son     No Known Problems Son     No Known Problems Son        Social History     Socioeconomic History    Marital status:      Spouse name: Mr. 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 Abigail Florence, her oldest son, [phone]    2.) Mr. Ashwini Guerrier, one of her sons, [phone]    3.) Mr. Selwyn Hernandez, her youngest sons, [phone]    AMBULATES: uses a walker at home, uses a wheelchair when out    DOMICILED: has stairs to enter her home and a ramp, no stairs inside, lives alone with her  (he is not strong enough to help her), has a dog     Social Determinants of Health     Financial Resource Strain: Not on file   Food Insecurity: Not on file   Transportation Needs: Not on file   Physical Activity: Not on file   Stress: Not on file   Social Connections: Not on file   Intimate Partner Violence: Not on file   Housing Stability: Not on file       Medications:   sodium chloride        vitamin C  1,000 mg Oral Daily    aspirin  325 mg Oral Daily    atorvastatin  80 mg Oral Nightly    carbidopa-levodopa  1 tablet Oral BID    cilostazol  100 mg Oral BID    folic acid  1 mg Oral Daily    levothyroxine  50 mcg Oral QAM AC    vitamin D  5,000 Units Oral Daily    melatonin  10 mg Oral Nightly    pantoprazole  40 mg Oral QAM AC    sodium chloride flush  5-40 mL IntraVENous 2 times per day    enoxaparin  30 mg SubCUTAneous Daily       Diagnostic Studies:  Reviewed all labs/diagnositcs since last 24hrs:  Recent Results (from the past 24 hour(s))   Basic Metabolic Panel w/ Reflex to MG    Collection Time: 02/13/23  2:14 AM   Result Value Ref Range    Sodium 141 136 - 145 mmol/L    Potassium reflex Magnesium 3.5 3.5 - 5.0 mmol/L    Chloride 111 98 - 111 mmol/L CO2 20 (L) 22 - 29 mmol/L    Anion Gap 10 7 - 19 mmol/L    Glucose 113 (H) 74 - 109 mg/dL    BUN 18 8 - 23 mg/dL    Creatinine 0.8 0.5 - 0.9 mg/dL    Est, Glom Filt Rate >60 >60    Calcium 8.7 (L) 8.8 - 10.2 mg/dL   CBC with Auto Differential    Collection Time: 02/13/23  2:14 AM   Result Value Ref Range    WBC 3.3 (L) 4.8 - 10.8 K/uL    RBC 3.56 (L) 4.20 - 5.40 M/uL    Hemoglobin 10.9 (L) 12.0 - 16.0 g/dL    Hematocrit 32.7 (L) 37.0 - 47.0 %    MCV 91.9 81.0 - 99.0 fL    MCH 30.6 27.0 - 31.0 pg    MCHC 33.3 33.0 - 37.0 g/dL    RDW 13.2 11.5 - 14.5 %    Platelets 018 628 - 430 K/uL    MPV 10.4 9.4 - 12.3 fL    Neutrophils % 54.2 50.0 - 65.0 %    Lymphocytes % 32.3 20.0 - 40.0 %    Monocytes % 8.7 0.0 - 10.0 %    Eosinophils % 3.9 0.0 - 5.0 %    Basophils % 0.9 0.0 - 1.0 %    Neutrophils Absolute 1.8 1.5 - 7.5 K/uL    Immature Granulocytes # 0.0 K/uL    Lymphocytes Absolute 1.1 1.1 - 4.5 K/uL    Monocytes Absolute 0.30 0.00 - 0.90 K/uL    Eosinophils Absolute 0.10 0.00 - 0.60 K/uL    Basophils Absolute 0.00 0.00 - 0.20 K/uL   Magnesium    Collection Time: 02/13/23  2:14 AM   Result Value Ref Range    Magnesium 2.2 1.6 - 2.4 mg/dL     Narrative   CLINICAL HISTORY: strokes   TECHNIQUE: Multiple contrast enhanced MRI images of the brain are acquired. If   there is ongoing concern for acute infarct, recommend brain MRI. 10 of   MultiHance was used. COMPARISON: Brain MRI from 11/13/2019, brain CT 11/13/2019   FINDINGS:   Redemonstration of a calcified falcine lesion at the vertex measuring 1.7 x 1.6   x 2.2 cm, compatible with known meningioma. Mild diffuse global parenchymal   atrophy. Extensive scattered T2 FLAIR white matter hyperintensities are   nonspecific, but commonly seen in the setting of chronic microvascular ischemic   disease. Tiny lacunar infarct in the right hussain and left cerebellum and right   periatrial white matter. T2 flow voids are intact. Scattered regions of   susceptibility artifact within the left cerebellum likely from hemosiderin   deposition. The diffusion weighted images demonstrate no evidence of acute infarct. No   abnormal enhancement is noted. There is no hydrocephalus, acute hemorrhage, mass   effect, midline shift, or extra axial fluid collection. Midline structures are   within normal limits. The paranasal sinuses are clear. Impression   1. No acute intracranial abnormality notified. 2.Stable calcified meningioma in the parafalcine region. 3.Other chronic and senescent changes detailed above. Diet:  ADULT DIET; Dysphagia - Soft and Bite Sized; 4 carb choices (60 gm/meal); Low Fat/Low Chol/High Fiber/2 gm Na; Low Sodium (2 gm)    Examination:  Vitals: BP (!) 143/85   Pulse 75   Temp 98.6 °F (37 °C)   Resp 18   Ht 5' 3\" (1.6 m)   Wt 110 lb (49.9 kg)   SpO2 95%   BMI 19.49 kg/m²    Intake/Output Summary (Last 24 hours) at 2/13/2023 1305  Last data filed at 2/13/2023 1036  Gross per 24 hour   Intake 120 ml   Output --   Net 120 ml     General appearance:  She is an elderly thin woman who is cooperative and in no distress but does have an odd affect  Skin: Skin color, texture, turgor normal. No rashes or lesions  HEENT: Head: Normal, normocephalic, atraumatic. Neck:no adenopathy, no carotid bruit, no JVD, supple, symmetrical, trachea midline, and thyroid not enlarged, symmetric, no tenderness/mass/nodules  Lungs: clear to auscultation bilaterally  Heart: regular rate and rhythm, S1, S2 normal, no murmur, click, rub or gallop  Extremities: extremities normal, atraumatic, no cyanosis or edema  Neurologic:  Alert, oriented. No dysarthria. Speech is fluent. EOMI, PERRL, VFF. No facial weakness. Limb strength is grossly normal.  No pronator drift. Rapid alternating movements are normal.  Finger to nose testing shows no dysmetria. Assessment:   1. Neurodegenerative dementia with gait impairment and cognitive impairment consistent with Alzheimer's disease.   2. Stable meningioma    Plan:   1. PT/OT  2. The use of Aricept or Namenda is an option. Not sure what we are to gain from it as it appears she is headed to [facility] currently. 3. Signing off. Neurology follow up PRN. Discharge planning:   Skilled nursing facility    I spent 35 total minutes in face to face interaction with patient and coordination of care.    I spent > 50% of the total time discussing the diagnoses, evaluation, test results, treatment options, plans; counseling and advising with the patient and/or family and/or caregiver as well as with the care team.    Electronically signed by Walker Jimenez M.D.

## 2023-02-13 NOTE — PROGRESS NOTES
[id number] [facility]      Patient:  Yesy Cid  YOB: 1945  Date of Service: 2/13/2023  MRN: [id number]   Acct: [de-identified]   Primary Care Physician: PEDRO Mendes  Advance Directive: Full Code  Admit Date: 2/10/2023       Hospital Day: 2  Portions of this note have been copied forward, however, changed to reflect the most current clinical status of this patient. CHIEF COMPLAINT weakness and confusion    SUBJECTIVE:  Patient sitting up in chair with son at bedside. Anxious \"to go home. \" Reports she was able to walk a small distance with pt today. CUMULATIVE HOSPITAL COURSE:  The patient is a 59-year-old female with past medical history of CVA, CAD, hypertension, and thyroid disease who presented to [address] [place] [address] ED with complaints of confusion and weakness. Family indicated CVA 2019 in which patient has had continued deficit. Family indicates patient fell on Wednesday before admission and was found hours later on the floor. Patient refused to be evaluated at that time. Family indicated worsening issues with memory and weakness. CT head in ED unremarkable. UA unremarkable. Patient was admitted to the hospitalist service and neurology was consulted. Symptoms and exam suggestive of dementia. MRI no acute findings. PT/OT indicates need for SNF at discharge. Patient has been accepted to skilled facility tomorrow. Review of Systems:   Review of Systems   Constitutional:  Positive for fatigue. Negative for chills and fever. HENT:  Negative for sore throat and trouble swallowing. Respiratory:  Negative for chest tightness and shortness of breath. Cardiovascular:  Negative for chest pain, palpitations and leg swelling. Gastrointestinal:  Negative for abdominal pain, constipation, diarrhea, nausea and vomiting. Genitourinary:  Negative for difficulty urinating, dysuria and hematuria. Musculoskeletal:  Positive for gait problem. Negative for back pain and myalgias.    Skin:  Negative for color change and wound. Neurological:  Positive for weakness (\"generalized\"). Negative for dizziness and speech difficulty. Psychiatric/Behavioral:  Positive for confusion. Negative for agitation. 14 point review of systems is negative except as specifically addressed above. Objective:   VITALS:  BP (!) 143/85   Pulse 75   Temp 98.6 °F (37 °C)   Resp 18   Ht 5' 3\" (1.6 m)   Wt 110 lb (49.9 kg)   SpO2 95%   BMI 19.49 kg/m²   24HR INTAKE/OUTPUT:    Intake/Output Summary (Last 24 hours) at 2/13/2023 1437  Last data filed at 2/13/2023 1036  Gross per 24 hour   Intake 120 ml   Output --   Net 120 ml       Physical Exam  Vitals reviewed. Constitutional:       Appearance: She is not ill-appearing. HENT:      Mouth/Throat:      Mouth: Mucous membranes are moist.      Pharynx: Oropharynx is clear. Eyes:      Pupils: Pupils are equal, round, and reactive to light. Cardiovascular:      Rate and Rhythm: Normal rate and regular rhythm. Pulses: Normal pulses. Heart sounds: Normal heart sounds. Pulmonary:      Effort: Pulmonary effort is normal.      Breath sounds: Normal breath sounds. Abdominal:      General: Abdomen is flat. Bowel sounds are normal. There is no distension. Palpations: Abdomen is soft. Tenderness: There is no abdominal tenderness. There is no guarding. Musculoskeletal:         General: Normal range of motion. Right lower leg: No edema. Left lower leg: No edema. Skin:     General: Skin is warm and dry. Capillary Refill: Capillary refill takes less than 2 seconds. Neurological:      Mental Status: She is alert and oriented to person, place, and time. Comments: Requires frequent reorientation and asks the same question multiple times.            Medications:      sodium chloride        vitamin C  1,000 mg Oral Daily    aspirin  325 mg Oral Daily    atorvastatin  80 mg Oral Nightly    carbidopa-levodopa  1 tablet Oral BID    cilostazol 100 mg Oral BID    folic acid  1 mg Oral Daily    levothyroxine  50 mcg Oral QAM AC    vitamin D  5,000 Units Oral Daily    melatonin  10 mg Oral Nightly    pantoprazole  40 mg Oral QAM AC    sodium chloride flush  5-40 mL IntraVENous 2 times per day    enoxaparin  30 mg SubCUTAneous Daily     melatonin, polyethylene glycol, calcium carbonate, nitroGLYCERIN, labetalol, potassium chloride **OR** potassium alternative oral replacement **OR** potassium chloride, sodium chloride flush, sodium chloride, LORazepam, ondansetron **OR** ondansetron, acetaminophen **OR** acetaminophen  ADULT DIET; Dysphagia - Soft and Bite Sized; 4 carb choices (60 gm/meal); Low Fat/Low Chol/High Fiber/2 gm Na; Low Sodium (2 gm)     Lab and other Data:     Recent Labs     02/11/23  0357 02/12/23  0309 02/13/23  0214   WBC 5.3 3.5* 3.3*   HGB 11.9* 11.4* 10.9*    190 190     Recent Labs     02/11/23  0357 02/12/23  0309 02/13/23  0214    139 141   K 3.2* 3.7 3.5    108 111   CO2 21* 22 20*   BUN 23 22 18   CREATININE 0.8 0.9 0.8   GLUCOSE 108 105 113*     Recent Labs     02/10/23  1620   AST 24   ALT 10   BILITOT 0.9   ALKPHOS 84     Troponin T:   Recent Labs     02/10/23  1620   TROPONINI <0.01     Pro-BNP: No results for input(s): BNP in the last 72 hours. INR: No results for input(s): INR in the last 72 hours. UA:  Recent Labs     02/10/23  1858   COLORU YELLOW   PHUR 5.5   CLARITYU CLOUDY*   SPECGRAV 1.028   LEUKOCYTESUR Negative   UROBILINOGEN 0.2   BILIRUBINUR Negative   BLOODU Negative   GLUCOSEU Negative     A1C:   Recent Labs     02/10/23  1818   LABA1C 5.3     ABG:No results for input(s): PHART, ZCT7DOS, PO2ART, QCG3BEN, BEART, HGBAE, T4KWQGOA, CARBOXHGBART in the last 72 hours. RAD:   XR KNEE LEFT (1-2 VIEWS)    Result Date: 2/10/2023  No radiographic evidence of acute fracture or dislocation. Osteopenia. Advanced patellofemoral osteophytic changes. Moderate left suprapatellar effusion.     CT Head W/O Contrast    Result Date: 2/10/2023  No acute intracranial process evident. Age-related involutional changes and changes of chronic microvascular ischemia. Calcified falcine meningioma measuring 2.1 x 2.1 x 2.5 cm. XR CHEST PORTABLE    Result Date: 2/10/2023  No radiographic evidence of acute cardiopulmonary process. MRI BRAIN W WO CONTRAST    Result Date: 2/12/2023  1. No acute intracranial abnormality notified. 2.Stable calcified meningioma in the parafalcine region. 3.Other chronic and senescent changes detailed above.       Assessment/Plan   Principal Problem:    Generalized weakness / Fatigue / Memory changes / Weakness   -neurology consulted and has now signed off   -MRI brain no acute findings   -CT head chronic microvascular ischemia, no acute findings   -pt/ot   -SLP   -social work assisting in [address], [facility]    Active Problems:    History of CVA   -noted   -continue pletal    Hypothyroid   -continue synthroid     DVT Prophylaxis:Lovenox    Discharge dispo: DC to [facility] tomorrow    PEDRO Rachel - CNP, 2/13/2023 2:37 PM

## 2023-02-14 VITALS
OXYGEN SATURATION: 90 % | BODY MASS INDEX: 19.49 KG/M2 | RESPIRATION RATE: 18 BRPM | WEIGHT: 110 LBS | SYSTOLIC BLOOD PRESSURE: 136 MMHG | TEMPERATURE: 97 F | HEART RATE: 87 BPM | DIASTOLIC BLOOD PRESSURE: 94 MMHG | HEIGHT: 63 IN

## 2023-02-14 LAB
ANION GAP SERPL CALCULATED.3IONS-SCNC: 9 MMOL/L (ref 7–19)
BASOPHILS ABSOLUTE: 0 K/UL (ref 0–0.2)
BASOPHILS RELATIVE PERCENT: 0.6 % (ref 0–1)
BUN BLDV-MCNC: 16 MG/DL (ref 8–23)
CALCIUM SERPL-MCNC: 8.8 MG/DL (ref 8.8–10.2)
CHLORIDE BLD-SCNC: 107 MMOL/L (ref 98–111)
CO2: 23 MMOL/L (ref 22–29)
CREAT SERPL-MCNC: 0.8 MG/DL (ref 0.5–0.9)
EOSINOPHILS ABSOLUTE: 0.1 K/UL (ref 0–0.6)
EOSINOPHILS RELATIVE PERCENT: 2.4 % (ref 0–5)
GFR SERPL CREATININE-BSD FRML MDRD: >60 ML/MIN/{1.73_M2}
GLUCOSE BLD-MCNC: 111 MG/DL (ref 74–109)
HCT VFR BLD CALC: 35.3 % (ref 37–47)
HEMOGLOBIN: 11.6 G/DL (ref 12–16)
IMMATURE GRANULOCYTES #: 0 K/UL
LYMPHOCYTES ABSOLUTE: 1.2 K/UL (ref 1.1–4.5)
LYMPHOCYTES RELATIVE PERCENT: 23.4 % (ref 20–40)
MAGNESIUM: 2 MG/DL (ref 1.6–2.4)
MCH RBC QN AUTO: 30.1 PG (ref 27–31)
MCHC RBC AUTO-ENTMCNC: 32.9 G/DL (ref 33–37)
MCV RBC AUTO: 91.7 FL (ref 81–99)
MONOCYTES ABSOLUTE: 0.3 K/UL (ref 0–0.9)
MONOCYTES RELATIVE PERCENT: 6.7 % (ref 0–10)
NEUTROPHILS ABSOLUTE: 3.4 K/UL (ref 1.5–7.5)
NEUTROPHILS RELATIVE PERCENT: 66.7 % (ref 50–65)
PDW BLD-RTO: 13.2 % (ref 11.5–14.5)
PLATELET # BLD: 200 K/UL (ref 130–400)
PMV BLD AUTO: 10.6 FL (ref 9.4–12.3)
POTASSIUM REFLEX MAGNESIUM: 3.4 MMOL/L (ref 3.5–5)
RBC # BLD: 3.85 M/UL (ref 4.2–5.4)
SARS-COV-2, NAAT: NOT DETECTED
SODIUM BLD-SCNC: 139 MMOL/L (ref 136–145)
WBC # BLD: 5.1 K/UL (ref 4.8–10.8)

## 2023-02-14 PROCEDURE — 6370000000 HC RX 637 (ALT 250 FOR IP)

## 2023-02-14 PROCEDURE — 83735 ASSAY OF MAGNESIUM: CPT

## 2023-02-14 PROCEDURE — 6370000000 HC RX 637 (ALT 250 FOR IP): Performed by: HOSPITALIST

## 2023-02-14 PROCEDURE — 6360000002 HC RX W HCPCS: Performed by: HOSPITALIST

## 2023-02-14 PROCEDURE — 36415 COLL VENOUS BLD VENIPUNCTURE: CPT

## 2023-02-14 PROCEDURE — 2580000003 HC RX 258: Performed by: HOSPITALIST

## 2023-02-14 PROCEDURE — 80048 BASIC METABOLIC PNL TOTAL CA: CPT

## 2023-02-14 PROCEDURE — 94760 N-INVAS EAR/PLS OXIMETRY 1: CPT

## 2023-02-14 PROCEDURE — 85025 COMPLETE CBC W/AUTO DIFF WBC: CPT

## 2023-02-14 PROCEDURE — 87635 SARS-COV-2 COVID-19 AMP PRB: CPT

## 2023-02-14 RX ORDER — POTASSIUM CHLORIDE 20 MEQ/1
20 TABLET, EXTENDED RELEASE ORAL 2 TIMES DAILY WITH MEALS
Qty: 60 TABLET | Refills: 3 | Status: SHIPPED | OUTPATIENT
Start: 2023-02-14

## 2023-02-14 RX ORDER — MECOBALAMIN 5000 MCG
10 TABLET,DISINTEGRATING ORAL NIGHTLY
Qty: 60 TABLET | Refills: 0 | Status: SHIPPED | OUTPATIENT
Start: 2023-02-14

## 2023-02-14 RX ADMIN — CILOSTAZOL 100 MG: 100 TABLET ORAL at 07:45

## 2023-02-14 RX ADMIN — FOLIC ACID 1 MG: 1 TABLET ORAL at 07:46

## 2023-02-14 RX ADMIN — ASPIRIN 325 MG: 325 TABLET, COATED ORAL at 07:46

## 2023-02-14 RX ADMIN — Medication 5000 UNITS: at 07:45

## 2023-02-14 RX ADMIN — ENOXAPARIN SODIUM 30 MG: 100 INJECTION SUBCUTANEOUS at 07:46

## 2023-02-14 RX ADMIN — SODIUM CHLORIDE, PRESERVATIVE FREE 10 ML: 5 INJECTION INTRAVENOUS at 07:45

## 2023-02-14 RX ADMIN — PANTOPRAZOLE SODIUM 40 MG: 40 TABLET, DELAYED RELEASE ORAL at 05:18

## 2023-02-14 RX ADMIN — OXYCODONE HYDROCHLORIDE AND ACETAMINOPHEN 1000 MG: 500 TABLET ORAL at 07:46

## 2023-02-14 RX ADMIN — CARBIDOPA AND LEVODOPA 1 TABLET: 25; 100 TABLET ORAL at 07:45

## 2023-02-14 RX ADMIN — LEVOTHYROXINE SODIUM 50 MCG: 50 TABLET ORAL at 05:18

## 2023-02-14 RX ADMIN — POTASSIUM BICARBONATE 40 MEQ: 782 TABLET, EFFERVESCENT ORAL at 10:05

## 2023-02-14 NOTE — CARE COORDINATION
2nd IMM Letter given to patient. Reviewed, discussed, answered questions, and signed by patient. Declined to stay 4 hours prior to discharge. Signed copy placed in patient's chart.      02/14/23 0918   IMM Letter   IMM Letter given to Patient/Family/Significant other/Guardian/POA/by: given to patient by Frederic Jurado RN BSN    IMM Letter date given: 02/14/23   IMM Letter time given: 0905   Electronically signed by Carrie Jin RN, BSN on 2/14/2023 at 9:19 AM

## 2023-02-14 NOTE — DISCHARGE SUMMARY
oRberta Lopez  :  1945  MRN:  [id number]    Admit date:  2/10/2023  Discharge date:  2023    Discharging Physician:  Dr. Moses Sesay Directive: Full Code    Consults: Yelena Larson TO CASE MANAGEMENT     Primary Care Physician:  PEDRO Paris    Discharge Diagnoses:  Principal Problem:    Generalized weakness  Active Problems:    Fatigue    Memory changes    Weakness  Resolved Problems:    * No resolved hospital problems. *      Portions of this note have been copied forward, however, changed to reflect the most current clinical status of this patient. Hospital Course: The patient is a 50-year-old female with past medical history of CVA, CAD, hypertension, and thyroid disease who presented to [facility] ED with complaints of confusion and weakness. Family indicated CVA 2019 in which patient has had continued deficit. Family indicates patient fell on Wednesday before admission and was found hours later on the floor. Patient refused to be evaluated at that time. Family indicated worsening issues with memory and weakness. CT head in ED unremarkable. UA unremarkable. Patient was admitted to the hospitalist service and neurology was consulted. Symptoms and exam suggestive of dementia. MRI brain no acute findings. Patient will require 24 hour supervision at discharge. Social work assisted in [address] and patient has been accepted to skilled nursing facility. Patient is now medically stable for discharge to SNF. Significant Diagnostic Studies:   XR KNEE LEFT (1-2 VIEWS)    Result Date: 2/10/2023  Exam: Fall, swelling Technique: 2 views of the left knee were obtained. Clinical information: Fall, swelling Comparison: None available. Findings: There is no radiographic evidence of acute fracture or dislocation. Advanced patellofemoral osteoarthritic changes are visualized. There is mild narrowing of the medial and lateral compartments. Moderate-sized left suprapatellar effusion is visualized. Osseous structures are osteopenic. Soft tissues appear within normal limits. No radiographic evidence of acute fracture or dislocation. Osteopenia. Advanced patellofemoral osteophytic changes. Moderate left suprapatellar effusion. CT Head W/O Contrast    Result Date: 2/10/2023  Exam: Noncontrast CT of the brain Technique: Axial noncontrast images were obtained from the skull base to the vertex. Coronal and sagittal reconstructions were performed. Radiation dose optimization technique was utilized. Clinical information: Weak, fall Comparison: Noncontrast CT of the brain performed on April 2, 2022 Findings: No evidence of acute hemorrhage, intra-axial mass, mass effect, or midline shift. Calcified falcine lesion is visualized at the vertex measuring 2.1 x 2.1 x 2.5 cm in maximal AP, transverse, and craniocaudal dimension is compatible with meningioma. No adjacent edema is visualized. The ventricular system and basal cisterns are within normal limits for the patients age. Age-related involutional changes are visualized. Bilateral periventricular white matter hypodensities are visualized compatible with changes of chronic microvessel ischemia. Gray-white differentiation is maintained. There is no evidence of an intra-axial or extra-axial fluid collection. Bone windows reveal no evidence of acute fracture. The visualized paranasal sinuses are within normal limits. The orbits are grossly unremarkable. No acute intracranial process evident. Age-related involutional changes and changes of chronic microvascular ischemia. Calcified falcine meningioma measuring 2.1 x 2.1 x 2.5 cm. XR CHEST PORTABLE    Result Date: 2/10/2023  Exam: Portable chest radiograph. Technique: Single AP view of the chest was obtained. Clinical information: Fall, weakness Comparison: None available. Findings: The lungs are clear without focal infiltrate or effusion. The patients Po sternotomy and CABG. Atheromatous calcifications of the thoracic aorta are visualized. The heart is normal in size. There are no acute osseous abnormalities. No radiographic evidence of acute cardiopulmonary process. Pertinent Labs:   CBC:   Recent Labs     02/12/23  0309 02/13/23 0214 02/14/23  0256   WBC 3.5* 3.3* 5.1   HGB 11.4* 10.9* 11.6*    190 200     BMP:    Recent Labs     02/12/23  0309 02/13/23  0214 02/14/23  0256    141 139   K 3.7 3.5 3.4*    111 107   CO2 22 20* 23   BUN 22 18 16   CREATININE 0.9 0.8 0.8   GLUCOSE 105 113* 111*     INR: No results for input(s): INR in the last 72 hours. Physical Exam:  Vital Signs: /89   Pulse 83   Temp 98.1 °F (36.7 °C)   Resp 16   Ht 5' 3\" (1.6 m)   Wt 110 lb (49.9 kg)   SpO2 94%   BMI 19.49 kg/m²   Physical Exam  Vitals reviewed. Constitutional:       Appearance: She is not ill-appearing. HENT:      Mouth/Throat:      Mouth: Mucous membranes are moist.      Pharynx: Oropharynx is clear. Eyes:      Pupils: Pupils are equal, round, and reactive to light. Cardiovascular:      Rate and Rhythm: Normal rate and regular rhythm. Pulses: Normal pulses. Heart sounds: Normal heart sounds. Pulmonary:      Effort: Pulmonary effort is normal.      Breath sounds: Normal breath sounds. Abdominal:      General: Abdomen is flat. Bowel sounds are normal. There is no distension. Palpations: Abdomen is soft. Tenderness: There is no abdominal tenderness. There is no guarding. Musculoskeletal:         General: Normal range of motion. Right lower leg: No edema. Left lower leg: No edema. Skin:     General: Skin is warm and dry. Capillary Refill: Capillary refill takes less than 2 seconds. Neurological:      Mental Status: She is alert and oriented to person, place, and time. Comments: Requires frequent reorientation and asks the same question multiple times.        Discharge Medications:         Medication List        START taking these medications      melatonin 5 MG Tbdp disintegrating tablet  Take 2 tablets by mouth nightly  Replaces: zolpidem 10 MG tablet            CHANGE how you take these medications      potassium chloride 20 MEQ extended release tablet  Commonly known as: KLOR-CON M  Take 1 tablet by mouth 2 times daily (with meals)  What changed: when to take this            CONTINUE taking these medications      amLODIPine 10 MG tablet  Commonly known as: NORVASC     aspirin 325 MG EC tablet  TAKE 1 TABLET BY MOUTH EVERY DAY     atorvastatin 80 MG tablet  Commonly known as: LIPITOR  Take 1 tablet by mouth nightly     carbidopa-levodopa  MG per tablet  Commonly known as: SINEMET  TAKE 1 TABLET BY MOUTH TWICE DAILY     cilostazol 100 MG tablet  Commonly known as: PLETAL  TAKE 1 TABLET BY MOUTH TWICE DAILY     folic acid 1 MG tablet  Commonly known as: FOLVITE  Take 1 tablet by mouth daily     levothyroxine 50 MCG tablet  Commonly known as: SYNTHROID  Take 1 tablet by mouth Daily     lisinopril 20 MG tablet  Commonly known as: PRINIVIL;ZESTRIL     nitroGLYCERIN 0.4 MG SL tablet  Commonly known as: NITROSTAT  up to max of 3 total doses. If no relief after 1 dose, call 911.      pantoprazole 40 MG tablet  Commonly known as: PROTONIX  Take 1 tablet by mouth every morning (before breakfast)     vitamin C 1000 MG tablet     Vitamin D (Cholecalciferol) 10 MCG (400 UNIT) Chew  5000 units daily -ok for pharmacy to use whatever dose and number of pills to achieve 5000 units daily            STOP taking these medications      cloNIDine 0.1 MG tablet  Commonly known as: CATAPRES     potassium chloride 10 MEQ extended release tablet  Commonly known as: KLOR-CON     zolpidem 10 MG tablet  Commonly known as: AMBIEN  Replaced by: melatonin 5 MG Tbdp disintegrating tablet               Where to Get Your Medications        [facility]      Phone: [phone]   melatonin 5 MG Tbdp disintegrating tablet  potassium chloride 20 MEQ extended release tablet         Discharge Instructions: Follow up with PEDRO Kellogg in 5-7 days. Take medications as directed. Resume activity as tolerated. Diet: ADULT DIET; Dysphagia - Soft and Bite Sized; 4 carb choices (60 gm/meal); Low Fat/Low Chol/High Fiber/2 gm Na; Low Sodium (2 gm)     Disposition: Patient is Stableand will be discharged to [address] [place] [address]. Time spent on discharge 31 minutes spent in assessing patient, reviewing medications, discussion with nursing, confirming safe discharge plan and preparation of discharge summary.     Signed:  PEDRO Monk - CNP, 2/14/2023 7:11 AM

## 2023-02-14 NOTE — DISCHARGE INSTR - DIET
Good nutrition is important when healing from an illness, injury, or surgery. Follow any nutrition recommendations given to you during your hospital stay. If you were given an oral nutrition supplement while in the hospital, continue to take this supplement at home. You can take it with meals, in-between meals, and/or before bedtime. These supplements can be purchased at most local grocery stores, pharmacies, and chain [employer]-stores. If you have any questions about your diet or nutrition, call the hospital and ask for the dietitian. ADULT DIET; Dysphagia - Soft and Bite Sized; 4 carb choices (60 gm/meal); Low Fat/Low Chol/High Fiber/2 gm Na;  Low Sodium (2 gm)

## 2023-02-14 NOTE — PROGRESS NOTES
Report called to Amado Moore at [facility]. Iv removed. All belongings sent with pt and pt son. [facility]. NAD noted, vss. AVS/Facility Discharge packet given to pt son to give to [facility].

## 2023-02-14 NOTE — PLAN OF CARE
Problem: Discharge Planning  Goal: Discharge to home or other facility with appropriate resources  2/14/2023 1103 by Keira De Dios RN  Outcome: Adequate for Discharge  Flowsheets (Taken 2/14/2023 0746)  Discharge to home or other facility with appropriate resources: Identify barriers to discharge with patient and caregiver  2/13/2023 2312 by JATIN Washington  Outcome: Progressing     Problem: Skin/Tissue Integrity  Goal: Absence of new skin breakdown  Description: 1. Monitor for areas of redness and/or skin breakdown  2. Assess vascular access sites hourly  3. Every 4-6 hours minimum:  Change oxygen saturation probe site  4. Every 4-6 hours:  If on nasal continuous positive airway pressure, respiratory therapy assess nares and determine need for appliance change or resting period.   2/14/2023 1103 by Keira De Dios RN  Outcome: Adequate for Discharge  2/13/2023 2312 by JATIN Washington  Outcome: Progressing     Problem: Safety - Adult  Goal: Free from fall injury  2/14/2023 1103 by Keira De Dios RN  Outcome: Adequate for Discharge  2/13/2023 2312 by JATIN Washington  Outcome: Progressing     Problem: Pain  Goal: Verbalizes/displays adequate comfort level or baseline comfort level  2/14/2023 1103 by Keira De Dios RN  Outcome: Adequate for Discharge  2/13/2023 2312 by JATIN Washington  Outcome: Progressing     Problem: Chronic Conditions and Co-morbidities  Goal: Patient's chronic conditions and co-morbidity symptoms are monitored and maintained or improved  2/14/2023 1103 by Keira De Dios RN  Outcome: Adequate for Discharge  Flowsheets (Taken 2/14/2023 0746)  Care Plan - Patient's Chronic Conditions and Co-Morbidity Symptoms are Monitored and Maintained or Improved: Monitor and assess patient's chronic conditions and comorbid symptoms for stability, deterioration, or improvement  2/13/2023 2312 by JATIN Washington  Outcome: Progressing

## 2023-02-15 NOTE — PROGRESS NOTES
Physician Progress Note      Mick Alaniz  CSN #:                  [id number]  :                       1945  ADMIT DATE:       2/10/2023 3:32 PM  Patricia Barrera [place] DATE:        2023 2:42 PM  RESPONDING  PROVIDER #:        Daniel Herrera MD          QUERY TEXT:    Pt admitted with weakness and confusion. 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 \" If possible, please document in progress   notes and discharge summary the relationship, if any, between   Neurodegenerative dementia or Alzheimer's and weakness and confusion. The medical record reflects the following:  Risk Factors: Advanced age with chronic illness  Clinical Indicators: Weakness, confusion, gait impairment, falls, cognitive   impairment. CT Head, Age-related involutional changes and changes of chronic   microvascular ischemia. Treatment: Neurology consult, PT, OT, Speech, CT Head. Options provided:  -- Weakness and confusion due to neurogenerative dementia  -- Weakness and confusion due to Alzheimer's  -- Other - I will add my own diagnosis  -- Disagree - Not applicable / Not valid  -- Disagree - Clinically unable to determine / Unknown  -- Refer to Clinical Documentation Reviewer    PROVIDER RESPONSE TEXT:    This patient has weakness and confusion due to Alzheimer's.     Query created by: Elpidio Reno on 2023 3:48 PM      Electronically signed by:  Daniel Herrera MD 2/15/2023 3:24 PM

## (undated) DEVICE — PREP SOL POVIDONE/IODINE BT 4OZ

## (undated) DEVICE — TBG SMPL FLTR LINE NASL 02/C02 A/ BX/100

## (undated) DEVICE — ENDOGATOR AUXILIARY WATER JET CONNECTOR: Brand: ENDOGATOR

## (undated) DEVICE — PK TURNOVER RM ADV

## (undated) DEVICE — Device: Brand: DEFENDO AIR/WATER/SUCTION AND BIOPSY VALVE

## (undated) DEVICE — SPNG GZ WOVN 4X4IN 12PLY 10/BX STRL

## (undated) DEVICE — THE SINGLE USE ETRAP – POLYP TRAP IS USED FOR SUCTION RETRIEVAL OF ENDOSCOPICALLY REMOVED POLYPS.: Brand: ETRAP

## (undated) DEVICE — GLV SURG BIOGEL M LTX PF 7 1/2

## (undated) DEVICE — THE CHANNEL CLEANING BRUSH IS A NYLON FLEXI BRUSH ATTACHED TO A FLEXIBLE PLASTIC SHEATH DESIGNED TO SAFELY REMOVE DEBRIS FROM FLEXIBLE ENDOSCOPES.

## (undated) DEVICE — SUT ETHLN 6/0 P3 18IN 1698G

## (undated) DEVICE — PK ENT HD AND NK 30

## (undated) DEVICE — SUT ETHLN 5/0 P3 18IN 698H

## (undated) DEVICE — SENSR O2 OXIMAX FNGR A/ 18IN NONSTR

## (undated) DEVICE — SUT VIC 4/0 P3 18IN UD VCP494H

## (undated) DEVICE — MSK O2 MD CONCENTR A/ LF 7FT 1P/U

## (undated) DEVICE — SNAR POLYP SENSATION JUMBO OVL 30 240X5